# Patient Record
Sex: FEMALE | Race: WHITE | NOT HISPANIC OR LATINO | Employment: OTHER | URBAN - NONMETROPOLITAN AREA
[De-identification: names, ages, dates, MRNs, and addresses within clinical notes are randomized per-mention and may not be internally consistent; named-entity substitution may affect disease eponyms.]

---

## 2017-01-19 ENCOUNTER — HOSPITAL ENCOUNTER (OUTPATIENT)
Dept: RADIOLOGY | Facility: HOSPITAL | Age: 77
Discharge: HOME | End: 2017-01-19
Attending: INTERNAL MEDICINE
Payer: MEDICARE

## 2017-01-19 ENCOUNTER — TRANSCRIBE ORDERS (OUTPATIENT)
Dept: ADMINISTRATIVE | Facility: HOSPITAL | Age: 77
End: 2017-01-19

## 2017-01-19 DIAGNOSIS — M25.552 PAIN IN LEFT HIP: ICD-10-CM

## 2017-01-19 PROCEDURE — 72110 X-RAY EXAM L-2 SPINE 4/>VWS: CPT

## 2017-01-19 PROCEDURE — 73502 X-RAY EXAM HIP UNI 2-3 VIEWS: CPT

## 2017-01-24 ENCOUNTER — ALLSCRIPTS OFFICE VISIT (OUTPATIENT)
Dept: OTHER | Facility: OTHER | Age: 77
End: 2017-01-24

## 2017-05-11 ENCOUNTER — ALLSCRIPTS OFFICE VISIT (OUTPATIENT)
Dept: OTHER | Facility: OTHER | Age: 77
End: 2017-05-11

## 2017-05-11 DIAGNOSIS — I10 ESSENTIAL (PRIMARY) HYPERTENSION: ICD-10-CM

## 2017-05-11 DIAGNOSIS — R09.89 OTHER SPECIFIED SYMPTOMS AND SIGNS INVOLVING THE CIRCULATORY AND RESPIRATORY SYSTEMS: ICD-10-CM

## 2017-05-11 DIAGNOSIS — E03.9 HYPOTHYROIDISM: ICD-10-CM

## 2017-05-11 DIAGNOSIS — E78.5 HYPERLIPIDEMIA: ICD-10-CM

## 2017-05-11 DIAGNOSIS — R53.83 OTHER FATIGUE: ICD-10-CM

## 2017-05-27 ENCOUNTER — APPOINTMENT (OUTPATIENT)
Dept: LAB | Facility: HOSPITAL | Age: 77
End: 2017-05-27
Attending: INTERNAL MEDICINE
Payer: MEDICARE

## 2017-05-27 ENCOUNTER — TRANSCRIBE ORDERS (OUTPATIENT)
Dept: ADMINISTRATIVE | Facility: HOSPITAL | Age: 77
End: 2017-05-27

## 2017-05-27 DIAGNOSIS — R53.83 OTHER FATIGUE: ICD-10-CM

## 2017-05-27 DIAGNOSIS — E78.5 HYPERLIPIDEMIA: ICD-10-CM

## 2017-05-27 DIAGNOSIS — I10 ESSENTIAL (PRIMARY) HYPERTENSION: ICD-10-CM

## 2017-05-27 DIAGNOSIS — R09.89 OTHER SPECIFIED SYMPTOMS AND SIGNS INVOLVING THE CIRCULATORY AND RESPIRATORY SYSTEMS: ICD-10-CM

## 2017-05-27 LAB
ALBUMIN SERPL BCP-MCNC: 3.8 G/DL (ref 3.5–5)
ALP SERPL-CCNC: 76 U/L (ref 46–116)
ALT SERPL W P-5'-P-CCNC: 35 U/L (ref 12–78)
ANION GAP SERPL CALCULATED.3IONS-SCNC: 7 MMOL/L (ref 4–13)
AST SERPL W P-5'-P-CCNC: 31 U/L (ref 5–45)
BASOPHILS # BLD AUTO: 0.03 THOUSANDS/ΜL (ref 0–0.1)
BASOPHILS NFR BLD AUTO: 1 % (ref 0–1)
BILIRUB SERPL-MCNC: 0.4 MG/DL (ref 0.2–1)
BUN SERPL-MCNC: 25 MG/DL (ref 5–25)
CALCIUM SERPL-MCNC: 9 MG/DL (ref 8.3–10.1)
CHLORIDE SERPL-SCNC: 101 MMOL/L (ref 100–108)
CHOLEST SERPL-MCNC: 177 MG/DL (ref 50–200)
CO2 SERPL-SCNC: 29 MMOL/L (ref 21–32)
CREAT SERPL-MCNC: 0.86 MG/DL (ref 0.6–1.3)
EOSINOPHIL # BLD AUTO: 0.17 THOUSAND/ΜL (ref 0–0.61)
EOSINOPHIL NFR BLD AUTO: 4 % (ref 0–6)
ERYTHROCYTE [DISTWIDTH] IN BLOOD BY AUTOMATED COUNT: 13.2 % (ref 11.6–15.1)
GFR SERPL CREATININE-BSD FRML MDRD: >60 ML/MIN/1.73SQ M
GLUCOSE P FAST SERPL-MCNC: 108 MG/DL (ref 65–99)
HCT VFR BLD AUTO: 41.4 % (ref 34.8–46.1)
HDLC SERPL-MCNC: 41 MG/DL (ref 40–60)
HGB BLD-MCNC: 13.5 G/DL (ref 11.5–15.4)
LDLC SERPL CALC-MCNC: 79 MG/DL (ref 0–100)
LYMPHOCYTES # BLD AUTO: 1.58 THOUSANDS/ΜL (ref 0.6–4.47)
LYMPHOCYTES NFR BLD AUTO: 36 % (ref 14–44)
MCH RBC QN AUTO: 30.6 PG (ref 26.8–34.3)
MCHC RBC AUTO-ENTMCNC: 32.6 G/DL (ref 31.4–37.4)
MCV RBC AUTO: 94 FL (ref 82–98)
MONOCYTES # BLD AUTO: 0.63 THOUSAND/ΜL (ref 0.17–1.22)
MONOCYTES NFR BLD AUTO: 14 % (ref 4–12)
NEUTROPHILS # BLD AUTO: 2.01 THOUSANDS/ΜL (ref 1.85–7.62)
NEUTS SEG NFR BLD AUTO: 45 % (ref 43–75)
PLATELET # BLD AUTO: 225 THOUSANDS/UL (ref 149–390)
PMV BLD AUTO: 10 FL (ref 8.9–12.7)
POTASSIUM SERPL-SCNC: 4.5 MMOL/L (ref 3.5–5.3)
PROT SERPL-MCNC: 7.5 G/DL (ref 6.4–8.2)
RBC # BLD AUTO: 4.41 MILLION/UL (ref 3.81–5.12)
SODIUM SERPL-SCNC: 137 MMOL/L (ref 136–145)
TRIGL SERPL-MCNC: 285 MG/DL
TSH SERPL DL<=0.05 MIU/L-ACNC: 0.06 UIU/ML (ref 0.36–3.74)
WBC # BLD AUTO: 4.42 THOUSAND/UL (ref 4.31–10.16)

## 2017-05-27 PROCEDURE — 84443 ASSAY THYROID STIM HORMONE: CPT

## 2017-05-27 PROCEDURE — 80053 COMPREHEN METABOLIC PANEL: CPT

## 2017-05-27 PROCEDURE — 80061 LIPID PANEL: CPT

## 2017-05-27 PROCEDURE — 85025 COMPLETE CBC W/AUTO DIFF WBC: CPT

## 2017-05-27 PROCEDURE — 36415 COLL VENOUS BLD VENIPUNCTURE: CPT

## 2017-06-16 ENCOUNTER — ALLSCRIPTS OFFICE VISIT (OUTPATIENT)
Dept: OTHER | Facility: OTHER | Age: 77
End: 2017-06-16

## 2017-06-20 DIAGNOSIS — M89.9 DISORDER OF BONE: ICD-10-CM

## 2017-06-20 DIAGNOSIS — M54.9 DORSALGIA: ICD-10-CM

## 2017-07-05 ENCOUNTER — APPOINTMENT (OUTPATIENT)
Dept: PHYSICAL THERAPY | Facility: HOME HEALTHCARE | Age: 77
End: 2017-07-05
Payer: MEDICARE

## 2017-07-05 ENCOUNTER — GENERIC CONVERSION - ENCOUNTER (OUTPATIENT)
Dept: OTHER | Facility: OTHER | Age: 77
End: 2017-07-05

## 2017-07-05 DIAGNOSIS — M54.9 DORSALGIA: ICD-10-CM

## 2017-07-05 PROCEDURE — G8979 MOBILITY GOAL STATUS: HCPCS

## 2017-07-05 PROCEDURE — G8978 MOBILITY CURRENT STATUS: HCPCS

## 2017-07-05 PROCEDURE — 97535 SELF CARE MNGMENT TRAINING: CPT

## 2017-07-05 PROCEDURE — 97162 PT EVAL MOD COMPLEX 30 MIN: CPT

## 2017-07-10 ENCOUNTER — APPOINTMENT (OUTPATIENT)
Dept: PHYSICAL THERAPY | Facility: HOME HEALTHCARE | Age: 77
End: 2017-07-10
Payer: MEDICARE

## 2017-07-10 PROCEDURE — 97150 GROUP THERAPEUTIC PROCEDURES: CPT

## 2017-07-10 PROCEDURE — 97140 MANUAL THERAPY 1/> REGIONS: CPT

## 2017-07-12 ENCOUNTER — APPOINTMENT (OUTPATIENT)
Dept: PHYSICAL THERAPY | Facility: HOME HEALTHCARE | Age: 77
End: 2017-07-12
Payer: MEDICARE

## 2017-07-12 PROCEDURE — 97140 MANUAL THERAPY 1/> REGIONS: CPT

## 2017-07-12 PROCEDURE — 97150 GROUP THERAPEUTIC PROCEDURES: CPT

## 2017-07-14 ENCOUNTER — APPOINTMENT (OUTPATIENT)
Dept: PHYSICAL THERAPY | Facility: HOME HEALTHCARE | Age: 77
End: 2017-07-14
Payer: MEDICARE

## 2017-07-14 PROCEDURE — 97110 THERAPEUTIC EXERCISES: CPT

## 2017-07-14 PROCEDURE — 97014 ELECTRIC STIMULATION THERAPY: CPT

## 2017-07-19 ENCOUNTER — APPOINTMENT (OUTPATIENT)
Dept: PHYSICAL THERAPY | Facility: HOME HEALTHCARE | Age: 77
End: 2017-07-19
Payer: MEDICARE

## 2017-07-19 PROCEDURE — 97110 THERAPEUTIC EXERCISES: CPT

## 2017-07-19 PROCEDURE — 97014 ELECTRIC STIMULATION THERAPY: CPT

## 2017-07-19 PROCEDURE — 97150 GROUP THERAPEUTIC PROCEDURES: CPT

## 2017-07-21 ENCOUNTER — APPOINTMENT (OUTPATIENT)
Dept: PHYSICAL THERAPY | Facility: HOME HEALTHCARE | Age: 77
End: 2017-07-21
Payer: MEDICARE

## 2017-07-21 PROCEDURE — 97014 ELECTRIC STIMULATION THERAPY: CPT

## 2017-07-21 PROCEDURE — 97110 THERAPEUTIC EXERCISES: CPT

## 2017-07-24 ENCOUNTER — APPOINTMENT (OUTPATIENT)
Dept: PHYSICAL THERAPY | Facility: HOME HEALTHCARE | Age: 77
End: 2017-07-24
Payer: MEDICARE

## 2017-07-24 PROCEDURE — 97014 ELECTRIC STIMULATION THERAPY: CPT

## 2017-07-24 PROCEDURE — 97110 THERAPEUTIC EXERCISES: CPT

## 2017-07-28 ENCOUNTER — APPOINTMENT (OUTPATIENT)
Dept: PHYSICAL THERAPY | Facility: HOME HEALTHCARE | Age: 77
End: 2017-07-28
Payer: MEDICARE

## 2017-07-28 PROCEDURE — 97110 THERAPEUTIC EXERCISES: CPT

## 2017-07-28 PROCEDURE — 97014 ELECTRIC STIMULATION THERAPY: CPT

## 2017-07-31 ENCOUNTER — APPOINTMENT (OUTPATIENT)
Dept: PHYSICAL THERAPY | Facility: HOME HEALTHCARE | Age: 77
End: 2017-07-31
Payer: MEDICARE

## 2017-07-31 DIAGNOSIS — E03.9 HYPOTHYROIDISM: ICD-10-CM

## 2017-07-31 PROCEDURE — 97014 ELECTRIC STIMULATION THERAPY: CPT

## 2017-07-31 PROCEDURE — 97150 GROUP THERAPEUTIC PROCEDURES: CPT

## 2017-08-03 ENCOUNTER — APPOINTMENT (OUTPATIENT)
Dept: PHYSICAL THERAPY | Facility: HOME HEALTHCARE | Age: 77
End: 2017-08-03
Payer: MEDICARE

## 2017-08-04 ENCOUNTER — APPOINTMENT (OUTPATIENT)
Dept: PHYSICAL THERAPY | Facility: HOME HEALTHCARE | Age: 77
End: 2017-08-04
Payer: MEDICARE

## 2017-08-04 PROCEDURE — 97164 PT RE-EVAL EST PLAN CARE: CPT

## 2017-08-04 PROCEDURE — G8978 MOBILITY CURRENT STATUS: HCPCS

## 2017-08-04 PROCEDURE — G8979 MOBILITY GOAL STATUS: HCPCS

## 2017-08-04 PROCEDURE — 97110 THERAPEUTIC EXERCISES: CPT

## 2017-08-04 PROCEDURE — 97014 ELECTRIC STIMULATION THERAPY: CPT

## 2017-08-07 ENCOUNTER — GENERIC CONVERSION - ENCOUNTER (OUTPATIENT)
Dept: OTHER | Facility: OTHER | Age: 77
End: 2017-08-07

## 2017-08-08 ENCOUNTER — APPOINTMENT (OUTPATIENT)
Dept: PHYSICAL THERAPY | Facility: HOME HEALTHCARE | Age: 77
End: 2017-08-08
Payer: MEDICARE

## 2017-08-08 PROCEDURE — 97110 THERAPEUTIC EXERCISES: CPT

## 2017-08-08 PROCEDURE — 97014 ELECTRIC STIMULATION THERAPY: CPT

## 2017-08-11 ENCOUNTER — APPOINTMENT (OUTPATIENT)
Dept: PHYSICAL THERAPY | Facility: HOME HEALTHCARE | Age: 77
End: 2017-08-11
Payer: MEDICARE

## 2017-08-11 PROCEDURE — 97014 ELECTRIC STIMULATION THERAPY: CPT

## 2017-08-11 PROCEDURE — 97110 THERAPEUTIC EXERCISES: CPT

## 2017-08-14 ENCOUNTER — APPOINTMENT (OUTPATIENT)
Dept: PHYSICAL THERAPY | Facility: HOME HEALTHCARE | Age: 77
End: 2017-08-14
Payer: MEDICARE

## 2017-08-14 PROCEDURE — 97110 THERAPEUTIC EXERCISES: CPT

## 2017-08-14 PROCEDURE — 97014 ELECTRIC STIMULATION THERAPY: CPT

## 2017-08-17 ENCOUNTER — APPOINTMENT (OUTPATIENT)
Dept: PHYSICAL THERAPY | Facility: HOME HEALTHCARE | Age: 77
End: 2017-08-17
Payer: MEDICARE

## 2017-08-17 PROCEDURE — 97014 ELECTRIC STIMULATION THERAPY: CPT

## 2017-08-17 PROCEDURE — 97110 THERAPEUTIC EXERCISES: CPT

## 2017-08-21 ENCOUNTER — APPOINTMENT (OUTPATIENT)
Dept: PHYSICAL THERAPY | Facility: HOME HEALTHCARE | Age: 77
End: 2017-08-21
Payer: MEDICARE

## 2017-08-25 ENCOUNTER — APPOINTMENT (OUTPATIENT)
Dept: PHYSICAL THERAPY | Facility: HOME HEALTHCARE | Age: 77
End: 2017-08-25
Payer: MEDICARE

## 2017-12-29 ENCOUNTER — GENERIC CONVERSION - ENCOUNTER (OUTPATIENT)
Dept: INTERNAL MEDICINE CLINIC | Facility: CLINIC | Age: 77
End: 2017-12-29

## 2018-01-11 NOTE — CONSULTS
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[facility] [employer], presents for evaluation of hearing loss  Reports last audiogram was 5 years ago  States she is unable to hear out of left ear  [facility] Dr Jackie Kelsey has irrigated ears in passed for cerumen removal and referred today for further evaluation  Reports Q-tip and Debrox gtt use  Denies otalgia, otorrhea, ear trauma and surgery  Otherwise she is during well  She has mild decrease in her hearing bilaterally left far worse than right  Review of Systems  Complete ENT ROS [facility] [place]:   Eyes: No complaints of itching, excessive tearing or vision changes  Ears: blocked and hearing loss, but as noted in HPI  Nose: No nasal obstruction, no discharge or runniness, no bleeding, no dryness, no sneezing and no loss of smell  Mouth: No sores in mouth, no altered taste, no dental problems  Throat: No complaints of throat pain, no difficulty swallowing, no hoarseness  Neck: No neck soreness, no neck pain, no neck lumps or swelling  Genitourinary: No complaints of dysuria, flank pain or frequent urination  Cardiovascular: No complaints of chest pain or palpitations  Respiratory: No complaints of shortness of breath, cough or wheezing  Gastrointestinal: No complaints of heartburn, nausea/vomiting, or constipation  Neurological: No complaints of headache, convulsions or memory loss  ROS Reviewed:   ROS reviewed  Active Problems    1  Abdominal cramping (789 00) (R10 9)   2  Abdominal pain, unspecified abdominal location   3  Advance directive discussed with patient (V65 49) (Z71 89)   4  Alternating constipation and diarrhea (787 99) (R19 8)   5  Anxiety disorder (300 00) (F41 9)   6  Cerumen impaction (380 4) (H61 20)   7  Diarrhea (787 91) (R19 7)   8  Diverticulosis (562 10) (K57 90)   9   Dyspnea (786 09) (R06 00)   10  Encounter for dental examination (V72 2) (Z01 20)   11  Esophagitis, reflux (530 11) (K21 0)   12  Fatigue (780 79) (R53 83)   13  Generalized osteoarthritis of multiple sites (715 09) (M15 9)   14  Hip pain, left (719 45) (M25 552)   15  History of diverticulitis (V12 70) (Z87 19)   16  Hyperlipidemia (272 4) (E78 5)   17  Hypertension (401 9) (I10)   18  Hypothyroidism (244 9) (E03 9)   19  Irritable bowel syndrome with diarrhea (564 1) (K58 0)   20  Kidney stone (592 0) (N20 0)   21  Low back pain without sciatica, unspecified back pain laterality   22  Need for influenza vaccination (V04 81) (Z23)   23  Need for vaccination with 13-polyvalent pneumococcal conjugate vaccine (V03 82) (Z23)   24  Other muscle spasm (728 85) (M62 838)   25  Pulmonary emphysema (492 8) (J43 9)   26  Refused influenza vaccine (V64 06) (Z28 21)   27  Stool guaiac positive (792 1) (R19 5)   28  Symptomatic menopausal or female climacteric states (627 2) (N95 1)   29  Vitamin D deficiency (268 9) (E55 9)   30  Von Willebrand disease (286 4) (D68 0)    Past Medical History    1  History of Acute sinusitis (461 9) (J01 90)   2  History of Bronchitis (490) (J40)   3  History of Chest congestion (786 9) (R09 89)   4  History of Contact dermatitis (692 9) (L25 9)   5  History of Upper respiratory infection (465 9) (J06 9)   6  History of Urinary tract infection (599 0) (N39 0)  Past Medical History Reviewed: The past medical history was reviewed and updated today  Surgical History    1  History of Hysterectomy  Surgical History Reviewed: The surgical history was reviewed and updated today  Family History    1  Family history of Deficiency of clotting factor (286 9) (D68 4)    2  Family history of Aneurysm (442 9) (I72 9)  Family History Reviewed: The family history was reviewed and updated today         Social History    · Always uses seat belt   · Assistive Devices: Cane   · Caffeine Use   · Dental care, regularly   · Never a smoker   · No illicit drug use  Social History Reviewed: The social history was reviewed and updated today  The social history was reviewed and is unchanged  Current Meds   1  Align Oral Capsule; USE AS DIRECTED; Therapy: 45IKJ3927 to (Evaluate:07Gkq7435); Last Rx:25Nov2015 Ordered   2  ALPRAZolam 0 25 MG Oral Tablet; TAKE 1 TABLET BY MOUTH FOUR TIMES A DAY IF   NEEDED; Therapy: 79VHV4183 to (Evaluate:54Krh5811)  Requested for: 58RJX8533; Last   Rx:17Mar2017 Ordered   3  Carisoprodol 250 MG Oral Tablet; TAKE 1 TABLET Every 8 hours; Therapy: 31ZPY8906 to (Evaluate:35Xnj6339); Last Rx:02Jun2017 Ordered   4  Desmopressin Acetate Spray 0 01 % Nasal Solution; USE 1 SPRAY IN EACH NOSTRIL   ONCE DAILY; Therapy: 27Lvi4855 to (Last Rx:10Apr2017)  Requested for: 88Wks6151 Ordered   5  Dicyclomine HCl - 20 MG Oral Tablet; TAKE 1 TABLET EVERY 6 HOURS AS NEEDED; Therapy: 70IYG0396 to (Evaluate:05Bvh0067)  Requested for: 79NDB7888; Last   Rx:25Nov2015 Ordered   6  HydroCHLOROthiazide 50 MG Oral Tablet; Take 1 tablet daily; Therapy: 09Apr2012 to (Evaluate:25Nov2017)  Requested for: 62QHY8705; Last   Rx:30Nov2016 Ordered   7  Levothyroxine Sodium 125 MCG Oral Tablet; TAKE 1 TABLET DAILY; Therapy: 39EKP4968 to (GXJHHGIZ:70OEU8997)  Requested for: 05OIJ7718; Last   Rx:25Jan2017 Ordered   8  Lisinopril 10 MG Oral Tablet; take 1 tablet by mouth twice a day; Therapy: 08FMT0554 to (Evaluate:25Nov2017)  Requested for: 73JSA1185; Last   Rx:30Nov2016 Ordered   9  Metoprolol Tartrate 25 MG Oral Tablet; take 1 tablet by mouth twice a day; Therapy: 58KLZ4677 to (Silvia Purl)  Requested for: 84UZH7308; Last   Rx:09Mar2017 Ordered   10  Nadolol 20 MG Oral Tablet; TAKE 1 TABLET DAILY; Therapy: 54PMA7834 to (Evaluate:19Oct2017)  Requested for: 24Oct2016; Last    Rx:94Tyy7432 Ordered   11  Pantoprazole Sodium 40 MG Oral Tablet Delayed Release; Take 1 tablet daily;     Therapy: 52Fep2674 to (CLAIREOTDA:29LNP6269)  Requested for: 95IJL4855; Last    Rx:25Jan2017 Ordered   12  PARoxetine HCl - 30 MG Oral Tablet; TAKE 1 TABLET DAILY AS DIRECTED; Therapy: 07JZL2470 to (Evaluate:19Apr2018)  Requested for: 24Apr2017; Last    Rx:24Apr2017 Ordered   13  Simvastatin 40 MG Oral Tablet; TAKE 1 TABLET BY MOUTH AT BEDTIME  NO    GRAPEFRUIT OR GRAPEFRUIT JUICE; Therapy: 02Cqb1840 to (Evaluate:19Oct2017)  Requested for: 24Oct2016; Last    Rx:24Oct2016 Ordered   14  Symbicort 160-4 5 MCG/ACT Inhalation Aerosol; INHALE 2 PUFFS BY MOUTH  TWICE    DAILY OR AS        DIRECTED BY PHYSICIAN; Therapy: 07HJK5860 to (Evaluate:01Feb2018)  Requested for: 61SPQ7685; Last    Rx:98Sbb8006 Ordered   15  Vitamin D3 85237 UNIT Oral Capsule; TAKE 1 TABLET WEEKLY FOR 12 WEEKS THEN    OTC VITAMIN D3;    Therapy: 30AXX3127 to (Tanja Helms)  Requested for: 69Zdj8299; Last    Rx:51Klv2199 Ordered   16  Xifaxan 550 MG Oral Tablet; TAKE 1 TABLET 3 times daily; Therapy: 54YRN9131 to (Tanja Helms)  Requested for: 32KKS6169; Last    Rx:01Mar2017 Ordered   17  Xifaxan 550 MG Oral Tablet; TAKE 3 TABLET Daily SAMPLES LOT # [id number] EXP 10/19; Therapy: 66AAY3766 to (Evaluate:44Adl6164) Recorded    Allergies    1  Codeine Derivatives   2  NSAIDs   3  Penicillins    Vitals  Signs   Recorded: 16Jun2017 09:33AM   Heart Rate: 71  Systolic: 678, LUE, Sitting  Diastolic: 49, LUE, Sitting  Height: 5 ft 7 in  Weight: 178 lb 6 oz  BMI Calculated: 27 94  BSA Calculated: 1 93    Physical Exam    Constitutional:   General appearance: Well developed, well nourished  Ability to communicate: Voice normal  Speech normal    Head and Face:   Head and face: Head normocephalic, atraumatic with no lesions or palpable masses  Palpation of the face for sinus tenderness: No sinus tenderness  Submandibular glands and parotid glands: non tender, no masses     Ears:   Otoscopic examination: Abnormal  Left ear cerumen impaction, TM intact, normal landmarks  Hearing: Normal    Nose:   External auditory canals: No cerumen impaction noted, no drainage observed, no edema noted in EAC, no exostoses present, no osteoma present, no tenderness noted  External Inspection of Nose: No deformities observed, no deviation of bone structure, no skin lesion present, no swelling present  Nares are symmetric, no deviation of caudal portion of septum  Nasal Mucosa: No congestion observed, no mucosal lesion or masses present, no ulcerations observed  Cartilaginous Septum: midline, no bleeding noted, no crusting present, no perforation noted  Turbinates: No hypertrophy or inflammation noted  foreshortened septum from rhinoplasty in 1970s  Poor tip support  Mouth: Inspection of Lips, Teeth, Gums: Lips normal in color, moist, no cracks or lesions  No loose teeth, no missing teeth  Gingiva: no bleeding observed, no inflammation present  Hard Palate: no asymmetry observed, no torus present  Soft palate normal with no ulcers noted  Throat:   Examination of Oropharynx: Oral Mucosa: no masses, lesions, leukoplakia, or scarring  Normal Pratik's ducts, pink and moist, no discoloration noted  Floor of mouth: normal Warthin's ducts, no lesions, ulcerations, leukoplakia or torus mandibularis  Tonsils: no hypertrophy or ulcerations noted  Tongue: normal mobility, surfaces without fissures, leukoplakia, ulceration or masses, not enlarged, no pallor noted, no white patches present  Inspect Pharyngeal Walls/Pyriform Sinus: No edema of posterior pharyngeal walls observed  Neck:   Examination of Neck: No decreased range of motion, trachea midline  Examination of Thyroid: Normal size, non-tender, no palpable masses  Pulmonary:   Respiratory effort: Normal respiratory rate and rhythm, no increased work of breathing  Cardiovascular:   Inspection of Peripheral vascular system by observation: Normal    Neurological/Psychiatric:   Oriented to person, place, and time  Cooperative, in no acute distress  Additional Findings: Nose:    Mucosa: Edematous   Turbinates: Hypertrophic bilaterally   Septum: foreshortened and right deviated   Internal valves: bilateral nasal valve obstruction +Acadia maneuver    External valves:   Zone 1  Zone 2      Right:  0  0      Left:  0  0   Nasal tip: Poor tip support without droop  Wide nasal tip  External contour: No gross deformities  Normal doral aesthetic lines  Good tip refinement  Tip defining points present and appropriate  Gender appropriate rotation and size appropriate projection   Nasal bones: No obvious deformities of the nasal bones or dorsum  Procedure  Procedure: Cerumen debridement left    Indications: Cerumen impaction left    Procedure in detail: After informed verbal consent was obtained the ear was visualized using microscopy  Using cerumen loop, suction, and alligator forceps the cerumen was debrided and the findings below were seen  The patient tolerated the procedure well  FINDINGS:  Bilateral tympanic membranes are intact with intact normal landmarks  Assessment    1  Cerumen impaction (380 4) (H61 20)   2  Hypertension (401 9) (I10)   3  Von Willebrand disease (286 4) (D68 0)   4  History of Hysterectomy   5  Dental care, regularly   6  Hearing loss (389 9) (H91 90)   7  Never a smoker    Plan    1  Comprehensive Audiogram with Tympanometry; Status:Active; Requested   QGP:[id number];     Discussion/Summary  Discussion Summary:   We discussed ongoing management of cerumen  Educated to discontinue utilizing Q-tips and continue with Debrox or hydrogen peroxide gtts  Patient's hearing in left ear improved after cerumen debridement  Recommended comprehensive audiogram with tympanometry       Follow up with Dr Aurelio Joy or Fredrick Francis after audiogram       Signatures   Electronically signed by : CHRIS Sun ; Jun 16 2017 12:38PM EST                       (Author)

## 2018-01-12 VITALS
HEART RATE: 71 BPM | HEIGHT: 67 IN | WEIGHT: 178.38 LBS | DIASTOLIC BLOOD PRESSURE: 49 MMHG | BODY MASS INDEX: 28 KG/M2 | SYSTOLIC BLOOD PRESSURE: 110 MMHG

## 2018-01-14 VITALS
SYSTOLIC BLOOD PRESSURE: 124 MMHG | BODY MASS INDEX: 26.99 KG/M2 | OXYGEN SATURATION: 97 % | DIASTOLIC BLOOD PRESSURE: 72 MMHG | HEART RATE: 75 BPM | TEMPERATURE: 98.3 F | HEIGHT: 69 IN | WEIGHT: 182.25 LBS

## 2018-01-14 NOTE — MISCELLANEOUS
Message  Spoke with pt  regarding Xifaxin prescription  She reports that she is having symptoms consistent with her usual IBS-D and would like another prescription for Xifaxin  She notes that is expensive at her pharmacy but states she was approved to receive another two weeks of the medication for free, but is not sure through what company or means  She notes that Sienna sawyer [place] helped her with this in the past  I will ask Marilu Carlton for assistance and prescribe the Xifaxin once I get a response        Signatures   Electronically signed by : Mir Valentine, [facility]; Dec 15 2016  3:32PM EST                       (Author)

## 2018-01-15 VITALS
BODY MASS INDEX: 26.14 KG/M2 | OXYGEN SATURATION: 95 % | TEMPERATURE: 97.3 F | HEART RATE: 62 BPM | WEIGHT: 177 LBS | DIASTOLIC BLOOD PRESSURE: 61 MMHG | SYSTOLIC BLOOD PRESSURE: 120 MMHG

## 2018-01-16 NOTE — MISCELLANEOUS
Message  GI Reminder Recall Reji Howard:   Date: 11/21/2016   Dear Eloy Samples:     Review of our records shows you are due for the following: Follow-up visit  Our office attempted to reach you with no response  Please call the following office to schedule your appointment:   [address] [facility], [place], [facility], [address] [phone]  We look forward to hearing from you!      Sincerely,     [facility]      Signatures   Electronically signed by : Silviano Combs, ; Nov 21 2016 10:21AM EST                       (Author)

## 2018-01-24 NOTE — PROGRESS NOTES
Assessment   1  Irritable bowel syndrome with diarrhea (564 1) (K58 0)  2  Anxiety disorder (300 00) (F41 9)  3  Esophagitis, reflux (530 11) (K21 0)  4  Generalized osteoarthritis of multiple sites (715 09) (M15 9)    Plan  Anxiety disorder    · Renew: ALPRAZolam 0 25 MG Oral Tablet; TAKE 1 TABLET BY MOUTH FOUR TIMES A DAY IF  NEEDED  Anxiety disorder, Irritable bowel syndrome with diarrhea    · Follow-up visit in 4 Months Evaluation and Treatment  Follow-up  Status: Hold For - Scheduling   Requested for: 21Jan2016    Discussion/Summary  Discussion Summary:   Continue present rx  Pt deferred colonoscopy due to clotting disorder  Continue bowel rx if able to afford  Increase fluids  Rto 4 months/prn  Refill alprazolam1    Medication SE Review and Pt Understands Tx: Possible side effects of new medications were reviewed with the patient/guardian today1  The treatment plan was reviewed with the patient/guardian  The patient/guardian understands and agrees with the treatment plan1        1 Amended By: Srinivas Andres; Jan 21 2016 9:15 PM EST    Chief Complaint  Chief Complaint Free Text Note Form: pt is being seen today for a follow up visit  discussed medication with pt  printing rx  Chief Complaint Chronic Condition [facility]: Patient is here today for follow up of chronic conditions described in HPI  History of Present Illness  HPI: Pt saw GI, seems to be benefiting from [facility]  Still stressed and misses her   Tries to stay active and social  No cp or sob 1    Hospital Based Practices Required Assessment:   Pain Assessment1    The patient states they do not have pain  1   Abuse And Domestic Violence Screen 1    Yes, the patient is safe at home  1    The patient states no one is hurting them1   Depression And Suicide Screen1   No, the patient has not had thoughts of hurting themself  1     Yes, the patient has felt depressed in the past 7 days  1         1 Amended By: Srinivas Andres; Jan 21 2016 9:11 PM EST    Review of Systems  Complete-Female:   Constitutional:1  feeling tired1   Eyes:1  No complaints of eye pain, no red eyes, no eyesight problems, no discharge, no dry eyes, no itching of eyes1   ENT:1  no complaints of earache, no loss of hearing, no nose bleeds, no nasal discharge, no sore throat, no hoarseness1   Cardiovascular:1  No complaints of slow heart rate, no fast heart rate, no chest pain, no palpitations, no leg claudication, no lower extremity edema1   Respiratory: shortness of breath during exertion1   Gastrointestinal:1  diarrhea1   Genitourinary:1  No complaints of dysuria, no incontinence, no pelvic pain, no dysmenorrhea, no vaginal discharge or bleeding1   Musculoskeletal:1  arthralgias1   Integumentary:1  No complaints of skin rash or lesions, no itching, no skin wounds, no breast pain or lump1   Neurological:1  No complaints of headache, no confusion, no convulsions, no numbness, no dizziness or fainting, no tingling, no limb weakness, no difficulty walking1   Psychiatric: anxiety1   Endocrine:1  No complaints of proptosis, no hot flashes, no muscle weakness, no deepening of the voice, no feelings of weakness1   Hematologic/Lymphatic:1  No complaints of swollen glands, no swollen glands in the neck, does not bleed easily, does not bruise easily1   Preventive Quality 65 Older:   Preventive Quality 65 and Older:1  Falls Risk:1  The patient fell 0 times in the past 12 months  1   The patient is currently asymptomatic1  Symptoms Include: 1  Associated symptoms: 1  No associated symptoms are reported1   The patient currently has no urinary incontinence symptoms1         1 Amended By: Srinivas Andres; Jan 21 2016 9:12 PM EST    Active Problems   1  Abdominal cramping (789 00) (R10 9)  2  Abdominal pain, unspecified abdominal location (789 00) (R10 9)  3  Anxiety disorder (300 00) (F41 9)  4  Chest congestion (786 9) (R09 89)  5  Diarrhea (787 91) (R19 7)  6  Diverticulosis (562 10) (K57 90)  7  Dyspnea (786 09) (R06 00)  8  Esophagitis, reflux (530 11) (K21 0)  9  Generalized osteoarthritis of multiple sites (715 09) (M15 9)  10  History of diverticulitis (V12 70) (Z87 19)  11  Hyperlipidemia (272 4) (E78 5)  12  Hypertension (401 9) (I10)  13  Hypothyroidism (244 9) (E03 9)  14  Irritable bowel syndrome with diarrhea (564 1) (K58 0)  15  Kidney stone (592 0) (N20 0)  16  Low back pain without sciatica, unspecified back pain laterality (724 2) (M54 5)  17  Need for influenza vaccination (V04 81) (Z23)  18  Other muscle spasm (728 85) (M62 838)  19  Pulmonary emphysema (492 8) (J43 9)  20  Stool guaiac positive (792 1) (R19 5)  21  Vitamin D deficiency (268 9) (E55 9)  22  Von Willebrand disease (286 4) (D68 0)    Past Medical History   1  History of Acute sinusitis (461 9) (J01 90)  2  History of Bronchitis (490) (J40)  3  History of Contact dermatitis (692 9) (L25 9)  4  History of Upper respiratory infection (465 9) (J06 9)  5  History of Urinary tract infection (599 0) (N39 0)  Active Problems And Past Medical History Reviewed: The active problems and past medical history were reviewed and updated today  Surgical History   1  History of Hysterectomy  Surgical History Reviewed: The surgical history was reviewed and updated today  Family History   1  Family history of Deficiency of clotting factor (286 9) (D68 4)   2  Family history of Aneurysm (442 9) (I72 9)  Family History Reviewed: The family history was reviewed and updated today  Social History    · Caffeine Use   · Never A Smoker  Social History Reviewed: The social history was reviewed and updated today  The social history was reviewed and is unchanged  Current Meds  1  Align Oral Capsule; USE AS DIRECTED; Therapy: 82DSP6613 to (Evaluate:19Vew5203); Last Rx:25Nov2015 Ordered  2  ALPRAZolam 0 25 MG Oral Tablet; TAKE 1 TABLET BY MOUTH FOUR TIMES A DAY IF NEEDED;    Therapy: 17GGA7876 to (Evaluate:14Apr2016)  Requested for: 16VPN1215; Last Rx:56Lub9316   Ordered  3  Apriso 0 375 GM Oral Capsule Extended Release 24 Hour; TAKE 4 CAPSULES DAILY IN THE   MORNING; Therapy: 85VBY7224 to (Evaluate:33Sfz4981)  Requested for: 49SLC6970; Last Rx:91Apv1984   Ordered  4  Carisoprodol 250 MG Oral Tablet; TAKE 1 TABLET Bedtime; Therapy: 18DSX7607 to (Evaluate:06Mar2016); Last Rx:89Ytc0416 Ordered  5  Dicyclomine HCl - 20 MG Oral Tablet; TAKE 1 TABLET EVERY 6 HOURS AS NEEDED; Therapy: 85JFI8880 to (Evaluate:51Cce1602)  Requested for: 08URN5447; Last Rx:25Nov2015   Ordered  6  Diphenoxylate-Atropine 2 5-0 025 MG Oral Tablet; Take 1 Q 8 hours prn; Therapy: 83AIL5200 to (Evaluate:12Oct2015); Last Rx:63Tnz9297 Ordered  7  Hydrochlorothiazide 50 MG Oral Tablet; Take 1 tablet daily; Therapy: 23Ada7286 to (Evaluate:27Oct2016)  Requested for: 55FCH6000; Last Rx:02Nov2015   Ordered  8  Levothyroxine Sodium 112 MCG Oral Tablet; TAKE 1 TABLET IN THE     MORNING ON AN EMPTY        STOMACH 45 MINUTES BEFOREBREAKFAST  TAKE WITH WATE; Therapy: 33Bcx1993 to (Evaluate:27Oct2016)  Requested for: 46JSS9942; Last Rx:02Nov2015   Ordered  9  Lisinopril 10 MG Oral Tablet; take 1 tablet by mouth twice a day; Therapy: 11NHZ1899 to (Evaluate:27Oct2016)  Requested for: 61TQU9535; Last Rx:02Nov2015   Ordered  10  Meloxicam 7 5 MG Oral Tablet; TAKE 1 TABLET DAILY WITH FOOD; Therapy: 79XVV1288 to (Evaluate:07Jan2016); Last Rx:09Oct2015 Ordered  11  Methocarbamol 500 MG Oral Tablet; TAKE 1 Q 12 HOURS PRN; Therapy: 57QJI3412 to (Roxana Pry)  Requested for: 30Jun2014; Last Rx:30Jun2014    Ordered  12  MetroNIDAZOLE 500 MG Oral Tablet; Take 1 TID; Therapy: 57NTP4442 to (Myna Parcel)  Requested for: 25EVL0279; Last Rx:28Oct2015    Ordered  13  Nadolol 20 MG Oral Tablet; TAKE 1 TABLET DAILY; Therapy: 57REX9396 to (Evaluate:27Oct2016)  Requested for: 26FBX1483; Last Rx:02Nov2015    Ordered  14   Pantoprazole Sodium 40 MG Oral Tablet Delayed Release; Take 1 tablet daily; Therapy: 34Qvb9542 to (Evaluate:59Kfn5664)  Requested for: 08APN1522; Last Rx:03Nov2015    Ordered  15  PARoxetine HCl - 30 MG Oral Tablet; TAKE 1 TABLET DAILY AS DIRECTED; Therapy: 92ZSS0553 to [phone])  Requested for: 14OQB7769; Last Rx:02Nov2015    Ordered  16  Simvastatin 40 MG Oral Tablet; TAKE 1 TABLET DAILY AT   BEDTIME  NO                 GRAPEFRUIT/GRAPEFRUIT    JUICE; Therapy: 72Gsr1782 to (Evaluate:96Esy7165)  Requested for: 45TRN0150; Last Rx:02Nov2015    Ordered  17  Symbicort 160-4 5 MCG/ACT Inhalation Aerosol; INHALE 2 PUFFS BY MOUTH  TWICE DAILY OR AS           DIRECTED BY PHYSICIAN; Therapy: 85JYH0549 to [phone])  Requested for: 94KLM8319; Last Rx:02Nov2015    Ordered  18  Vitamin D3 55817 UNIT Oral Capsule; TAKE 1 TABLET WEEKLY FOR 12 WEEKS THEN OTC VITAMIN    D3;    Therapy: 57REF0769 to (Cherie Tucker)  Requested for: 40Rih5383; Last Rx:11Sep2015    Ordered  19  Xifaxan 550 MG Oral Tablet; TAKE 1 TABLET 3 times daily; Therapy: 10AFG7784 to (Evaluate:26Clk6260)  Requested for: 21ATK9927; Last Rx:11Jan2016    Ordered  20  Xifaxan 550 MG Oral Tablet; TAKE 1 TABLET 3 times daily; Therapy: 04LPE9344 to (Rea Wolfe)  Requested for: 56IWI4826; Last Rx:18Jan2016    Ordered  Medication List Reviewed: The medication list was reviewed and updated today  Allergies   1  Codeine Derivatives  2  NSAIDs  3  Penicillins    Vitals  Vital Signs [Data Includes: Current Encounter]    Recorded: 21Jan2016 11:25AM Recorded: 21Jan2016 11:09AM   Temperature  35 7 F   Systolic 555    Diastolic 72    Height  5 ft 9 in   Weight  170 lb 8 0 oz   BMI Calculated  25 18   BSA Calculated  1 93     Physical Exam    Constitutional1    General appearance: No acute distress, well appearing and well nourished  1    Eyes1    Conjunctiva and lids: No swelling, erythema or discharge  1    Pupils and irises: Equal, round and reactive to light  1    Ears, Nose, Mouth, and Throat1    External inspection of ears and nose: Normal 1    Otoscopic examination: Tympanic membranes translucent with normal light reflex  Canals patent without erythema  1    Nasal mucosa, septum, and turbinates: Normal without edema or erythema  1    Oropharynx: Normal with no erythema, edema, exudate or lesions  1    Pulmonary1    Respiratory effort: No increased work of breathing or signs of respiratory distress  1    Auscultation of lungs: Clear to auscultation  1    Cardiovascular1    Palpation of heart: Normal PMI, no thrills  1    Auscultation of heart: Normal rate and rhythm, normal S1 and S2, without murmurs  1    Examination of extremities for edema and/or varicosities: Normal 1    Carotid pulses: Normal 1    Abdomen1    Abdomen: Non-tender, no masses  1    Liver and spleen: No hepatomegaly or splenomegaly  1    Lymphatic1    Palpation of lymph nodes in neck: No lymphadenopathy  1    Musculoskeletal1    Gait and station: Normal 1    Digits and nails: Normal without clubbing or cyanosis  1    Inspection/palpation of joints, bones, and muscles: Abnormal  1  Djd1   Skin1    Skin and subcutaneous tissue: Normal without rashes or lesions  1    Neurologic1    Cranial nerves: Cranial nerves 2-12 intact  1    Reflexes: 2+ and symmetric  1    Sensation: No sensory loss  1    Psychiatric1    Orientation to person, place, and time: Normal 1    Mood and affect: Normal 1          1 Amended By: Giovanni Bright; Jan 21 2016 9:13 PM EST    Health Management  Health Maintenance   Medicare Annual Wellness Visit; every 1 year; Last 08Apr2015; Next Due: 89Ozn5575;  Active    Future Appointments    Date/Time Provider Specialty Site   03/09/2016 10:30 AM Keisha Modi DO Gastroenterology [facility] [address] [place] [address] [facility]     Signatures   Electronically signed by : Kalie Lao DO; Jan 21 2016 11:45AM EST                       (Author)    Electronically signed by : Jeffery Eller DO; Jan 21 2016  9:15PM EST                       (Author)

## 2018-01-26 DIAGNOSIS — E03.9 ACQUIRED HYPOTHYROIDISM: Primary | ICD-10-CM

## 2018-01-26 RX ORDER — LEVOTHYROXINE SODIUM 0.12 MG/1
TABLET ORAL
Qty: 90 TABLET | Refills: 3 | Status: SHIPPED | OUTPATIENT
Start: 2018-01-26 | End: 2018-02-01 | Stop reason: SDUPTHER

## 2018-02-01 DIAGNOSIS — E03.9 ACQUIRED HYPOTHYROIDISM: ICD-10-CM

## 2018-02-01 RX ORDER — LEVOTHYROXINE SODIUM 0.12 MG/1
125 TABLET ORAL DAILY
Qty: 90 TABLET | Refills: 3 | Status: SHIPPED | OUTPATIENT
Start: 2018-02-01 | End: 2018-02-01 | Stop reason: SDUPTHER

## 2018-02-02 RX ORDER — LEVOTHYROXINE SODIUM 0.12 MG/1
TABLET ORAL
Qty: 90 TABLET | Refills: 3 | Status: SHIPPED | OUTPATIENT
Start: 2018-02-02 | End: 2019-12-23 | Stop reason: ALTCHOICE

## 2018-02-09 ENCOUNTER — TELEPHONE (OUTPATIENT)
Dept: INTERNAL MEDICINE CLINIC | Facility: CLINIC | Age: 78
End: 2018-02-09

## 2018-02-09 DIAGNOSIS — I10 ESSENTIAL HYPERTENSION: ICD-10-CM

## 2018-02-09 DIAGNOSIS — R73.03 PREDIABETES: Primary | ICD-10-CM

## 2018-02-26 ENCOUNTER — APPOINTMENT (OUTPATIENT)
Dept: LAB | Facility: HOSPITAL | Age: 78
End: 2018-02-26
Attending: INTERNAL MEDICINE
Payer: MEDICARE

## 2018-02-26 DIAGNOSIS — R73.03 PREDIABETES: ICD-10-CM

## 2018-02-26 DIAGNOSIS — I10 ESSENTIAL HYPERTENSION: ICD-10-CM

## 2018-02-26 LAB
ALBUMIN SERPL BCP-MCNC: 3.6 G/DL (ref 3.5–5)
ALP SERPL-CCNC: 77 U/L (ref 46–116)
ALT SERPL W P-5'-P-CCNC: 32 U/L (ref 12–78)
ANION GAP SERPL CALCULATED.3IONS-SCNC: 9 MMOL/L (ref 4–13)
AST SERPL W P-5'-P-CCNC: 26 U/L (ref 5–45)
BILIRUB SERPL-MCNC: 0.2 MG/DL (ref 0.2–1)
BUN SERPL-MCNC: 25 MG/DL (ref 5–25)
CALCIUM SERPL-MCNC: 9 MG/DL (ref 8.3–10.1)
CHLORIDE SERPL-SCNC: 104 MMOL/L (ref 100–108)
CO2 SERPL-SCNC: 28 MMOL/L (ref 21–32)
CREAT SERPL-MCNC: 0.76 MG/DL (ref 0.6–1.3)
EST. AVERAGE GLUCOSE BLD GHB EST-MCNC: 126 MG/DL
GFR SERPL CREATININE-BSD FRML MDRD: 74 ML/MIN/1.73SQ M
GLUCOSE P FAST SERPL-MCNC: 107 MG/DL (ref 65–99)
HBA1C MFR BLD: 6 % (ref 4.2–6.3)
POTASSIUM SERPL-SCNC: 4.5 MMOL/L (ref 3.5–5.3)
PROT SERPL-MCNC: 7.3 G/DL (ref 6.4–8.2)
SODIUM SERPL-SCNC: 141 MMOL/L (ref 136–145)

## 2018-02-26 PROCEDURE — 83036 HEMOGLOBIN GLYCOSYLATED A1C: CPT

## 2018-02-26 PROCEDURE — 80053 COMPREHEN METABOLIC PANEL: CPT

## 2018-02-26 PROCEDURE — 36415 COLL VENOUS BLD VENIPUNCTURE: CPT

## 2018-02-28 ENCOUNTER — OFFICE VISIT (OUTPATIENT)
Dept: INTERNAL MEDICINE CLINIC | Facility: CLINIC | Age: 78
End: 2018-02-28
Payer: MEDICARE

## 2018-02-28 VITALS
SYSTOLIC BLOOD PRESSURE: 132 MMHG | BODY MASS INDEX: 28.31 KG/M2 | HEART RATE: 75 BPM | OXYGEN SATURATION: 93 % | HEIGHT: 67 IN | WEIGHT: 180.38 LBS | DIASTOLIC BLOOD PRESSURE: 70 MMHG | TEMPERATURE: 96.9 F

## 2018-02-28 DIAGNOSIS — K57.90 DIVERTICULOSIS OF INTESTINE WITHOUT BLEEDING, UNSPECIFIED INTESTINAL TRACT LOCATION: ICD-10-CM

## 2018-02-28 DIAGNOSIS — D68.0 VON WILLEBRAND DISEASE (HCC): ICD-10-CM

## 2018-02-28 DIAGNOSIS — J43.9 PULMONARY EMPHYSEMA, UNSPECIFIED EMPHYSEMA TYPE (HCC): Primary | ICD-10-CM

## 2018-02-28 DIAGNOSIS — I10 ESSENTIAL HYPERTENSION: ICD-10-CM

## 2018-02-28 DIAGNOSIS — M15.9 GENERALIZED OSTEOARTHRITIS OF MULTIPLE SITES: ICD-10-CM

## 2018-02-28 DIAGNOSIS — R06.00 DYSPNEA ON EXERTION: Primary | ICD-10-CM

## 2018-02-28 PROBLEM — M54.9 BACK PAIN, CHRONIC: Status: ACTIVE | Noted: 2017-06-26

## 2018-02-28 PROBLEM — G89.29 BACK PAIN, CHRONIC: Status: ACTIVE | Noted: 2017-06-26

## 2018-02-28 PROBLEM — M25.552 HIP PAIN, LEFT: Status: ACTIVE | Noted: 2017-01-19

## 2018-02-28 PROBLEM — R19.8 ALTERNATING CONSTIPATION AND DIARRHEA: Status: ACTIVE | Noted: 2017-01-29

## 2018-02-28 PROBLEM — H91.90 HEARING LOSS: Status: ACTIVE | Noted: 2017-06-16

## 2018-02-28 PROCEDURE — 99214 OFFICE O/P EST MOD 30 MIN: CPT | Performed by: INTERNAL MEDICINE

## 2018-02-28 NOTE — PROGRESS NOTES
Assessment/Plan:     Diagnoses and all orders for this visit:    Pulmonary emphysema, unspecified [facility] )  -     Complete pulmonary function test; Future  patient will schedule PFTs    Generalized osteoarthritis of multiple sites  Patient will walk for exercise as she cannot tolerate formal PT  She is aware of open order for Dexa scan and she will consider completing when she has other tests at [facility] disease ([address] )  Stable cbc    Essential hypertension  Low sodium diet  Labs reviewed with patient and no changes  Echo was ordered as patient requested and is due for repeat  She will schedule as well    Diverticulosis of intestine without bleeding, unspecified intestinal tract location    Labs reviewed with patient  Anxiety:  She is trying to move forward and has her house up for sale and already has a rental lined up once her house sells and it will be near a friend  Rto 4 months   Patient ID: Yuli Alonzo is a [de-identified] y o  female  HPI   Patient is Ok  Bowels are better overall with increased fruit in diet  Not able to do Pt  Planning to sell her house and move to half a double  Still struggles with stress regarding her children and their issues  No chest pain or sob  No uri sxs    The following portions of the patient's history were reviewed and updated as appropriate:   Past Medical History:   Diagnosis Date    Contact dermatitis     Last assessed - 8/8/12    COPD (chronic obstructive pulmonary disease) (HCC)     Disease of thyroid gland     GERD (gastroesophageal reflux disease)     Hyperlipidemia     Hypertension     Irritable bowel syndrome      Social History     Social History    Marital status:      Spouse name: N/A    Number of children: N/A    Years of education: N/A     Occupational History    Not on file       Social History Main Topics    Smoking status: Never Smoker    Smokeless tobacco: Never Used    Alcohol use No    Drug use: No    Sexual activity: Not on file     Other Topics Concern    Not on file     Social History Narrative    Always uses seat belt    Assistive devices - Cane     Caffeine use    Dental care, regularly         Allergies   Allergen Reactions    Niacin And Related Anaphylaxis    Codeine Nausea Only    Nsaids     Other      Iv contrast dye- lips and tongue thick body itchy    Penicillins Rash     Social History     Social History    Marital status:      Spouse name: N/A    Number of children: N/A    Years of education: N/A     Occupational History    Not on file  Social History Main Topics    Smoking status: Never Smoker    Smokeless tobacco: Never Used    Alcohol use No    Drug use: No    Sexual activity: Not on file     Other Topics Concern    Not on file     Social History Narrative    Always uses seat belt    Assistive devices - Cane     Caffeine use    Dental care, regularly         Review of Systems   Constitutional: Negative  HENT: Negative  Eyes: Negative  Respiratory: Negative  Cardiovascular: Negative  Gastrointestinal: Negative  Endocrine: Negative  Genitourinary: Negative  Musculoskeletal: Positive for arthralgias and back pain  Skin: Negative  Allergic/Immunologic: Negative  Neurological: Negative  Hematological: Negative  Psychiatric/Behavioral: The patient is nervous/anxious  /70   Pulse 75   Temp (!) 96 9 °F (36 1 °C) (Tympanic)   Ht 5' 7" (1 702 m)   Wt 81 8 kg (180 lb 6 oz)   SpO2 93%   BMI 28 25 kg/m²      Physical Exam   Constitutional: She is oriented to person, place, and time  She appears well-developed and well-nourished  No distress  HENT:   Head: Normocephalic and atraumatic  Right Ear: External ear normal    Left Ear: External ear normal    Nose: Nose normal    Mouth/Throat: No oropharyngeal exudate  Eyes: Conjunctivae and EOM are normal  Pupils are equal, round, and reactive to light  No scleral icterus     Neck: Normal range of motion  Neck supple  Cardiovascular: Normal rate, regular rhythm, normal heart sounds and intact distal pulses  Pulmonary/Chest: Effort normal and breath sounds normal    Abdominal: Soft  Bowel sounds are normal    Musculoskeletal: Normal range of motion  Neurological: She is alert and oriented to person, place, and time  She has normal reflexes  Skin: Skin is warm and dry  Psychiatric: She has a normal mood and affect  Her behavior is normal  Judgment and thought content normal    Nursing note and vitals reviewed

## 2018-03-07 NOTE — CONSULTS
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, retired Nursing  at [address], presents for evaluation of hearing loss  Reports last audiogram was 5 years ago  States she is unable to hear out of left ear  [facility] Dr Saumya Rae has irrigated ears in passed for cerumen removal and referred today for further evaluation  Reports Q-tip and Debrox gtt use  Denies otalgia, otorrhea, ear trauma and surgery  Otherwise she is during well  She has mild decrease in her hearing bilaterally left far worse than right  Review of Systems  Complete ENT ROS [facility]:   Eyes: No complaints of itching, excessive tearing or vision changes  Ears: blocked and hearing loss, but as noted in HPI  Nose: No nasal obstruction, no discharge or runniness, no bleeding, no dryness, no sneezing and no loss of smell  Mouth: No sores in mouth, no altered taste, no dental problems  Throat: No complaints of throat pain, no difficulty swallowing, no hoarseness  Neck: No neck soreness, no neck pain, no neck lumps or swelling  Genitourinary: No complaints of dysuria, flank pain or frequent urination  Cardiovascular: No complaints of chest pain or palpitations  Respiratory: No complaints of shortness of breath, cough or wheezing  Gastrointestinal: No complaints of heartburn, nausea/vomiting, or constipation  Neurological: No complaints of headache, convulsions or memory loss  ROS Reviewed:   ROS reviewed  Active Problems    1   Abdominal cramping (789 00) (R10 9)   2  Abdominal pain, unspecified abdominal location   3  Advance directive discussed with patient (V65 49) (Z71 89)   4  Alternating constipation and diarrhea (787 99) (R19 8)   5  Anxiety disorder (300 00) (F41 9)   6  Cerumen impaction (380 4) (H61 20)   7  Diarrhea (787 91) (R19 7)   8  Diverticulosis (562 10) (K57 90)   9  Dyspnea (786 09) (R06 00)   10  Encounter for dental examination (V72 2) (Z01 20)   11  Esophagitis, reflux (530 11) (K21 0)   12  Fatigue (780 79) (R53 83)   13  Generalized osteoarthritis of multiple sites (715 09) (M15 9)   14  Hip pain, left (719 45) (M25 552)   15  History of diverticulitis (V12 70) (Z87 19)   16  Hyperlipidemia (272 4) (E78 5)   17  Hypertension (401 9) (I10)   18  Hypothyroidism (244 9) (E03 9)   19  Irritable bowel syndrome with diarrhea (564 1) (K58 0)   20  Kidney stone (592 0) (N20 0)   21  Low back pain without sciatica, unspecified back pain laterality   22  Need for influenza vaccination (V04 81) (Z23)   23  Need for vaccination with 13-polyvalent pneumococcal conjugate vaccine (V03 82) (Z23)   24  Other muscle spasm (728 85) (M62 838)   25  Pulmonary emphysema (492 8) (J43 9)   26  Refused influenza vaccine (V64 06) (Z28 21)   27  Stool guaiac positive (792 1) (R19 5)   28  Symptomatic menopausal or female climacteric states (627 2) (N95 1)   29  Vitamin D deficiency (268 9) (E55 9)   30  Von Willebrand disease (286 4) (D68 0)    Past Medical History    1  History of Acute sinusitis (461 9) (J01 90)   2  History of Bronchitis (490) (J40)   3  History of Chest congestion (786 9) (R09 89)   4  History of Contact dermatitis (692 9) (L25 9)   5  History of Upper respiratory infection (465 9) (J06 9)   6  History of Urinary tract infection (599 0) (N39 0)  Past Medical History Reviewed: The past medical history was reviewed and updated today  Surgical History    1  History of Hysterectomy  Surgical History Reviewed:    The surgical history was reviewed and updated today  Family History  Mother    1  Family history of Deficiency of clotting factor (286 9) (D68 4)  Father    2  Family history of Aneurysm (442 9) (I72 9)  Family History Reviewed: The family history was reviewed and updated today  Social History    · Always uses seat belt   · Assistive Devices: Cane   · Caffeine Use   · Dental care, regularly   · Never a smoker   · No illicit drug use  Social History Reviewed: The social history was reviewed and updated today  The social history was reviewed and is unchanged  Current Meds   1  Align Oral Capsule; USE AS DIRECTED; Therapy: 36CGL6606 to (Evaluate:02Cut6306); Last Rx:25Nov2015 Ordered   2  ALPRAZolam 0 25 MG Oral Tablet; TAKE 1 TABLET BY MOUTH FOUR TIMES A DAY IF   NEEDED; Therapy: 31JRR8720 to (Evaluate:59Nzp5701)  Requested for: 87ZLH4801; Last   Rx:17Mar2017 Ordered   3  Carisoprodol 250 MG Oral Tablet; TAKE 1 TABLET Every 8 hours; Therapy: 14IZO5697 to (Evaluate:30Dgu0679); Last Rx:02Jun2017 Ordered   4  Desmopressin Acetate Spray 0 01 % Nasal Solution; USE 1 SPRAY IN EACH NOSTRIL   ONCE DAILY; Therapy: 42Xps9112 to (Last Rx:99Ovm0752)  Requested for: 31Jtc9046 Ordered   5  Dicyclomine HCl - 20 MG Oral Tablet; TAKE 1 TABLET EVERY 6 HOURS AS NEEDED; Therapy: 65HVJ9016 to (Evaluate:43Liv1379)  Requested for: 68QMA3806; Last   Rx:25Nov2015 Ordered   6  HydroCHLOROthiazide 50 MG Oral Tablet; Take 1 tablet daily; Therapy: 80Xvx9282 to (Evaluate:25Nov2017)  Requested for: 52HSZ1376; Last   Rx:30Nov2016 Ordered   7  Levothyroxine Sodium 125 MCG Oral Tablet; TAKE 1 TABLET DAILY; Therapy: 52IEB7338 to (LVHFZOKJ:36KCG7664)  Requested for: 34RCN1557; Last   Rx:25Jan2017 Ordered   8  Lisinopril 10 MG Oral Tablet; take 1 tablet by mouth twice a day; Therapy: 66KAO9809 to (Evaluate:25Nov2017)  Requested for: 17VKM0198; Last   Rx:30Nov2016 Ordered   9   Metoprolol Tartrate 25 MG Oral Tablet; take 1 tablet by mouth twice a day; Therapy: 37YWV5046 to (María Nagel)  Requested for: 19CJH7844; Last   Rx:09Mar2017 Ordered   10  Nadolol 20 MG Oral Tablet; TAKE 1 TABLET DAILY; Therapy: 36NSE8841 to (Evaluate:19Oct2017)  Requested for: 24Oct2016; Last    Rx:24Oct2016 Ordered   11  Pantoprazole Sodium 40 MG Oral Tablet Delayed Release; Take 1 tablet daily; Therapy: 43Wcg1355 to (Evaluate:20Jan2018)  Requested for: 25Jan2017; Last    Rx:25Jan2017 Ordered   12  PARoxetine HCl - 30 MG Oral Tablet; TAKE 1 TABLET DAILY AS DIRECTED; Therapy: 35YPL5399 to (Evaluate:19Apr2018)  Requested for: 24Apr2017; Last    Rx:24Apr2017 Ordered   13  Simvastatin 40 MG Oral Tablet; TAKE 1 TABLET BY MOUTH AT BEDTIME  NO    GRAPEFRUIT OR GRAPEFRUIT JUICE; Therapy: 12Rxb4995 to (Evaluate:19Oct2017)  Requested for: 24Oct2016; Last    Rx:24Oct2016 Ordered   14  Symbicort 160-4 5 MCG/ACT Inhalation Aerosol; INHALE 2 PUFFS BY MOUTH  TWICE    DAILY OR AS        DIRECTED BY PHYSICIAN; Therapy: 27TXN5641 to (Evaluate:01Feb2018)  Requested for: 95ZJD9567; Last    Rx:06Feb2017 Ordered   15  Vitamin D3 25362 UNIT Oral Capsule; TAKE 1 TABLET WEEKLY FOR 12 WEEKS THEN    OTC VITAMIN D3;    Therapy: 95VKT7666 to (Jb Carmona)  Requested for: 49Twi2122; Last    Rx:53Yfl7953 Ordered   16  Xifaxan 550 MG Oral Tablet; TAKE 1 TABLET 3 times daily; Therapy: 32BTY8463 to (Anika Bryson)  Requested for: 71ILG5145; Last    Rx:01Mar2017 Ordered   17  Xifaxan 550 MG Oral Tablet; TAKE 3 TABLET Daily SAMPLES LOT # [id number] EXP 10/19; Therapy: 64YMD5318 to (Evaluate:26Hlo4967) Recorded    Allergies    1  Codeine Derivatives   2  NSAIDs   3  Penicillins    Vitals  Signs   Recorded: 16Jun2017 09:33AM   Heart Rate: 71  Systolic: 165, LUE, Sitting  Diastolic: 49, LUE, Sitting  Height: 5 ft 7 in  Weight: 178 lb 6 oz  BMI Calculated: 27 94  BSA Calculated: 1 93    Physical Exam    Constitutional:   General appearance: Well developed, well nourished      Ability to communicate: Voice normal  Speech normal    Head and Face:   Head and face: Head normocephalic, atraumatic with no lesions or palpable masses  Palpation of the face for sinus tenderness: No sinus tenderness  Submandibular glands and parotid glands: non tender, no masses  Ears:   Otoscopic examination: Abnormal  Left ear cerumen impaction, TM intact, normal landmarks  Hearing: Normal    Nose:   External auditory canals: No cerumen impaction noted, no drainage observed, no edema noted in EAC, no exostoses present, no osteoma present, no tenderness noted  External Inspection of Nose: No deformities observed, no deviation of bone structure, no skin lesion present, no swelling present  Nares are symmetric, no deviation of caudal portion of septum  Nasal Mucosa: No congestion observed, no mucosal lesion or masses present, no ulcerations observed  Cartilaginous Septum: midline, no bleeding noted, no crusting present, no perforation noted  Turbinates: No hypertrophy or inflammation noted  foreshortened septum from rhinoplasty in [facility]  Poor tip support  Mouth: Inspection of Lips, Teeth, Gums: Lips normal in color, moist, no cracks or lesions  No loose teeth, no missing teeth  Gingiva: no bleeding observed, no inflammation present  Hard Palate: no asymmetry observed, no torus present  Soft palate normal with no ulcers noted  Throat:   Examination of Oropharynx: Oral Mucosa: no masses, lesions, leukoplakia, or scarring  Normal Pratik's ducts, pink and moist, no discoloration noted  Floor of mouth: normal Warthin's ducts, no lesions, ulcerations, leukoplakia or torus mandibularis  Tonsils: no hypertrophy or ulcerations noted  Tongue: normal mobility, surfaces without fissures, leukoplakia, ulceration or masses, not enlarged, no pallor noted, no white patches present  Inspect Pharyngeal Walls/Pyriform Sinus: No edema of posterior pharyngeal walls observed     Neck:   Examination of Neck: No decreased range of motion, trachea midline  Examination of Thyroid: Normal size, non-tender, no palpable masses  Pulmonary:   Respiratory effort: Normal respiratory rate and rhythm, no increased work of breathing  Cardiovascular:   Inspection of Peripheral vascular system by observation: Normal    Neurological/Psychiatric:   Oriented to person, place, and time  Cooperative, in no acute distress  Additional Findings: Nose:    Mucosa: Edematous   Turbinates: Hypertrophic bilaterally   Septum: foreshortened and right deviated   Internal valves: bilateral nasal valve obstruction +Luquillo maneuver    External valves:   Zone 1  Zone 2      Right:  0  0      Left:  0  0   Nasal tip: Poor tip support without droop  Wide nasal tip  External contour: No gross deformities  Normal doral aesthetic lines  Good tip refinement  Tip defining points present and appropriate  Gender appropriate rotation and size appropriate projection   Nasal bones: No obvious deformities of the nasal bones or dorsum  Procedure  Procedure: Cerumen debridement left    Indications: Cerumen impaction left    Procedure in detail: After informed verbal consent was obtained the ear was visualized using microscopy  Using cerumen loop, suction, and alligator forceps the cerumen was debrided and the findings below were seen  The patient tolerated the procedure well  FINDINGS:  Bilateral tympanic membranes are intact with intact normal landmarks  Discussion/Summary  Discussion Summary:   We discussed ongoing management of cerumen  Educated to discontinue utilizing Q-tips and continue with Debrox or hydrogen peroxide gtts  Patient's hearing in left ear improved after cerumen debridement  Recommended comprehensive audiogram with tympanometry       Follow up with Dr Star Palacios or Jack Santana after audiogram       Signatures   Electronically signed by : CHRIS Serrano ; Jun 16 2017 12:38PM EST                       (Author)

## 2018-03-13 ENCOUNTER — HOSPITAL ENCOUNTER (OUTPATIENT)
Dept: PULMONOLOGY | Facility: HOSPITAL | Age: 78
Discharge: HOME | End: 2018-03-13
Attending: INTERNAL MEDICINE

## 2018-03-13 RX ORDER — ALBUTEROL SULFATE 2.5 MG/3ML
2.5 SOLUTION RESPIRATORY (INHALATION) ONCE AS NEEDED
Status: DISCONTINUED | OUTPATIENT
Start: 2018-03-13 | End: 2018-03-17 | Stop reason: HOSPADM

## 2018-03-16 ENCOUNTER — HOSPITAL ENCOUNTER (OUTPATIENT)
Dept: NON INVASIVE DIAGNOSTICS | Facility: HOSPITAL | Age: 78
Discharge: HOME | End: 2018-03-16
Attending: INTERNAL MEDICINE
Payer: MEDICARE

## 2018-03-16 DIAGNOSIS — R06.00 DYSPNEA ON EXERTION: ICD-10-CM

## 2018-03-16 PROCEDURE — 93306 TTE W/DOPPLER COMPLETE: CPT

## 2018-03-16 PROCEDURE — 93306 TTE W/DOPPLER COMPLETE: CPT | Performed by: INTERNAL MEDICINE

## 2018-03-20 ENCOUNTER — TELEPHONE (OUTPATIENT)
Dept: INTERNAL MEDICINE CLINIC | Facility: CLINIC | Age: 78
End: 2018-03-20

## 2018-03-20 ENCOUNTER — HOSPITAL ENCOUNTER (EMERGENCY)
Facility: HOSPITAL | Age: 78
Discharge: HOME | End: 2018-03-20
Attending: EMERGENCY MEDICINE | Admitting: EMERGENCY MEDICINE
Payer: MEDICARE

## 2018-03-20 ENCOUNTER — HOSPITAL ENCOUNTER (OUTPATIENT)
Dept: RADIOLOGY | Facility: HOSPITAL | Age: 78
Discharge: HOME | End: 2018-03-20
Attending: INTERNAL MEDICINE
Payer: MEDICARE

## 2018-03-20 VITALS
TEMPERATURE: 99 F | HEART RATE: 65 BPM | RESPIRATION RATE: 16 BRPM | SYSTOLIC BLOOD PRESSURE: 122 MMHG | WEIGHT: 179.7 LBS | OXYGEN SATURATION: 93 % | HEIGHT: 67 IN | BODY MASS INDEX: 28.2 KG/M2 | DIASTOLIC BLOOD PRESSURE: 58 MMHG

## 2018-03-20 DIAGNOSIS — M79.601 RIGHT ARM PAIN: Primary | ICD-10-CM

## 2018-03-20 DIAGNOSIS — M24.811 INTERNAL DERANGEMENT OF RIGHT SHOULDER: Primary | ICD-10-CM

## 2018-03-20 DIAGNOSIS — M79.601 RIGHT ARM PAIN: ICD-10-CM

## 2018-03-20 PROCEDURE — 73030 X-RAY EXAM OF SHOULDER: CPT

## 2018-03-20 PROCEDURE — 99283 EMERGENCY DEPT VISIT LOW MDM: CPT

## 2018-03-20 PROCEDURE — 73060 X-RAY EXAM OF HUMERUS: CPT

## 2018-03-20 RX ORDER — ACETAMINOPHEN 325 MG/1
975 TABLET ORAL ONCE
Status: COMPLETED | OUTPATIENT
Start: 2018-03-20 | End: 2018-03-20

## 2018-03-20 RX ADMIN — ACETAMINOPHEN 975 MG: 325 TABLET, FILM COATED ORAL at 18:07

## 2018-03-20 NOTE — ED PROVIDER NOTES
History  Chief Complaint   Patient presents with    Shoulder Pain     Pt reports RT shoulder pain "on and off for a while then yesterday it goes worse" Pt denies any trauma/injury       History provided by:  Patient  Shoulder Pain   Location:  Shoulder  Shoulder location:  R shoulder  Injury: no (Patient states she has had intermittent recurrent right shoulder pain for some time particular worsening within the past 2 weeks without any preceding trauma/injury)    Pain details:     Quality:  Aching and cramping    Radiates to:  Does not radiate    Severity:  Moderate    Onset quality:  Gradual    Duration:  2 weeks    Timing:  Intermittent    Progression:  Waxing and waning  Handedness:  Right-handed  Prior injury to area:  No (No hx fracture/surgery to RUE)  Relieved by:  Being still (Pain relieved when arm in held internally rotated and flexed at elbow)  Worsened by:  Bearing weight and movement  Ineffective treatments: Has applied topical analgesic cream without improvement in symptoms  Associated symptoms: decreased range of motion and stiffness    Associated symptoms: no fatigue, no fever, no muscle weakness, no neck pain, no numbness, no swelling and no tingling    Risk factors comment:  Patient had outpatient x-rays performed prior to coming to emergency department for evaluation      Prior to Admission Medications   Prescriptions Last Dose Informant Patient Reported? Taking?    ALPRAZolam (XANAX) 0 25 mg tablet 3/20/2018 at Unknown time Self Yes Yes   Sig: Take 0 25 mg by mouth 4 (four) times a day as needed     PARoxetine (PAXIL) 30 mg tablet   Yes Yes   Sig: Take 30 mg by mouth every morning     benzonatate (TESSALON PERLES) 100 mg capsule   Yes Yes   Sig: Take 100 mg by mouth 2 (two) times a day as needed     budesonide-formoterol (SYMBICORT) 160-4 5 mcg/act inhaler   Yes Yes   Sig: Inhale 1 puff daily     dicyclomine (BENTYL) 20 mg tablet   Yes Yes   Sig: Take 20 mg by mouth every 6 (six) hours as needed     hydrochlorothiazide (HYDRODIURIL) 50 mg tablet 3/20/2018 at Unknown time  Yes Yes   Sig: Take 50 mg by mouth daily     levothyroxine 125 mcg tablet 3/20/2018 at Unknown time  No Yes   Sig: take 1 tablet by mouth once daily   lisinopril (ZESTRIL) 10 mg tablet   Yes Yes   Sig: Take 10 mg by mouth 2 (two) times a day     metoprolol tartrate (LOPRESSOR) 25 mg tablet 3/20/2018 at Unknown time  Yes Yes   Sig: Take 25 mg by mouth 2 (two) times a day     nadolol (CORGARD) 20 mg tablet 3/20/2018 at Unknown time  Yes Yes   Sig: Take 20 mg by mouth daily     pantoprazole (PROTONIX) 40 mg tablet 3/20/2018 at Unknown time  Yes Yes   Sig: Take 40 mg by mouth daily     simvastatin (ZOCOR) 40 mg tablet   Yes Yes   Sig: Take 40 mg by mouth daily        Facility-Administered Medications: None       Past Medical History:   Diagnosis Date    Contact dermatitis     Last assessed - 8/8/12    COPD [facility]chronic obstructive pulmonary disease) ([facility] [address] )     Disease of thyroid gland     GERD (gastroesophageal reflux disease)     Hyperlipidemia     Hypertension     Irritable bowel syndrome        Past Surgical History:   Procedure Laterality Date    APPENDECTOMY      HYSTERECTOMY      Date unk        Family History   Problem Relation Age of Onset    Clotting disorder Mother      Def of clotting factor     Aneurysm Father      I have reviewed and agree with the history as documented  Social History   Substance Use Topics    Smoking status: Never Smoker    Smokeless tobacco: Never Used    Alcohol use No        Review of Systems   Constitutional: Negative for diaphoresis, fatigue and fever  Respiratory: Negative for cough, chest tightness and shortness of breath  Cardiovascular: Negative for chest pain and palpitations  Musculoskeletal: Positive for arthralgias and stiffness  Negative for gait problem, joint swelling and neck pain  Skin: Negative for color change, pallor, rash and wound     Neurological: Negative for weakness and numbness  Hematological: Negative for adenopathy  Does not bruise/bleed easily  Physical Exam  ED Triage Vitals [03/20/18 1721]   Temperature Pulse Respirations Blood Pressure SpO2   99 °F (37 2 °C) 79 17 135/63 95 %      Temp Source Heart Rate Source Patient Position - Orthostatic VS BP Location FiO2 (%)   Temporal Monitor Sitting Left arm --      Pain Score       5           Orthostatic Vital Signs  Vitals:    03/20/18 1721 03/20/18 1800 03/20/18 1830   BP: 135/63 128/69 122/58   Pulse: 79 68 65   Patient Position - Orthostatic VS: Sitting Lying Lying       Physical Exam   Constitutional: She is oriented to person, place, and time  Vital signs are normal  She appears well-developed and well-nourished  She is active and cooperative  No distress  HENT:   Head: Normocephalic and atraumatic  Neck: Trachea normal and phonation normal    Cardiovascular: Normal rate, regular rhythm, intact distal pulses and normal pulses  Pulses:       Radial pulses are 2+ on the right side, and 2+ on the left side  Pulmonary/Chest: Effort normal  No respiratory distress  Musculoskeletal:        Right shoulder: She exhibits decreased range of motion (Approx 35 deg abduction; 25 deg external rotation; 90 deg internal rotation; 80 deg flexion; 25 deg extension ), tenderness (Moderate tenderness to palpation along the anterior and superior aspects of the glenoid), pain and spasm  She exhibits no bony tenderness, no swelling, no effusion, no deformity and no laceration  Left shoulder: Normal         Right elbow: Normal        Left elbow: Normal         Right wrist: Normal         Left wrist: Normal         Cervical back: Normal         Right upper arm: She exhibits tenderness  She exhibits no bony tenderness, no swelling, no edema, no deformity and no laceration          Left upper arm: Normal         Right forearm: Normal         Left forearm: Normal         Arms:       Right hand: Normal         Left hand: Normal    Neurological: She is alert and oriented to person, place, and time  She has normal strength  No sensory deficit  GCS eye subscore is 4  GCS verbal subscore is 5  GCS motor subscore is 6  Reflex Scores:       Tricep reflexes are 2+ on the right side and 2+ on the left side  Bicep reflexes are 2+ on the right side and 2+ on the left side  Brachioradialis reflexes are 2+ on the right side and 2+ on the left side  Sensation is intact to light touch in the bilateral upper extremity in the C3-C8 distribution  Strength 5/5 in upper extremity bilaterally at elbow/wrist/fingers in all planes of motion  Limited strength exam in right shoulder secondary to severe pain with any active range of motion  5/5 strength in left shoulder in all planes of motion  Skin: Skin is warm, dry and intact  Capillary refill takes less than 2 seconds  She is not diaphoretic  Nursing note and vitals reviewed  ED Medications  Medications   acetaminophen (TYLENOL) tablet 975 mg (975 mg Oral Given 3/20/18 1807)       Diagnostic Studies  Results Reviewed     None                 No orders to display              Procedures  Procedures       Phone Contacts  ED Phone Contact    ED Course  ED Course as of Mar 20 2007   Tue Mar 20, 2018   1841 RIGHT SHOULDER     INDICATION:   M79 601: Pain in right arm      COMPARISON:  Right shoulder radiograph 11/22/2013     VIEWS:  3     IMAGES:  3     FINDINGS:     There is no acute fracture or dislocation      Mild degenerative changes of glenohumeral and acromioclavicular joints  Punctate density adjacent to the inferior glenoid most compatible with sequela of old trauma or degenerative change      No lytic or blastic lesions are seen      Soft tissues are unremarkable      IMPRESSION:  1  No acute osseous abnormality    2  Punctate density adjacent to the inferior glenoid compatible with degenerative change or sequela of old trauma         Workstation performed: [id number]    1841 RIGHT HUMERUS     INDICATION:   M79 601: Pain in right arm      COMPARISON:  None     VIEWS:  2     IMAGES:  2     FINDINGS:     There is no acute fracture or dislocation      Mild degenerative changes of the glenohumeral and acromioclavicular joints      No lytic or blastic lesions are seen      Soft tissues are unremarkable      IMPRESSION:     No acute osseous abnormality            Workstation performed: [id number]        [facility]  Number of Diagnoses or Management Options  Internal derangement of right shoulder: new and requires workup     Amount and/or Complexity of Data Reviewed  Tests in the radiology section of CPT®: reviewed  Decide to obtain previous medical records or to obtain history from someone other than the patient: yes  Review and summarize past medical records: yes  Independent visualization of images, tracings, or specimens: yes    Risk of Complications, Morbidity, and/or Mortality  Presenting problems: moderate  Diagnostic procedures: moderate  Management options: moderate  General comments: I discussed results of the diagnostic workup with the patient  Reviewed relevant findings and likely diagnosis:  Right shoulder internal derangement with some suggestion of rotator cuff injury given the physical examination  No evidence of neurovascular impairment and no associated radiographic abnormalities account for symptoms  Reviewed treatment plan: Will use sling right upper extremity for pain control as needed as well as scheduled apap; advised patient to not exceed 3 g per day maximum dose  Reviewed follow-up plan:  Patient will require orthopedic surgery follow-up only given appropriate referral information for orthopedic surgery evaluation/management  Reviewed ED return precautions: return to ED for worsening/uncontrolled pain  All questions answered prior to discharge  Patient expressed understanding and agreed to plan        Patient Progress  Patient progress: stable    CritCare Time    Disposition  Final diagnoses:   Internal derangement of right shoulder     Time reflects when diagnosis was documented in both MDM as applicable and the Disposition within this note     Time User Action Codes Description Comment    3/20/2018  6:57 PM Gary Lazaro Add [R11 [id number]] Internal derangement of right shoulder       ED Disposition     ED Disposition Condition Comment    Discharge  Miladis Bello discharge to home/self care  Condition at discharge: Good        Follow-up Information     Follow up With Specialties Details Why Contact Info    [employer]  Call in 1 day This is the main [facility]'s information line  Please call in 1 day to obtain information on orthopedic surgeons who can evaluate your further   [phone]          Discharge Medication List as of 3/20/2018  6:59 PM      CONTINUE these medications which have NOT CHANGED    Details   ALPRAZolam (XANAX) 0 25 mg tablet Take 0 25 mg by mouth 4 (four) times a day as needed  , Starting Thu 1/26/2012, Historical Med      benzonatate (TESSALON PERLES) 100 mg capsule Take 100 mg by mouth 2 (two) times a day as needed  , Starting 8/1/2013, Until Discontinued, Historical Med      budesonide-formoterol (SYMBICORT) 160-4 5 mcg/act inhaler Inhale 1 puff daily  , Starting 1/4/2013, Until Discontinued, Historical Med      dicyclomine (BENTYL) 20 mg tablet Take 20 mg by mouth every 6 (six) hours as needed  , Starting 1/27/2015, Until Discontinued, Historical Med      hydrochlorothiazide (HYDRODIURIL) 50 mg tablet Take 50 mg by mouth daily  , Starting 4/9/2012, Until Discontinued, Historical Med      levothyroxine 125 mcg tablet take 1 tablet by mouth once daily, Normal      lisinopril (ZESTRIL) 10 mg tablet Take 10 mg by mouth 2 (two) times a day  , Starting 11/20/2014, Until Discontinued, Historical Med      metoprolol tartrate (LOPRESSOR) 25 mg tablet Take 25 mg by mouth 2 (two) times a day  , Starting 1/28/2016, Until Discontinued, Historical Med      nadolol (CORGARD) 20 mg tablet Take 20 mg by mouth daily  , Starting 2/14/2012, Until Discontinued, Historical Med      pantoprazole (PROTONIX) 40 mg tablet Take 40 mg by mouth daily  , Starting 8/24/2011, Until Discontinued, Historical Med      PARoxetine (PAXIL) 30 mg tablet Take 30 mg by mouth every morning  , Starting 4/4/2012, Until Discontinued, Historical Med      simvastatin (ZOCOR) 40 mg tablet Take 40 mg by mouth daily  , Starting 9/21/2016, Until Discontinued, Historical Med           No discharge procedures on file      ED Provider  Electronically Signed by           Bryon Bernal DO  03/20/18 2008       Bryon Bernal,   03/20/18 2010

## 2018-03-20 NOTE — DISCHARGE INSTRUCTIONS
Shoulder Pain   WHAT YOU NEED TO KNOW:   Shoulder pain is a common problem and can affect your daily activities  Pain can be caused by a problem within your shoulder  Shoulder pain may also be caused by pain that spreads to your shoulder from another part of your body  DISCHARGE INSTRUCTIONS:   Return to the emergency department if:   · You have severe pain  · You cannot move your arm or shoulder  · You have numbness or tingling in your shoulder or arm  Contact your healthcare provider if:   · Your pain gets worse or does not go away with treatment  · You have trouble moving your arm or shoulder  · You have questions or concerns about your condition or care  Medicines: You may need any of the following:  · Acetaminophen  decreases pain and fever  It is available without a doctor's order  Ask how much to take and how often to take it  Follow directions  Acetaminophen can cause liver damage if not taken correctly  · NSAIDs , such as ibuprofen, help decrease swelling, pain, and fever  This medicine is available with or without a doctor's order  NSAIDs can cause stomach bleeding or kidney problems in certain people  If you take blood thinner medicine, always ask your healthcare provider if NSAIDs are safe for you  Always read the medicine label and follow directions  · Take your medicine as directed  Contact your healthcare provider if you think your medicine is not helping or if you have side effects  Tell him of her if you are allergic to any medicine  Keep a list of the medicines, vitamins, and herbs you take  Include the amounts, and when and why you take them  Bring the list or the pill bottles to follow-up visits  Carry your medicine list with you in case of an emergency  Follow up with your healthcare provider or orthopedist as directed:  Write down your questions so you remember to ask them during your visits     Manage your symptoms:   · Apply ice  on your shoulder for 20 to 30 minutes every 2 hours or as directed  Use an ice pack, or put crushed ice in a plastic bag  Cover it with a towel  Ice helps prevent tissue damage and decreases swelling and pain  · Apply heat if ice does not help your symptoms  Apply heat on your shoulder for 20 to 30 minutes every 2 hours for as many days as directed  Heat helps decrease pain and muscle spasms  · Go to physical or occupational therapy as directed  A physical therapist teaches you exercises to help improve movement and strength, and to decrease pain  An occupational therapist teaches you skills to help with your daily activities  Prevent shoulder pain:   · Stretch and strengthen your shoulder  Use proper technique during exercises and sports  · Limit activities as directed  Try to avoid repeated overhead movements  © 2017 [facility] Information is for End User's use only and may not be sold, redistributed or otherwise used for commercial purposes  All illustrations and images included in CareNotes® are the copyrighted property of A D A M [employer] Inc  or [address]  The above information is an  only  It is not intended as medical advice for individual conditions or treatments  Talk to your doctor, nurse or pharmacist before following any medical regimen to see if it is safe and effective for you  Rotator Cuff Injury   WHAT YOU NEED TO KNOW:   A rotator cuff injury is damage to the muscles or tendons of your rotator cuff  The rotator cuff is made up of a group of muscles and tendons that hold the shoulder joint in place  The damage may include stretching of your muscle or tears in the tendons  It may also include inflammation of the bursa (small sack of fluid around the joint)  DISCHARGE INSTRUCTIONS:   · Acetaminophen: This medicine decreases pain  Acetaminophen is available without a doctor's order  Ask how much to take and how often to take it  Follow directions   Acetaminophen can cause liver damage if not taken correctly  · NSAIDs:  These medicines decrease swelling, pain, and fever  NSAIDs are available without a doctor's order  Ask your healthcare provider which medicine is right for you  Ask how much to take and when to take it  Take as directed  NSAIDs can cause stomach bleeding or kidney problems if not taken correctly  · Pain medicine: You may be given a prescription medicine to decrease pain  Do not wait until the pain is severe before you take this medicine  · Steroids: This medicine may be injected into the rotator cuff area to decrease inflammation and pain  · Take your medicine as directed  Contact your healthcare provider if you think your medicine is not helping or if you have side effects  Tell him of her if you are allergic to any medicine  Keep a list of the medicines, vitamins, and herbs you take  Include the amounts, and when and why you take them  Bring the list or the pill bottles to follow-up visits  Carry your medicine list with you in case of an emergency  Follow up with your healthcare provider or orthopedist as directed:  Write down your questions so you remember to ask them during your visits  Physical therapy:  A physical therapist can teach you exercises to help improve movement and strength, and to decrease pain  These exercises may help you go back to your usual activities or return to playing sports  Self-care:   · Rest:  Rest may help your shoulder heal  Overuse of your shoulder can make your injury worse  Avoid heavy lifting, using your arms over your head, or any other activity that makes the pain worse  · Put ice or heat on your shoulder:  Use ice on your shoulder every few hours for the first several days  This may help decrease pain and swelling  After the first several days, a heating pad may help relax the muscles in your shoulder    Contact your healthcare provider or orthopedist if:   · The pain in your shoulder or arm is not improving, or is worse than before you started treatment  · You have new pain in your neck  · You have a fever  · You have questions or concerns about your condition or care  Return to the emergency department if:  · You suddenly cannot move your arm  · You have severe stomach or back pain, are vomiting blood, or have black bowel movements  © 2017 [address] [employer]  Information is for End User's use only and may not be sold, redistributed or otherwise used for commercial purposes  All illustrations and images included in CareNotes® are the copyrighted property of [employer]  or [employer] Lee  The above information is an  only  It is not intended as medical advice for individual conditions or treatments  Talk to your doctor, nurse or pharmacist before following any medical regimen to see if it is safe and effective for you

## 2018-03-22 NOTE — PROGRESS NOTES
Asking what she can do about her discomfort? Wearing her sling and taking tylenol PRN  Told her to f/up with Ortho as rec  By the ER as well

## 2018-03-25 DIAGNOSIS — I10 ESSENTIAL HYPERTENSION: Primary | ICD-10-CM

## 2018-03-29 DIAGNOSIS — R05.9 COUGH: Primary | ICD-10-CM

## 2018-03-29 RX ORDER — BENZONATATE 100 MG/1
CAPSULE ORAL
Qty: 30 CAPSULE | Refills: 1 | Status: SHIPPED | OUTPATIENT
Start: 2018-03-29 | End: 2018-06-07 | Stop reason: SDUPTHER

## 2018-03-30 ENCOUNTER — OFFICE VISIT (OUTPATIENT)
Dept: OBGYN CLINIC | Facility: CLINIC | Age: 78
End: 2018-03-30
Payer: MEDICARE

## 2018-03-30 VITALS
DIASTOLIC BLOOD PRESSURE: 78 MMHG | WEIGHT: 179 LBS | HEART RATE: 71 BPM | HEIGHT: 67 IN | BODY MASS INDEX: 28.09 KG/M2 | SYSTOLIC BLOOD PRESSURE: 132 MMHG

## 2018-03-30 DIAGNOSIS — M75.41 IMPINGEMENT SYNDROME OF RIGHT SHOULDER: Primary | ICD-10-CM

## 2018-03-30 PROCEDURE — 99203 OFFICE O/P NEW LOW 30 MIN: CPT | Performed by: ORTHOPAEDIC SURGERY

## 2018-03-30 PROCEDURE — 20610 DRAIN/INJ JOINT/BURSA W/O US: CPT | Performed by: ORTHOPAEDIC SURGERY

## 2018-03-30 RX ORDER — BETAMETHASONE SODIUM PHOSPHATE AND BETAMETHASONE ACETATE 3; 3 MG/ML; MG/ML
6 INJECTION, SUSPENSION INTRA-ARTICULAR; INTRALESIONAL; INTRAMUSCULAR; SOFT TISSUE
Status: COMPLETED | OUTPATIENT
Start: 2018-03-30 | End: 2018-03-30

## 2018-03-30 RX ORDER — BUPIVACAINE HYDROCHLORIDE 2.5 MG/ML
4 INJECTION, SOLUTION INFILTRATION; PERINEURAL
Status: COMPLETED | OUTPATIENT
Start: 2018-03-30 | End: 2018-03-30

## 2018-03-30 RX ADMIN — BUPIVACAINE HYDROCHLORIDE 4 ML: 2.5 INJECTION, SOLUTION INFILTRATION; PERINEURAL at 15:31

## 2018-03-30 RX ADMIN — BETAMETHASONE SODIUM PHOSPHATE AND BETAMETHASONE ACETATE 6 MG: 3; 3 INJECTION, SUSPENSION INTRA-ARTICULAR; INTRALESIONAL; INTRAMUSCULAR; SOFT TISSUE at 15:31

## 2018-03-30 NOTE — PROGRESS NOTES
Chief Complaint  Right shoulder pain 10 days    History Of Presenting Illness  Lg [place] 1940 presents with right shoulder pain 10 days  No history of trauma  Patient is a retired nurse  Patient is right handed  Pain mainly located in the right shoulder region  Exacerbated by movements  Decreased with rest   Patient has 1 mg disease  Current Medications  Current Outpatient Prescriptions   Medication Sig Dispense Refill    ALPRAZolam (XANAX) 0 25 mg tablet Take 0 25 mg by mouth 4 (four) times a day as needed        benzonatate (TESSALON PERLES) 100 mg capsule take 1 capsule by mouth three times a day if needed 30 capsule 1    budesonide-formoterol (SYMBICORT) 160-4 5 mcg/act inhaler Inhale 1 puff daily        dicyclomine (BENTYL) 20 mg tablet Take 20 mg by mouth every 6 (six) hours as needed        hydrochlorothiazide (HYDRODIURIL) 50 mg tablet Take 50 mg by mouth daily        levothyroxine 125 mcg tablet take 1 tablet by mouth once daily 90 tablet 3    lisinopril (ZESTRIL) 10 mg tablet Take 10 mg by mouth 2 (two) times a day        metoprolol tartrate (LOPRESSOR) 25 mg tablet take 1 tablet by mouth twice a day 180 tablet 3    nadolol (CORGARD) 20 mg tablet Take 20 mg by mouth daily        pantoprazole (PROTONIX) 40 mg tablet Take 40 mg by mouth daily        PARoxetine (PAXIL) 30 mg tablet Take 30 mg by mouth every morning        simvastatin (ZOCOR) 40 mg tablet Take 40 mg by mouth daily         No current facility-administered medications for this visit          Current Problems    Active Problems:   Patient Active Problem List    Diagnosis Date Noted    Back pain, chronic 06/26/2017    Hearing loss 06/16/2017    Alternating constipation and diarrhea 01/29/2017    Hip pain, left 01/19/2017    Fatigue 06/14/2016    Stool guaiac positive 12/11/2015    Kidney stone 10/14/2015    Vitamin D deficiency 08/25/2015    Diverticulosis 05/17/2013    Von Willebrand disease ([facility], [place] [address] ) 05/03/2013    Hyperlipidemia 02/19/2013    Esophagitis, reflux 12/10/2012    Anxiety disorder 10/09/2012    Hypothyroidism 08/14/2012    Generalized osteoarthritis of multiple sites 05/23/2012    Hypertension 05/09/2012         Review of Systems:    General: negative for - chills, fatigue, fever,  weight gain or weight loss    Past Medical History:   Past Medical History:   Diagnosis Date    Contact dermatitis     Last assessed - 8/8/12    COPD (chronic obstructive pulmonary disease) (HCC)     Disease of thyroid gland     GERD (gastroesophageal reflux disease)     Hyperlipidemia     Hypertension     Irritable bowel syndrome        Past Surgical History:   Past Surgical History:   Procedure Laterality Date    APPENDECTOMY      HYSTERECTOMY      Date unk        Family History:  Family history reviewed and non-contributory  Family History   Problem Relation Age of Onset    Clotting disorder Mother      Def of clotting factor     Aneurysm Father        Social History:  Social History     Social History    Marital status:      Spouse name: N/A    Number of children: N/A    Years of education: N/A     Social History Main Topics    Smoking status: Never Smoker    Smokeless tobacco: Never Used    Alcohol use No    Drug use: No    Sexual activity: Not Asked     Other Topics Concern    None     Social History Narrative    Always uses seat belt    Assistive devices - Cane     Caffeine use    Dental care, regularly           Allergies: Allergies   Allergen Reactions    Niacin And Related Anaphylaxis    Codeine Nausea Only    Nsaids     Other      Iv contrast dye- lips and tongue thick body itchy    Penicillins Rash           Physical ExaminationBP 132/78   Pulse 71   Ht 5' 7" (1 702 m)   Wt 81 2 kg (179 lb)   BMI 28 04 kg/m²   Gen: Alert and oriented to person, place, time  HEENT: EOMI, eyes clear, moist mucus membranes, hearing intact  Respiratory: Bilateral chest rise   No audible wheezing found  Cardiovascular: Regular Rate and Rhythm  Abdomen: soft nontender/nondistended    Orthopedic Exam  Right shoulder no obvious  Swelling or deformity cuff strength is 4/5  Signs of impingement positive          Impression    Right shoulder rotator cuff tendinitis with impingement            Plan  Right subacromial bursa injected with steroid  Patient will start a physical therapy program  Follow-up in 8 weeks  If the patient is no better we will obtain an MRI  Large joint arthrocentesis  Date/Time: 3/30/2018 3:31 PM  Consent given by: patient  Site marked: site marked  Timeout: Immediately prior to procedure a time out was called to verify the correct patient, procedure, equipment, support staff and site/side marked as required   Supporting Documentation  Indications: pain and diagnostic evaluation   Procedure Details  Location: shoulder - R subacromial bursa  Needle size: 22 G  Ultrasound guidance: no  Approach: posterolateral  Medications administered: 4 mL bupivacaine 0 25 %; 6 mg betamethasone acetate-betamethasone sodium phosphate 6 (3-3) mg/mL    Patient tolerance: patient tolerated the procedure well with no immediate complications  Dressing:  Sterile dressing applied        Francisco Leahy MD        Portions of the record may have been created with voice recognition software   Occasional wrong word or "sound a like" substitutions may have occurred due to the inherent limitations of voice recognition software   Read the chart carefully and recognize, using context, where substitutions have occurred

## 2018-03-30 NOTE — PATIENT INSTRUCTIONS
Exercises for Shoulder Abduction and Adduction   AMBULATORY CARE:   Shoulder abduction and adduction exercises  work the muscles at the back of your shoulder and your upper back  Before you exercise:  Warm up and stretch before you exercise  Walk or ride a stationary bike for 5 to 10 minutes to help you warm up  Stretching helps increase range of motion  It may also decrease muscle soreness and help prevent another injury  Your healthcare provider will tell you which of the following stretches to do:  · Crossover arm stretch:  Relax your shoulders  Hold your upper arm with the opposite hand  Pull your arm across your chest until you feel a stretch  Hold the stretch for 30 seconds  Return to the starting position  · Shoulder flexion stretch:  Stand facing a wall  Slowly walk your fingers up the wall until you feel a stretch  Hold the stretch for 30 seconds  Return to the starting position  · Sleeper stretch:  Lie on your injured side on a firm, flat surface  Bend the elbow of your injured arm 90° with your hand facing up  Use your arm that is not injured to slowly push your injured arm down  Stop when you feel a stretch at the back of your injured shoulder  Hold the stretch for 30 seconds  Slowly return to the starting position  Contact your healthcare provider if:   · You have sharp or worsening pain during exercise or at rest     · You have questions or concerns about your shoulder exercises  How to exercise with a weight:  Your healthcare provider will tell you how much weight to use  · Shoulder abduction:  Stand and hold a weight in your hand with your palm facing your body  Slowly raise your arm to the side with your thumb pointing up  Then raise your arm over your head as far as you can without pain  Hold this position for as long as directed  Do not raise your arm over your head unless your healthcare provider says it is okay  · Shoulder adduction:  Lie on your back on a firm surface   Extend your arm out to a "T " Bend your elbow so your forearm in the air  Hold a weight in your hand  Slowly raise your arm toward the ceiling and straighten your elbow  Hold this position for as long as directed  Slowly return to the starting position  How to exercise with an exercise band:   · Shoulder abduction:  Wrap the exercise band around a heavy, stable object near your foot  Grab the band with the hand of your injured shoulder  Keep your arm straight  Slowly raise your arm to the side with your thumb pointing up  Then, slowly pull the band over your head as far as you can without pain  Do not raise your arm over your head unless your healthcare provider says it is okay  Do not let your shoulder shrug  Hold this position for as long as directed  Slowly return to the starting position  · Shoulder adduction:  Wrap the exercise band around a heavy, stable object  Stand and face away from where the band is anchored  Hold each end of the band in both hands with your elbows bent  Your elbows should not be behind your body  Keep your arms parallel to the floor and slowly straighten your elbows  Hold this position for as long as directed  Slowly return to the starting position  Follow up with your physical therapist as directed:  Write down your questions so you remember to ask them at your visits  © 2017 [address] [employer] Information is for End User's use only and may not be sold, redistributed or otherwise used for commercial purposes  All illustrations and images included in CareNotes® are the copyrighted property of [employer]  or Lorenzo Lee  The above information is an  only  It is not intended as medical advice for individual conditions or treatments  Talk to your doctor, nurse or pharmacist before following any medical regimen to see if it is safe and effective for you

## 2018-04-04 DIAGNOSIS — F41.9 ANXIETY: Primary | ICD-10-CM

## 2018-04-04 RX ORDER — ALPRAZOLAM 0.25 MG/1
0.25 TABLET ORAL 4 TIMES DAILY PRN
Qty: 120 TABLET | Refills: 0 | Status: SHIPPED | OUTPATIENT
Start: 2018-04-04 | End: 2018-05-02 | Stop reason: SDUPTHER

## 2018-04-18 DIAGNOSIS — F32.9 REACTIVE DEPRESSION: Primary | ICD-10-CM

## 2018-04-18 DIAGNOSIS — F32.A DEPRESSION, UNSPECIFIED DEPRESSION TYPE: Primary | ICD-10-CM

## 2018-04-18 RX ORDER — PAROXETINE 30 MG/1
30 TABLET, FILM COATED ORAL EVERY MORNING
Qty: 90 TABLET | Refills: 3 | Status: SHIPPED | OUTPATIENT
Start: 2018-04-18 | End: 2018-04-18 | Stop reason: SDUPTHER

## 2018-04-18 RX ORDER — PAROXETINE 30 MG/1
TABLET, FILM COATED ORAL
Qty: 90 TABLET | Refills: 3 | Status: SHIPPED | OUTPATIENT
Start: 2018-04-18 | End: 2019-04-11 | Stop reason: SDUPTHER

## 2018-05-02 DIAGNOSIS — F41.9 ANXIETY: ICD-10-CM

## 2018-05-02 DIAGNOSIS — K21.9 GASTROESOPHAGEAL REFLUX DISEASE WITHOUT ESOPHAGITIS: Primary | ICD-10-CM

## 2018-05-02 RX ORDER — ALPRAZOLAM 0.25 MG/1
0.25 TABLET ORAL 4 TIMES DAILY PRN
Qty: 120 TABLET | Refills: 0 | Status: SHIPPED | OUTPATIENT
Start: 2018-05-02 | End: 2018-05-31 | Stop reason: SDUPTHER

## 2018-05-02 RX ORDER — PANTOPRAZOLE SODIUM 40 MG/1
40 TABLET, DELAYED RELEASE ORAL DAILY
Qty: 30 TABLET | Refills: 5 | Status: SHIPPED | OUTPATIENT
Start: 2018-05-02 | End: 2018-11-24 | Stop reason: SDUPTHER

## 2018-05-09 ENCOUNTER — OFFICE VISIT (OUTPATIENT)
Dept: OBGYN CLINIC | Facility: CLINIC | Age: 78
End: 2018-05-09
Payer: MEDICARE

## 2018-05-09 VITALS
HEART RATE: 75 BPM | WEIGHT: 181 LBS | DIASTOLIC BLOOD PRESSURE: 55 MMHG | BODY MASS INDEX: 28.41 KG/M2 | SYSTOLIC BLOOD PRESSURE: 89 MMHG | HEIGHT: 67 IN

## 2018-05-09 DIAGNOSIS — M75.41 IMPINGEMENT SYNDROME OF RIGHT SHOULDER: Primary | ICD-10-CM

## 2018-05-09 PROCEDURE — 99213 OFFICE O/P EST LOW 20 MIN: CPT | Performed by: ORTHOPAEDIC SURGERY

## 2018-05-09 PROCEDURE — 20610 DRAIN/INJ JOINT/BURSA W/O US: CPT | Performed by: ORTHOPAEDIC SURGERY

## 2018-05-09 RX ORDER — BUPIVACAINE HYDROCHLORIDE 2.5 MG/ML
4 INJECTION, SOLUTION INFILTRATION; PERINEURAL
Status: COMPLETED | OUTPATIENT
Start: 2018-05-09 | End: 2018-05-09

## 2018-05-09 RX ORDER — BETAMETHASONE SODIUM PHOSPHATE AND BETAMETHASONE ACETATE 3; 3 MG/ML; MG/ML
12 INJECTION, SUSPENSION INTRA-ARTICULAR; INTRALESIONAL; INTRAMUSCULAR; SOFT TISSUE
Status: COMPLETED | OUTPATIENT
Start: 2018-05-09 | End: 2018-05-09

## 2018-05-09 RX ADMIN — BUPIVACAINE HYDROCHLORIDE 4 ML: 2.5 INJECTION, SOLUTION INFILTRATION; PERINEURAL at 13:36

## 2018-05-09 RX ADMIN — BETAMETHASONE SODIUM PHOSPHATE AND BETAMETHASONE ACETATE 12 MG: 3; 3 INJECTION, SUSPENSION INTRA-ARTICULAR; INTRALESIONAL; INTRAMUSCULAR; SOFT TISSUE at 13:36

## 2018-05-09 NOTE — PATIENT INSTRUCTIONS
Exercises for Shoulder Abduction and Adduction   AMBULATORY CARE:   Shoulder abduction and adduction exercises  work the muscles at the back of your shoulder and your upper back  Before you exercise:  Warm up and stretch before you exercise  Walk or ride a stationary bike for 5 to 10 minutes to help you warm up  Stretching helps increase range of motion  It may also decrease muscle soreness and help prevent another injury  Your healthcare provider will tell you which of the following stretches to do:  · Crossover arm stretch:  Relax your shoulders  Hold your upper arm with the opposite hand  Pull your arm across your chest until you feel a stretch  Hold the stretch for 30 seconds  Return to the starting position  · Shoulder flexion stretch:  Stand facing a wall  Slowly walk your fingers up the wall until you feel a stretch  Hold the stretch for 30 seconds  Return to the starting position  · Sleeper stretch:  Lie on your injured side on a firm, flat surface  Bend the elbow of your injured arm 90° with your hand facing up  Use your arm that is not injured to slowly push your injured arm down  Stop when you feel a stretch at the back of your injured shoulder  Hold the stretch for 30 seconds  Slowly return to the starting position  Contact your healthcare provider if:   · You have sharp or worsening pain during exercise or at rest     · You have questions or concerns about your shoulder exercises  How to exercise with a weight:  Your healthcare provider will tell you how much weight to use  · Shoulder abduction:  Stand and hold a weight in your hand with your palm facing your body  Slowly raise your arm to the side with your thumb pointing up  Then raise your arm over your head as far as you can without pain  Hold this position for as long as directed  Do not raise your arm over your head unless your healthcare provider says it is okay  · Shoulder adduction:  Lie on your back on a firm surface   Extend your arm out to a "T " Bend your elbow so your forearm in the air  Hold a weight in your hand  Slowly raise your arm toward the ceiling and straighten your elbow  Hold this position for as long as directed  Slowly return to the starting position  How to exercise with an exercise band:   · Shoulder abduction:  Wrap the exercise band around a heavy, stable object near your foot  Grab the band with the hand of your injured shoulder  Keep your arm straight  Slowly raise your arm to the side with your thumb pointing up  Then, slowly pull the band over your head as far as you can without pain  Do not raise your arm over your head unless your healthcare provider says it is okay  Do not let your shoulder shrug  Hold this position for as long as directed  Slowly return to the starting position  · Shoulder adduction:  Wrap the exercise band around a heavy, stable object  Stand and face away from where the band is anchored  Hold each end of the band in both hands with your elbows bent  Your elbows should not be behind your body  Keep your arms parallel to the floor and slowly straighten your elbows  Hold this position for as long as directed  Slowly return to the starting position  Follow up with your physical therapist as directed:  Write down your questions so you remember to ask them at your visits  © 2017 [address] [employer] Information is for End User's use only and may not be sold, redistributed or otherwise used for commercial purposes  All illustrations and images included in CareNotes® are the copyrighted property of [employer]  or Lorenzo Lee  The above information is an  only  It is not intended as medical advice for individual conditions or treatments  Talk to your doctor, nurse or pharmacist before following any medical regimen to see if it is safe and effective for you

## 2018-05-09 NOTE — PROGRESS NOTES
Chief Complaint  Right shoulder pain    History Of Presenting Illness  Tasia Leonard 1940 presents with right shoulder pain 8 weeks  Patient had a steroid injection end of March 2018  Patient was diagnosed with subacromial impingement  Patient had good relief of symptoms till around 3 4 days ago  Symptoms are returning  The pain in the right shoulder aggravated by overhead activities  Decreased with rest       Current Medications  Current Outpatient Prescriptions   Medication Sig Dispense Refill    ALPRAZolam (XANAX) 0 25 mg tablet Take 1 tablet (0 25 mg total) by mouth 4 (four) times a day as needed for anxiety 120 tablet 0    benzonatate (TESSALON PERLES) 100 mg capsule take 1 capsule by mouth three times a day if needed 30 capsule 1    budesonide-formoterol (SYMBICORT) 160-4 5 mcg/act inhaler Inhale 1 puff daily        dicyclomine (BENTYL) 20 mg tablet Take 20 mg by mouth every 6 (six) hours as needed        hydrochlorothiazide (HYDRODIURIL) 50 mg tablet Take 50 mg by mouth daily        levothyroxine 125 mcg tablet take 1 tablet by mouth once daily 90 tablet 3    lisinopril (ZESTRIL) 10 mg tablet Take 10 mg by mouth 2 (two) times a day        metoprolol tartrate (LOPRESSOR) 25 mg tablet take 1 tablet by mouth twice a day 180 tablet 3    nadolol (CORGARD) 20 mg tablet Take 20 mg by mouth daily        pantoprazole (PROTONIX) 40 mg tablet Take 1 tablet (40 mg total) by mouth daily 30 tablet 5    PARoxetine (PAXIL) 30 mg tablet take 1 tablet by mouth once daily 90 tablet 3    simvastatin (ZOCOR) 40 mg tablet Take 40 mg by mouth daily         No current facility-administered medications for this visit          Current Problems    Active Problems:   Patient Active Problem List    Diagnosis Date Noted    Back pain, chronic 06/26/2017    Hearing loss 06/16/2017    Alternating constipation and diarrhea 01/29/2017    Hip pain, left 01/19/2017    Fatigue 06/14/2016    Stool guaiac positive 12/11/2015    Kidney stone 10/14/2015    Vitamin D deficiency 08/25/2015    Diverticulosis 05/17/2013    Von Willebrand disease ([facility] [address] ) 05/03/2013    Hyperlipidemia 02/19/2013    Esophagitis, reflux 12/10/2012    Anxiety disorder 10/09/2012    Hypothyroidism 08/14/2012    Generalized osteoarthritis of multiple sites 05/23/2012    Hypertension 05/09/2012         Review of Systems:    General: negative for - chills, fatigue, fever,  weight gain or weight loss    Rest of review of systems unchanged from before  Past Medical History:   Past Medical History:   Diagnosis Date    Contact dermatitis     Last assessed - 8/8/12    COPD (chronic obstructive pulmonary disease) (HCC)     Disease of thyroid gland     GERD (gastroesophageal reflux disease)     Hyperlipidemia     Hypertension     Irritable bowel syndrome        Past Surgical History:   Past Surgical History:   Procedure Laterality Date    APPENDECTOMY      HYSTERECTOMY      Date unk        Family History:  Family history reviewed and non-contributory  Family History   Problem Relation Age of Onset    Clotting disorder Mother      Def of clotting factor     Aneurysm Father        Social History:  Social History     Social History    Marital status:      Spouse name: N/A    Number of children: N/A    Years of education: N/A     Social History Main Topics    Smoking status: Never Smoker    Smokeless tobacco: Never Used    Alcohol use No    Drug use: No    Sexual activity: Not Asked     Other Topics Concern    None     Social History Narrative    Always uses seat belt    Assistive devices - Cane     Caffeine use    Dental care, regularly           Allergies:    Allergies   Allergen Reactions    Niacin And Related Anaphylaxis    Codeine Nausea Only    Nsaids     Other      Iv contrast dye- lips and tongue thick body itchy    Penicillins Rash           Physical ExaminationBP (!) 89/55   Pulse 75   Ht 5' 7" (1 702 m)   Wt 82 1 kg (181 lb)   BMI 28 35 kg/m²   Gen: Alert and oriented to person, place, time  HEENT: EOMI, eyes clear, moist mucus membranes, hearing intact  Respiratory: Bilateral chest rise  No audible wheezing found  Cardiovascular: Regular Rate and Rhythm  Abdomen: soft nontender/nondistended    Orthopedic Exam  Right shoulder no obvious swelling or deformity  Excellent range of movements  Cuff strength 4+ out of 5  Signs of impingement positive          Impression    Right shoulder pain due to impingement        No results found  Plan    Discussed treatment with the patient  She does not want an MRI  Right subacromial space injected with steroid and local anesthetic  Patient will continue physical therapy and home exercise program  Follow-up in 3 months  Large joint arthrocentesis  Date/Time: 5/9/2018 1:36 PM  Consent given by: patient  Site marked: site marked  Timeout: Immediately prior to procedure a time out was called to verify the correct patient, procedure, equipment, support staff and site/side marked as required   Supporting Documentation  Indications: pain and diagnostic evaluation   Procedure Details  Location: shoulder - R subacromial bursa  Needle size: 22 G  Ultrasound guidance: no  Approach: posterolateral  Medications administered: 4 mL bupivacaine 0 25 %; 12 mg betamethasone acetate-betamethasone sodium phosphate 6 (3-3) mg/mL    Patient tolerance: patient tolerated the procedure well with no immediate complications  Dressing:  Sterile dressing applied        Nick Torres MD        Portions of the record may have been created with voice recognition software   Occasional wrong word or "sound a like" substitutions may have occurred due to the inherent limitations of voice recognition software   Read the chart carefully and recognize, using context, where substitutions have occurred

## 2018-05-31 DIAGNOSIS — F41.9 ANXIETY: ICD-10-CM

## 2018-05-31 RX ORDER — ALPRAZOLAM 0.25 MG/1
TABLET ORAL
Qty: 120 TABLET | Refills: 0 | Status: SHIPPED | OUTPATIENT
Start: 2018-05-31 | End: 2018-07-02 | Stop reason: SDUPTHER

## 2018-06-07 DIAGNOSIS — R05.9 COUGH: ICD-10-CM

## 2018-06-07 RX ORDER — BENZONATATE 100 MG/1
CAPSULE ORAL
Qty: 30 CAPSULE | Refills: 1 | Status: SHIPPED | OUTPATIENT
Start: 2018-06-07 | End: 2018-08-06 | Stop reason: SDUPTHER

## 2018-07-02 DIAGNOSIS — F41.9 ANXIETY: ICD-10-CM

## 2018-07-02 RX ORDER — ALPRAZOLAM 0.25 MG/1
TABLET ORAL
Qty: 120 TABLET | Refills: 0 | Status: SHIPPED | OUTPATIENT
Start: 2018-07-02 | End: 2018-07-02 | Stop reason: SDUPTHER

## 2018-07-02 RX ORDER — ALPRAZOLAM 0.25 MG/1
0.25 TABLET ORAL 4 TIMES DAILY
Qty: 120 TABLET | Refills: 0 | Status: SHIPPED | OUTPATIENT
Start: 2018-07-02 | End: 2018-07-30 | Stop reason: SDUPTHER

## 2018-07-30 DIAGNOSIS — F41.9 ANXIETY: ICD-10-CM

## 2018-07-30 RX ORDER — ALPRAZOLAM 0.25 MG/1
0.25 TABLET ORAL 4 TIMES DAILY
Qty: 360 TABLET | Refills: 0 | Status: SHIPPED | OUTPATIENT
Start: 2018-07-30 | End: 2018-11-04 | Stop reason: SDUPTHER

## 2018-08-06 DIAGNOSIS — R05.9 COUGH: ICD-10-CM

## 2018-08-06 RX ORDER — BENZONATATE 100 MG/1
CAPSULE ORAL
Qty: 30 CAPSULE | Refills: 1 | Status: SHIPPED | OUTPATIENT
Start: 2018-08-06 | End: 2018-10-08 | Stop reason: SDUPTHER

## 2018-09-10 DIAGNOSIS — J43.9 PULMONARY EMPHYSEMA, UNSPECIFIED EMPHYSEMA TYPE (HCC): Primary | ICD-10-CM

## 2018-09-10 DIAGNOSIS — J43.9 PULMONARY EMPHYSEMA, UNSPECIFIED EMPHYSEMA TYPE (HCC): ICD-10-CM

## 2018-09-10 RX ORDER — BUDESONIDE AND FORMOTEROL FUMARATE DIHYDRATE 160; 4.5 UG/1; UG/1
AEROSOL RESPIRATORY (INHALATION)
Qty: 30.6 G | Refills: 3 | Status: SHIPPED | OUTPATIENT
Start: 2018-09-10 | End: 2019-04-11 | Stop reason: SDUPTHER

## 2018-09-10 RX ORDER — BUDESONIDE AND FORMOTEROL FUMARATE DIHYDRATE 160; 4.5 UG/1; UG/1
2 AEROSOL RESPIRATORY (INHALATION) 2 TIMES DAILY
Qty: 10.2 G | Refills: 5 | Status: SHIPPED | OUTPATIENT
Start: 2018-09-10 | End: 2018-09-10 | Stop reason: SDUPTHER

## 2018-09-13 ENCOUNTER — OFFICE VISIT (OUTPATIENT)
Dept: INTERNAL MEDICINE CLINIC | Facility: CLINIC | Age: 78
End: 2018-09-13
Payer: MEDICARE

## 2018-09-13 ENCOUNTER — TELEPHONE (OUTPATIENT)
Dept: INTERNAL MEDICINE CLINIC | Facility: CLINIC | Age: 78
End: 2018-09-13

## 2018-09-13 VITALS — HEART RATE: 75 BPM | OXYGEN SATURATION: 97 % | TEMPERATURE: 97.8 F

## 2018-09-13 DIAGNOSIS — R10.84 GENERALIZED ABDOMINAL PAIN: Primary | ICD-10-CM

## 2018-09-13 PROCEDURE — 99214 OFFICE O/P EST MOD 30 MIN: CPT | Performed by: INTERNAL MEDICINE

## 2018-09-13 RX ORDER — DICYCLOMINE HYDROCHLORIDE 10 MG/1
10 CAPSULE ORAL 3 TIMES DAILY PRN
Qty: 30 CAPSULE | Refills: 5 | Status: SHIPPED | OUTPATIENT
Start: 2018-09-13 | End: 2019-07-10 | Stop reason: DRUGHIGH

## 2018-09-13 NOTE — PROGRESS NOTES
Assessment/Plan:      Diagnoses and all orders for this visit:    Generalized abdominal pain  Sxs seem to be improving and seem to be related to higher fiber diet  Tums prn Increase water intake  Should have fiber but balance the intake  Bentyl prn  She does not want GI follow up/would not            Patient ID: Delvin Dumont is a [de-identified] y o  female  HPI   Patient has had abdominal pain and distension  She had been drinking smoothies with fruit and boost  She had bloating and some discomfort  Bentyl has helped and today sxs seem better  Taking tums    Review of Systems   Constitutional: Negative  HENT: Negative  Eyes: Negative  Respiratory: Negative  Cardiovascular: Negative  Gastrointestinal: Positive for abdominal distention and abdominal pain  Genitourinary: Negative  Negative for dysuria  Musculoskeletal: Positive for arthralgias  Skin: Negative  Allergic/Immunologic: Negative  Hematological: Negative  Psychiatric/Behavioral: Negative  Past Medical History:   Diagnosis Date    Contact dermatitis     Last assessed - 8/8/12    COPD (chronic obstructive pulmonary disease) (HCC)     Disease of thyroid gland     GERD (gastroesophageal reflux disease)     Hyperlipidemia     Hypertension     Irritable bowel syndrome      Past Surgical History:   Procedure Laterality Date    APPENDECTOMY      HYSTERECTOMY      Date unk      Allergies   Allergen Reactions    Niacin And Related Anaphylaxis    Codeine Nausea Only    Nsaids     Other      Iv contrast dye- lips and tongue thick body itchy    Penicillins Rash     Social History     Social History    Marital status:      Spouse name: N/A    Number of children: N/A    Years of education: N/A     Occupational History    Not on file       Social History Main Topics    Smoking status: Never Smoker    Smokeless tobacco: Never Used    Alcohol use No    Drug use: No    Sexual activity: Not on file     Other Topics Concern    Not on file     Social History Narrative    Always uses seat belt    Assistive devices - Cane     Caffeine use    Dental care, regularly         Vitals:    09/13/18 1427   Pulse: 75   Temp: 97 8 °F (36 6 °C)   SpO2: 97%   /70       Physical Exam   Constitutional: She is oriented to person, place, and time  She appears well-developed and well-nourished  No distress  Pulmonary/Chest: Effort normal and breath sounds normal    Abdominal: Soft  Bowel sounds are normal  She exhibits no distension  There is no tenderness  There is no rebound and no guarding  Musculoskeletal: Normal range of motion  She exhibits no edema, tenderness or deformity  Neurological: She is alert and oriented to person, place, and time  She has normal reflexes  She displays normal reflexes  No cranial nerve deficit  She exhibits normal muscle tone  Coordination normal    Skin: Skin is warm and dry  She is not diaphoretic  Psychiatric: She has a normal mood and affect  Her behavior is normal  Judgment and thought content normal    Nursing note and vitals reviewed

## 2018-09-13 NOTE — TELEPHONE ENCOUNTER
I WOULD NOT - IF SHE HAD EXPOSURE TO IT IN THE PAST IT CAN REMAIN POSITIVE SO iT MAY NOT BE ACCURATE - BIOPSY IS MUCH MORE ACCURATE

## 2018-10-08 DIAGNOSIS — R05.9 COUGH: ICD-10-CM

## 2018-10-08 RX ORDER — BENZONATATE 100 MG/1
CAPSULE ORAL
Qty: 30 CAPSULE | Refills: 1 | Status: SHIPPED | OUTPATIENT
Start: 2018-10-08 | End: 2018-11-07 | Stop reason: SDUPTHER

## 2018-11-04 DIAGNOSIS — F41.9 ANXIETY: ICD-10-CM

## 2018-11-04 RX ORDER — ALPRAZOLAM 0.25 MG/1
TABLET ORAL
Qty: 120 TABLET | Refills: 5 | Status: SHIPPED | OUTPATIENT
Start: 2018-11-04 | End: 2019-05-13 | Stop reason: SDUPTHER

## 2018-11-05 ENCOUNTER — TELEPHONE (OUTPATIENT)
Dept: INTERNAL MEDICINE CLINIC | Facility: CLINIC | Age: 78
End: 2018-11-05

## 2018-11-07 DIAGNOSIS — R05.9 COUGH: ICD-10-CM

## 2018-11-07 RX ORDER — BENZONATATE 100 MG/1
CAPSULE ORAL
Qty: 30 CAPSULE | Refills: 1 | Status: SHIPPED | OUTPATIENT
Start: 2018-11-07 | End: 2019-01-09 | Stop reason: SDUPTHER

## 2018-11-15 DIAGNOSIS — I10 ESSENTIAL HYPERTENSION: Primary | ICD-10-CM

## 2018-11-15 RX ORDER — HYDROCHLOROTHIAZIDE 50 MG/1
TABLET ORAL
Qty: 90 TABLET | Refills: 3 | Status: SHIPPED | OUTPATIENT
Start: 2018-11-15 | End: 2019-11-13 | Stop reason: SDUPTHER

## 2018-11-15 RX ORDER — LISINOPRIL 10 MG/1
TABLET ORAL
Qty: 180 TABLET | Refills: 3 | Status: SHIPPED | OUTPATIENT
Start: 2018-11-15 | End: 2019-11-13 | Stop reason: SDUPTHER

## 2018-11-24 DIAGNOSIS — K21.9 GASTROESOPHAGEAL REFLUX DISEASE WITHOUT ESOPHAGITIS: ICD-10-CM

## 2018-11-24 RX ORDER — PANTOPRAZOLE SODIUM 40 MG/1
TABLET, DELAYED RELEASE ORAL
Qty: 30 TABLET | Refills: 5 | Status: SHIPPED | OUTPATIENT
Start: 2018-11-24 | End: 2019-05-30 | Stop reason: SDUPTHER

## 2018-12-18 DIAGNOSIS — E78.2 MIXED HYPERLIPIDEMIA: Primary | ICD-10-CM

## 2018-12-18 RX ORDER — SIMVASTATIN 40 MG
TABLET ORAL
Qty: 90 TABLET | Refills: 3 | Status: SHIPPED | OUTPATIENT
Start: 2018-12-18 | End: 2019-12-08 | Stop reason: SDUPTHER

## 2019-01-09 DIAGNOSIS — R05.9 COUGH: ICD-10-CM

## 2019-01-09 RX ORDER — BENZONATATE 100 MG/1
CAPSULE ORAL
Qty: 30 CAPSULE | Refills: 1 | Status: SHIPPED | OUTPATIENT
Start: 2019-01-09 | End: 2019-02-03 | Stop reason: SDUPTHER

## 2019-02-03 DIAGNOSIS — R05.9 COUGH: ICD-10-CM

## 2019-02-03 DIAGNOSIS — E03.9 ACQUIRED HYPOTHYROIDISM: ICD-10-CM

## 2019-02-03 RX ORDER — LEVOTHYROXINE SODIUM 0.12 MG/1
TABLET ORAL
Qty: 90 TABLET | Refills: 3 | Status: SHIPPED | OUTPATIENT
Start: 2019-02-03 | End: 2019-07-10 | Stop reason: SDUPTHER

## 2019-02-03 RX ORDER — BENZONATATE 100 MG/1
CAPSULE ORAL
Qty: 30 CAPSULE | Refills: 1 | Status: SHIPPED | OUTPATIENT
Start: 2019-02-03 | End: 2019-04-17 | Stop reason: SDUPTHER

## 2019-02-26 ENCOUNTER — TELEPHONE (OUTPATIENT)
Dept: INTERNAL MEDICINE CLINIC | Facility: CLINIC | Age: 79
End: 2019-02-26

## 2019-03-14 DIAGNOSIS — G89.29 CHRONIC MIDLINE LOW BACK PAIN WITHOUT SCIATICA: Primary | ICD-10-CM

## 2019-03-14 DIAGNOSIS — M54.50 CHRONIC MIDLINE LOW BACK PAIN WITHOUT SCIATICA: Primary | ICD-10-CM

## 2019-03-14 RX ORDER — BACLOFEN 10 MG/1
10 TABLET ORAL 3 TIMES DAILY
Qty: 90 TABLET | Refills: 0 | Status: SHIPPED | OUTPATIENT
Start: 2019-03-14 | End: 2019-12-23 | Stop reason: ALTCHOICE

## 2019-03-14 NOTE — TELEPHONE ENCOUNTER
Pt is requesting a refill on her Soma 250mg  Pt hasn't had this medication filled in about 3 years, pt states that she has tried to control her back pain by taking tylenol but last night she couldn't sleep  Pt is on Alprazolam 0 25mg four times a day  All- nsaids, penicillins, codeine, niacin  Pls advise

## 2019-03-14 NOTE — TELEPHONE ENCOUNTER
Cleo Li is on a non preferred rx list  We can give her an alternative jose manuel since she is already on a controlled rx in alprazolam  Baclofen 10 mg up to tid would be the alternate

## 2019-03-20 ENCOUNTER — TELEPHONE (OUTPATIENT)
Dept: INTERNAL MEDICINE CLINIC | Facility: CLINIC | Age: 79
End: 2019-03-20

## 2019-03-20 DIAGNOSIS — R06.00 DYSPNEA ON EXERTION: ICD-10-CM

## 2019-03-20 DIAGNOSIS — I10 ESSENTIAL HYPERTENSION: ICD-10-CM

## 2019-03-20 DIAGNOSIS — E55.9 VITAMIN D DEFICIENCY: Primary | ICD-10-CM

## 2019-03-20 DIAGNOSIS — E03.9 ACQUIRED HYPOTHYROIDISM: ICD-10-CM

## 2019-03-20 DIAGNOSIS — M15.9 GENERALIZED OSTEOARTHRITIS OF MULTIPLE SITES: ICD-10-CM

## 2019-03-20 DIAGNOSIS — E78.2 MIXED HYPERLIPIDEMIA: ICD-10-CM

## 2019-03-20 DIAGNOSIS — D68.0 VON WILLEBRAND DISEASE (HCC): ICD-10-CM

## 2019-03-20 NOTE — TELEPHONE ENCOUNTER
Patient called for routine lab testing and asked when she is due for a repeat Echo?     Last echo was 3/2018 and before that was 2016

## 2019-03-20 NOTE — TELEPHONE ENCOUNTER
Patient aware of lab and Echo orders  Please call to schedule Echo for patient  She prefers late morning appt    Thank you

## 2019-03-24 DIAGNOSIS — I10 ESSENTIAL HYPERTENSION: ICD-10-CM

## 2019-03-28 ENCOUNTER — HOSPITAL ENCOUNTER (OUTPATIENT)
Dept: NON INVASIVE DIAGNOSTICS | Facility: HOSPITAL | Age: 79
Discharge: HOME | End: 2019-03-28
Attending: INTERNAL MEDICINE
Payer: MEDICARE

## 2019-03-28 ENCOUNTER — TELEPHONE (OUTPATIENT)
Dept: INTERNAL MEDICINE CLINIC | Facility: CLINIC | Age: 79
End: 2019-03-28

## 2019-03-28 ENCOUNTER — APPOINTMENT (OUTPATIENT)
Dept: LAB | Facility: HOSPITAL | Age: 79
End: 2019-03-28
Attending: INTERNAL MEDICINE
Payer: MEDICARE

## 2019-03-28 DIAGNOSIS — D68.0 VON WILLEBRAND DISEASE (HCC): ICD-10-CM

## 2019-03-28 DIAGNOSIS — R06.00 DYSPNEA ON EXERTION: ICD-10-CM

## 2019-03-28 DIAGNOSIS — E78.2 MIXED HYPERLIPIDEMIA: ICD-10-CM

## 2019-03-28 DIAGNOSIS — M15.9 GENERALIZED OSTEOARTHRITIS OF MULTIPLE SITES: ICD-10-CM

## 2019-03-28 DIAGNOSIS — E03.9 ACQUIRED HYPOTHYROIDISM: ICD-10-CM

## 2019-03-28 DIAGNOSIS — E55.9 VITAMIN D DEFICIENCY: ICD-10-CM

## 2019-03-28 DIAGNOSIS — I10 ESSENTIAL HYPERTENSION: ICD-10-CM

## 2019-03-28 LAB
25(OH)D3 SERPL-MCNC: 21.9 NG/ML (ref 30–100)
ALBUMIN SERPL BCP-MCNC: 3.7 G/DL (ref 3.5–5)
ALP SERPL-CCNC: 84 U/L (ref 46–116)
ALT SERPL W P-5'-P-CCNC: 30 U/L (ref 12–78)
ANION GAP SERPL CALCULATED.3IONS-SCNC: 10 MMOL/L (ref 4–13)
AST SERPL W P-5'-P-CCNC: 23 U/L (ref 5–45)
BILIRUB SERPL-MCNC: 0.3 MG/DL (ref 0.2–1)
BUN SERPL-MCNC: 27 MG/DL (ref 5–25)
CALCIUM SERPL-MCNC: 9.1 MG/DL (ref 8.3–10.1)
CHLORIDE SERPL-SCNC: 104 MMOL/L (ref 100–108)
CHOLEST SERPL-MCNC: 184 MG/DL (ref 50–200)
CO2 SERPL-SCNC: 27 MMOL/L (ref 21–32)
CREAT SERPL-MCNC: 0.97 MG/DL (ref 0.6–1.3)
ERYTHROCYTE [DISTWIDTH] IN BLOOD BY AUTOMATED COUNT: 13.3 % (ref 11.6–15.1)
GFR SERPL CREATININE-BSD FRML MDRD: 55 ML/MIN/1.73SQ M
GLUCOSE P FAST SERPL-MCNC: 117 MG/DL (ref 65–99)
HCT VFR BLD AUTO: 41.2 % (ref 34.8–46.1)
HDLC SERPL-MCNC: 37 MG/DL (ref 40–60)
HGB BLD-MCNC: 12.9 G/DL (ref 11.5–15.4)
LDLC SERPL CALC-MCNC: 96 MG/DL (ref 0–100)
MCH RBC QN AUTO: 30.4 PG (ref 26.8–34.3)
MCHC RBC AUTO-ENTMCNC: 31.3 G/DL (ref 31.4–37.4)
MCV RBC AUTO: 97 FL (ref 82–98)
NONHDLC SERPL-MCNC: 147 MG/DL
PLATELET # BLD AUTO: 238 THOUSANDS/UL (ref 149–390)
PMV BLD AUTO: 9.7 FL (ref 8.9–12.7)
POTASSIUM SERPL-SCNC: 4.4 MMOL/L (ref 3.5–5.3)
PROT SERPL-MCNC: 7.4 G/DL (ref 6.4–8.2)
RBC # BLD AUTO: 4.25 MILLION/UL (ref 3.81–5.12)
SODIUM SERPL-SCNC: 141 MMOL/L (ref 136–145)
TRIGL SERPL-MCNC: 253 MG/DL
TSH SERPL DL<=0.05 MIU/L-ACNC: 0.04 UIU/ML (ref 0.36–3.74)
WBC # BLD AUTO: 3.8 THOUSAND/UL (ref 4.31–10.16)

## 2019-03-28 PROCEDURE — 80061 LIPID PANEL: CPT

## 2019-03-28 PROCEDURE — 93306 TTE W/DOPPLER COMPLETE: CPT

## 2019-03-28 PROCEDURE — 93306 TTE W/DOPPLER COMPLETE: CPT | Performed by: INTERNAL MEDICINE

## 2019-03-28 PROCEDURE — 85027 COMPLETE CBC AUTOMATED: CPT

## 2019-03-28 PROCEDURE — 84443 ASSAY THYROID STIM HORMONE: CPT

## 2019-03-28 PROCEDURE — 36415 COLL VENOUS BLD VENIPUNCTURE: CPT

## 2019-03-28 PROCEDURE — 80053 COMPREHEN METABOLIC PANEL: CPT

## 2019-03-28 PROCEDURE — 82306 VITAMIN D 25 HYDROXY: CPT

## 2019-03-28 NOTE — TELEPHONE ENCOUNTER
Labs overall look about the same - her thyroid level is lower but it has been in that range - if she feels ok would not change  Sugar went up about 10 points to 117 so should watch carb in take and try to do some exercise

## 2019-04-11 DIAGNOSIS — F32.9 REACTIVE DEPRESSION: ICD-10-CM

## 2019-04-11 DIAGNOSIS — J43.9 PULMONARY EMPHYSEMA, UNSPECIFIED EMPHYSEMA TYPE (HCC): ICD-10-CM

## 2019-04-11 RX ORDER — BUDESONIDE AND FORMOTEROL FUMARATE DIHYDRATE 160; 4.5 UG/1; UG/1
2 AEROSOL RESPIRATORY (INHALATION) 2 TIMES DAILY
Qty: 1 INHALER | Refills: 5 | Status: SHIPPED | OUTPATIENT
Start: 2019-04-11 | End: 2019-07-10 | Stop reason: SDUPTHER

## 2019-04-11 RX ORDER — PAROXETINE 30 MG/1
30 TABLET, FILM COATED ORAL DAILY
Qty: 90 TABLET | Refills: 3 | Status: SHIPPED | OUTPATIENT
Start: 2019-04-11 | End: 2019-07-10 | Stop reason: SDUPTHER

## 2019-04-17 DIAGNOSIS — R05.9 COUGH: ICD-10-CM

## 2019-04-17 RX ORDER — BENZONATATE 100 MG/1
CAPSULE ORAL
Qty: 30 CAPSULE | Refills: 1 | Status: SHIPPED | OUTPATIENT
Start: 2019-04-17 | End: 2019-07-08 | Stop reason: SDUPTHER

## 2019-05-13 DIAGNOSIS — F41.9 ANXIETY: ICD-10-CM

## 2019-05-13 RX ORDER — ALPRAZOLAM 0.25 MG/1
TABLET ORAL
Qty: 120 TABLET | Refills: 5 | Status: SHIPPED | OUTPATIENT
Start: 2019-05-13 | End: 2019-12-09 | Stop reason: SDUPTHER

## 2019-05-30 DIAGNOSIS — K21.9 GASTROESOPHAGEAL REFLUX DISEASE WITHOUT ESOPHAGITIS: ICD-10-CM

## 2019-05-30 RX ORDER — PANTOPRAZOLE SODIUM 40 MG/1
TABLET, DELAYED RELEASE ORAL
Qty: 30 TABLET | Refills: 5 | Status: SHIPPED | OUTPATIENT
Start: 2019-05-30 | End: 2019-12-03 | Stop reason: SDUPTHER

## 2019-06-24 ENCOUNTER — TELEPHONE (OUTPATIENT)
Dept: GASTROENTEROLOGY | Facility: AMBULARY SURGERY CENTER | Age: 79
End: 2019-06-24

## 2019-07-08 DIAGNOSIS — R05.9 COUGH: ICD-10-CM

## 2019-07-08 RX ORDER — BENZONATATE 100 MG/1
CAPSULE ORAL
Qty: 30 CAPSULE | Refills: 1 | Status: SHIPPED | OUTPATIENT
Start: 2019-07-08 | End: 2019-08-28 | Stop reason: SDUPTHER

## 2019-07-10 ENCOUNTER — OFFICE VISIT (OUTPATIENT)
Dept: INTERNAL MEDICINE CLINIC | Facility: CLINIC | Age: 79
End: 2019-07-10
Payer: MEDICARE

## 2019-07-10 VITALS
HEART RATE: 72 BPM | DIASTOLIC BLOOD PRESSURE: 70 MMHG | SYSTOLIC BLOOD PRESSURE: 126 MMHG | WEIGHT: 184.38 LBS | BODY MASS INDEX: 28.94 KG/M2 | HEIGHT: 67 IN | OXYGEN SATURATION: 93 % | TEMPERATURE: 98.7 F

## 2019-07-10 DIAGNOSIS — K58.2 IRRITABLE BOWEL SYNDROME WITH BOTH CONSTIPATION AND DIARRHEA: Primary | ICD-10-CM

## 2019-07-10 DIAGNOSIS — M54.6 CHRONIC MIDLINE THORACIC BACK PAIN: ICD-10-CM

## 2019-07-10 DIAGNOSIS — G89.29 CHRONIC MIDLINE THORACIC BACK PAIN: ICD-10-CM

## 2019-07-10 DIAGNOSIS — F32.9 REACTIVE DEPRESSION: ICD-10-CM

## 2019-07-10 DIAGNOSIS — J43.9 PULMONARY EMPHYSEMA, UNSPECIFIED EMPHYSEMA TYPE (HCC): ICD-10-CM

## 2019-07-10 PROBLEM — M25.552 HIP PAIN, LEFT: Status: RESOLVED | Noted: 2017-01-19 | Resolved: 2019-07-10

## 2019-07-10 PROCEDURE — 99214 OFFICE O/P EST MOD 30 MIN: CPT | Performed by: INTERNAL MEDICINE

## 2019-07-10 RX ORDER — PAROXETINE 30 MG/1
30 TABLET, FILM COATED ORAL DAILY
Qty: 90 TABLET | Refills: 3 | Status: SHIPPED | OUTPATIENT
Start: 2019-07-10 | End: 2019-12-23 | Stop reason: SDUPTHER

## 2019-07-10 RX ORDER — DICYCLOMINE HCL 20 MG
20 TABLET ORAL EVERY 8 HOURS PRN
Qty: 90 TABLET | Refills: 5 | Status: SHIPPED | OUTPATIENT
Start: 2019-07-10 | End: 2019-12-23 | Stop reason: DRUGHIGH

## 2019-07-10 RX ORDER — BUDESONIDE AND FORMOTEROL FUMARATE DIHYDRATE 160; 4.5 UG/1; UG/1
2 AEROSOL RESPIRATORY (INHALATION) 2 TIMES DAILY
Qty: 1 INHALER | Refills: 5 | Status: SHIPPED | OUTPATIENT
Start: 2019-07-10 | End: 2019-11-18 | Stop reason: SDUPTHER

## 2019-07-10 NOTE — PROGRESS NOTES
BMI Counseling: Body mass index is 28 88 kg/m²  Discussed the patient's BMI with her  The BMI is above average  BMI counseling and education was provided to the patient  Nutrition recommendations include 3-5 servings of fruits/vegetables daily

## 2019-07-10 NOTE — PROGRESS NOTES
Assessment/Plan:         Diagnoses and all orders for this visit:    Irritable bowel syndrome with both constipation and diarrhea  -     dicyclomine (BENTYL) 20 mg tablet; Take 1 tablet (20 mg total) by mouth every 8 (eight) hours as needed (abdominal pain)  Pt encouraged to take regularly since sxs have been increased   Increase water intake Rest, fiber    Reactive depression  -     PARoxetine (PAXIL) 30 mg tablet; Take 1 tablet (30 mg total) by mouth daily  Pt continues on present rx and seems to be managing better overall in terms of family stressors  Her upcoming move I feel will be a help as well in settling her further    Pulmonary emphysema, unspecified emphysema type (HCC)  -     budesonide-formoterol (SYMBICORT) 160-4 5 mcg/act inhaler; Inhale 2 puffs 2 (two) times a day Rinse mouth after use  BMI 28 0-28 9,adult  Low fat diet Increase water intake    Chronic midline thoracic back pain  She has known arthritis and recent bowel flare likely increased back pressure Caution with upcoming move and lifting boxes  Rto 3 months recent labs reviewed       Patient ID: Gentry Wesley is a 78 y o  female  HPI   Pt had increased abdominal bloating and loose stools about 2 weeks ago She strated taking 20mg of the bentyl three times per day more often and sxs have been improving  She is moving into an apartment soon and is in the process of selling her home to her niece which she is pleased about  She has lost contact with one of her sons who was causing great stress to her previously but she felt she needed to step back until he could be in a more respectful relationship Overall she seems to be managing the family stressors better and is very excited about her upcoming move and downsizing  Her diet has not been perfect Her bowels now are more formed The Bentyl does help to some degree    She had increased mid back pain while her bowels were acting up No acute trauma to her back but she has started packing this week - she does have help coming to do most of the work though  She actually has a pretty good plan to remove items from her home as well as the move ahead  She takes xanax at hs to help with sleep  No chest pain or sob  Review of Systems   Constitutional: Negative for chills and fever  HENT: Negative  Respiratory: Negative  Cardiovascular: Negative  Gastrointestinal: Positive for abdominal distention and abdominal pain  Genitourinary: Negative  Musculoskeletal: Positive for back pain  Skin: Negative  Neurological: Negative  Hematological: Negative  Psychiatric/Behavioral: Negative  Past Medical History:   Diagnosis Date    Contact dermatitis     Last assessed - 8/8/12    COPD (chronic obstructive pulmonary disease) (HCC)     Disease of thyroid gland     GERD (gastroesophageal reflux disease)     Hyperlipidemia     Hypertension     Irritable bowel syndrome      Past Surgical History:   Procedure Laterality Date    APPENDECTOMY      HYSTERECTOMY      Date unk      Social History     Socioeconomic History    Marital status:       Spouse name: Not on file    Number of children: Not on file    Years of education: Not on file    Highest education level: Not on file   Occupational History    Not on file   Social Needs    Financial resource strain: Not on file    Food insecurity:     Worry: Not on file     Inability: Not on file    Transportation needs:     Medical: Not on file     Non-medical: Not on file   Tobacco Use    Smoking status: Never Smoker    Smokeless tobacco: Never Used   Substance and Sexual Activity    Alcohol use: No    Drug use: No    Sexual activity: Not on file   Lifestyle    Physical activity:     Days per week: Not on file     Minutes per session: Not on file    Stress: Not on file   Relationships    Social connections:     Talks on phone: Not on file     Gets together: Not on file     Attends [employer] service: Not on file Active member of club or organization: Not on file     Attends meetings of clubs or organizations: Not on file     Relationship status: Not on file    Intimate partner violence:     Fear of current or ex partner: Not on file     Emotionally abused: Not on file     Physically abused: Not on file     Forced sexual activity: Not on file   Other Topics Concern    Not on file   Social History Narrative    Always uses seat belt    Assistive devices - Cane     Caffeine use    Dental care, regularly     Allergies   Allergen Reactions    Niacin And Related Anaphylaxis    Codeine Nausea Only    Nsaids     Other      Iv contrast dye- lips and tongue thick body itchy    Penicillins Rash               /70   Pulse 72   Temp 98 7 °F (37 1 °C) (Tympanic)   Ht 5' 7" (1 702 m)   Wt 83 6 kg (184 lb 6 oz)   SpO2 93%   BMI 28 88 kg/m²          Physical Exam   Constitutional: She is oriented to person, place, and time  She appears well-developed and well-nourished  Non-toxic appearance  She does not appear ill  No distress  HENT:   Head: Normocephalic and atraumatic  Mouth/Throat: No oropharyngeal exudate  Eyes: Pupils are equal, round, and reactive to light  EOM are normal  No scleral icterus  Cardiovascular: Normal rate, regular rhythm and normal heart sounds  No murmur heard  Pulmonary/Chest: Effort normal and breath sounds normal  No respiratory distress  She has no wheezes  Abdominal: Soft  Normal appearance and bowel sounds are normal  She exhibits no ascites  Neurological: She is alert and oriented to person, place, and time  Skin: Skin is warm and dry  Capillary refill takes less than 2 seconds  She is not diaphoretic  Psychiatric: She has a normal mood and affect  Her behavior is normal    Nursing note and vitals reviewed

## 2019-07-10 NOTE — PATIENT INSTRUCTIONS

## 2019-08-28 DIAGNOSIS — R05.9 COUGH: ICD-10-CM

## 2019-08-28 RX ORDER — BENZONATATE 100 MG/1
CAPSULE ORAL
Qty: 30 CAPSULE | Refills: 1 | Status: SHIPPED | OUTPATIENT
Start: 2019-08-28 | End: 2019-10-10 | Stop reason: SDUPTHER

## 2019-08-30 ENCOUNTER — HOSPITAL ENCOUNTER (EMERGENCY)
Facility: HOSPITAL | Age: 79
Discharge: HOME | End: 2019-08-30
Attending: EMERGENCY MEDICINE
Payer: MEDICARE

## 2019-08-30 VITALS
SYSTOLIC BLOOD PRESSURE: 142 MMHG | TEMPERATURE: 97.8 F | BODY MASS INDEX: 29.17 KG/M2 | DIASTOLIC BLOOD PRESSURE: 65 MMHG | HEIGHT: 67 IN | OXYGEN SATURATION: 94 % | RESPIRATION RATE: 18 BRPM | HEART RATE: 72 BPM | WEIGHT: 185.85 LBS

## 2019-08-30 DIAGNOSIS — S05.02XA ABRASION OF LEFT CORNEA, INITIAL ENCOUNTER: ICD-10-CM

## 2019-08-30 DIAGNOSIS — H11.32 SUBCONJUNCTIVAL HEMORRHAGE OF LEFT EYE: Primary | ICD-10-CM

## 2019-08-30 PROCEDURE — 99284 EMERGENCY DEPT VISIT MOD MDM: CPT | Performed by: EMERGENCY MEDICINE

## 2019-08-30 PROCEDURE — 99282 EMERGENCY DEPT VISIT SF MDM: CPT

## 2019-08-30 RX ORDER — CARBOXYMETHYLCELLULOSE SODIUM 5 MG/ML
1 SOLUTION/ DROPS OPHTHALMIC 3 TIMES DAILY PRN
Qty: 10 ML | Refills: 0 | Status: SHIPPED | OUTPATIENT
Start: 2019-08-30 | End: 2020-05-21 | Stop reason: SDUPTHER

## 2019-08-30 RX ORDER — BACITRACIN 500 [USP'U]/G
OINTMENT OPHTHALMIC EVERY 4 HOURS
Qty: 3.5 G | Refills: 0 | Status: SHIPPED | OUTPATIENT
Start: 2019-08-30 | End: 2019-09-06

## 2019-08-30 RX ORDER — TETRACAINE HYDROCHLORIDE 5 MG/ML
1 SOLUTION OPHTHALMIC ONCE
Status: COMPLETED | OUTPATIENT
Start: 2019-08-30 | End: 2019-08-30

## 2019-08-30 RX ADMIN — FLUORESCEIN SODIUM 1 STRIP: 1 STRIP OPHTHALMIC at 09:14

## 2019-08-30 RX ADMIN — TETRACAINE HYDROCHLORIDE 1 DROP: 5 SOLUTION OPHTHALMIC at 09:14

## 2019-08-30 NOTE — ED PROVIDER NOTES
History  Chief Complaint   Patient presents with    Eye Redness     left eye redness past 3 days, worse in last day  Itchy, has been rubbing  initially felt like humberto was in her eye  Eye Problem   Location:  Left eye  Quality:  Burning  Severity:  Mild  Onset quality:  Gradual  Duration:  2 days  Timing:  Constant  Progression:  Unchanged  Chronicity:  New  Relieved by:  Nothing  Worsened by:  Nothing  Ineffective treatments:  None tried  Associated symptoms: itching and redness    Associated symptoms: no discharge, no headaches, no nausea, no photophobia, no vomiting and no weakness    Risk factors: conjunctival hemorrhage         Pt presents from home c/o left eye "redness, like there's blood underneath," and a "scratchy feeling in both my eyes, and I've been itching them like crazy "  Pt is taking "blood thinners," although she can't remember the name  Pt o/w denies any symptoms  No vision change  No other symptoms  Pt denies ha, fevers, cough, cp, sob, n/v/d/c, abd pain, dysuria, focal def or syncope  Prior to Admission Medications   Prescriptions Last Dose Informant Patient Reported? Taking? ALPRAZolam (XANAX) 0 25 mg tablet   No No   Sig: take 1 tablet by mouth four times a day if needed   PARoxetine (PAXIL) 30 mg tablet   No No   Sig: Take 1 tablet (30 mg total) by mouth daily   baclofen 10 mg tablet   No No   Sig: Take 1 tablet (10 mg total) by mouth 3 (three) times a day for 30 days   benzonatate (TESSALON PERLES) 100 mg capsule   No No   Sig: TAKE 1 CAPSULE BY MOUTH 3 TIMES A DAY   budesonide-formoterol (SYMBICORT) 160-4 5 mcg/act inhaler   No No   Sig: Inhale 2 puffs 2 (two) times a day Rinse mouth after use     dicyclomine (BENTYL) 20 mg tablet   Yes No   Sig: Take 20 mg by mouth every 6 (six) hours as needed     dicyclomine (BENTYL) 20 mg tablet   No No   Sig: Take 1 tablet (20 mg total) by mouth every 8 (eight) hours as needed (abdominal pain)   hydrochlorothiazide (HYDRODIURIL) 50 mg tablet   No No   Sig: take 1 tablet by mouth once daily   levothyroxine 125 mcg tablet   No No   Sig: take 1 tablet by mouth once daily   lisinopril (ZESTRIL) 10 mg tablet   No No   Sig: TAKE 1 TABLET TWICE DAILY   metoprolol tartrate (LOPRESSOR) 25 mg tablet   No No   Sig: take 1 tablet by mouth twice a day   nadolol (CORGARD) 20 mg tablet   Yes No   Sig: Take 20 mg by mouth daily     pantoprazole (PROTONIX) 40 mg tablet   No No   Sig: take 1 tablet by mouth once daily   simvastatin (ZOCOR) 40 mg tablet   No No   Sig: take 1 tablet by mouth at bedtime (NO GRAPEFRUIT JUICE)      Facility-Administered Medications: None       Past Medical History:   Diagnosis Date    Contact dermatitis     Last assessed - 8/8/12    COPD (chronic obstructive pulmonary disease) (HCC)     Disease of thyroid gland     GERD (gastroesophageal reflux disease)     Hyperlipidemia     Hypertension     Irritable bowel syndrome        Past Surgical History:   Procedure Laterality Date    APPENDECTOMY      HYSTERECTOMY      Date unk        Family History   Problem Relation Age of Onset    Clotting disorder Mother         Def of clotting factor     Aneurysm Father      I have reviewed and agree with the history as documented  Social History     Tobacco Use    Smoking status: Never Smoker    Smokeless tobacco: Never Used   Substance Use Topics    Alcohol use: No    Drug use: No        Review of Systems   Constitutional: Negative for activity change, appetite change, diaphoresis, fatigue and fever  HENT: Negative for congestion, facial swelling, mouth sores and trouble swallowing  Eyes: Positive for redness and itching  Negative for photophobia, discharge and visual disturbance  Respiratory: Negative for apnea, cough, shortness of breath and wheezing  Cardiovascular: Negative for chest pain and leg swelling  Gastrointestinal: Negative for abdominal pain, constipation, diarrhea, nausea and vomiting     Endocrine: Negative for heat intolerance and polydipsia  Genitourinary: Negative for dysuria, flank pain, frequency and hematuria  Musculoskeletal: Negative for back pain, gait problem, myalgias and neck pain  Skin: Negative for rash and wound  Allergic/Immunologic: Negative for immunocompromised state  Neurological: Negative for dizziness, syncope, weakness, light-headedness and headaches  Hematological: Negative for adenopathy  Psychiatric/Behavioral: Negative for agitation, confusion and self-injury  The patient is not nervous/anxious  Physical Exam  Physical Exam   Constitutional: She is oriented to person, place, and time  She appears well-developed and well-nourished  No distress  HENT:   Head: Normocephalic and atraumatic  Right Ear: External ear normal    Left Ear: External ear normal    Nose: Nose normal    Mouth/Throat: Oropharynx is clear and moist  No oropharyngeal exudate  Eyes: Pupils are equal, round, and reactive to light  Conjunctivae and EOM are normal  Right eye exhibits no discharge  Left eye exhibits no discharge  No scleral icterus  Fluorescein staining of the left eye shows uptake over the pupil approx 2-3 mm at the 3 o'clock position   Neck: Normal range of motion  Neck supple  No JVD present  No tracheal deviation present  No thyromegaly present  Cardiovascular: Normal rate, regular rhythm and normal heart sounds  No murmur heard  Pulmonary/Chest: Effort normal and breath sounds normal  No stridor  No respiratory distress  She has no wheezes  She has no rales  She exhibits no tenderness  Abdominal: Soft  Bowel sounds are normal  She exhibits no distension and no mass  There is no tenderness  There is no rebound and no guarding  Musculoskeletal: Normal range of motion  She exhibits no edema, tenderness or deformity  Lymphadenopathy:     She has no cervical adenopathy  Neurological: She is alert and oriented to person, place, and time   She has normal reflexes  She displays normal reflexes  No cranial nerve deficit  She exhibits normal muscle tone  Coordination normal    Skin: Skin is warm and dry  No rash noted  She is not diaphoretic  No erythema  No pallor  Psychiatric: She has a normal mood and affect  Her behavior is normal  Judgment and thought content normal    Nursing note and vitals reviewed  Vital Signs  ED Triage Vitals [08/30/19 0832]   Temperature Pulse Respirations Blood Pressure SpO2   97 8 °F (36 6 °C) 72 18 142/65 94 %      Temp Source Heart Rate Source Patient Position - Orthostatic VS BP Location FiO2 (%)   Temporal Monitor Sitting Left arm --      Pain Score       No Pain           Vitals:    08/30/19 0832   BP: 142/65   Pulse: 72   Patient Position - Orthostatic VS: Sitting         Visual Acuity      ED Medications  Medications   fluorescein sodium sterile ophthalmic strip 1 strip (1 strip Right Eye Given by Other 8/30/19 8668)   tetracaine 0 5 % ophthalmic solution 1 drop (1 drop Right Eye Given by Other 8/30/19 9533)       Diagnostic Studies  Results Reviewed     None                 No orders to display              Procedures  Procedures       ED Course                               MDM  Number of Diagnoses or Management Options  Abrasion of left cornea, initial encounter:   Subconjunctival hemorrhage of left eye:   Diagnosis management comments: IMP: subconjunctival hemorrhage likely due to trauma from rubbing the eyes and being on blood thinners  Plan: will give artificial tears and antibiotic eye drop  Pt will f/up with ophthalmology         Amount and/or Complexity of Data Reviewed  Decide to obtain previous medical records or to obtain history from someone other than the patient: yes  Review and summarize past medical records: yes  Independent visualization of images, tracings, or specimens: yes    Risk of Complications, Morbidity, and/or Mortality  Presenting problems: high  Diagnostic procedures: minimal  Management options: low    Patient Progress  Patient progress: improved      Disposition  Final diagnoses:   Subconjunctival hemorrhage of left eye   Abrasion of left cornea, initial encounter     Time reflects when diagnosis was documented in both MDM as applicable and the Disposition within this note     Time User Action Codes Description Comment    8/30/2019  9:59 AM Sarah Aguilar Add [H11 32] Subconjunctival hemorrhage of left eye     8/30/2019  9:59 AM Sarah Aguilar Add [S05  02XA] Abrasion of left cornea, initial encounter       ED Disposition     ED Disposition Condition Date/Time Comment    Discharge Stable Fri Aug 30, 2019  9:59 AM Justa Quitaque discharge to home/self care              Follow-up Information     Follow up With Specialties Details Why [facility], DO Internal Medicine Schedule an appointment as soon as possible for a visit in 2 days As needed [facility]  [phone]            Discharge Medication List as of 8/30/2019 10:03 AM      START taking these medications    Details   bacitracin ophthalmic ointment Administer into the left eye every 4 (four) hours for 7 days, Starting Fri 8/30/2019, Until Fri 9/6/2019, Print      carboxymethylcellulose 0 5 % SOLN Administer 1 drop to both eyes 3 (three) times a day as needed for dry eyes for up to 30 days, Starting Fri 8/30/2019, Until Sun 9/29/2019, Print         CONTINUE these medications which have NOT CHANGED    Details   ALPRAZolam (XANAX) 0 25 mg tablet take 1 tablet by mouth four times a day if needed, Normal      baclofen 10 mg tablet Take 1 tablet (10 mg total) by mouth 3 (three) times a day for 30 days, Starting Thu 3/14/2019, Until Sat 4/13/2019, Normal      benzonatate (TESSALON PERLES) 100 mg capsule TAKE 1 CAPSULE BY MOUTH 3 TIMES A DAY, Normal      budesonide-formoterol (SYMBICORT) 160-4 5 mcg/act inhaler Inhale 2 puffs 2 (two) times a day Rinse mouth after use , Starting Wed 7/10/2019, Normal      !! dicyclomine (BENTYL) 20 mg tablet Take 20 mg by mouth every 6 (six) hours as needed  , Starting 1/27/2015, Until Discontinued, Historical Med      !! dicyclomine (BENTYL) 20 mg tablet Take 1 tablet (20 mg total) by mouth every 8 (eight) hours as needed (abdominal pain), Starting Wed 7/10/2019, Normal      hydrochlorothiazide (HYDRODIURIL) 50 mg tablet take 1 tablet by mouth once daily, Normal      levothyroxine 125 mcg tablet take 1 tablet by mouth once daily, Normal      lisinopril (ZESTRIL) 10 mg tablet TAKE 1 TABLET TWICE DAILY, Normal      metoprolol tartrate (LOPRESSOR) 25 mg tablet take 1 tablet by mouth twice a day, Normal      nadolol (CORGARD) 20 mg tablet Take 20 mg by mouth daily  , Starting 2/14/2012, Until Discontinued, Historical Med      pantoprazole (PROTONIX) 40 mg tablet take 1 tablet by mouth once daily, Normal      PARoxetine (PAXIL) 30 mg tablet Take 1 tablet (30 mg total) by mouth daily, Starting Wed 7/10/2019, Normal      simvastatin (ZOCOR) 40 mg tablet take 1 tablet by mouth at bedtime (NO GRAPEFRUIT JUICE), Normal       !! - Potential duplicate medications found  Please discuss with provider  No discharge procedures on file      ED Provider  Electronically Signed by           Catrina Saavedra DO  09/01/19 6310

## 2019-09-14 DIAGNOSIS — R10.84 GENERALIZED ABDOMINAL PAIN: ICD-10-CM

## 2019-09-15 RX ORDER — DICYCLOMINE HYDROCHLORIDE 10 MG/1
CAPSULE ORAL
Qty: 30 CAPSULE | Refills: 5 | Status: SHIPPED | OUTPATIENT
Start: 2019-09-15 | End: 2019-12-23 | Stop reason: SDUPTHER

## 2019-10-10 ENCOUNTER — IMMUNIZATIONS (OUTPATIENT)
Dept: INTERNAL MEDICINE CLINIC | Facility: CLINIC | Age: 79
End: 2019-10-10
Payer: MEDICARE

## 2019-10-10 DIAGNOSIS — Z23 ENCOUNTER FOR IMMUNIZATION: ICD-10-CM

## 2019-10-10 DIAGNOSIS — R05.9 COUGH: ICD-10-CM

## 2019-10-10 DIAGNOSIS — I10 ESSENTIAL HYPERTENSION: ICD-10-CM

## 2019-10-10 DIAGNOSIS — K57.90 DIVERTICULOSIS: ICD-10-CM

## 2019-10-10 DIAGNOSIS — E55.9 VITAMIN D DEFICIENCY: Primary | ICD-10-CM

## 2019-10-10 DIAGNOSIS — E78.2 MIXED HYPERLIPIDEMIA: ICD-10-CM

## 2019-10-10 PROCEDURE — 90732 PPSV23 VACC 2 YRS+ SUBQ/IM: CPT | Performed by: INTERNAL MEDICINE

## 2019-10-10 PROCEDURE — G0009 ADMIN PNEUMOCOCCAL VACCINE: HCPCS | Performed by: INTERNAL MEDICINE

## 2019-10-10 PROCEDURE — 90662 IIV NO PRSV INCREASED AG IM: CPT | Performed by: INTERNAL MEDICINE

## 2019-10-10 PROCEDURE — G0008 ADMIN INFLUENZA VIRUS VAC: HCPCS | Performed by: INTERNAL MEDICINE

## 2019-10-10 RX ORDER — BENZONATATE 100 MG/1
100 CAPSULE ORAL 3 TIMES DAILY
Qty: 30 CAPSULE | Refills: 1 | Status: SHIPPED | OUTPATIENT
Start: 2019-10-10 | End: 2019-10-24 | Stop reason: SDUPTHER

## 2019-10-18 ENCOUNTER — APPOINTMENT (OUTPATIENT)
Dept: LAB | Facility: HOSPITAL | Age: 79
End: 2019-10-18
Attending: INTERNAL MEDICINE
Payer: MEDICARE

## 2019-10-18 ENCOUNTER — TELEPHONE (OUTPATIENT)
Dept: INTERNAL MEDICINE CLINIC | Facility: CLINIC | Age: 79
End: 2019-10-18

## 2019-10-18 DIAGNOSIS — E03.9 HYPOTHYROIDISM, UNSPECIFIED TYPE: Primary | ICD-10-CM

## 2019-10-18 DIAGNOSIS — I10 ESSENTIAL HYPERTENSION: ICD-10-CM

## 2019-10-18 DIAGNOSIS — E55.9 VITAMIN D DEFICIENCY: ICD-10-CM

## 2019-10-18 DIAGNOSIS — E78.2 MIXED HYPERLIPIDEMIA: ICD-10-CM

## 2019-10-18 DIAGNOSIS — E03.9 HYPOTHYROIDISM, UNSPECIFIED TYPE: ICD-10-CM

## 2019-10-18 LAB
25(OH)D3 SERPL-MCNC: 19 NG/ML (ref 30–100)
ALBUMIN SERPL BCP-MCNC: 4 G/DL (ref 3.5–5)
ALP SERPL-CCNC: 78 U/L (ref 46–116)
ALT SERPL W P-5'-P-CCNC: 29 U/L (ref 12–78)
ANION GAP SERPL CALCULATED.3IONS-SCNC: 8 MMOL/L (ref 4–13)
AST SERPL W P-5'-P-CCNC: 23 U/L (ref 5–45)
BILIRUB SERPL-MCNC: 0.3 MG/DL (ref 0.2–1)
BUN SERPL-MCNC: 27 MG/DL (ref 5–25)
CALCIUM SERPL-MCNC: 9.6 MG/DL (ref 8.3–10.1)
CHLORIDE SERPL-SCNC: 102 MMOL/L (ref 100–108)
CO2 SERPL-SCNC: 28 MMOL/L (ref 21–32)
CREAT SERPL-MCNC: 1.06 MG/DL (ref 0.6–1.3)
ERYTHROCYTE [DISTWIDTH] IN BLOOD BY AUTOMATED COUNT: 13.3 % (ref 11.6–15.1)
GFR SERPL CREATININE-BSD FRML MDRD: 49 ML/MIN/1.73SQ M
GLUCOSE P FAST SERPL-MCNC: 114 MG/DL (ref 65–99)
HCT VFR BLD AUTO: 44.7 % (ref 34.8–46.1)
HGB BLD-MCNC: 14.3 G/DL (ref 11.5–15.4)
LDLC SERPL DIRECT ASSAY-MCNC: 84 MG/DL (ref 0–100)
MCH RBC QN AUTO: 31.2 PG (ref 26.8–34.3)
MCHC RBC AUTO-ENTMCNC: 32 G/DL (ref 31.4–37.4)
MCV RBC AUTO: 98 FL (ref 82–98)
PLATELET # BLD AUTO: 255 THOUSANDS/UL (ref 149–390)
PMV BLD AUTO: 9.7 FL (ref 8.9–12.7)
POTASSIUM SERPL-SCNC: 4.6 MMOL/L (ref 3.5–5.3)
PROT SERPL-MCNC: 8.5 G/DL (ref 6.4–8.2)
RBC # BLD AUTO: 4.58 MILLION/UL (ref 3.81–5.12)
SODIUM SERPL-SCNC: 138 MMOL/L (ref 136–145)
TRIGL SERPL-MCNC: 266 MG/DL
TSH SERPL DL<=0.05 MIU/L-ACNC: 0.19 UIU/ML (ref 0.36–3.74)
WBC # BLD AUTO: 3.98 THOUSAND/UL (ref 4.31–10.16)

## 2019-10-18 PROCEDURE — 83721 ASSAY OF BLOOD LIPOPROTEIN: CPT

## 2019-10-18 PROCEDURE — 84443 ASSAY THYROID STIM HORMONE: CPT

## 2019-10-18 PROCEDURE — 85027 COMPLETE CBC AUTOMATED: CPT

## 2019-10-18 PROCEDURE — 80053 COMPREHEN METABOLIC PANEL: CPT

## 2019-10-18 PROCEDURE — 84478 ASSAY OF TRIGLYCERIDES: CPT

## 2019-10-18 PROCEDURE — 82306 VITAMIN D 25 HYDROXY: CPT

## 2019-10-18 PROCEDURE — 36415 COLL VENOUS BLD VENIPUNCTURE: CPT

## 2019-10-18 NOTE — TELEPHONE ENCOUNTER
Labs were bascially stable from prior - TSH was actually on low end so would be more towards overactive not underactive  Would have to be on cancel list to come in sooner

## 2019-10-24 ENCOUNTER — OFFICE VISIT (OUTPATIENT)
Dept: INTERNAL MEDICINE CLINIC | Facility: CLINIC | Age: 79
End: 2019-10-24
Payer: MEDICARE

## 2019-10-24 VITALS
WEIGHT: 184.38 LBS | HEIGHT: 67 IN | SYSTOLIC BLOOD PRESSURE: 126 MMHG | HEART RATE: 75 BPM | TEMPERATURE: 97.6 F | BODY MASS INDEX: 28.94 KG/M2 | DIASTOLIC BLOOD PRESSURE: 70 MMHG | OXYGEN SATURATION: 92 %

## 2019-10-24 DIAGNOSIS — F41.1 GENERALIZED ANXIETY DISORDER: ICD-10-CM

## 2019-10-24 DIAGNOSIS — R79.89 ABNORMAL TSH: Primary | ICD-10-CM

## 2019-10-24 DIAGNOSIS — E55.9 VITAMIN D DEFICIENCY: ICD-10-CM

## 2019-10-24 DIAGNOSIS — R05.9 COUGH: ICD-10-CM

## 2019-10-24 PROCEDURE — 99214 OFFICE O/P EST MOD 30 MIN: CPT | Performed by: INTERNAL MEDICINE

## 2019-10-24 RX ORDER — BENZONATATE 100 MG/1
100 CAPSULE ORAL 3 TIMES DAILY
Qty: 30 CAPSULE | Refills: 1 | Status: SHIPPED | OUTPATIENT
Start: 2019-10-24 | End: 2020-03-09 | Stop reason: SDUPTHER

## 2019-10-24 RX ORDER — LEVOTHYROXINE SODIUM 0.1 MG/1
100 TABLET ORAL
Qty: 30 TABLET | Refills: 5 | Status: SHIPPED | OUTPATIENT
Start: 2019-10-24 | End: 2019-12-23 | Stop reason: SDUPTHER

## 2019-10-24 NOTE — PROGRESS NOTES
Assessment/Plan:         Diagnoses and all orders for this visit:    Abnormal TSH  -     levothyroxine 100 mcg tablet; Take 1 tablet (100 mcg total) by mouth daily in the early morning  Decrease to 100mcg daily     Cough  -     benzonatate (TESSALON PERLES) 100 mg capsule; Take 1 capsule (100 mg total) by mouth 3 (three) times a day  Refill sent to pharmacy    Generalized anxiety disorder  -     levothyroxine 100 mcg tablet; Take 1 tablet (100 mcg total) by mouth daily in the early morning  Hopeful that decreasing synthroid dose and recheck in 2 months     Vitamin D deficiency  Vit d supplement 2000 iu daily     Rto 2 months      Patient ID: Love Baltazar is a 78 y o  female  HPI  Pt has been more tired recently She did move and is happy but very tired She has good neighbors and goes to [employer] group She feels down and is sad about her  this time of year No chest pain Occasional palpitations No n/v Appetite good She sleeps more than she would like to She feels she gets good sleep No falls She tries to get out but does not always feel like going out  No harmful thoughts She lacks motivation at times and her son does upset her often    Depression Screening Follow-up Plan: Patient's depression screening was positive with a PHQ-2 score of   Their PHQ-9 score was   Patient assessed for underlying major depression  They have no active suicidal ideations  Brief counseling provided and recommend additional follow-up/re-evaluation next office visit  Review of Systems   Constitutional: Positive for fatigue  Negative for chills and fever  HENT: Negative  Respiratory: Negative  Cardiovascular: Negative  Gastrointestinal: Negative  Genitourinary: Negative  Musculoskeletal: Positive for arthralgias  Skin: Negative  Neurological: Negative for dizziness and headaches  Hematological: Negative  Psychiatric/Behavioral: The patient is nervous/anxious          Past Medical History: Diagnosis Date    Contact dermatitis     Last assessed - 8/8/12    COPD (chronic obstructive pulmonary disease) (HCC)     Disease of thyroid gland     GERD (gastroesophageal reflux disease)     Hyperlipidemia     Hypertension     Irritable bowel syndrome      Past Surgical History:   Procedure Laterality Date    APPENDECTOMY      HYSTERECTOMY      Date unk      Social History     Socioeconomic History    Marital status:       Spouse name: Not on file    Number of children: Not on file    Years of education: Not on file    Highest education level: Not on file   Occupational History    Not on file   Social Needs    Financial resource strain: Not on file    Food insecurity:     Worry: Not on file     Inability: Not on file    Transportation needs:     Medical: Not on file     Non-medical: Not on file   Tobacco Use    Smoking status: Never Smoker    Smokeless tobacco: Never Used   Substance and Sexual Activity    Alcohol use: No    Drug use: No    Sexual activity: Not on file   Lifestyle    Physical activity:     Days per week: Not on file     Minutes per session: Not on file    Stress: Not on file   Relationships    Social connections:     Talks on phone: Not on file     Gets together: Not on file     Attends Zoroastrianism service: Not on file     Active member of club or organization: Not on file     Attends meetings of clubs or organizations: Not on file     Relationship status: Not on file    Intimate partner violence:     Fear of current or ex partner: Not on file     Emotionally abused: Not on file     Physically abused: Not on file     Forced sexual activity: Not on file   Other Topics Concern    Not on file   Social History Narrative    Always uses seat belt    Assistive devices - Cane     Caffeine use    Dental care, regularly     Allergies   Allergen Reactions    Niacin And Related Anaphylaxis    Codeine Nausea Only    Nsaids     Other      Iv contrast dye- lips and tongue thick body itchy    Penicillins Rash             /70   Pulse 75   Temp 97 6 °F (36 4 °C) (Tympanic)   Ht 5' 7" (1 702 m)   Wt 83 6 kg (184 lb 6 oz)   SpO2 92%   BMI 28 88 kg/m²          Physical Exam   Constitutional: She is oriented to person, place, and time  She appears well-developed and well-nourished  No distress  HENT:   Head: Normocephalic and atraumatic  Mouth/Throat: Oropharynx is clear and moist  No oropharyngeal exudate  Eyes: Pupils are equal, round, and reactive to light  Conjunctivae and EOM are normal  No scleral icterus  Neck: Normal range of motion  Neck supple  No JVD present  Cardiovascular: Normal rate and regular rhythm  No murmur heard  Pulmonary/Chest: Effort normal and breath sounds normal  No respiratory distress  She has no wheezes  Abdominal: Soft  Bowel sounds are normal  She exhibits no distension  There is no tenderness  Musculoskeletal: Normal range of motion  She exhibits no edema  Lymphadenopathy:     She has no cervical adenopathy  Neurological: She is alert and oriented to person, place, and time  No cranial nerve deficit  She exhibits abnormal muscle tone  Coordination normal    Skin: Skin is warm and dry  Capillary refill takes less than 2 seconds  She is not diaphoretic  No erythema  Psychiatric: She has a normal mood and affect  Her behavior is normal  Judgment and thought content normal    Nursing note and vitals reviewed

## 2019-11-13 DIAGNOSIS — I10 ESSENTIAL HYPERTENSION: ICD-10-CM

## 2019-11-13 RX ORDER — HYDROCHLOROTHIAZIDE 50 MG/1
TABLET ORAL
Qty: 90 TABLET | Refills: 3 | Status: SHIPPED | OUTPATIENT
Start: 2019-11-13 | End: 2019-12-23 | Stop reason: SDUPTHER

## 2019-11-13 RX ORDER — LISINOPRIL 10 MG/1
TABLET ORAL
Qty: 180 TABLET | Refills: 3 | Status: SHIPPED | OUTPATIENT
Start: 2019-11-13 | End: 2019-12-23 | Stop reason: SDUPTHER

## 2019-11-18 DIAGNOSIS — J43.9 PULMONARY EMPHYSEMA, UNSPECIFIED EMPHYSEMA TYPE (HCC): ICD-10-CM

## 2019-11-18 RX ORDER — BUDESONIDE AND FORMOTEROL FUMARATE DIHYDRATE 160; 4.5 UG/1; UG/1
2 AEROSOL RESPIRATORY (INHALATION) 2 TIMES DAILY
Qty: 1 INHALER | Refills: 5 | Status: SHIPPED | OUTPATIENT
Start: 2019-11-18 | End: 2019-12-23 | Stop reason: SDUPTHER

## 2019-12-03 DIAGNOSIS — K21.9 GASTROESOPHAGEAL REFLUX DISEASE WITHOUT ESOPHAGITIS: ICD-10-CM

## 2019-12-03 RX ORDER — PANTOPRAZOLE SODIUM 40 MG/1
TABLET, DELAYED RELEASE ORAL
Qty: 30 TABLET | Refills: 5 | Status: SHIPPED | OUTPATIENT
Start: 2019-12-03 | End: 2019-12-23 | Stop reason: SDUPTHER

## 2019-12-08 DIAGNOSIS — E78.2 MIXED HYPERLIPIDEMIA: ICD-10-CM

## 2019-12-08 RX ORDER — SIMVASTATIN 40 MG
TABLET ORAL
Qty: 90 TABLET | Refills: 0 | Status: SHIPPED | OUTPATIENT
Start: 2019-12-08 | End: 2019-12-23 | Stop reason: SDUPTHER

## 2019-12-09 DIAGNOSIS — F41.9 ANXIETY: ICD-10-CM

## 2019-12-09 RX ORDER — ALPRAZOLAM 0.25 MG/1
TABLET ORAL
Qty: 120 TABLET | Refills: 0 | Status: SHIPPED | OUTPATIENT
Start: 2019-12-09 | End: 2020-02-20 | Stop reason: SDUPTHER

## 2019-12-23 ENCOUNTER — OFFICE VISIT (OUTPATIENT)
Dept: INTERNAL MEDICINE CLINIC | Facility: CLINIC | Age: 79
End: 2019-12-23
Payer: MEDICARE

## 2019-12-23 VITALS
BODY MASS INDEX: 28.94 KG/M2 | HEART RATE: 64 BPM | SYSTOLIC BLOOD PRESSURE: 132 MMHG | OXYGEN SATURATION: 95 % | DIASTOLIC BLOOD PRESSURE: 78 MMHG | TEMPERATURE: 99.1 F | HEIGHT: 67 IN | WEIGHT: 184.4 LBS

## 2019-12-23 DIAGNOSIS — E78.2 MIXED HYPERLIPIDEMIA: ICD-10-CM

## 2019-12-23 DIAGNOSIS — I10 ESSENTIAL HYPERTENSION: ICD-10-CM

## 2019-12-23 DIAGNOSIS — R10.84 GENERALIZED ABDOMINAL PAIN: ICD-10-CM

## 2019-12-23 DIAGNOSIS — R79.89 ABNORMAL TSH: ICD-10-CM

## 2019-12-23 DIAGNOSIS — Z11.59 ENCOUNTER FOR HEPATITIS C SCREENING TEST FOR LOW RISK PATIENT: ICD-10-CM

## 2019-12-23 DIAGNOSIS — F41.1 GENERALIZED ANXIETY DISORDER: ICD-10-CM

## 2019-12-23 DIAGNOSIS — Z00.00 MEDICARE ANNUAL WELLNESS VISIT, SUBSEQUENT: Primary | ICD-10-CM

## 2019-12-23 DIAGNOSIS — F32.9 REACTIVE DEPRESSION: ICD-10-CM

## 2019-12-23 DIAGNOSIS — J43.9 PULMONARY EMPHYSEMA, UNSPECIFIED EMPHYSEMA TYPE (HCC): ICD-10-CM

## 2019-12-23 DIAGNOSIS — K21.9 GASTROESOPHAGEAL REFLUX DISEASE WITHOUT ESOPHAGITIS: ICD-10-CM

## 2019-12-23 PROCEDURE — G0439 PPPS, SUBSEQ VISIT: HCPCS | Performed by: INTERNAL MEDICINE

## 2019-12-23 RX ORDER — HYDROCHLOROTHIAZIDE 50 MG/1
50 TABLET ORAL DAILY
Qty: 90 TABLET | Refills: 3 | Status: SHIPPED | OUTPATIENT
Start: 2019-12-23 | End: 2020-02-20 | Stop reason: SDUPTHER

## 2019-12-23 RX ORDER — PAROXETINE 30 MG/1
30 TABLET, FILM COATED ORAL DAILY
Qty: 90 TABLET | Refills: 3 | Status: SHIPPED | OUTPATIENT
Start: 2019-12-23 | End: 2020-05-21 | Stop reason: SDUPTHER

## 2019-12-23 RX ORDER — DICYCLOMINE HYDROCHLORIDE 10 MG/1
10 CAPSULE ORAL 3 TIMES DAILY PRN
Qty: 90 CAPSULE | Refills: 3 | Status: SHIPPED | OUTPATIENT
Start: 2019-12-23 | End: 2020-05-15 | Stop reason: SDUPTHER

## 2019-12-23 RX ORDER — LISINOPRIL 10 MG/1
10 TABLET ORAL 2 TIMES DAILY
Qty: 180 TABLET | Refills: 3 | Status: SHIPPED | OUTPATIENT
Start: 2019-12-23 | End: 2020-04-23 | Stop reason: SDUPTHER

## 2019-12-23 RX ORDER — LEVOTHYROXINE SODIUM 0.1 MG/1
100 TABLET ORAL
Qty: 90 TABLET | Refills: 3 | Status: SHIPPED | OUTPATIENT
Start: 2019-12-23 | End: 2020-05-21 | Stop reason: SDUPTHER

## 2019-12-23 RX ORDER — BUDESONIDE AND FORMOTEROL FUMARATE DIHYDRATE 160; 4.5 UG/1; UG/1
2 AEROSOL RESPIRATORY (INHALATION) 2 TIMES DAILY
Qty: 3 INHALER | Refills: 3 | Status: SHIPPED | OUTPATIENT
Start: 2019-12-23 | End: 2020-05-21 | Stop reason: SDUPTHER

## 2019-12-23 RX ORDER — PANTOPRAZOLE SODIUM 40 MG/1
40 TABLET, DELAYED RELEASE ORAL DAILY
Qty: 90 TABLET | Refills: 3 | Status: SHIPPED | OUTPATIENT
Start: 2019-12-23 | End: 2020-05-21 | Stop reason: SDUPTHER

## 2019-12-23 RX ORDER — SIMVASTATIN 40 MG
40 TABLET ORAL
Qty: 90 TABLET | Refills: 3 | Status: SHIPPED | OUTPATIENT
Start: 2019-12-23 | End: 2020-05-21 | Stop reason: SDUPTHER

## 2019-12-23 NOTE — PROGRESS NOTES
Assessment and Plan:     Problem List Items Addressed This Visit        Other    Medicare annual wellness visit, subsequent - Primary      Other Visit Diagnoses     Encounter for hepatitis C screening test for low risk patient        Relevant Orders    Hepatitis C antibody      Rx for repeat TSH Hep C screening  Immunizations utd  Pt has AD and POA  Exercise at home encouraged  Rto 4 months      Preventive health issues were discussed with patient, and age appropriate screening tests were ordered as noted in patient's After Visit Summary  Personalized health advice and appropriate referrals for health education or preventive services given if needed, as noted in patient's After Visit Summary       History of Present Illness:     Patient presents for Medicare Annual Wellness visit    Patient Care Team:  Doug Canas DO as PCP - General  Doug Canas DO     Problem List:     Patient Active Problem List   Diagnosis    Alternating constipation and diarrhea    Anxiety disorder    Back pain, chronic    Diverticulosis    Esophagitis, reflux    Fatigue    Generalized osteoarthritis of multiple sites    Hearing loss    Hyperlipidemia    Hypertension    Hypothyroidism    Kidney stone    Stool guaiac positive    Vitamin D deficiency    [facility]    Generalized abdominal pain    Irritable bowel syndrome with both constipation and diarrhea    BMI 28 0-28 9,adult    Subconjunctival hemorrhage of left eye    Left corneal abrasion    Abnormal TSH    Medicare annual wellness visit, subsequent      Past Medical and Surgical History:     Past Medical History:   Diagnosis Date    Contact dermatitis     Last assessed - 8/8/12    COPD [facility]chronic obstructive [facility] disease) ([facility]     Disease of thyroid gland     GERD (gastroesophageal reflux disease)     Hyperlipidemia     Hypertension     Irritable bowel syndrome      Past Surgical History:   Procedure Laterality Date    APPENDECTOMY      HYSTERECTOMY      Date unk       Family History:     Family History   Problem Relation Age of Onset    Clotting disorder Mother         Def of clotting factor     Aneurysm Father       Social History:     Social History     Socioeconomic History    Marital status:      Spouse name: None    Number of children: None    Years of education: None    Highest education level: None   Occupational History    None   Social Needs    Financial resource strain: None    Food insecurity:     Worry: None     Inability: None    Transportation needs:     Medical: None     Non-medical: None   Tobacco Use    Smoking status: Never Smoker    Smokeless tobacco: Never Used   Substance and Sexual Activity    Alcohol use: No    Drug use: No    Sexual activity: None   Lifestyle    Physical activity:     Days per week: None     Minutes per session: None    Stress: None   Relationships    Social connections:     Talks on phone: None     Gets together: None     Attends Rastafarian service: None     Active member of club or organization: None     Attends meetings of clubs or organizations: None     Relationship status: None    Intimate partner violence:     Fear of current or ex partner: None     Emotionally abused: None     Physically abused: None     Forced sexual activity: None   Other Topics Concern    None   Social History Narrative    Always uses seat belt    Assistive devices - Cane     Caffeine use    Dental care, regularly       Medications and Allergies:     Current Outpatient Medications   Medication Sig Dispense Refill    ALPRAZolam (XANAX) 0 25 mg tablet take 1 tablet by mouth four times a day if needed 120 tablet 0    benzonatate (TESSALON PERLES) 100 mg capsule Take 1 capsule (100 mg total) by mouth 3 (three) times a day 30 capsule 1    budesonide-formoterol (SYMBICORT) 160-4 5 mcg/act inhaler Inhale 2 puffs 2 (two) times a day Rinse mouth after use   1 Inhaler 5    carboxymethylcellulose 0 5 % SOLN Administer 1 drop to both eyes 3 (three) times a day as needed for dry eyes for up to 30 days 10 mL 0    dicyclomine (BENTYL) 10 mg capsule TAKE 1 CAPSULE BY MOUTH 3 TIMES A DAY AS NEEDED FOR ABDOMINAL PAIN 30 capsule 5    dicyclomine (BENTYL) 20 mg tablet Take 1 tablet (20 mg total) by mouth every 8 (eight) hours as needed (abdominal pain) 90 tablet 5    hydrochlorothiazide (HYDRODIURIL) 50 mg tablet take 1 tablet by mouth once daily 90 tablet 3    levothyroxine 100 mcg tablet Take 1 tablet (100 mcg total) by mouth daily in the early morning 30 tablet 5    lisinopril (ZESTRIL) 10 mg tablet take 1 tablet by mouth twice a day 180 tablet 3    metoprolol tartrate (LOPRESSOR) 25 mg tablet take 1 tablet by mouth twice a day 180 tablet 3    pantoprazole (PROTONIX) 40 mg tablet take 1 tablet by mouth once daily 30 tablet 5    PARoxetine (PAXIL) 30 mg tablet Take 1 tablet (30 mg total) by mouth daily 90 tablet 3    simvastatin (ZOCOR) 40 mg tablet  TAKE 1 TABLET BY MOUTH, AT BEDTIME, NO GRAPEFRUIT JUICE  90 tablet 0     No current facility-administered medications for this visit  Allergies   Allergen Reactions    Niacin And Related Anaphylaxis    Codeine Nausea Only    Nsaids     Other      Iv contrast dye- lips and tongue thick body itchy    Penicillins Rash      Immunizations:     Immunization History   Administered Date(s) Administered    INFLUENZA 09/30/2014    Influenza Quadrivalent Preservative Free 3 years and older IM 09/10/2015    Influenza Split High Dose Preservative Free IM 09/19/2011, 10/05/2017    Influenza TIV (IM) 09/04/2012    Influenza, high dose seasonal 0 5 mL 10/10/2019    Pneumococcal Conjugate 13-Valent 07/18/2016    Pneumococcal Polysaccharide PPV23 10/10/2019    Zoster 12/20/2010      Health Maintenance: There are no preventive care reminders to display for this patient        Topic Date Due    DTaP,Tdap,and Td Vaccines (1 - Tdap) 02/25/1951      Medicare Health Risk Assessment:     /78   Pulse 64   Temp 99 1 °F (37 3 °C) (Tympanic)   Ht 5' 7" (1 702 m)   Wt 83 6 kg (184 lb 6 4 oz)   SpO2 95%   BMI 28 88 kg/m²      Rachael Rogel is here for her Subsequent Wellness visit  Last Medicare Wellness visit information reviewed, patient interviewed and updates made to the record today  Health Risk Assessment:   Patient rates overall health as fair  Patient feels that their physical health rating is slightly worse  Eyesight was rated as same  Hearing was rated as same  Patient feels that their emotional and mental health rating is slightly worse  Pain experienced in the last 7 days has been some  Patient's pain rating has been 4/10  Patient states that she has experienced no weight loss or gain in last 6 months  Depression Screening:   PHQ-2 Score: 6  PHQ-9 Score: 13      Fall Risk Screening: In the past year, patient has experienced: no history of falling in past year      Urinary Incontinence Screening:   Patient has leaked urine accidently in the last six months  Pt states that she has to wear pads every day  Home Safety:  Patient has trouble with stairs inside or outside of their home  Patient has working smoke alarms and has working carbon monoxide detector  Home safety hazards include: none  Nutrition:   Current diet is Regular  Medications:   Patient is currently taking over-the-counter supplements  OTC medications include: see medication list  Patient is able to manage medications  Activities of Daily Living (ADLs)/Instrumental Activities of Daily Living (IADLs):   Walk and transfer into and out of bed and chair?: Yes  Dress and groom yourself?: Yes    Bathe or shower yourself?: Yes    Feed yourself?  Yes  Do your laundry/housekeeping?: Yes  Manage your money, pay your bills and track your expenses?: Yes  Make your own meals?: Yes    Do your own shopping?: Yes    Previous Hospitalizations:   Any hospitalizations or ED visits within the last 12 months?: No      Advance Care Planning:   Living will: Yes    Durable POA for healthcare:  Yes    Advanced directive: No    End of Life Decisions reviewed with patient: Yes    Provider agrees with end of life decisions: Yes      PREVENTIVE SCREENINGS      Cardiovascular Screening:    General: Screening Not Indicated, History Lipid Disorder and Screening Current      Diabetes Screening:     General: Screening Current      Colorectal Cancer Screening:     General: Screening Not Indicated      Breast Cancer Screening:     General: Patient Declines      Cervical Cancer Screening:    General: Screening Not Indicated      Osteoporosis Screening:    General: Patient Declines      Abdominal Aortic Aneurysm (AAA) Screening:        General: Screening Current      Lung Cancer Screening:     General: Screening Not Indicated      Hepatitis C Screening:    General: Risks and Benefits Discussed    Hep C Screening Accepted: Yes        Doug Canas DO

## 2019-12-23 NOTE — TELEPHONE ENCOUNTER
Pt was requesting all of her meds be sent to [facility] in [address] [facility] due to ins change   Rx sent for approval

## 2019-12-23 NOTE — PATIENT INSTRUCTIONS

## 2020-01-03 ENCOUNTER — APPOINTMENT (OUTPATIENT)
Dept: LAB | Facility: HOSPITAL | Age: 80
End: 2020-01-03
Attending: INTERNAL MEDICINE
Payer: MEDICARE

## 2020-01-03 DIAGNOSIS — R79.89 ABNORMAL TSH: ICD-10-CM

## 2020-01-03 DIAGNOSIS — Z11.59 ENCOUNTER FOR HEPATITIS C SCREENING TEST FOR LOW RISK PATIENT: ICD-10-CM

## 2020-01-03 LAB
HCV AB SER QL: NORMAL
TSH SERPL DL<=0.05 MIU/L-ACNC: 0.76 UIU/ML (ref 0.36–3.74)

## 2020-01-03 PROCEDURE — 84443 ASSAY THYROID STIM HORMONE: CPT

## 2020-01-03 PROCEDURE — 36415 COLL VENOUS BLD VENIPUNCTURE: CPT

## 2020-01-03 PROCEDURE — 86803 HEPATITIS C AB TEST: CPT

## 2020-02-20 DIAGNOSIS — F41.9 ANXIETY: ICD-10-CM

## 2020-02-20 DIAGNOSIS — I10 ESSENTIAL HYPERTENSION: ICD-10-CM

## 2020-02-20 RX ORDER — ALPRAZOLAM 0.25 MG/1
0.25 TABLET ORAL 4 TIMES DAILY PRN
Qty: 120 TABLET | Refills: 0 | Status: SHIPPED | OUTPATIENT
Start: 2020-02-20 | End: 2020-04-23 | Stop reason: SDUPTHER

## 2020-02-20 RX ORDER — HYDROCHLOROTHIAZIDE 50 MG/1
50 TABLET ORAL DAILY
Qty: 90 TABLET | Refills: 3 | Status: SHIPPED | OUTPATIENT
Start: 2020-02-20 | End: 2020-05-21 | Stop reason: SDUPTHER

## 2020-03-03 ENCOUNTER — APPOINTMENT (EMERGENCY)
Dept: CT IMAGING | Facility: HOSPITAL | Age: 80
End: 2020-03-03
Payer: MEDICARE

## 2020-03-03 ENCOUNTER — HOSPITAL ENCOUNTER (EMERGENCY)
Facility: HOSPITAL | Age: 80
Discharge: HOME | End: 2020-03-03
Attending: EMERGENCY MEDICINE | Admitting: EMERGENCY MEDICINE
Payer: MEDICARE

## 2020-03-03 ENCOUNTER — TELEPHONE (OUTPATIENT)
Dept: INTERNAL MEDICINE CLINIC | Facility: CLINIC | Age: 80
End: 2020-03-03

## 2020-03-03 VITALS
HEART RATE: 70 BPM | RESPIRATION RATE: 14 BRPM | WEIGHT: 184.97 LBS | SYSTOLIC BLOOD PRESSURE: 106 MMHG | DIASTOLIC BLOOD PRESSURE: 58 MMHG | HEIGHT: 67 IN | OXYGEN SATURATION: 94 % | TEMPERATURE: 98.3 F | BODY MASS INDEX: 29.03 KG/M2

## 2020-03-03 DIAGNOSIS — N39.0 UTI (URINARY TRACT INFECTION): Primary | ICD-10-CM

## 2020-03-03 DIAGNOSIS — R11.2 NAUSEA VOMITING AND DIARRHEA: ICD-10-CM

## 2020-03-03 DIAGNOSIS — R79.89 ELEVATED LACTIC ACID LEVEL: ICD-10-CM

## 2020-03-03 DIAGNOSIS — R19.7 NAUSEA VOMITING AND DIARRHEA: ICD-10-CM

## 2020-03-03 LAB
ALBUMIN SERPL BCP-MCNC: 3.7 G/DL (ref 3.5–5)
ALP SERPL-CCNC: 64 U/L (ref 46–116)
ALT SERPL W P-5'-P-CCNC: 28 U/L (ref 12–78)
ANION GAP SERPL CALCULATED.3IONS-SCNC: 11 MMOL/L (ref 4–13)
AST SERPL W P-5'-P-CCNC: 20 U/L (ref 5–45)
BACTERIA UR QL AUTO: ABNORMAL /HPF
BASOPHILS # BLD AUTO: 0.05 THOUSANDS/ΜL (ref 0–0.1)
BASOPHILS NFR BLD AUTO: 1 % (ref 0–1)
BILIRUB SERPL-MCNC: 0.3 MG/DL (ref 0.2–1)
BILIRUB UR QL STRIP: NEGATIVE
BUN SERPL-MCNC: 27 MG/DL (ref 5–25)
CALCIUM SERPL-MCNC: 9.2 MG/DL (ref 8.3–10.1)
CHLORIDE SERPL-SCNC: 103 MMOL/L (ref 100–108)
CLARITY UR: ABNORMAL
CO2 SERPL-SCNC: 26 MMOL/L (ref 21–32)
COLOR UR: YELLOW
CREAT SERPL-MCNC: 1.11 MG/DL (ref 0.6–1.3)
EOSINOPHIL # BLD AUTO: 0.1 THOUSAND/ΜL (ref 0–0.61)
EOSINOPHIL NFR BLD AUTO: 2 % (ref 0–6)
ERYTHROCYTE [DISTWIDTH] IN BLOOD BY AUTOMATED COUNT: 13.2 % (ref 11.6–15.1)
GFR SERPL CREATININE-BSD FRML MDRD: 46 ML/MIN/1.73SQ M
GLUCOSE SERPL-MCNC: 151 MG/DL (ref 65–140)
GLUCOSE UR STRIP-MCNC: NEGATIVE MG/DL
HCT VFR BLD AUTO: 41 % (ref 34.8–46.1)
HGB BLD-MCNC: 13.2 G/DL (ref 11.5–15.4)
HGB UR QL STRIP.AUTO: NEGATIVE
IMM GRANULOCYTES # BLD AUTO: 0.02 THOUSAND/UL (ref 0–0.2)
IMM GRANULOCYTES NFR BLD AUTO: 0 % (ref 0–2)
KETONES UR STRIP-MCNC: NEGATIVE MG/DL
LACTATE SERPL-SCNC: 2.1 MMOL/L (ref 0.5–2)
LACTATE SERPL-SCNC: 2.5 MMOL/L (ref 0.5–2)
LEUKOCYTE ESTERASE UR QL STRIP: ABNORMAL
LIPASE SERPL-CCNC: 118 U/L (ref 73–393)
LYMPHOCYTES # BLD AUTO: 1.39 THOUSANDS/ΜL (ref 0.6–4.47)
LYMPHOCYTES NFR BLD AUTO: 22 % (ref 14–44)
MCH RBC QN AUTO: 31.4 PG (ref 26.8–34.3)
MCHC RBC AUTO-ENTMCNC: 32.2 G/DL (ref 31.4–37.4)
MCV RBC AUTO: 98 FL (ref 82–98)
MONOCYTES # BLD AUTO: 0.42 THOUSAND/ΜL (ref 0.17–1.22)
MONOCYTES NFR BLD AUTO: 7 % (ref 4–12)
NEUTROPHILS # BLD AUTO: 4.44 THOUSANDS/ΜL (ref 1.85–7.62)
NEUTS SEG NFR BLD AUTO: 68 % (ref 43–75)
NITRITE UR QL STRIP: POSITIVE
NON-SQ EPI CELLS URNS QL MICRO: ABNORMAL /HPF
NRBC BLD AUTO-RTO: 0 /100 WBCS
PH UR STRIP.AUTO: 6 [PH]
PLATELET # BLD AUTO: 216 THOUSANDS/UL (ref 149–390)
PMV BLD AUTO: 10.3 FL (ref 8.9–12.7)
POTASSIUM SERPL-SCNC: 3.4 MMOL/L (ref 3.5–5.3)
PROT SERPL-MCNC: 7.8 G/DL (ref 6.4–8.2)
PROT UR STRIP-MCNC: NEGATIVE MG/DL
RBC # BLD AUTO: 4.2 MILLION/UL (ref 3.81–5.12)
RBC #/AREA URNS AUTO: ABNORMAL /HPF
SODIUM SERPL-SCNC: 140 MMOL/L (ref 136–145)
SP GR UR STRIP.AUTO: 1.01 (ref 1–1.03)
TROPONIN I SERPL-MCNC: <0.02 NG/ML
UROBILINOGEN UR QL STRIP.AUTO: 0.2 E.U./DL
WBC # BLD AUTO: 6.42 THOUSAND/UL (ref 4.31–10.16)
WBC #/AREA URNS AUTO: ABNORMAL /HPF

## 2020-03-03 PROCEDURE — 99284 EMERGENCY DEPT VISIT MOD MDM: CPT | Performed by: PHYSICIAN ASSISTANT

## 2020-03-03 PROCEDURE — 83605 ASSAY OF LACTIC ACID: CPT | Performed by: EMERGENCY MEDICINE

## 2020-03-03 PROCEDURE — 84484 ASSAY OF TROPONIN QUANT: CPT | Performed by: PHYSICIAN ASSISTANT

## 2020-03-03 PROCEDURE — 96375 TX/PRO/DX INJ NEW DRUG ADDON: CPT

## 2020-03-03 PROCEDURE — 96365 THER/PROPH/DIAG IV INF INIT: CPT

## 2020-03-03 PROCEDURE — 96361 HYDRATE IV INFUSION ADD-ON: CPT

## 2020-03-03 PROCEDURE — 81001 URINALYSIS AUTO W/SCOPE: CPT | Performed by: EMERGENCY MEDICINE

## 2020-03-03 PROCEDURE — 36415 COLL VENOUS BLD VENIPUNCTURE: CPT | Performed by: EMERGENCY MEDICINE

## 2020-03-03 PROCEDURE — 93005 ELECTROCARDIOGRAM TRACING: CPT

## 2020-03-03 PROCEDURE — 80053 COMPREHEN METABOLIC PANEL: CPT | Performed by: EMERGENCY MEDICINE

## 2020-03-03 PROCEDURE — 74176 CT ABD & PELVIS W/O CONTRAST: CPT

## 2020-03-03 PROCEDURE — 83690 ASSAY OF LIPASE: CPT | Performed by: EMERGENCY MEDICINE

## 2020-03-03 PROCEDURE — 99284 EMERGENCY DEPT VISIT MOD MDM: CPT

## 2020-03-03 PROCEDURE — 85025 COMPLETE CBC W/AUTO DIFF WBC: CPT | Performed by: EMERGENCY MEDICINE

## 2020-03-03 RX ORDER — ONDANSETRON 4 MG/1
4 TABLET, ORALLY DISINTEGRATING ORAL EVERY 6 HOURS PRN
Qty: 12 TABLET | Refills: 0 | Status: SHIPPED | OUTPATIENT
Start: 2020-03-03 | End: 2020-05-22 | Stop reason: ALTCHOICE

## 2020-03-03 RX ORDER — CEPHALEXIN 500 MG/1
500 CAPSULE ORAL EVERY 12 HOURS SCHEDULED
Qty: 14 CAPSULE | Refills: 0 | Status: SHIPPED | OUTPATIENT
Start: 2020-03-03 | End: 2020-03-10

## 2020-03-03 RX ORDER — ONDANSETRON 2 MG/ML
4 INJECTION INTRAMUSCULAR; INTRAVENOUS ONCE
Status: COMPLETED | OUTPATIENT
Start: 2020-03-03 | End: 2020-03-03

## 2020-03-03 RX ORDER — CEFTRIAXONE 1 G/50ML
1000 INJECTION, SOLUTION INTRAVENOUS ONCE
Status: COMPLETED | OUTPATIENT
Start: 2020-03-03 | End: 2020-03-03

## 2020-03-03 RX ORDER — ONDANSETRON 2 MG/ML
INJECTION INTRAMUSCULAR; INTRAVENOUS
Status: COMPLETED
Start: 2020-03-03 | End: 2020-03-03

## 2020-03-03 RX ORDER — ACETAMINOPHEN 325 MG/1
650 TABLET ORAL ONCE
Status: COMPLETED | OUTPATIENT
Start: 2020-03-03 | End: 2020-03-03

## 2020-03-03 RX ADMIN — CEFTRIAXONE 1000 MG: 1 INJECTION, SOLUTION INTRAVENOUS at 19:31

## 2020-03-03 RX ADMIN — SODIUM CHLORIDE 500 ML: 0.9 INJECTION, SOLUTION INTRAVENOUS at 17:26

## 2020-03-03 RX ADMIN — ONDANSETRON 4 MG: 2 INJECTION INTRAMUSCULAR; INTRAVENOUS at 19:30

## 2020-03-03 RX ADMIN — ONDANSETRON 4 MG: 2 INJECTION INTRAMUSCULAR; INTRAVENOUS at 16:29

## 2020-03-03 RX ADMIN — ACETAMINOPHEN 650 MG: 325 TABLET, FILM COATED ORAL at 19:28

## 2020-03-03 NOTE — ED PROVIDER NOTES
History  Chief Complaint   Patient presents with    Vomiting     Nausea, vomiting, diarrhea starting today     78year old female with history of HTN, HLD, GERD, IBS, COPD presents via EMS from home for evaluation of nausea, vomiting and diarrhea  She notes symptoms started acutely today when she woke up  Reports 4-5 episodes of nonbloody nonbilious vomiting  Reports abdominal pain and diarrhea  She notes mild headache earlier which improved with tylenol this morning  Denies head injury or trauma  Denies visual changes  Reports fatigue, decreased appetite and feeling dizzy/lightheaded  Denies chest pain or SOB  Denies fever, chills  Denies cough, congestion or recent illness  Prior abdominal surgeries include appendectomy and hysterectomy  No specific treatments tried  She has not been able to tolerate any liquids this afternoon  Denies sick contacts  Denies recent travel  History provided by:  Patient   used: No    Vomiting   Associated symptoms: abdominal pain and diarrhea    Associated symptoms: no chills, no cough, no fever, no headaches, no myalgias and no sore throat        Prior to Admission Medications   Prescriptions Last Dose Informant Patient Reported? Taking?    ALPRAZolam (XANAX) 0 25 mg tablet   No Yes   Sig: Take 1 tablet (0 25 mg total) by mouth 4 (four) times a day as needed (As needed)   PARoxetine (PAXIL) 30 mg tablet   No Yes   Sig: Take 1 tablet (30 mg total) by mouth daily   benzonatate (TESSALON PERLES) 100 mg capsule   No Yes   Sig: Take 1 capsule (100 mg total) by mouth 3 (three) times a day   budesonide-formoterol (SYMBICORT) 160-4 5 mcg/act inhaler   No Yes   Sig: Inhale 2 puffs 2 (two) times a day Rinse mouth after use    carboxymethylcellulose 0 5 % SOLN   No No   Sig: Administer 1 drop to both eyes 3 (three) times a day as needed for dry eyes for up to 30 days   dicyclomine (BENTYL) 10 mg capsule   No Yes   Sig: Take 1 capsule (10 mg total) by mouth 3 (three) times a day as needed (abdominal pain)   hydrochlorothiazide (HYDRODIURIL) 50 mg tablet   No Yes   Sig: Take 1 tablet (50 mg total) by mouth daily   levothyroxine 100 mcg tablet   No Yes   Sig: Take 1 tablet (100 mcg total) by mouth daily in the early morning   lisinopril (ZESTRIL) 10 mg tablet   No Yes   Sig: Take 1 tablet (10 mg total) by mouth 2 (two) times a day   metoprolol tartrate (LOPRESSOR) 25 mg tablet   No Yes   Sig: Take 1 tablet (25 mg total) by mouth 2 (two) times a day   pantoprazole (PROTONIX) 40 mg tablet   No Yes   Sig: Take 1 tablet (40 mg total) by mouth daily   simvastatin (ZOCOR) 40 mg tablet   No Yes   Sig: Take 1 tablet (40 mg total) by mouth daily at bedtime      Facility-Administered Medications: None       Past Medical History:   Diagnosis Date    Contact dermatitis     Last assessed - 8/8/12    COPD (chronic obstructive pulmonary disease) (HCC)     Disease of thyroid gland     GERD (gastroesophageal reflux disease)     Hyperlipidemia     Hypertension     Irritable bowel syndrome     Von Willebrand disease ([facility] [address] )        Past Surgical History:   Procedure Laterality Date    APPENDECTOMY      HYSTERECTOMY      Date unk        Family History   Problem Relation Age of Onset    Clotting disorder Mother         Def of clotting factor     Aneurysm Father      I have reviewed and agree with the history as documented  E-Cigarette/Vaping     E-Cigarette/Vaping Substances     Social History     Tobacco Use    Smoking status: Never Smoker    Smokeless tobacco: Never Used   Substance Use Topics    Alcohol use: No    Drug use: No       Review of Systems   Constitutional: Positive for appetite change and fatigue  Negative for chills and fever  HENT: Negative  Negative for congestion, rhinorrhea and sore throat  Eyes: Negative  Negative for visual disturbance  Respiratory: Negative  Negative for cough, shortness of breath and wheezing      Cardiovascular: Negative  Negative for chest pain, palpitations and leg swelling  Gastrointestinal: Positive for abdominal pain, diarrhea, nausea and vomiting  Negative for constipation  Genitourinary: Negative  Negative for dysuria, flank pain, frequency and hematuria  Musculoskeletal: Negative  Negative for back pain, myalgias and neck pain  Skin: Negative  Negative for rash  Neurological: Positive for dizziness and light-headedness  Negative for syncope and headaches  Psychiatric/Behavioral: Negative  Negative for confusion  All other systems reviewed and are negative  Physical Exam  Physical Exam   Constitutional: She is oriented to person, place, and time  She appears well-developed and well-nourished  Non-toxic appearance  No distress  Elderly female   HENT:   Head: Normocephalic and atraumatic  Right Ear: Hearing, tympanic membrane, external ear and ear canal normal    Left Ear: Hearing, tympanic membrane, external ear and ear canal normal    Nose: Nose normal    Mouth/Throat: Uvula is midline, oropharynx is clear and moist and mucous membranes are normal  No oropharyngeal exudate  Eyes: Pupils are equal, round, and reactive to light  Conjunctivae, EOM and lids are normal  No scleral icterus    +glasses   Neck: Trachea normal, normal range of motion and phonation normal  Neck supple  No tracheal deviation present  Cardiovascular: Normal rate, regular rhythm, normal heart sounds, intact distal pulses and normal pulses  No murmur heard  Pulmonary/Chest: Effort normal and breath sounds normal  No respiratory distress  She has no wheezes  She has no rhonchi  She has no rales  Abdominal: Soft  Bowel sounds are normal  She exhibits no distension  There is no hepatosplenomegaly  There is no tenderness  There is no rigidity, no rebound, no guarding and no CVA tenderness  No hernia  obese   Musculoskeletal: Normal range of motion  She exhibits no edema or tenderness     Neurological: She is alert and oriented to person, place, and time  She has normal strength and normal reflexes  No cranial nerve deficit or sensory deficit  She exhibits normal muscle tone  Coordination and gait normal  GCS eye subscore is 4  GCS verbal subscore is 5  GCS motor subscore is 6  No focal neuro deficits  No pronator drift  Skin: Skin is warm and dry  Capillary refill takes less than 2 seconds  No rash noted  Psychiatric: She has a normal mood and affect  Her speech is normal and behavior is normal    Nursing note and vitals reviewed  Vital Signs  ED Triage Vitals   Temperature Pulse Respirations Blood Pressure SpO2   03/03/20 1612 03/03/20 1612 03/03/20 1612 03/03/20 1612 03/03/20 1612   98 3 °F (36 8 °C) 76 18 145/68 93 %      Temp Source Heart Rate Source Patient Position - Orthostatic VS BP Location FiO2 (%)   03/03/20 1612 03/03/20 1732 03/03/20 1612 03/03/20 1612 --   Temporal Monitor Lying Right arm       Pain Score       03/03/20 1612       No Pain           Vitals:    03/03/20 1612 03/03/20 1732   BP: 145/68 106/58   Pulse: 76 70   Patient Position - Orthostatic VS: Lying Lying         Visual Acuity      ED Medications  Medications   ondansetron (ZOFRAN) injection 4 mg (4 mg Intravenous Given 3/3/20 1629)   sodium chloride 0 9 % bolus 500 mL (0 mL Intravenous Stopped 3/3/20 1831)   acetaminophen (TYLENOL) tablet 650 mg (650 mg Oral Given 3/3/20 1928)   cefTRIAXone (ROCEPHIN) IVPB (premix) 1,000 mg (0 mg Intravenous Stopped 3/3/20 2007)   ondansetron (ZOFRAN) 4 mg/2 mL injection **ADS Override Pull** (4 mg  Given 3/3/20 1930)       Diagnostic Studies  Results Reviewed     Procedure Component Value Units Date/Time    Lactic acid x2 Q2H [[id number]]  (Abnormal) Collected:  03/03/20 1840    Lab Status:  Final result Specimen:  Blood from Arm, Right Updated:  03/03/20 1907     LACTIC ACID 2 1 mmol/L     Narrative:       Result may be elevated if tourniquet was used during collection      Urine Microscopic [[id number]]  (Abnormal) Collected:  03/03/20 1834    Lab Status:  Final result Specimen:  Urine, Clean Catch Updated:  03/03/20 1901     RBC, UA 0-1 /hpf      WBC, UA 4-10 /hpf      Epithelial Cells Moderate /hpf      Bacteria, UA Innumerable /hpf     UA w Reflex to Microscopic w Reflex to Culture [[id number]]  (Abnormal) Collected:  03/03/20 1834    Lab Status:  Final result Specimen:  Urine, Clean Catch Updated:  03/03/20 1847     Color, UA Yellow     Clarity, UA Slightly Cloudy     Specific Gravity, UA 1 015     pH, UA 6 0     Leukocytes, UA Trace     Nitrite, UA Positive     Protein, UA Negative mg/dl      Glucose, UA Negative mg/dl      Ketones, UA Negative mg/dl      Urobilinogen, UA 0 2 E U /dl      Bilirubin, UA Negative     Blood, UA Negative    Lactic acid x2 Q2H [[id number]]  (Abnormal) Collected:  03/03/20 1627    Lab Status:  Final result Specimen:  Blood from Arm, Right Updated:  03/03/20 1709     LACTIC ACID 2 5 mmol/L     Narrative:       Result may be elevated if tourniquet was used during collection      Troponin I [[id number]]  (Normal) Collected:  03/03/20 1627    Lab Status:  Final result Specimen:  Blood from Arm, Right Updated:  03/03/20 1658     Troponin I <0 02 ng/mL     Comprehensive metabolic panel [[id number]]  (Abnormal) Collected:  03/03/20 1627    Lab Status:  Final result Specimen:  Blood from Arm, Right Updated:  03/03/20 1654     Sodium 140 mmol/L      Potassium 3 4 mmol/L      Chloride 103 mmol/L      CO2 26 mmol/L      ANION GAP 11 mmol/L      BUN 27 mg/dL      Creatinine 1 11 mg/dL      Glucose 151 mg/dL      Calcium 9 2 mg/dL      AST 20 U/L      ALT 28 U/L      Alkaline Phosphatase 64 U/L      Total Protein 7 8 g/dL      Albumin 3 7 g/dL      Total Bilirubin 0 30 mg/dL      eGFR 46 ml/min/1 73sq m     Narrative:       Padmini guidelines for Chronic Kidney Disease (CKD):     Stage 1 with normal or high GFR (GFR > 90 mL/min/1 73 square meters)    Stage 2 Mild CKD (GFR = 60-89 mL/min/1 73 square meters)    Stage 3A Moderate CKD (GFR = 45-59 mL/min/1 73 square meters)    Stage 3B Moderate CKD (GFR = 30-44 mL/min/1 73 square meters)    Stage 4 Severe CKD (GFR = 15-29 mL/min/1 73 square meters)    Stage 5 End Stage CKD (GFR <15 mL/min/1 73 square meters)  Note: GFR calculation is accurate only with a steady state creatinine    Lipase [[id number]]  (Normal) Collected:  03/03/20 1627    Lab Status:  Final result Specimen:  Blood from Arm, Right Updated:  03/03/20 1650     Lipase 118 u/L     CBC and differential [[id number]] Collected:  03/03/20 1627    Lab Status:  Final result Specimen:  Blood from Arm, Right Updated:  03/03/20 1640     WBC 6 42 Thousand/uL      RBC 4 20 Million/uL      Hemoglobin 13 2 g/dL      Hematocrit 41 0 %      MCV 98 fL      MCH 31 4 pg      MCHC 32 2 g/dL      RDW 13 2 %      MPV 10 3 fL      Platelets 285 Thousands/uL      nRBC 0 /100 WBCs      Neutrophils Relative 68 %      Immat GRANS % 0 %      Lymphocytes Relative 22 %      Monocytes Relative 7 %      Eosinophils Relative 2 %      Basophils Relative 1 %      Neutrophils Absolute 4 44 Thousands/µL      Immature Grans Absolute 0 02 Thousand/uL      Lymphocytes Absolute 1 39 Thousands/µL      Monocytes Absolute 0 42 Thousand/µL      Eosinophils Absolute 0 10 Thousand/µL      Basophils Absolute 0 05 Thousands/µL                  CT abdomen pelvis wo contrast   Final Result by Kamryn Tobias MD (03/03 1720)      No acute findings  No bowel obstruction                 Workstation performed: [id number]                    Procedures  ECG 12 Lead Documentation Only  Date/Time: 3/3/2020 5:05 PM  Performed by: Slime Herman PA-C  Authorized by: Slime Herman PA-C     Indications / Diagnosis:  Nausea/vomiting  ECG reviewed by me, the ED Provider: yes    Patient location:  ED  Previous ECG:     Previous ECG:  Unavailable    Comparison to cardiac monitor: Yes    Interpretation: Interpretation: normal    Rate:     ECG rate:  73    ECG rate assessment: normal    Rhythm:     Rhythm: sinus rhythm    Ectopy:     Ectopy: none    QRS:     QRS axis:  Normal    QRS intervals:  Normal  Conduction:     Conduction: normal    ST segments:     ST segments:  Normal  T waves:     T waves: normal    Comments:      , QRS 72, QT/QTc 418/460; no acute ischemic changes; prior EKG unavailable  ED Course  ED Course as of Mar 03 2048   Tue Mar 03, 2020   1641 WBC: 6 42   1641 Hemoglobin: 13 2   1641 Platelet Count: 416   1651 Lipase: 118   1651 CT performed and pending interpretation  This was done without contrast secondary to allergy  1704 Glucose, Random(!): 151   1704 Stable from 1 06 four months ago   Creatinine: 1 11   1704 BUN(!): 27   1704 Sodium: 140   1704 Potassium(!): 3 4   1704 Chloride: 103   1704 CO2: 26   1704 Anion Gap: 11   1704 Calcium: 9 2   1704 AST: 20   1704 ALT: 28   1704 Alkaline Phosphatase: 64   1704 Total Protein: 7 8   1704 Albumin: 3 7   1704 TOTAL BILIRUBIN: 0 30   1704 eGFR: 46   1704 Troponin I: <0 02   1717 LACTIC ACID(!!): 2 5   1728 IMPRESSION:     No acute findings  No bowel obstruction  CT abdomen pelvis wo contrast   1740 Pt updated on results  Pending urine  1830 Pt again reassessed  She is feeling improved  No further vomiting while here  She is tolerating sips of water at this time  She will try to give urine  Will repeat lactic acid        1849 Color, UA: Yellow   1849 Clarity, UA: Slightly Cloudy   1849 SL AMB SPECIFIC GRAVITY_URINE: 1 015   1849 pH, UA: 6 0   1849 Leukocytes, UA(!): Trace   1849 Nitrite, UA(!): Positive   1849 POCT URINE PROTEIN: Negative   1849 Glucose, UA: Negative   1849 Ketones, UA: Negative   1849 SL AMB POCT UROBILINOGEN: 0 2   1849 Bilirubin, UA: Negative   1849 Blood, UA: Negative   1906 RBC, UA(!): 0-1   1906 WBC, UA(!): 4-10   1906 Epithelial Cells(!): Moderate   1907 Bacteria, UA(!): Innumerable   1914 Improved from 2 5 earlier  LACTIC ACID(!!): 2 1   1920 Pt updated on results  No further vomiting  She is tolerating water  She is feeling improved except for a mild headache in which she requested tylenol  IV rocephin ordered for UTI  At this time, pt requesting discharge home  Will reassess once Abx completed  As long as she continues to feel improved and is tolerating PO, will discharge home with strict return precautions  2011 Pt again reassessed  Antibiotic completed  She continues to feel improved and requesting discharge home  UA c/w UTI  Lactic acid improved from 2 5 to 2 1  Although still elevated, has trended down with gentle fluids  Pt has normal white count and afebrile  Abdomen is soft and nontender  She is tolerating PO  Pt afebrile and hemodynamically stable  She's had no vomiting or diarrhea while here  She wants to go home as she feels improved  Rx keflex as directed for full duration pending urine culture result  Zofran as needed for nausea/vomiting  Pt advised to stay well hydrated  Strict return precautions outlined - 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  Advised outpatient follow up with PCP in 2-3 days for recheck or return to ER for change in condition as outlined  Pt verbalized understanding and had no further questions  Pt left in stable, improved condition  HEART Risk Score      Most Recent Value   Heart Score Risk Calculator   History  0 Filed at: 03/03/2020 1834   ECG  0 Filed at: 03/03/2020 1834   Age  2 Filed at: 03/03/2020 1834   Risk Factors  1 Filed at: 03/03/2020 1834   Troponin  0 Filed at: 03/03/2020 1834   HEART Score  3 Filed at: 03/03/2020 1834                Initial Sepsis Screening     Row Name 03/03/20 2017                Is the patient's history suggestive of a new or worsening infection?   (!) Yes (Proceed)  -KO        Suspected source of infection  acute abdominal infection  -KO        Are two or more of the following signs & symptoms of infection both present and new to the patient? No  -KO        Indicate SIRS criteria          If the answer is yes to both questions, suspicion of sepsis is present          If severe sepsis is present AND tissue hypoperfusion perists in the hour after fluid resuscitation or lactate > 4, the patient meets criteria for SEPTIC SHOCK          Are any of the following organ dysfunction criteria present within 6 hours of suspected infection and SIRS criteria that are NOT considered to be chronic conditions?         Organ dysfunction          Date of presentation of severe sepsis          Time of presentation of severe sepsis          Tissue hypoperfusion persists in the hour after crystalloid fluid administration, evidenced, by either:          Was hypotension present within one hour of the conclusion of crystalloid fluid administration?           Date of presentation of septic shock          Time of presentation of septic shock            User Key  (r) = Recorded By, (t) = Taken By, (c) = Cosigned By    Initials Name Provider Type    TAMEKA Knox PA-C Physician Assistant                  MDM  Number of Diagnoses or Management Options  Elevated lactic acid level: new and requires workup  Nausea vomiting and diarrhea: new and requires workup  UTI (urinary tract infection): new and requires workup     Amount and/or Complexity of Data Reviewed  Clinical lab tests: ordered and reviewed  Tests in the radiology section of CPT®: ordered and reviewed  Decide to obtain previous medical records or to obtain history from someone other than the patient: yes  Obtain history from someone other than the patient: yes  Review and summarize past medical records: yes  Discuss the patient with other providers: yes (attending)  Independent visualization of images, tracings, or specimens: yes    Patient Progress  Patient progress: improved        Disposition  Final diagnoses:   UTI (urinary tract infection)   Nausea vomiting and diarrhea   Elevated lactic acid level     Time reflects when diagnosis was documented in both MDM as applicable and the Disposition within this note     Time User Action Codes Description Comment    3/3/2020  7:32 PM Vania Deutscher Add [N39 0] UTI (urinary tract infection)     3/3/2020  7:32 PM Vania Deutscher Add [R11 2,  R19 7] Nausea vomiting and diarrhea     3/3/2020  7:33 PM Vania Deutscher Add [R79 89] Elevated lactic acid level       ED Disposition     ED Disposition Condition Date/Time Comment    Discharge Stable Tue Mar 3, 2020  8:15 PM Love Giovanny discharge to home/self care              Follow-up Information     Follow up With Specialties Details Why Contact Info Additional DO [facility] Schedule an appointment as soon as possible for a visit   [address] [place] [address] Emergency Department Emergency Medicine  As needed [facility] [address]  [phone] [place] ED, [address], [address]          Discharge Medication List as of 3/3/2020  8:19 PM      START taking these medications    Details   cephalexin (KEFLEX) 500 mg capsule Take 1 capsule (500 mg total) by mouth every 12 (twelve) hours for 7 days, Starting Tue 3/3/2020, Until Tue 3/10/2020, Normal      ondansetron (ZOFRAN-ODT) 4 mg disintegrating tablet Take 1 tablet (4 mg total) by mouth every 6 (six) hours as needed for nausea or vomiting, Starting Tue 3/3/2020, Normal         CONTINUE these medications which have NOT CHANGED    Details   ALPRAZolam (XANAX) 0 25 mg tablet Take 1 tablet (0 25 mg total) by mouth 4 (four) times a day as needed (As needed), Starting Thu 2/20/2020, Normal      benzonatate (TESSALON PERLES) 100 mg capsule Take 1 capsule (100 mg total) by mouth 3 (three) times a day, Starting Thu 10/24/2019, Normal budesonide-formoterol (SYMBICORT) 160-4 5 mcg/act inhaler Inhale 2 puffs 2 (two) times a day Rinse mouth after use , Starting Mon 12/23/2019, Normal      dicyclomine (BENTYL) 10 mg capsule Take 1 capsule (10 mg total) by mouth 3 (three) times a day as needed (abdominal pain), Starting Mon 12/23/2019, Normal      hydrochlorothiazide (HYDRODIURIL) 50 mg tablet Take 1 tablet (50 mg total) by mouth daily, Starting Thu 2/20/2020, Normal      levothyroxine 100 mcg tablet Take 1 tablet (100 mcg total) by mouth daily in the early morning, Starting Mon 12/23/2019, Normal      lisinopril (ZESTRIL) 10 mg tablet Take 1 tablet (10 mg total) by mouth 2 (two) times a day, Starting Mon 12/23/2019, Normal      metoprolol tartrate (LOPRESSOR) 25 mg tablet Take 1 tablet (25 mg total) by mouth 2 (two) times a day, Starting Mon 12/23/2019, Normal      pantoprazole (PROTONIX) 40 mg tablet Take 1 tablet (40 mg total) by mouth daily, Starting Mon 12/23/2019, Normal      PARoxetine (PAXIL) 30 mg tablet Take 1 tablet (30 mg total) by mouth daily, Starting Mon 12/23/2019, Normal      simvastatin (ZOCOR) 40 mg tablet Take 1 tablet (40 mg total) by mouth daily at bedtime, Starting Mon 12/23/2019, Normal      carboxymethylcellulose 0 5 % SOLN Administer 1 drop to both eyes 3 (three) times a day as needed for dry eyes for up to 30 days, Starting Fri 8/30/2019, Until Mon 12/23/2019, Print           No discharge procedures on file      PDMP Review       Value Time User    PDMP Reviewed  Yes 2/20/2020 12:37 PM Padmini Montez DO ED Provider  Electronically Signed by           Dima Will PA-C  03/03/20 2048

## 2020-03-03 NOTE — TELEPHONE ENCOUNTER
Pt c/o soft stool, nausea, vomiting, and when she moves the room spins  Pt states it started late last night  Pls advise          Allergies   Niacin And Related Anaphylaxis High  10/9/2016    Codeine Nausea Only Not Specified  10/9/2015    Nsaids  Not Specified  10/9/2015    Other  Not Specified  10/9/2016    Iv contrast dye- lips and tongue thick body itchy      Penicillins Rash Low  10/9/2016

## 2020-03-04 LAB
ATRIAL RATE: 73 BPM
P AXIS: 55 DEGREES
PR INTERVAL: 182 MS
QRS AXIS: -8 DEGREES
QRSD INTERVAL: 72 MS
QT INTERVAL: 418 MS
QTC INTERVAL: 460 MS
T WAVE AXIS: 52 DEGREES
VENTRICULAR RATE: 73 BPM

## 2020-03-04 PROCEDURE — 93010 ELECTROCARDIOGRAM REPORT: CPT | Performed by: INTERNAL MEDICINE

## 2020-03-04 NOTE — DISCHARGE INSTRUCTIONS
Rest, plenty of fluids  Take antibiotic as directed for the full duration, pending urine culture result  Continue to alternate OTC tylenol and ibuprofen as needed for discomfort  Zofran as needed for nausea/vomiting  Follow up with PCP in 2-3 days for recheck or return to ER as needed

## 2020-03-09 DIAGNOSIS — R05.9 COUGH: ICD-10-CM

## 2020-03-09 RX ORDER — BENZONATATE 100 MG/1
100 CAPSULE ORAL 3 TIMES DAILY
Qty: 90 CAPSULE | Refills: 1 | Status: SHIPPED | OUTPATIENT
Start: 2020-03-09 | End: 2020-06-17 | Stop reason: SDUPTHER

## 2020-03-25 ENCOUNTER — PATIENT OUTREACH (OUTPATIENT)
Dept: CASE MANAGEMENT | Facility: OTHER | Age: 80
End: 2020-03-25

## 2020-04-20 ENCOUNTER — TELEPHONE (OUTPATIENT)
Dept: INTERNAL MEDICINE CLINIC | Facility: CLINIC | Age: 80
End: 2020-04-20

## 2020-04-20 DIAGNOSIS — F41.9 ANXIETY: ICD-10-CM

## 2020-04-23 ENCOUNTER — TELEMEDICINE (OUTPATIENT)
Dept: INTERNAL MEDICINE CLINIC | Facility: CLINIC | Age: 80
End: 2020-04-23
Payer: MEDICARE

## 2020-04-23 DIAGNOSIS — I10 ESSENTIAL HYPERTENSION: ICD-10-CM

## 2020-04-23 DIAGNOSIS — F41.9 ANXIETY: ICD-10-CM

## 2020-04-23 PROCEDURE — 99441 PR PHYS/QHP TELEPHONE EVALUATION 5-10 MIN: CPT | Performed by: INTERNAL MEDICINE

## 2020-04-23 RX ORDER — LISINOPRIL 10 MG/1
10 TABLET ORAL 2 TIMES DAILY
Qty: 180 TABLET | Refills: 3 | Status: SHIPPED | OUTPATIENT
Start: 2020-04-23 | End: 2020-05-21 | Stop reason: SDUPTHER

## 2020-04-23 RX ORDER — ALPRAZOLAM 0.25 MG/1
0.25 TABLET ORAL 3 TIMES DAILY PRN
Qty: 90 TABLET | Refills: 0 | Status: SHIPPED | OUTPATIENT
Start: 2020-04-23 | End: 2020-05-20 | Stop reason: SDUPTHER

## 2020-05-13 ENCOUNTER — TELEPHONE (OUTPATIENT)
Dept: INTERNAL MEDICINE CLINIC | Facility: CLINIC | Age: 80
End: 2020-05-13

## 2020-05-15 ENCOUNTER — APPOINTMENT (EMERGENCY)
Dept: CT IMAGING | Facility: HOSPITAL | Age: 80
End: 2020-05-15
Payer: MEDICARE

## 2020-05-15 ENCOUNTER — TELEPHONE (OUTPATIENT)
Dept: OTHER | Facility: OTHER | Age: 80
End: 2020-05-15

## 2020-05-15 ENCOUNTER — HOSPITAL ENCOUNTER (EMERGENCY)
Facility: HOSPITAL | Age: 80
Discharge: HOME | End: 2020-05-15
Attending: EMERGENCY MEDICINE | Admitting: EMERGENCY MEDICINE
Payer: MEDICARE

## 2020-05-15 ENCOUNTER — NURSE TRIAGE (OUTPATIENT)
Dept: OTHER | Facility: OTHER | Age: 80
End: 2020-05-15

## 2020-05-15 VITALS
DIASTOLIC BLOOD PRESSURE: 54 MMHG | SYSTOLIC BLOOD PRESSURE: 106 MMHG | OXYGEN SATURATION: 91 % | HEART RATE: 74 BPM | RESPIRATION RATE: 18 BRPM | TEMPERATURE: 98.6 F

## 2020-05-15 DIAGNOSIS — R19.7 DIARRHEA, UNSPECIFIED TYPE: ICD-10-CM

## 2020-05-15 DIAGNOSIS — R10.84 GENERALIZED ABDOMINAL PAIN: ICD-10-CM

## 2020-05-15 DIAGNOSIS — K58.9 IBS (IRRITABLE BOWEL SYNDROME): Primary | ICD-10-CM

## 2020-05-15 LAB
ALBUMIN SERPL BCP-MCNC: 3.5 G/DL (ref 3.5–5)
ALP SERPL-CCNC: 72 U/L (ref 46–116)
ALT SERPL W P-5'-P-CCNC: 27 U/L (ref 12–78)
ANION GAP SERPL CALCULATED.3IONS-SCNC: 8 MMOL/L (ref 4–13)
AST SERPL W P-5'-P-CCNC: 19 U/L (ref 5–45)
BACTERIA UR QL AUTO: ABNORMAL /HPF
BASOPHILS # BLD AUTO: 0.05 THOUSANDS/ΜL (ref 0–0.1)
BASOPHILS NFR BLD AUTO: 1 % (ref 0–1)
BILIRUB SERPL-MCNC: 0.2 MG/DL (ref 0.2–1)
BILIRUB UR QL STRIP: NEGATIVE
BUN SERPL-MCNC: 39 MG/DL (ref 5–25)
CALCIUM SERPL-MCNC: 8.9 MG/DL (ref 8.3–10.1)
CHLORIDE SERPL-SCNC: 104 MMOL/L (ref 100–108)
CLARITY UR: CLEAR
CO2 SERPL-SCNC: 25 MMOL/L (ref 21–32)
COLOR UR: YELLOW
CREAT SERPL-MCNC: 1.17 MG/DL (ref 0.6–1.3)
EOSINOPHIL # BLD AUTO: 0.18 THOUSAND/ΜL (ref 0–0.61)
EOSINOPHIL NFR BLD AUTO: 3 % (ref 0–6)
ERYTHROCYTE [DISTWIDTH] IN BLOOD BY AUTOMATED COUNT: 12.9 % (ref 11.6–15.1)
GFR SERPL CREATININE-BSD FRML MDRD: 43 ML/MIN/1.73SQ M
GLUCOSE SERPL-MCNC: 122 MG/DL (ref 65–140)
GLUCOSE UR STRIP-MCNC: NEGATIVE MG/DL
HCT VFR BLD AUTO: 37.9 % (ref 34.8–46.1)
HGB BLD-MCNC: 12.2 G/DL (ref 11.5–15.4)
HGB UR QL STRIP.AUTO: NEGATIVE
IMM GRANULOCYTES # BLD AUTO: 0.02 THOUSAND/UL (ref 0–0.2)
IMM GRANULOCYTES NFR BLD AUTO: 0 % (ref 0–2)
KETONES UR STRIP-MCNC: NEGATIVE MG/DL
LEUKOCYTE ESTERASE UR QL STRIP: ABNORMAL
LIPASE SERPL-CCNC: 130 U/L (ref 73–393)
LYMPHOCYTES # BLD AUTO: 1.47 THOUSANDS/ΜL (ref 0.6–4.47)
LYMPHOCYTES NFR BLD AUTO: 26 % (ref 14–44)
MAGNESIUM SERPL-MCNC: 1.8 MG/DL (ref 1.6–2.6)
MCH RBC QN AUTO: 31.4 PG (ref 26.8–34.3)
MCHC RBC AUTO-ENTMCNC: 32.2 G/DL (ref 31.4–37.4)
MCV RBC AUTO: 98 FL (ref 82–98)
MONOCYTES # BLD AUTO: 0.68 THOUSAND/ΜL (ref 0.17–1.22)
MONOCYTES NFR BLD AUTO: 12 % (ref 4–12)
NEUTROPHILS # BLD AUTO: 3.31 THOUSANDS/ΜL (ref 1.85–7.62)
NEUTS SEG NFR BLD AUTO: 58 % (ref 43–75)
NITRITE UR QL STRIP: NEGATIVE
NON-SQ EPI CELLS URNS QL MICRO: ABNORMAL /HPF
NRBC BLD AUTO-RTO: 0 /100 WBCS
PH UR STRIP.AUTO: 5.5 [PH]
PLATELET # BLD AUTO: 225 THOUSANDS/UL (ref 149–390)
PMV BLD AUTO: 9.7 FL (ref 8.9–12.7)
POTASSIUM SERPL-SCNC: 4 MMOL/L (ref 3.5–5.3)
PROT SERPL-MCNC: 7.3 G/DL (ref 6.4–8.2)
PROT UR STRIP-MCNC: NEGATIVE MG/DL
RBC # BLD AUTO: 3.88 MILLION/UL (ref 3.81–5.12)
RBC #/AREA URNS AUTO: ABNORMAL /HPF
SODIUM SERPL-SCNC: 137 MMOL/L (ref 136–145)
SP GR UR STRIP.AUTO: 1.02 (ref 1–1.03)
TROPONIN I SERPL-MCNC: 0.02 NG/ML
UROBILINOGEN UR QL STRIP.AUTO: 0.2 E.U./DL
WBC # BLD AUTO: 5.71 THOUSAND/UL (ref 4.31–10.16)
WBC #/AREA URNS AUTO: ABNORMAL /HPF

## 2020-05-15 PROCEDURE — 83735 ASSAY OF MAGNESIUM: CPT | Performed by: EMERGENCY MEDICINE

## 2020-05-15 PROCEDURE — 96374 THER/PROPH/DIAG INJ IV PUSH: CPT

## 2020-05-15 PROCEDURE — 84484 ASSAY OF TROPONIN QUANT: CPT | Performed by: EMERGENCY MEDICINE

## 2020-05-15 PROCEDURE — 93005 ELECTROCARDIOGRAM TRACING: CPT

## 2020-05-15 PROCEDURE — 74176 CT ABD & PELVIS W/O CONTRAST: CPT

## 2020-05-15 PROCEDURE — 83690 ASSAY OF LIPASE: CPT | Performed by: EMERGENCY MEDICINE

## 2020-05-15 PROCEDURE — 81001 URINALYSIS AUTO W/SCOPE: CPT | Performed by: EMERGENCY MEDICINE

## 2020-05-15 PROCEDURE — 99284 EMERGENCY DEPT VISIT MOD MDM: CPT

## 2020-05-15 PROCEDURE — 85025 COMPLETE CBC W/AUTO DIFF WBC: CPT | Performed by: EMERGENCY MEDICINE

## 2020-05-15 PROCEDURE — 99284 EMERGENCY DEPT VISIT MOD MDM: CPT | Performed by: EMERGENCY MEDICINE

## 2020-05-15 PROCEDURE — 36415 COLL VENOUS BLD VENIPUNCTURE: CPT | Performed by: EMERGENCY MEDICINE

## 2020-05-15 PROCEDURE — 80053 COMPREHEN METABOLIC PANEL: CPT | Performed by: EMERGENCY MEDICINE

## 2020-05-15 RX ORDER — ONDANSETRON 2 MG/ML
4 INJECTION INTRAMUSCULAR; INTRAVENOUS ONCE
Status: COMPLETED | OUTPATIENT
Start: 2020-05-15 | End: 2020-05-15

## 2020-05-15 RX ORDER — SIMETHICONE 80 MG
80 TABLET,CHEWABLE ORAL ONCE
Status: COMPLETED | OUTPATIENT
Start: 2020-05-15 | End: 2020-05-15

## 2020-05-15 RX ORDER — MAGNESIUM HYDROXIDE/ALUMINUM HYDROXICE/SIMETHICONE 120; 1200; 1200 MG/30ML; MG/30ML; MG/30ML
30 SUSPENSION ORAL ONCE
Status: COMPLETED | OUTPATIENT
Start: 2020-05-15 | End: 2020-05-15

## 2020-05-15 RX ORDER — MORPHINE SULFATE 4 MG/ML
4 INJECTION, SOLUTION INTRAMUSCULAR; INTRAVENOUS ONCE
Status: DISCONTINUED | OUTPATIENT
Start: 2020-05-15 | End: 2020-05-15 | Stop reason: HOSPADM

## 2020-05-15 RX ORDER — ACETAMINOPHEN 325 MG/1
650 TABLET ORAL EVERY 6 HOURS PRN
Qty: 1 BOTTLE | Refills: 0 | Status: SHIPPED | OUTPATIENT
Start: 2020-05-15

## 2020-05-15 RX ORDER — SIMETHICONE 125 MG
125 TABLET,CHEWABLE ORAL EVERY 6 HOURS PRN
Qty: 30 TABLET | Refills: 0 | Status: SHIPPED | OUTPATIENT
Start: 2020-05-15

## 2020-05-15 RX ORDER — DICYCLOMINE HYDROCHLORIDE 10 MG/1
10 CAPSULE ORAL 3 TIMES DAILY PRN
Qty: 90 CAPSULE | Refills: 0 | Status: SHIPPED | OUTPATIENT
Start: 2020-05-15 | End: 2020-05-21 | Stop reason: SDUPTHER

## 2020-05-15 RX ORDER — SODIUM CHLORIDE 9 MG/ML
125 INJECTION, SOLUTION INTRAVENOUS CONTINUOUS
Status: DISCONTINUED | OUTPATIENT
Start: 2020-05-15 | End: 2020-05-15

## 2020-05-15 RX ADMIN — SODIUM CHLORIDE 125 ML/HR: 0.9 INJECTION, SOLUTION INTRAVENOUS at 17:59

## 2020-05-15 RX ADMIN — ONDANSETRON 4 MG: 2 INJECTION INTRAMUSCULAR; INTRAVENOUS at 18:30

## 2020-05-15 RX ADMIN — SIMETHICONE CHEW TAB 80 MG 80 MG: 80 TABLET ORAL at 20:01

## 2020-05-15 RX ADMIN — ALUMINUM HYDROXIDE, MAGNESIUM HYDROXIDE, AND SIMETHICONE 30 ML: 200; 200; 20 SUSPENSION ORAL at 20:01

## 2020-05-16 LAB
ATRIAL RATE: 79 BPM
P AXIS: 61 DEGREES
PR INTERVAL: 178 MS
QRS AXIS: -4 DEGREES
QRSD INTERVAL: 70 MS
QT INTERVAL: 370 MS
QTC INTERVAL: 424 MS
T WAVE AXIS: 50 DEGREES
VENTRICULAR RATE: 79 BPM

## 2020-05-16 PROCEDURE — 93010 ELECTROCARDIOGRAM REPORT: CPT | Performed by: INTERNAL MEDICINE

## 2020-05-16 RX ORDER — ALPRAZOLAM 0.25 MG/1
TABLET ORAL
Qty: 120 TABLET | Refills: 0 | OUTPATIENT
Start: 2020-05-16

## 2020-05-20 ENCOUNTER — TELEMEDICINE (OUTPATIENT)
Dept: INTERNAL MEDICINE CLINIC | Facility: CLINIC | Age: 80
End: 2020-05-20
Payer: MEDICARE

## 2020-05-20 DIAGNOSIS — K58.2 IRRITABLE BOWEL SYNDROME WITH BOTH CONSTIPATION AND DIARRHEA: Primary | ICD-10-CM

## 2020-05-20 DIAGNOSIS — F41.9 ANXIETY: ICD-10-CM

## 2020-05-20 DIAGNOSIS — R14.0 ABDOMINAL BLOATING: ICD-10-CM

## 2020-05-20 PROCEDURE — 99442 PR PHYS/QHP TELEPHONE EVALUATION 11-20 MIN: CPT | Performed by: INTERNAL MEDICINE

## 2020-05-20 RX ORDER — ALPRAZOLAM 0.25 MG/1
0.25 TABLET ORAL 3 TIMES DAILY PRN
Qty: 90 TABLET | Refills: 0 | Status: SHIPPED | OUTPATIENT
Start: 2020-05-20 | End: 2020-06-22 | Stop reason: SDUPTHER

## 2020-05-21 DIAGNOSIS — K21.9 GASTROESOPHAGEAL REFLUX DISEASE WITHOUT ESOPHAGITIS: ICD-10-CM

## 2020-05-21 DIAGNOSIS — J43.9 PULMONARY EMPHYSEMA, UNSPECIFIED EMPHYSEMA TYPE (HCC): ICD-10-CM

## 2020-05-21 DIAGNOSIS — F32.9 REACTIVE DEPRESSION: ICD-10-CM

## 2020-05-21 DIAGNOSIS — F41.1 GENERALIZED ANXIETY DISORDER: ICD-10-CM

## 2020-05-21 DIAGNOSIS — R10.84 GENERALIZED ABDOMINAL PAIN: ICD-10-CM

## 2020-05-21 DIAGNOSIS — H11.32 SUBCONJUNCTIVAL HEMORRHAGE OF LEFT EYE: ICD-10-CM

## 2020-05-21 DIAGNOSIS — E78.2 MIXED HYPERLIPIDEMIA: ICD-10-CM

## 2020-05-21 DIAGNOSIS — I10 ESSENTIAL HYPERTENSION: ICD-10-CM

## 2020-05-21 DIAGNOSIS — R79.89 ABNORMAL TSH: ICD-10-CM

## 2020-05-21 RX ORDER — PAROXETINE 30 MG/1
30 TABLET, FILM COATED ORAL DAILY
Qty: 90 TABLET | Refills: 3 | Status: SHIPPED | OUTPATIENT
Start: 2020-05-21 | End: 2021-05-07

## 2020-05-21 RX ORDER — SIMVASTATIN 40 MG
40 TABLET ORAL
Qty: 90 TABLET | Refills: 3 | Status: SHIPPED | OUTPATIENT
Start: 2020-05-21 | End: 2021-06-13

## 2020-05-21 RX ORDER — CARBOXYMETHYLCELLULOSE SODIUM 5 MG/ML
1 SOLUTION/ DROPS OPHTHALMIC 3 TIMES DAILY PRN
Qty: 10 ML | Refills: 0 | Status: SHIPPED | OUTPATIENT
Start: 2020-05-21 | End: 2020-05-22 | Stop reason: ALTCHOICE

## 2020-05-21 RX ORDER — DICYCLOMINE HYDROCHLORIDE 10 MG/1
10 CAPSULE ORAL 3 TIMES DAILY PRN
Qty: 90 CAPSULE | Refills: 0 | Status: SHIPPED | OUTPATIENT
Start: 2020-05-21 | End: 2020-05-22

## 2020-05-21 RX ORDER — HYDROCHLOROTHIAZIDE 50 MG/1
50 TABLET ORAL DAILY
Qty: 90 TABLET | Refills: 3 | Status: SHIPPED | OUTPATIENT
Start: 2020-05-21 | End: 2021-08-02

## 2020-05-21 RX ORDER — BUDESONIDE AND FORMOTEROL FUMARATE DIHYDRATE 160; 4.5 UG/1; UG/1
2 AEROSOL RESPIRATORY (INHALATION) 2 TIMES DAILY
Qty: 3 INHALER | Refills: 3 | Status: SHIPPED | OUTPATIENT
Start: 2020-05-21 | End: 2021-10-10

## 2020-05-21 RX ORDER — PANTOPRAZOLE SODIUM 40 MG/1
40 TABLET, DELAYED RELEASE ORAL DAILY
Qty: 90 TABLET | Refills: 3 | Status: SHIPPED | OUTPATIENT
Start: 2020-05-21 | End: 2020-05-22

## 2020-05-21 RX ORDER — LEVOTHYROXINE SODIUM 0.1 MG/1
100 TABLET ORAL
Qty: 90 TABLET | Refills: 3 | Status: SHIPPED | OUTPATIENT
Start: 2020-05-21 | End: 2020-05-22 | Stop reason: ALTCHOICE

## 2020-05-21 RX ORDER — LISINOPRIL 10 MG/1
10 TABLET ORAL 2 TIMES DAILY
Qty: 180 TABLET | Refills: 3 | Status: SHIPPED | OUTPATIENT
Start: 2020-05-21 | End: 2021-08-02

## 2020-05-22 ENCOUNTER — TELEPHONE (OUTPATIENT)
Dept: GASTROENTEROLOGY | Facility: CLINIC | Age: 80
End: 2020-05-22

## 2020-05-22 ENCOUNTER — OFFICE VISIT (OUTPATIENT)
Dept: GASTROENTEROLOGY | Facility: CLINIC | Age: 80
End: 2020-05-22
Payer: MEDICARE

## 2020-05-22 ENCOUNTER — TELEPHONE (OUTPATIENT)
Dept: GASTROENTEROLOGY | Facility: AMBULARY SURGERY CENTER | Age: 80
End: 2020-05-22

## 2020-05-22 ENCOUNTER — TELEPHONE (OUTPATIENT)
Dept: INTERNAL MEDICINE CLINIC | Facility: CLINIC | Age: 80
End: 2020-05-22

## 2020-05-22 VITALS
DIASTOLIC BLOOD PRESSURE: 71 MMHG | HEART RATE: 90 BPM | BODY MASS INDEX: 28.25 KG/M2 | TEMPERATURE: 100 F | WEIGHT: 180 LBS | HEIGHT: 67 IN | SYSTOLIC BLOOD PRESSURE: 127 MMHG | RESPIRATION RATE: 20 BRPM

## 2020-05-22 DIAGNOSIS — K21.00 ESOPHAGITIS, REFLUX: ICD-10-CM

## 2020-05-22 DIAGNOSIS — R10.30 LOWER ABDOMINAL PAIN: ICD-10-CM

## 2020-05-22 DIAGNOSIS — K58.2 IRRITABLE BOWEL SYNDROME WITH BOTH CONSTIPATION AND DIARRHEA: ICD-10-CM

## 2020-05-22 DIAGNOSIS — R19.5 POSITIVE COLORECTAL CANCER SCREENING USING COLOGUARD TEST: ICD-10-CM

## 2020-05-22 DIAGNOSIS — K58.2 IRRITABLE BOWEL SYNDROME WITH BOTH CONSTIPATION AND DIARRHEA: Primary | ICD-10-CM

## 2020-05-22 PROCEDURE — 99204 OFFICE O/P NEW MOD 45 MIN: CPT | Performed by: INTERNAL MEDICINE

## 2020-05-22 RX ORDER — HYOSCYAMINE SULFATE 0.125 MG
0.12 TABLET ORAL EVERY 6 HOURS PRN
Qty: 60 TABLET | Refills: 0 | Status: SHIPPED | OUTPATIENT
Start: 2020-05-22

## 2020-05-23 ENCOUNTER — APPOINTMENT (OUTPATIENT)
Dept: LAB | Facility: HOSPITAL | Age: 80
End: 2020-05-23
Attending: INTERNAL MEDICINE
Payer: MEDICARE

## 2020-05-23 DIAGNOSIS — K58.2 IRRITABLE BOWEL SYNDROME WITH BOTH CONSTIPATION AND DIARRHEA: ICD-10-CM

## 2020-05-23 DIAGNOSIS — R10.30 LOWER ABDOMINAL PAIN: ICD-10-CM

## 2020-05-23 LAB
C DIFF TOX GENS STL QL NAA+PROBE: NEGATIVE
SARS-COV-2 RNA RESP QL NAA+PROBE: NEGATIVE

## 2020-05-23 PROCEDURE — U0003 INFECTIOUS AGENT DETECTION BY NUCLEIC ACID (DNA OR RNA); SEVERE ACUTE RESPIRATORY SYNDROME CORONAVIRUS 2 (SARS-COV-2) (CORONAVIRUS DISEASE [COVID-19]), AMPLIFIED PROBE TECHNIQUE, MAKING USE OF HIGH THROUGHPUT TECHNOLOGIES AS DESCRIBED BY CMS-2020-01-R: HCPCS

## 2020-05-23 PROCEDURE — 87505 NFCT AGENT DETECTION GI: CPT

## 2020-05-23 PROCEDURE — 87209 SMEAR COMPLEX STAIN: CPT

## 2020-05-23 PROCEDURE — 83993 ASSAY FOR CALPROTECTIN FECAL: CPT

## 2020-05-23 PROCEDURE — 87177 OVA AND PARASITES SMEARS: CPT

## 2020-05-24 LAB
CAMPYLOBACTER DNA SPEC NAA+PROBE: NORMAL
SALMONELLA DNA SPEC QL NAA+PROBE: NORMAL
SHIGA TOXIN STX GENE SPEC NAA+PROBE: NORMAL
SHIGELLA DNA SPEC QL NAA+PROBE: NORMAL

## 2020-05-25 ENCOUNTER — TELEPHONE (OUTPATIENT)
Dept: GASTROENTEROLOGY | Facility: AMBULARY SURGERY CENTER | Age: 80
End: 2020-05-25

## 2020-05-25 ENCOUNTER — TELEPHONE (OUTPATIENT)
Dept: OTHER | Facility: OTHER | Age: 80
End: 2020-05-25

## 2020-05-25 DIAGNOSIS — K58.2 IRRITABLE BOWEL SYNDROME WITH BOTH CONSTIPATION AND DIARRHEA: Primary | ICD-10-CM

## 2020-05-25 DIAGNOSIS — K58.0 IRRITABLE BOWEL SYNDROME WITH DIARRHEA: Primary | ICD-10-CM

## 2020-05-25 DIAGNOSIS — K58.9 IBS (IRRITABLE BOWEL SYNDROME): ICD-10-CM

## 2020-05-26 ENCOUNTER — TELEPHONE (OUTPATIENT)
Dept: GASTROENTEROLOGY | Facility: CLINIC | Age: 80
End: 2020-05-26

## 2020-05-26 ENCOUNTER — APPOINTMENT (OUTPATIENT)
Dept: LAB | Facility: HOSPITAL | Age: 80
End: 2020-05-26
Payer: MEDICARE

## 2020-05-26 DIAGNOSIS — K58.2 IRRITABLE BOWEL SYNDROME WITH BOTH CONSTIPATION AND DIARRHEA: ICD-10-CM

## 2020-05-26 LAB
ANION GAP SERPL CALCULATED.3IONS-SCNC: 8 MMOL/L (ref 4–13)
BUN SERPL-MCNC: 22 MG/DL (ref 5–25)
CALCIUM SERPL-MCNC: 9.3 MG/DL (ref 8.3–10.1)
CHLORIDE SERPL-SCNC: 104 MMOL/L (ref 100–108)
CO2 SERPL-SCNC: 27 MMOL/L (ref 21–32)
CREAT SERPL-MCNC: 1.04 MG/DL (ref 0.6–1.3)
GFR SERPL CREATININE-BSD FRML MDRD: 50 ML/MIN/1.73SQ M
GLUCOSE P FAST SERPL-MCNC: 123 MG/DL (ref 65–99)
POTASSIUM SERPL-SCNC: 4.4 MMOL/L (ref 3.5–5.3)
SODIUM SERPL-SCNC: 139 MMOL/L (ref 136–145)

## 2020-05-26 PROCEDURE — 86677 HELICOBACTER PYLORI ANTIBODY: CPT

## 2020-05-26 PROCEDURE — 36415 COLL VENOUS BLD VENIPUNCTURE: CPT

## 2020-05-26 PROCEDURE — 80048 BASIC METABOLIC PNL TOTAL CA: CPT

## 2020-05-27 LAB
H PYLORI IGG SER IA-ACNC: 0.21 INDEX VALUE (ref 0–0.79)
H PYLORI IGM SER-ACNC: <9 UNITS (ref 0–8.9)
O+P STL CONC: NORMAL

## 2020-05-28 LAB — CALPROTECTIN STL-MCNT: 123 UG/G (ref 0–120)

## 2020-06-02 ENCOUNTER — TELEMEDICINE (OUTPATIENT)
Dept: GASTROENTEROLOGY | Facility: CLINIC | Age: 80
End: 2020-06-02
Payer: MEDICARE

## 2020-06-02 DIAGNOSIS — K58.2 IRRITABLE BOWEL SYNDROME WITH BOTH CONSTIPATION AND DIARRHEA: Primary | ICD-10-CM

## 2020-06-02 DIAGNOSIS — R19.5 POSITIVE COLORECTAL CANCER SCREENING USING COLOGUARD TEST: ICD-10-CM

## 2020-06-02 PROCEDURE — 99441 PR PHYS/QHP TELEPHONE EVALUATION 5-10 MIN: CPT | Performed by: PHYSICIAN ASSISTANT

## 2020-06-03 ENCOUNTER — TELEPHONE (OUTPATIENT)
Dept: GASTROENTEROLOGY | Facility: CLINIC | Age: 80
End: 2020-06-03

## 2020-06-03 RX ORDER — CARBOXYMETHYLCELLULOSE SODIUM 5 MG/ML
SOLUTION/ DROPS OPHTHALMIC
COMMUNITY
Start: 2020-05-21

## 2020-06-05 ENCOUNTER — TELEPHONE (OUTPATIENT)
Dept: GASTROENTEROLOGY | Facility: CLINIC | Age: 80
End: 2020-06-05

## 2020-06-09 ENCOUNTER — OFFICE VISIT (OUTPATIENT)
Dept: GASTROENTEROLOGY | Facility: CLINIC | Age: 80
End: 2020-06-09
Payer: MEDICARE

## 2020-06-09 VITALS
HEART RATE: 85 BPM | WEIGHT: 181 LBS | TEMPERATURE: 98.4 F | HEIGHT: 67 IN | DIASTOLIC BLOOD PRESSURE: 80 MMHG | SYSTOLIC BLOOD PRESSURE: 116 MMHG | BODY MASS INDEX: 28.41 KG/M2

## 2020-06-09 DIAGNOSIS — K21.00 ESOPHAGITIS, REFLUX: ICD-10-CM

## 2020-06-09 DIAGNOSIS — R19.5 POSITIVE COLORECTAL CANCER SCREENING USING COLOGUARD TEST: ICD-10-CM

## 2020-06-09 DIAGNOSIS — K58.2 IRRITABLE BOWEL SYNDROME WITH BOTH CONSTIPATION AND DIARRHEA: Primary | ICD-10-CM

## 2020-06-09 PROBLEM — R19.8 ALTERNATING CONSTIPATION AND DIARRHEA: Status: RESOLVED | Noted: 2017-01-29 | Resolved: 2020-06-09

## 2020-06-09 PROCEDURE — 3079F DIAST BP 80-89 MM HG: CPT | Performed by: INTERNAL MEDICINE

## 2020-06-09 PROCEDURE — 1036F TOBACCO NON-USER: CPT | Performed by: INTERNAL MEDICINE

## 2020-06-09 PROCEDURE — 3074F SYST BP LT 130 MM HG: CPT | Performed by: INTERNAL MEDICINE

## 2020-06-09 PROCEDURE — 1160F RVW MEDS BY RX/DR IN RCRD: CPT | Performed by: INTERNAL MEDICINE

## 2020-06-09 PROCEDURE — 4040F PNEUMOC VAC/ADMIN/RCVD: CPT | Performed by: INTERNAL MEDICINE

## 2020-06-09 PROCEDURE — 99214 OFFICE O/P EST MOD 30 MIN: CPT | Performed by: INTERNAL MEDICINE

## 2020-06-09 PROCEDURE — 3008F BODY MASS INDEX DOCD: CPT | Performed by: INTERNAL MEDICINE

## 2020-06-17 DIAGNOSIS — R05.9 COUGH: ICD-10-CM

## 2020-06-17 RX ORDER — BENZONATATE 100 MG/1
100 CAPSULE ORAL 3 TIMES DAILY
Qty: 90 CAPSULE | Refills: 1 | Status: SHIPPED | OUTPATIENT
Start: 2020-06-17 | End: 2020-09-17 | Stop reason: SDUPTHER

## 2020-06-22 ENCOUNTER — TELEMEDICINE (OUTPATIENT)
Dept: INTERNAL MEDICINE CLINIC | Facility: CLINIC | Age: 80
End: 2020-06-22
Payer: MEDICARE

## 2020-06-22 DIAGNOSIS — K58.2 IRRITABLE BOWEL SYNDROME WITH BOTH CONSTIPATION AND DIARRHEA: Primary | ICD-10-CM

## 2020-06-22 DIAGNOSIS — F41.9 ANXIETY: ICD-10-CM

## 2020-06-22 PROCEDURE — 99214 OFFICE O/P EST MOD 30 MIN: CPT | Performed by: INTERNAL MEDICINE

## 2020-06-22 RX ORDER — ALPRAZOLAM 0.25 MG/1
0.25 TABLET ORAL 3 TIMES DAILY PRN
Qty: 90 TABLET | Refills: 0 | Status: SHIPPED | OUTPATIENT
Start: 2020-06-22 | End: 2020-08-27 | Stop reason: SDUPTHER

## 2020-06-23 ENCOUNTER — TELEPHONE (OUTPATIENT)
Dept: GASTROENTEROLOGY | Facility: CLINIC | Age: 80
End: 2020-06-23

## 2020-07-17 ENCOUNTER — TELEPHONE (OUTPATIENT)
Dept: INTERNAL MEDICINE CLINIC | Facility: CLINIC | Age: 80
End: 2020-07-17

## 2020-07-20 DIAGNOSIS — K57.90 DIVERTICULOSIS: ICD-10-CM

## 2020-07-20 DIAGNOSIS — D68.0 VON WILLEBRAND DISEASE (HCC): Primary | ICD-10-CM

## 2020-07-20 DIAGNOSIS — K58.2 IRRITABLE BOWEL SYNDROME WITH BOTH CONSTIPATION AND DIARRHEA: ICD-10-CM

## 2020-07-20 RX ORDER — DICYCLOMINE HYDROCHLORIDE 10 MG/1
10 CAPSULE ORAL 3 TIMES DAILY PRN
Qty: 90 CAPSULE | Refills: 3 | Status: SHIPPED | OUTPATIENT
Start: 2020-07-20 | End: 2021-04-20 | Stop reason: SDUPTHER

## 2020-07-20 RX ORDER — PANTOPRAZOLE SODIUM 40 MG/1
40 TABLET, DELAYED RELEASE ORAL
Qty: 30 TABLET | Refills: 5 | Status: SHIPPED | OUTPATIENT
Start: 2020-07-20 | End: 2021-01-18

## 2020-07-20 NOTE — TELEPHONE ENCOUNTER
Patient asking for refills on Pantoprazole and Dicyclomine    She also is due for Echo       OK to order these

## 2020-08-05 ENCOUNTER — HOSPITAL ENCOUNTER (OUTPATIENT)
Dept: NON INVASIVE DIAGNOSTICS | Facility: HOSPITAL | Age: 80
Discharge: HOME | End: 2020-08-05
Attending: INTERNAL MEDICINE
Payer: MEDICARE

## 2020-08-05 DIAGNOSIS — D68.0 VON WILLEBRAND DISEASE (HCC): ICD-10-CM

## 2020-08-05 PROCEDURE — 93306 TTE W/DOPPLER COMPLETE: CPT

## 2020-08-05 PROCEDURE — 93306 TTE W/DOPPLER COMPLETE: CPT | Performed by: INTERNAL MEDICINE

## 2020-08-27 DIAGNOSIS — F41.9 ANXIETY: ICD-10-CM

## 2020-08-27 RX ORDER — ALPRAZOLAM 0.25 MG/1
0.25 TABLET ORAL 3 TIMES DAILY PRN
Qty: 90 TABLET | Refills: 0 | Status: SHIPPED | OUTPATIENT
Start: 2020-08-27 | End: 2020-10-19 | Stop reason: SDUPTHER

## 2020-08-27 NOTE — TELEPHONE ENCOUNTER
30 day is the policy due to restrictions with controlled meds and recently issues with trying to fill 90 days

## 2020-09-17 DIAGNOSIS — R05.9 COUGH: ICD-10-CM

## 2020-09-17 RX ORDER — BENZONATATE 100 MG/1
100 CAPSULE ORAL 3 TIMES DAILY
Qty: 90 CAPSULE | Refills: 1 | Status: SHIPPED | OUTPATIENT
Start: 2020-09-17 | End: 2020-12-29 | Stop reason: SDUPTHER

## 2020-09-24 ENCOUNTER — IMMUNIZATIONS (OUTPATIENT)
Dept: INTERNAL MEDICINE CLINIC | Facility: CLINIC | Age: 80
End: 2020-09-24
Payer: MEDICARE

## 2020-09-24 DIAGNOSIS — Z23 ENCOUNTER FOR IMMUNIZATION: ICD-10-CM

## 2020-09-24 PROCEDURE — 90662 IIV NO PRSV INCREASED AG IM: CPT

## 2020-09-24 PROCEDURE — G0008 ADMIN INFLUENZA VIRUS VAC: HCPCS

## 2020-10-19 DIAGNOSIS — F41.9 ANXIETY: ICD-10-CM

## 2020-10-19 RX ORDER — ALPRAZOLAM 0.25 MG/1
0.25 TABLET ORAL 3 TIMES DAILY PRN
Qty: 90 TABLET | Refills: 0 | Status: SHIPPED | OUTPATIENT
Start: 2020-10-19 | End: 2020-12-02 | Stop reason: SDUPTHER

## 2020-11-04 ENCOUNTER — OFFICE VISIT (OUTPATIENT)
Dept: GASTROENTEROLOGY | Facility: CLINIC | Age: 80
End: 2020-11-04
Payer: MEDICARE

## 2020-11-04 VITALS
BODY MASS INDEX: 28.6 KG/M2 | TEMPERATURE: 99.2 F | DIASTOLIC BLOOD PRESSURE: 66 MMHG | WEIGHT: 182.2 LBS | HEIGHT: 67 IN | SYSTOLIC BLOOD PRESSURE: 106 MMHG | HEART RATE: 69 BPM

## 2020-11-04 DIAGNOSIS — K21.00 GASTROESOPHAGEAL REFLUX DISEASE WITH ESOPHAGITIS, UNSPECIFIED WHETHER HEMORRHAGE: ICD-10-CM

## 2020-11-04 DIAGNOSIS — K58.2 IRRITABLE BOWEL SYNDROME WITH BOTH CONSTIPATION AND DIARRHEA: Primary | ICD-10-CM

## 2020-11-04 PROCEDURE — 99214 OFFICE O/P EST MOD 30 MIN: CPT | Performed by: INTERNAL MEDICINE

## 2020-11-13 ENCOUNTER — TELEPHONE (OUTPATIENT)
Dept: GASTROENTEROLOGY | Facility: CLINIC | Age: 80
End: 2020-11-13

## 2020-11-20 ENCOUNTER — TELEPHONE (OUTPATIENT)
Dept: GASTROENTEROLOGY | Facility: CLINIC | Age: 80
End: 2020-11-20

## 2020-12-02 ENCOUNTER — TELEPHONE (OUTPATIENT)
Dept: INTERNAL MEDICINE CLINIC | Facility: CLINIC | Age: 80
End: 2020-12-02

## 2020-12-02 DIAGNOSIS — M54.9 BACK PAIN, UNSPECIFIED BACK LOCATION, UNSPECIFIED BACK PAIN LATERALITY, UNSPECIFIED CHRONICITY: Primary | ICD-10-CM

## 2020-12-02 DIAGNOSIS — F41.9 ANXIETY: ICD-10-CM

## 2020-12-02 RX ORDER — ALPRAZOLAM 0.25 MG/1
0.25 TABLET ORAL 3 TIMES DAILY PRN
Qty: 90 TABLET | Refills: 0 | Status: SHIPPED | OUTPATIENT
Start: 2020-12-02 | End: 2020-12-29 | Stop reason: SDUPTHER

## 2020-12-07 ENCOUNTER — TELEPHONE (OUTPATIENT)
Dept: INTERNAL MEDICINE CLINIC | Facility: CLINIC | Age: 80
End: 2020-12-07

## 2020-12-07 DIAGNOSIS — I10 ESSENTIAL HYPERTENSION: Primary | ICD-10-CM

## 2020-12-20 ENCOUNTER — LAB (OUTPATIENT)
Dept: LAB | Facility: HOSPITAL | Age: 80
End: 2020-12-20
Attending: INTERNAL MEDICINE
Payer: MEDICARE

## 2020-12-20 DIAGNOSIS — I10 ESSENTIAL HYPERTENSION: ICD-10-CM

## 2020-12-20 LAB
ALBUMIN SERPL BCP-MCNC: 3.8 G/DL (ref 3.5–5)
ALP SERPL-CCNC: 71 U/L (ref 46–116)
ALT SERPL W P-5'-P-CCNC: 30 U/L (ref 12–78)
ANION GAP SERPL CALCULATED.3IONS-SCNC: 8 MMOL/L (ref 4–13)
AST SERPL W P-5'-P-CCNC: 22 U/L (ref 5–45)
BASOPHILS # BLD AUTO: 0.04 THOUSANDS/ΜL (ref 0–0.1)
BASOPHILS NFR BLD AUTO: 1 % (ref 0–1)
BILIRUB SERPL-MCNC: 0.3 MG/DL (ref 0.2–1)
BUN SERPL-MCNC: 26 MG/DL (ref 5–25)
CALCIUM SERPL-MCNC: 9.3 MG/DL (ref 8.3–10.1)
CHLORIDE SERPL-SCNC: 105 MMOL/L (ref 100–108)
CO2 SERPL-SCNC: 27 MMOL/L (ref 21–32)
CREAT SERPL-MCNC: 0.96 MG/DL (ref 0.6–1.3)
CREAT UR-MCNC: 152 MG/DL
EOSINOPHIL # BLD AUTO: 0.2 THOUSAND/ΜL (ref 0–0.61)
EOSINOPHIL NFR BLD AUTO: 5 % (ref 0–6)
ERYTHROCYTE [DISTWIDTH] IN BLOOD BY AUTOMATED COUNT: 13.2 % (ref 11.6–15.1)
GFR SERPL CREATININE-BSD FRML MDRD: 55 ML/MIN/1.73SQ M
GLUCOSE P FAST SERPL-MCNC: 113 MG/DL (ref 65–99)
HCT VFR BLD AUTO: 39.8 % (ref 34.8–46.1)
HGB BLD-MCNC: 12.5 G/DL (ref 11.5–15.4)
IMM GRANULOCYTES # BLD AUTO: 0.01 THOUSAND/UL (ref 0–0.2)
IMM GRANULOCYTES NFR BLD AUTO: 0 % (ref 0–2)
LYMPHOCYTES # BLD AUTO: 1.55 THOUSANDS/ΜL (ref 0.6–4.47)
LYMPHOCYTES NFR BLD AUTO: 40 % (ref 14–44)
MCH RBC QN AUTO: 31.2 PG (ref 26.8–34.3)
MCHC RBC AUTO-ENTMCNC: 31.4 G/DL (ref 31.4–37.4)
MCV RBC AUTO: 99 FL (ref 82–98)
MICROALBUMIN UR-MCNC: 12.3 MG/L (ref 0–20)
MICROALBUMIN/CREAT 24H UR: 8 MG/G CREATININE (ref 0–30)
MONOCYTES # BLD AUTO: 0.42 THOUSAND/ΜL (ref 0.17–1.22)
MONOCYTES NFR BLD AUTO: 11 % (ref 4–12)
NEUTROPHILS # BLD AUTO: 1.69 THOUSANDS/ΜL (ref 1.85–7.62)
NEUTS SEG NFR BLD AUTO: 43 % (ref 43–75)
NRBC BLD AUTO-RTO: 0 /100 WBCS
PLATELET # BLD AUTO: 205 THOUSANDS/UL (ref 149–390)
PMV BLD AUTO: 10.1 FL (ref 8.9–12.7)
POTASSIUM SERPL-SCNC: 4.1 MMOL/L (ref 3.5–5.3)
PROT SERPL-MCNC: 7.6 G/DL (ref 6.4–8.2)
RBC # BLD AUTO: 4.01 MILLION/UL (ref 3.81–5.12)
SODIUM SERPL-SCNC: 140 MMOL/L (ref 136–145)
WBC # BLD AUTO: 3.91 THOUSAND/UL (ref 4.31–10.16)

## 2020-12-20 PROCEDURE — 82043 UR ALBUMIN QUANTITATIVE: CPT

## 2020-12-20 PROCEDURE — 36415 COLL VENOUS BLD VENIPUNCTURE: CPT

## 2020-12-20 PROCEDURE — 82570 ASSAY OF URINE CREATININE: CPT

## 2020-12-20 PROCEDURE — 80053 COMPREHEN METABOLIC PANEL: CPT

## 2020-12-20 PROCEDURE — 85025 COMPLETE CBC W/AUTO DIFF WBC: CPT

## 2020-12-21 ENCOUNTER — TELEPHONE (OUTPATIENT)
Dept: INTERNAL MEDICINE CLINIC | Facility: CLINIC | Age: 80
End: 2020-12-21

## 2020-12-29 ENCOUNTER — OFFICE VISIT (OUTPATIENT)
Dept: INTERNAL MEDICINE CLINIC | Facility: CLINIC | Age: 80
End: 2020-12-29
Payer: MEDICARE

## 2020-12-29 VITALS
SYSTOLIC BLOOD PRESSURE: 118 MMHG | DIASTOLIC BLOOD PRESSURE: 70 MMHG | TEMPERATURE: 96 F | BODY MASS INDEX: 28.09 KG/M2 | HEART RATE: 66 BPM | HEIGHT: 67 IN | OXYGEN SATURATION: 92 % | WEIGHT: 179 LBS

## 2020-12-29 DIAGNOSIS — M54.50 CHRONIC BILATERAL LOW BACK PAIN WITHOUT SCIATICA: ICD-10-CM

## 2020-12-29 DIAGNOSIS — F33.9 DEPRESSION, RECURRENT (HCC): ICD-10-CM

## 2020-12-29 DIAGNOSIS — R05.9 COUGH: ICD-10-CM

## 2020-12-29 DIAGNOSIS — Z00.00 MEDICARE ANNUAL WELLNESS VISIT, SUBSEQUENT: Primary | ICD-10-CM

## 2020-12-29 DIAGNOSIS — G89.29 CHRONIC BILATERAL LOW BACK PAIN WITHOUT SCIATICA: ICD-10-CM

## 2020-12-29 DIAGNOSIS — F41.9 ANXIETY: ICD-10-CM

## 2020-12-29 PROCEDURE — G0438 PPPS, INITIAL VISIT: HCPCS | Performed by: INTERNAL MEDICINE

## 2020-12-29 PROCEDURE — 1123F ACP DISCUSS/DSCN MKR DOCD: CPT | Performed by: INTERNAL MEDICINE

## 2020-12-29 RX ORDER — ALPRAZOLAM 0.25 MG/1
0.25 TABLET ORAL DAILY
Qty: 60 TABLET | Refills: 0 | Status: SHIPPED | OUTPATIENT
Start: 2020-12-29 | End: 2021-03-04 | Stop reason: SDUPTHER

## 2020-12-29 RX ORDER — LEVOTHYROXINE SODIUM 0.1 MG/1
TABLET ORAL
COMMUNITY
Start: 2020-10-27 | End: 2021-01-25 | Stop reason: SDUPTHER

## 2020-12-29 RX ORDER — BENZONATATE 100 MG/1
100 CAPSULE ORAL 3 TIMES DAILY
Qty: 90 CAPSULE | Refills: 1 | Status: SHIPPED | OUTPATIENT
Start: 2020-12-29 | End: 2021-05-17 | Stop reason: SDUPTHER

## 2021-01-18 DIAGNOSIS — K57.90 DIVERTICULOSIS: ICD-10-CM

## 2021-01-18 RX ORDER — PANTOPRAZOLE SODIUM 40 MG/1
TABLET, DELAYED RELEASE ORAL
Qty: 30 TABLET | Refills: 0 | Status: SHIPPED | OUTPATIENT
Start: 2021-01-18 | End: 2021-01-18 | Stop reason: SDUPTHER

## 2021-01-18 RX ORDER — PANTOPRAZOLE SODIUM 40 MG/1
40 TABLET, DELAYED RELEASE ORAL DAILY
Qty: 90 TABLET | Refills: 3 | Status: SHIPPED | OUTPATIENT
Start: 2021-01-18 | End: 2021-02-17 | Stop reason: SDUPTHER

## 2021-01-21 ENCOUNTER — TELEPHONE (OUTPATIENT)
Dept: INTERNAL MEDICINE CLINIC | Facility: CLINIC | Age: 81
End: 2021-01-21

## 2021-01-25 DIAGNOSIS — E03.9 HYPOTHYROIDISM, UNSPECIFIED TYPE: Primary | ICD-10-CM

## 2021-01-25 RX ORDER — LEVOTHYROXINE SODIUM 0.1 MG/1
100 TABLET ORAL DAILY
Qty: 30 TABLET | Refills: 6 | Status: SHIPPED | OUTPATIENT
Start: 2021-01-25 | End: 2021-04-20 | Stop reason: SDUPTHER

## 2021-01-28 ENCOUNTER — IMMUNIZATIONS (OUTPATIENT)
Dept: FAMILY MEDICINE CLINIC | Facility: HOSPITAL | Age: 81
End: 2021-01-28

## 2021-01-28 DIAGNOSIS — Z23 ENCOUNTER FOR IMMUNIZATION: Primary | ICD-10-CM

## 2021-01-28 PROCEDURE — 91301 SARS-COV-2 / COVID-19 MRNA VACCINE (MODERNA) 100 MCG: CPT

## 2021-01-28 PROCEDURE — 0011A SARS-COV-2 / COVID-19 MRNA VACCINE (MODERNA) 100 MCG: CPT

## 2021-02-17 DIAGNOSIS — K57.90 DIVERTICULOSIS: ICD-10-CM

## 2021-02-17 RX ORDER — PANTOPRAZOLE SODIUM 40 MG/1
40 TABLET, DELAYED RELEASE ORAL DAILY
Qty: 90 TABLET | Refills: 3 | Status: SHIPPED | OUTPATIENT
Start: 2021-02-17 | End: 2022-01-18 | Stop reason: SDUPTHER

## 2021-02-25 ENCOUNTER — IMMUNIZATIONS (OUTPATIENT)
Dept: FAMILY MEDICINE CLINIC | Facility: HOSPITAL | Age: 81
End: 2021-02-25

## 2021-02-25 DIAGNOSIS — Z23 ENCOUNTER FOR IMMUNIZATION: Primary | ICD-10-CM

## 2021-02-25 PROCEDURE — 0012A SARS-COV-2 / COVID-19 MRNA VACCINE (MODERNA) 100 MCG: CPT

## 2021-02-25 PROCEDURE — 91301 SARS-COV-2 / COVID-19 MRNA VACCINE (MODERNA) 100 MCG: CPT

## 2021-02-26 ENCOUNTER — TELEPHONE (OUTPATIENT)
Dept: GASTROENTEROLOGY | Facility: CLINIC | Age: 81
End: 2021-02-26

## 2021-03-04 DIAGNOSIS — F41.9 ANXIETY: ICD-10-CM

## 2021-03-04 RX ORDER — ALPRAZOLAM 0.25 MG/1
0.25 TABLET ORAL DAILY
Qty: 60 TABLET | Refills: 0 | Status: SHIPPED | OUTPATIENT
Start: 2021-03-04 | End: 2021-03-23 | Stop reason: SDUPTHER

## 2021-03-10 ENCOUNTER — OFFICE VISIT (OUTPATIENT)
Dept: GASTROENTEROLOGY | Facility: CLINIC | Age: 81
End: 2021-03-10
Payer: MEDICARE

## 2021-03-10 ENCOUNTER — TELEPHONE (OUTPATIENT)
Dept: GASTROENTEROLOGY | Facility: CLINIC | Age: 81
End: 2021-03-10

## 2021-03-10 VITALS
BODY MASS INDEX: 28.25 KG/M2 | TEMPERATURE: 98.8 F | OXYGEN SATURATION: 95 % | WEIGHT: 180 LBS | HEART RATE: 71 BPM | SYSTOLIC BLOOD PRESSURE: 105 MMHG | DIASTOLIC BLOOD PRESSURE: 55 MMHG | HEIGHT: 67 IN

## 2021-03-10 DIAGNOSIS — K21.00 GASTROESOPHAGEAL REFLUX DISEASE WITH ESOPHAGITIS, UNSPECIFIED WHETHER HEMORRHAGE: ICD-10-CM

## 2021-03-10 DIAGNOSIS — K58.2 IRRITABLE BOWEL SYNDROME WITH BOTH CONSTIPATION AND DIARRHEA: Primary | ICD-10-CM

## 2021-03-10 PROCEDURE — 99214 OFFICE O/P EST MOD 30 MIN: CPT | Performed by: INTERNAL MEDICINE

## 2021-03-10 NOTE — PROGRESS NOTES
[facility] - Outpatient Follow-up Note  Yulissa Sen 78 y o  female MRN: [id number]  Encounter: [id number]          ASSESSMENT AND PLAN:      1  Irritable bowel syndrome with both constipation and diarrhea  I am going to give her another course of the rifaximin 3 times a day for two weeks  I will also give her two refills  I encouraged her to restart MiraLax at a low dose and take it on a consistent basis so she does not get backed up  She may end up taking the MiraLax 1/2 dose per day or 1/2 dose every other day  - rifaximin (XIFAXAN) 550 mg tablet; Take 1 tablet (550 mg total) by mouth every 8 (eight) hours for 14 days  Dispense: 42 tablet; Refill: 2    2  Gastroesophageal reflux disease with esophagitis, unspecified whether hemorrhage  I encouraged her to continue the Protonix daily for her reflux     ______________________________________________________________________    SUBJECTIVE:  She presents for follow-up of her irritable bowel syndrome in her reflux  Her reflux has been well controlled on Protonix daily  She has not had any difficulty swallowing or vomiting symptoms  She feels the bloating has worsened from her irritable bowel syndrome  She has been a little more on the constipated side than the diarrhea side  She has not had any bleeding or weight loss  She felt rifaximin helped in the past and would like to try another course of this  She takes dicyclomine at bedtime and feels it is helping  She tried MiraLax in the past but felt to cause diarrhea so she stopped taking it  REVIEW OF SYSTEMS IS OTHERWISE NEGATIVE        Historical Information   Past Medical History:   Diagnosis Date    Contact dermatitis     Last assessed - 8/8/12    COPD (chronic obstructive pulmonary disease) (HCC)     Disease of thyroid gland     GERD (gastroesophageal reflux disease)     Hyperlipidemia     Hypertension     Irritable bowel syndrome     Von Willebrand disease [facility])      Past Surgical History:   Procedure Laterality Date    APPENDECTOMY      HYSTERECTOMY      Date unk      Social History   Social History     Substance and Sexual Activity   Alcohol Use No     Social History     Substance and Sexual Activity   Drug Use No     Social History     Tobacco Use   Smoking Status Never Smoker   Smokeless Tobacco Never Used     Family History   Problem Relation Age of Onset    Clotting disorder Mother         Def of clotting factor     Aneurysm Father        Meds/Allergies       Current Outpatient Medications:     acetaminophen (TYLENOL) 325 mg tablet    ALPRAZolam (XANAX) 0 25 mg tablet    benzonatate (TESSALON PERLES) 100 mg capsule    budesonide-formoterol (Symbicort) 160-4 5 mcg/act inhaler    dicyclomine (BENTYL) 10 mg capsule    hydrochlorothiazide (HYDRODIURIL) 50 mg tablet    levothyroxine 100 mcg tablet    lisinopril (ZESTRIL) 10 mg tablet    metoprolol tartrate (LOPRESSOR) 25 mg tablet    pantoprazole (PROTONIX) 40 mg tablet    PARoxetine (PAXIL) 30 mg tablet    REFRESH TEARS 0 5 % SOLN    simethicone (MYLICON) 858 MG chewable tablet    simvastatin (ZOCOR) 40 mg tablet    hyoscyamine (ANASPAZ,LEVSIN) 0 125 MG tablet    rifaximin (XIFAXAN) 550 mg tablet    Allergies   Allergen Reactions    Contrast [Iodinated Diagnostic Agents] Itching, Tongue Swelling and Lip Swelling    Niacin And Related Anaphylaxis    Other      Iv contrast dye- lips and tongue thick body itchy    Codeine Nausea Only    Nsaids     Penicillins Rash           Objective     Blood pressure 105/55, pulse 71, temperature 98 8 °F (37 1 °C), temperature source Tympanic, height 5' 7" (1 702 m), weight 81 6 kg (180 lb), SpO2 95 %  Body mass index is 28 19 kg/m²        PHYSICAL EXAM:      General Appearance:   Alert, cooperative, no distress, uses a cane   HEENT:   Normocephalic, atraumatic, anicteric      Neck:  Supple, symmetrical, trachea midline   Lungs:   Clear to auscultation bilaterally; no rales, rhonchi or wheezing; respirations unlabored    Heart[de-identified]   Regular rate and rhythm; no murmur, rub, or gallop  Abdomen:   Soft, non-tender, non-distended; normal bowel sounds; no masses, no organomegaly    Genitalia:   Deferred    Rectal:   Deferred    Extremities:  No cyanosis, clubbing or edema    Pulses:  2+ and symmetric    Skin:  No jaundice, rashes, or lesions    Lymph nodes:  No palpable cervical lymphadenopathy        Lab Results:   No visits with results within 1 Day(s) from this visit  Latest known visit with results is:   Lab on 12/20/2020   Component Date Value    Sodium 12/20/2020 140     Potassium 12/20/2020 4 1     Chloride 12/20/2020 105     CO2 12/20/2020 27     ANION GAP 12/20/2020 8     BUN 12/20/2020 26*    Creatinine 12/20/2020 0 96     Glucose, Fasting 12/20/2020 113*    Calcium 12/20/2020 9 3     AST 12/20/2020 22     ALT 12/20/2020 30     Alkaline Phosphatase 12/20/2020 71     Total Protein 12/20/2020 7 6     Albumin 12/20/2020 3 8     Total Bilirubin 12/20/2020 0 30     eGFR 12/20/2020 55     WBC 12/20/2020 3 91*    RBC 12/20/2020 4 01     Hemoglobin 12/20/2020 12 5     Hematocrit 12/20/2020 39 8     MCV 12/20/2020 99*    MCH 12/20/2020 31 2     MCHC 12/20/2020 31 4     RDW 12/20/2020 13 2     MPV 12/20/2020 10 1     Platelets 32/19/3843 205     nRBC 12/20/2020 0     Neutrophils Relative 12/20/2020 43     Immat GRANS % 12/20/2020 0     Lymphocytes Relative 12/20/2020 40     Monocytes Relative 12/20/2020 11     Eosinophils Relative 12/20/2020 5     Basophils Relative 12/20/2020 1     Neutrophils Absolute 12/20/2020 1 69*    Immature Grans Absolute 12/20/2020 0 01     Lymphocytes Absolute 12/20/2020 1 55     Monocytes Absolute 12/20/2020 0 42     Eosinophils Absolute 12/20/2020 0 20     Basophils Absolute 12/20/2020 0 04          Radiology Results:   No results found

## 2021-03-10 NOTE — TELEPHONE ENCOUNTER
Called patient to let her know I will start the prior-authorization for her medication  Patient expressed understanding

## 2021-03-11 NOTE — TELEPHONE ENCOUNTER
Called patient to let her know that I received the paper work from Gypsy Hdez everything over to [place] @[phone]  Patient expressed understanding

## 2021-03-15 NOTE — TELEPHONE ENCOUNTER
I called Irenamelyssa Vigil @ [phone]  Talked to [facility] the medication Rifaximin (Xifaxan) 550 mg tablet is approved from  3/11/2021 to 5/11/2021  Talk to the patient she said she picked up the medication on Saturday, she was very grateful for me doing the work I do for her  Patient expressed understanding

## 2021-03-23 DIAGNOSIS — F41.9 ANXIETY: ICD-10-CM

## 2021-03-23 RX ORDER — ALPRAZOLAM 0.25 MG/1
0.25 TABLET ORAL 3 TIMES DAILY PRN
Qty: 42 TABLET | Refills: 0 | Status: SHIPPED | OUTPATIENT
Start: 2021-03-23 | End: 2021-04-07 | Stop reason: SDUPTHER

## 2021-03-23 RX ORDER — ALPRAZOLAM 0.25 MG/1
TABLET ORAL
Qty: 60 TABLET | Refills: 0 | OUTPATIENT
Start: 2021-03-23

## 2021-03-23 NOTE — TELEPHONE ENCOUNTER
Patient called back to state she would still like the script to be for TID so she has the medication for the days she needs it  States you are aware of her family issues, IBS and need for this medication  States she has been on this sig for a long time and would appreciate you changing back to TID  She is also requesting an appt

## 2021-03-23 NOTE — TELEPHONE ENCOUNTER
Pt called asking about her Xanax script as to why it was changed the last few times to once daily with a qty of 60  Pt states it calms her irritable bowel syndrome  I explained you had changed it on her december appt to one daily at bedtime PRN  She should have anough as she said she doesn't take it 3 times daily as she used to get it as she got a qty of 61 which is more than once daily  She asked to make an appt and was transferred to the front to schedule

## 2021-03-24 ENCOUNTER — TELEPHONE (OUTPATIENT)
Dept: INTERNAL MEDICINE CLINIC | Facility: CLINIC | Age: 81
End: 2021-03-24

## 2021-03-24 NOTE — TELEPHONE ENCOUNTER
Refill was sent to the pharmacy but patient states she is still waiting for a c/b to schedule an appt? Can you please call patient    Thank you

## 2021-03-24 NOTE — TELEPHONE ENCOUNTER
The reason for the visit is she wats to increase her anxiety medication - we gave her an additional rx yesterday since her script was not due to be filled until early April  Can add prior to that Rx running out but she was also told not sure if insurance will cover the additional medication  Not sure if anything Friday (she usually takes more than 15 minutes if not look at next week as she had script sent in yesterday

## 2021-03-24 NOTE — TELEPHONE ENCOUNTER
Pt refused appt offered 4/8/2021 - "too far out", scheduled for 3/30/21 early appt     Wasent happy with that but iit was all we had

## 2021-03-30 ENCOUNTER — OFFICE VISIT (OUTPATIENT)
Dept: INTERNAL MEDICINE CLINIC | Facility: CLINIC | Age: 81
End: 2021-03-30
Payer: MEDICARE

## 2021-03-30 ENCOUNTER — APPOINTMENT (OUTPATIENT)
Dept: RADIOLOGY | Facility: MEDICAL CENTER | Age: 81
End: 2021-03-30
Payer: MEDICARE

## 2021-03-30 VITALS
HEIGHT: 67 IN | TEMPERATURE: 97.5 F | SYSTOLIC BLOOD PRESSURE: 126 MMHG | WEIGHT: 181.5 LBS | BODY MASS INDEX: 28.49 KG/M2 | DIASTOLIC BLOOD PRESSURE: 78 MMHG

## 2021-03-30 DIAGNOSIS — M54.50 CHRONIC BILATERAL LOW BACK PAIN WITHOUT SCIATICA: ICD-10-CM

## 2021-03-30 DIAGNOSIS — M54.50 ACUTE BILATERAL LOW BACK PAIN WITHOUT SCIATICA: Primary | ICD-10-CM

## 2021-03-30 DIAGNOSIS — R19.7 DIARRHEA, UNSPECIFIED TYPE: ICD-10-CM

## 2021-03-30 DIAGNOSIS — G89.29 CHRONIC BILATERAL LOW BACK PAIN WITHOUT SCIATICA: ICD-10-CM

## 2021-03-30 PROCEDURE — 72100 X-RAY EXAM L-S SPINE 2/3 VWS: CPT

## 2021-03-30 PROCEDURE — 99214 OFFICE O/P EST MOD 30 MIN: CPT | Performed by: INTERNAL MEDICINE

## 2021-03-30 NOTE — PROGRESS NOTES
BMI Counseling: Body mass index is 28 43 kg/m²  The BMI is above normal  Nutrition recommendations include consuming healthier snacks and moderation in carbohydrate intake  Exercise recommendations include exercising 3-5 times per week  No pharmacotherapy was ordered  Assessment/Plan:         Diagnoses and all orders for this visit:    Acute bilateral low back pain without sciatica  Pt will go for xray today and did discuss possible in home therapy     BMI 28 0-28 9,adult  Pt is working on [employer]     Diarrhea, unspecified type  Pt had sxs after taking Francella Sober so I encouraged her to call Gi who prescribed the medication       Anxiety - pt requesting next Rx for anxiety rx be back to bid as in past I told her that it is a controlled med/system and we can send as she takes prn up to bid but if it is denied we will have to stay with daily rx She refuses alternate rx as she states she has had reactions in the past to many     Rto 1 months as scheduled     Patient ID: Deacon Gunn is a 78 y o  female  HPI  Pt has back pain and is aware xray was ordered last visit but she did not get done yet She started Francella Sober per GI and said she has had loose stools since She did not call GI but stopped the med Her son is creating a lot of added stress and anxiety but he as in past does not feel he needs help She is overwhelmed at times and feels it makes her sick She has been advised in the past that it is he who needs to agree to getting help to fix the problem       Review of Systems   Constitutional: Positive for activity change and fatigue  Negative for chills and fever  HENT: Negative  Respiratory: Negative for cough and shortness of breath  Cardiovascular: Negative  Gastrointestinal: Positive for diarrhea  Genitourinary: Negative  Musculoskeletal: Positive for arthralgias and back pain  Neurological: Negative for dizziness, light-headedness and headaches     Psychiatric/Behavioral: Positive for sleep disturbance  The patient is nervous/anxious  Past Medical History:   Diagnosis Date    Contact dermatitis     Last assessed - 8/8/12    [facility]ch[facility] obstructive [facility]) [facility])     Disease of thyroid gland     GERD (gastroesophageal reflux disease)     Hyperlipidemia     Hypertension     Irritable bowel syndrome     Von Willebrand disease ([facility] [address] )      Past Surgical History:   Procedure Laterality Date    APPENDECTOMY      HYSTERECTOMY      Date unk      Social History     Socioeconomic History    Marital status:       Spouse name: Not on file    Number of children: Not on file    Years of education: Not on file    Highest education level: Not on file   Occupational History    Not on file   Social Needs    Financial resource strain: Not on file    Food insecurity     Worry: Not on file     Inability: Not on file    Transportation needs     Medical: Not on file     Non-medical: Not on file   Tobacco Use    Smoking status: Never Smoker    Smokeless tobacco: Never Used   Substance and Sexual Activity    Alcohol use: No    Drug use: No    Sexual activity: Not on file   Lifestyle    Physical activity     Days per week: Not on file     Minutes per session: Not on file    Stress: Not on file   Relationships    Social connections     Talks on phone: Not on file     Gets together: Not on file     Attends Caodaism service: Not on file     Active member of club or organization: Not on file     Attends meetings of clubs or organizations: Not on file     Relationship status: Not on file    Intimate partner violence     Fear of current or ex partner: Not on file     Emotionally abused: Not on file     Physically abused: Not on file     Forced sexual activity: Not on file   Other Topics Concern    Not on file   Social History Narrative    Always uses seat belt    Assistive devices - Cane     Caffeine use    Dental care, regularly     Allergies   Allergen Reactions    Contrast [Iodinated Diagnostic Agents] Itching, Tongue Swelling and Lip Swelling    Niacin And Related Anaphylaxis    Other      Iv contrast dye- lips and tongue thick body itchy    Codeine Nausea Only    Nsaids     Penicillins Rash           Temp 97 5 °F (36 4 °C) (Temporal)   Ht 5' 7" (1 702 m)   Wt 82 3 kg (181 lb 8 oz)   BMI 28 43 kg/m²          Physical Exam  Constitutional:       General: She is not in acute distress  Appearance: Normal appearance  She is not ill-appearing, toxic-appearing or diaphoretic  HENT:      Head: Normocephalic and atraumatic  Neurological:      Mental Status: She is alert

## 2021-04-07 DIAGNOSIS — F41.9 ANXIETY: ICD-10-CM

## 2021-04-07 RX ORDER — ALPRAZOLAM 0.25 MG/1
0.25 TABLET ORAL 2 TIMES DAILY PRN
Qty: 60 TABLET | Refills: 0 | Status: SHIPPED | OUTPATIENT
Start: 2021-04-07 | End: 2021-05-17 | Stop reason: SDUPTHER

## 2021-04-07 NOTE — TELEPHONE ENCOUNTER
I told her I prefer it to be taken BID - she had requested an increase but at her visit I encouraged to try and maintain bid dose or can add Buspar as an option if she had ongoing anxiety beyond that dose

## 2021-04-07 NOTE — TELEPHONE ENCOUNTER
Pt called for a refill on Alprazolam and said it should be BID  Just veryfying we are lowering that and are we dispensing 60? Please advise

## 2021-04-20 DIAGNOSIS — K58.2 IRRITABLE BOWEL SYNDROME WITH BOTH CONSTIPATION AND DIARRHEA: ICD-10-CM

## 2021-04-20 DIAGNOSIS — E03.9 HYPOTHYROIDISM, UNSPECIFIED TYPE: ICD-10-CM

## 2021-04-20 DIAGNOSIS — K57.90 DIVERTICULOSIS: ICD-10-CM

## 2021-04-20 RX ORDER — LEVOTHYROXINE SODIUM 0.1 MG/1
100 TABLET ORAL DAILY
Qty: 30 TABLET | Refills: 6 | Status: SHIPPED | OUTPATIENT
Start: 2021-04-20 | End: 2021-05-17 | Stop reason: SDUPTHER

## 2021-04-20 RX ORDER — DICYCLOMINE HYDROCHLORIDE 10 MG/1
10 CAPSULE ORAL 3 TIMES DAILY PRN
Qty: 90 CAPSULE | Refills: 3 | Status: SHIPPED | OUTPATIENT
Start: 2021-04-20 | End: 2022-03-15 | Stop reason: SDUPTHER

## 2021-04-21 ENCOUNTER — TELEPHONE (OUTPATIENT)
Dept: INTERNAL MEDICINE CLINIC | Facility: CLINIC | Age: 81
End: 2021-04-21

## 2021-04-21 NOTE — TELEPHONE ENCOUNTER
Pt called, wanting to cancel her appt for tomorrow with CARLOS MANUEL, she is having a bout of IBS  - I left message  For JOSE to reconnect with pt for rescheduling/cancel of tomorrows appt

## 2021-04-29 ENCOUNTER — OFFICE VISIT (OUTPATIENT)
Dept: INTERNAL MEDICINE CLINIC | Facility: CLINIC | Age: 81
End: 2021-04-29
Payer: MEDICARE

## 2021-04-29 VITALS
HEIGHT: 67 IN | BODY MASS INDEX: 28.47 KG/M2 | TEMPERATURE: 97.1 F | WEIGHT: 181.38 LBS | DIASTOLIC BLOOD PRESSURE: 72 MMHG | SYSTOLIC BLOOD PRESSURE: 112 MMHG

## 2021-04-29 DIAGNOSIS — J43.9 PULMONARY EMPHYSEMA, UNSPECIFIED EMPHYSEMA TYPE (HCC): ICD-10-CM

## 2021-04-29 DIAGNOSIS — R79.89 ABNORMAL TSH: ICD-10-CM

## 2021-04-29 DIAGNOSIS — G89.29 CHRONIC MIDLINE THORACIC BACK PAIN: Primary | ICD-10-CM

## 2021-04-29 DIAGNOSIS — M54.6 CHRONIC MIDLINE THORACIC BACK PAIN: Primary | ICD-10-CM

## 2021-04-29 DIAGNOSIS — R19.7 DIARRHEA, UNSPECIFIED TYPE: ICD-10-CM

## 2021-04-29 DIAGNOSIS — F33.9 DEPRESSION, RECURRENT (HCC): ICD-10-CM

## 2021-04-29 DIAGNOSIS — E78.1 HYPERTRIGLYCERIDEMIA: ICD-10-CM

## 2021-04-29 DIAGNOSIS — E03.8 OTHER SPECIFIED HYPOTHYROIDISM: ICD-10-CM

## 2021-04-29 DIAGNOSIS — D68.0 VON WILLEBRAND DISEASE (HCC): ICD-10-CM

## 2021-04-29 PROBLEM — H11.32 SUBCONJUNCTIVAL HEMORRHAGE OF LEFT EYE: Status: RESOLVED | Noted: 2019-08-30 | Resolved: 2021-04-29

## 2021-04-29 PROBLEM — R14.0 ABDOMINAL BLOATING: Status: RESOLVED | Noted: 2020-05-20 | Resolved: 2021-04-29

## 2021-04-29 PROCEDURE — 99214 OFFICE O/P EST MOD 30 MIN: CPT | Performed by: INTERNAL MEDICINE

## 2021-04-29 NOTE — PROGRESS NOTES
Assessment/Plan:      Diagnoses and all orders for this visit:    Chronic midline thoracic back pain  Pt cx in home PT due to GI issues but encouraged her to reschedule     Diarrhea, unspecified type  -     Comprehensive metabolic panel; Future  GI appt moved to June and pt will have labs prior     Von Willebrand disease ([facility] [address] )  -     CBC and Platelet; Future  Pt hesitant to get vaccine due to her hx     Pulmonary emphysema, unspecified emphysema type ([facility] [address] )  Stable Pt without complaint today     Depression, recurrent ([facility] )  Discussed boundaries for family stressors and encouraged continued delia community as resource She has deferred therapy in past and also medication other than present rx     Abnormal TSH  -     TSH, 3rd generation; Future  -     Triglycerides; Future  Pt will get labs upcoming     Other specified hypothyroidism   -     TSH, 3rd generation; Future    Hypertriglyceridemia  -     Triglycerides; Future  Pt requested with upcoming labs           Rto 3 months      Patient ID: Natalia Monroe is a 78 y o  female  HPI   Pt did not start PT as she had flare of GI sxs and cx Pt She has r/s Gi appt in June She continues to have family stressors and struggles with resolution She continues to rely on her delia and prays a lot but has not yet returned to [employer] her back pain is ongoing and limits activity at home at times No falls No chest pain or sob No abdominal pain Loose stools off and on     Review of Systems   Constitutional: Positive for activity change  Cx PT due to GI issues   Respiratory: Negative for cough and shortness of breath  Gastrointestinal: Positive for abdominal distention and diarrhea  Musculoskeletal: Positive for arthralgias and back pain  Neurological: Negative for dizziness, light-headedness and headaches  Psychiatric/Behavioral: Positive for sleep disturbance  The patient is nervous/anxious          Past Medical History:   Diagnosis Date    Contact dermatitis Last assessed - 8/8/12    [facility]ch[facility] obstructive pulmonary [facility]) ([facility])     Disease of thyroid gland     GERD (gastroesophageal reflux disease)     Hyperlipidemia     Hypertension     Irritable bowel syndrome     Von Willebrand disease ([address] )      Past Surgical History:   Procedure Laterality Date    APPENDECTOMY      HYSTERECTOMY      Date unk      Social History     Socioeconomic History    Marital status:       Spouse name: Not on file    Number of children: Not on file    Years of education: Not on file    Highest education level: Not on file   Occupational History    Not on file   Social Needs    Financial resource strain: Not on file    Food insecurity     Worry: Not on file     Inability: Not on file    Transportation needs     Medical: Not on file     Non-medical: Not on file   Tobacco Use    Smoking status: Never Smoker    Smokeless tobacco: Never Used   Substance and Sexual Activity    Alcohol use: No    Drug use: No    Sexual activity: Not on file   Lifestyle    Physical activity     Days per week: Not on file     Minutes per session: Not on file    Stress: Not on file   Relationships    Social connections     Talks on phone: Not on file     Gets together: Not on file     Attends Hindu service: Not on file     Active member of club or organization: Not on file     Attends meetings of clubs or organizations: Not on file     Relationship status: Not on file    Intimate partner violence     Fear of current or ex partner: Not on file     Emotionally abused: Not on file     Physically abused: Not on file     Forced sexual activity: Not on file   Other Topics Concern    Not on file   Social History Narrative    Always uses seat belt    Assistive devices - Cane     Caffeine use    Dental care, regularly     Allergies   Allergen Reactions    Contrast [Iodinated Diagnostic Agents] Itching, Tongue Swelling and Lip Swelling    Niacin And Related Anaphylaxis    Other Iv contrast dye- lips and tongue thick body itchy    Codeine Nausea Only    Nsaids     Penicillins Rash         Visit Vitals  /72   Temp (!) 97 1 °F (36 2 °C) (Temporal)   Ht 5' 7" (1 702 m)   Wt 82 3 kg (181 lb 6 oz)   BMI 28 41 kg/m²   OB Status Hysterectomy   Smoking Status Never Smoker   BSA 1 94 m²          Physical Exam  Vitals signs reviewed  Constitutional:       General: She is not in acute distress  Appearance: Normal appearance  She is not ill-appearing, toxic-appearing or diaphoretic  HENT:      Head: Normocephalic and atraumatic  Nose: Nose normal       Mouth/Throat:      Mouth: Mucous membranes are dry  Eyes:      General: No scleral icterus  Extraocular Movements: Extraocular movements intact  Conjunctiva/sclera: Conjunctivae normal       Pupils: Pupils are equal, round, and reactive to light  Neck:      Musculoskeletal: Normal range of motion and neck supple  Cardiovascular:      Rate and Rhythm: Normal rate  Pulses: Normal pulses  Heart sounds: Normal heart sounds  Abdominal:      General: Bowel sounds are normal  There is distension  Palpations: Abdomen is soft  Tenderness: There is no abdominal tenderness  Musculoskeletal:      Right lower leg: No edema  Left lower leg: No edema  Skin:     General: Skin is dry  Neurological:      General: No focal deficit present  Mental Status: She is alert and oriented to person, place, and time  Mental status is at baseline  Cranial Nerves: No cranial nerve deficit  Sensory: No sensory deficit  Psychiatric:         Mood and Affect: Mood normal          Behavior: Behavior normal          Thought Content:  Thought content normal          Judgment: Judgment normal

## 2021-05-07 DIAGNOSIS — F32.9 REACTIVE DEPRESSION: ICD-10-CM

## 2021-05-07 RX ORDER — PAROXETINE 30 MG/1
30 TABLET, FILM COATED ORAL DAILY
Qty: 90 TABLET | Refills: 3 | Status: SHIPPED | OUTPATIENT
Start: 2021-05-07 | End: 2022-01-18 | Stop reason: SDUPTHER

## 2021-05-07 RX ORDER — PAROXETINE 30 MG/1
TABLET, FILM COATED ORAL
Qty: 90 TABLET | Refills: 0 | Status: SHIPPED | OUTPATIENT
Start: 2021-05-07 | End: 2021-05-07 | Stop reason: SDUPTHER

## 2021-05-17 DIAGNOSIS — E03.9 HYPOTHYROIDISM, UNSPECIFIED TYPE: ICD-10-CM

## 2021-05-17 DIAGNOSIS — R05.9 COUGH: ICD-10-CM

## 2021-05-17 DIAGNOSIS — F41.9 ANXIETY: ICD-10-CM

## 2021-05-17 RX ORDER — BENZONATATE 100 MG/1
100 CAPSULE ORAL 3 TIMES DAILY
Qty: 90 CAPSULE | Refills: 1 | Status: SHIPPED | OUTPATIENT
Start: 2021-05-17 | End: 2021-11-11

## 2021-05-17 RX ORDER — LEVOTHYROXINE SODIUM 0.1 MG/1
100 TABLET ORAL DAILY
Qty: 30 TABLET | Refills: 6 | Status: SHIPPED | OUTPATIENT
Start: 2021-05-17 | End: 2021-12-17

## 2021-05-17 RX ORDER — ALPRAZOLAM 0.25 MG/1
0.25 TABLET ORAL 2 TIMES DAILY PRN
Qty: 60 TABLET | Refills: 0 | Status: SHIPPED | OUTPATIENT
Start: 2021-05-17 | End: 2021-06-17 | Stop reason: SDUPTHER

## 2021-05-18 ENCOUNTER — APPOINTMENT (OUTPATIENT)
Dept: LAB | Facility: HOSPITAL | Age: 81
End: 2021-05-18
Attending: INTERNAL MEDICINE
Payer: MEDICARE

## 2021-05-18 DIAGNOSIS — E03.8 OTHER SPECIFIED HYPOTHYROIDISM: ICD-10-CM

## 2021-05-18 DIAGNOSIS — E78.1 HYPERTRIGLYCERIDEMIA: ICD-10-CM

## 2021-05-18 DIAGNOSIS — R79.89 ABNORMAL TSH: ICD-10-CM

## 2021-05-18 DIAGNOSIS — D68.0 VON WILLEBRAND DISEASE (HCC): ICD-10-CM

## 2021-05-18 DIAGNOSIS — R19.7 DIARRHEA, UNSPECIFIED TYPE: ICD-10-CM

## 2021-05-18 LAB
ALBUMIN SERPL BCP-MCNC: 3.8 G/DL (ref 3.5–5)
ALP SERPL-CCNC: 78 U/L (ref 46–116)
ALT SERPL W P-5'-P-CCNC: 28 U/L (ref 12–78)
ANION GAP SERPL CALCULATED.3IONS-SCNC: 9 MMOL/L (ref 4–13)
AST SERPL W P-5'-P-CCNC: 21 U/L (ref 5–45)
BILIRUB SERPL-MCNC: 0.31 MG/DL (ref 0.2–1)
BUN SERPL-MCNC: 32 MG/DL (ref 5–25)
CALCIUM SERPL-MCNC: 9.3 MG/DL (ref 8.3–10.1)
CHLORIDE SERPL-SCNC: 105 MMOL/L (ref 100–108)
CO2 SERPL-SCNC: 26 MMOL/L (ref 21–32)
CREAT SERPL-MCNC: 1.01 MG/DL (ref 0.6–1.3)
ERYTHROCYTE [DISTWIDTH] IN BLOOD BY AUTOMATED COUNT: 13.5 % (ref 11.6–15.1)
GFR SERPL CREATININE-BSD FRML MDRD: 52 ML/MIN/1.73SQ M
GLUCOSE P FAST SERPL-MCNC: 113 MG/DL (ref 65–99)
HCT VFR BLD AUTO: 38.9 % (ref 34.8–46.1)
HGB BLD-MCNC: 12.2 G/DL (ref 11.5–15.4)
MCH RBC QN AUTO: 31.5 PG (ref 26.8–34.3)
MCHC RBC AUTO-ENTMCNC: 31.4 G/DL (ref 31.4–37.4)
MCV RBC AUTO: 101 FL (ref 82–98)
PLATELET # BLD AUTO: 212 THOUSANDS/UL (ref 149–390)
PMV BLD AUTO: 10.1 FL (ref 8.9–12.7)
POTASSIUM SERPL-SCNC: 4.2 MMOL/L (ref 3.5–5.3)
PROT SERPL-MCNC: 7.9 G/DL (ref 6.4–8.2)
RBC # BLD AUTO: 3.87 MILLION/UL (ref 3.81–5.12)
SODIUM SERPL-SCNC: 140 MMOL/L (ref 136–145)
TRIGL SERPL-MCNC: 293 MG/DL
TSH SERPL DL<=0.05 MIU/L-ACNC: 0.49 UIU/ML (ref 0.36–3.74)
WBC # BLD AUTO: 4.85 THOUSAND/UL (ref 4.31–10.16)

## 2021-05-18 PROCEDURE — 80053 COMPREHEN METABOLIC PANEL: CPT

## 2021-05-18 PROCEDURE — 36415 COLL VENOUS BLD VENIPUNCTURE: CPT

## 2021-05-18 PROCEDURE — 84478 ASSAY OF TRIGLYCERIDES: CPT

## 2021-05-18 PROCEDURE — 85027 COMPLETE CBC AUTOMATED: CPT

## 2021-05-18 PROCEDURE — 84443 ASSAY THYROID STIM HORMONE: CPT

## 2021-06-13 DIAGNOSIS — E78.2 MIXED HYPERLIPIDEMIA: ICD-10-CM

## 2021-06-13 RX ORDER — SIMVASTATIN 40 MG
TABLET ORAL
Qty: 90 TABLET | Refills: 0 | Status: SHIPPED | OUTPATIENT
Start: 2021-06-13 | End: 2021-09-08

## 2021-06-17 DIAGNOSIS — F41.9 ANXIETY: ICD-10-CM

## 2021-06-17 RX ORDER — ALPRAZOLAM 0.25 MG/1
0.25 TABLET ORAL 2 TIMES DAILY PRN
Qty: 60 TABLET | Refills: 0 | Status: SHIPPED | OUTPATIENT
Start: 2021-06-17 | End: 2021-07-14

## 2021-07-14 DIAGNOSIS — F41.9 ANXIETY: ICD-10-CM

## 2021-07-14 RX ORDER — ALPRAZOLAM 0.25 MG/1
TABLET ORAL
Qty: 60 TABLET | Refills: 0 | Status: SHIPPED | OUTPATIENT
Start: 2021-07-14 | End: 2021-08-02

## 2021-08-01 DIAGNOSIS — F41.9 ANXIETY: ICD-10-CM

## 2021-08-01 DIAGNOSIS — I10 ESSENTIAL HYPERTENSION: ICD-10-CM

## 2021-08-02 RX ORDER — ALPRAZOLAM 0.25 MG/1
TABLET ORAL
Qty: 60 TABLET | Refills: 0 | Status: SHIPPED | OUTPATIENT
Start: 2021-08-13 | End: 2021-09-17

## 2021-08-02 RX ORDER — LISINOPRIL 10 MG/1
TABLET ORAL
Qty: 180 TABLET | Refills: 3 | Status: SHIPPED | OUTPATIENT
Start: 2021-08-02 | End: 2022-01-18 | Stop reason: SDUPTHER

## 2021-08-02 RX ORDER — HYDROCHLOROTHIAZIDE 50 MG/1
TABLET ORAL
Qty: 90 TABLET | Refills: 3 | Status: SHIPPED | OUTPATIENT
Start: 2021-08-02 | End: 2022-01-18 | Stop reason: SDUPTHER

## 2021-09-17 DIAGNOSIS — F41.9 ANXIETY: ICD-10-CM

## 2021-09-17 RX ORDER — ALPRAZOLAM 0.25 MG/1
TABLET ORAL
Qty: 60 TABLET | Refills: 0 | Status: SHIPPED | OUTPATIENT
Start: 2021-09-17 | End: 2021-10-18

## 2021-10-14 ENCOUNTER — TELEPHONE (OUTPATIENT)
Dept: GASTROENTEROLOGY | Facility: CLINIC | Age: 81
End: 2021-10-14

## 2021-10-14 DIAGNOSIS — K58.0 IRRITABLE BOWEL SYNDROME WITH DIARRHEA: Primary | ICD-10-CM

## 2021-10-18 DIAGNOSIS — F41.9 ANXIETY: ICD-10-CM

## 2021-10-18 RX ORDER — ALPRAZOLAM 0.25 MG/1
TABLET ORAL
Qty: 60 TABLET | Refills: 0 | Status: SHIPPED | OUTPATIENT
Start: 2021-10-18 | End: 2021-11-18

## 2021-10-22 ENCOUNTER — TELEPHONE (OUTPATIENT)
Dept: GASTROENTEROLOGY | Facility: CLINIC | Age: 81
End: 2021-10-22

## 2021-11-10 DIAGNOSIS — R05.9 COUGH: ICD-10-CM

## 2021-11-11 RX ORDER — BENZONATATE 100 MG/1
CAPSULE ORAL
Qty: 90 CAPSULE | Refills: 0 | Status: SHIPPED | OUTPATIENT
Start: 2021-11-11 | End: 2022-01-18 | Stop reason: SDUPTHER

## 2021-11-18 DIAGNOSIS — F41.9 ANXIETY: ICD-10-CM

## 2021-11-18 RX ORDER — ALPRAZOLAM 0.25 MG/1
TABLET ORAL
Qty: 60 TABLET | Refills: 0 | Status: SHIPPED | OUTPATIENT
Start: 2021-11-18 | End: 2021-12-17

## 2021-12-01 ENCOUNTER — TELEPHONE (OUTPATIENT)
Dept: FAMILY MEDICINE CLINIC | Facility: CLINIC | Age: 81
End: 2021-12-01

## 2021-12-01 DIAGNOSIS — R42 VERTIGO: Primary | ICD-10-CM

## 2021-12-01 RX ORDER — MECLIZINE HCL 12.5 MG/1
12.5 TABLET ORAL EVERY 8 HOURS PRN
Qty: 30 TABLET | Refills: 0 | Status: SHIPPED | OUTPATIENT
Start: 2021-12-01 | End: 2022-01-18 | Stop reason: SDUPTHER

## 2021-12-17 DIAGNOSIS — F41.9 ANXIETY: ICD-10-CM

## 2021-12-17 DIAGNOSIS — E03.9 HYPOTHYROIDISM, UNSPECIFIED TYPE: ICD-10-CM

## 2021-12-17 RX ORDER — LEVOTHYROXINE SODIUM 100 UG/1
TABLET ORAL
Qty: 30 TABLET | Refills: 0 | Status: SHIPPED | OUTPATIENT
Start: 2021-12-17 | End: 2022-01-18 | Stop reason: SDUPTHER

## 2021-12-17 RX ORDER — ALPRAZOLAM 0.25 MG/1
TABLET ORAL
Qty: 60 TABLET | Refills: 0 | Status: SHIPPED | OUTPATIENT
Start: 2021-12-17 | End: 2022-01-18 | Stop reason: SDUPTHER

## 2021-12-21 ENCOUNTER — IMMUNIZATIONS (OUTPATIENT)
Dept: FAMILY MEDICINE CLINIC | Facility: HOSPITAL | Age: 81
End: 2021-12-21

## 2021-12-21 DIAGNOSIS — Z23 ENCOUNTER FOR IMMUNIZATION: Primary | ICD-10-CM

## 2021-12-21 PROCEDURE — 0064A COVID-19 MODERNA VACC 0.25 ML BOOSTER: CPT

## 2021-12-21 PROCEDURE — 91306 COVID-19 MODERNA VACC 0.25 ML BOOSTER: CPT

## 2021-12-30 ENCOUNTER — OFFICE VISIT (OUTPATIENT)
Dept: FAMILY MEDICINE CLINIC | Facility: CLINIC | Age: 81
End: 2021-12-30
Payer: MEDICARE

## 2021-12-30 VITALS
HEART RATE: 78 BPM | RESPIRATION RATE: 18 BRPM | SYSTOLIC BLOOD PRESSURE: 126 MMHG | BODY MASS INDEX: 28.41 KG/M2 | DIASTOLIC BLOOD PRESSURE: 70 MMHG | HEIGHT: 67 IN | WEIGHT: 181 LBS | TEMPERATURE: 97.8 F

## 2021-12-30 DIAGNOSIS — Z00.00 MEDICARE ANNUAL WELLNESS VISIT, SUBSEQUENT: Primary | ICD-10-CM

## 2021-12-30 PROCEDURE — G0439 PPPS, SUBSEQ VISIT: HCPCS | Performed by: INTERNAL MEDICINE

## 2022-01-18 DIAGNOSIS — E03.9 HYPOTHYROIDISM, UNSPECIFIED TYPE: ICD-10-CM

## 2022-01-18 DIAGNOSIS — F32.9 REACTIVE DEPRESSION: ICD-10-CM

## 2022-01-18 DIAGNOSIS — F41.9 ANXIETY: ICD-10-CM

## 2022-01-18 DIAGNOSIS — R42 VERTIGO: ICD-10-CM

## 2022-01-18 DIAGNOSIS — R05.9 COUGH: ICD-10-CM

## 2022-01-18 DIAGNOSIS — J43.9 PULMONARY EMPHYSEMA, UNSPECIFIED EMPHYSEMA TYPE (HCC): ICD-10-CM

## 2022-01-18 DIAGNOSIS — E78.2 MIXED HYPERLIPIDEMIA: ICD-10-CM

## 2022-01-18 DIAGNOSIS — K57.90 DIVERTICULOSIS: ICD-10-CM

## 2022-01-18 DIAGNOSIS — I10 ESSENTIAL HYPERTENSION: ICD-10-CM

## 2022-01-18 RX ORDER — PAROXETINE 30 MG/1
30 TABLET, FILM COATED ORAL DAILY
Qty: 90 TABLET | Refills: 3 | Status: SHIPPED | OUTPATIENT
Start: 2022-01-18

## 2022-01-18 RX ORDER — HYDROCHLOROTHIAZIDE 50 MG/1
50 TABLET ORAL DAILY
Qty: 90 TABLET | Refills: 3 | Status: SHIPPED | OUTPATIENT
Start: 2022-01-18

## 2022-01-18 RX ORDER — MECLIZINE HCL 12.5 MG/1
12.5 TABLET ORAL EVERY 8 HOURS PRN
Qty: 30 TABLET | Refills: 0 | Status: SHIPPED | OUTPATIENT
Start: 2022-01-18

## 2022-01-18 RX ORDER — SIMVASTATIN 40 MG
40 TABLET ORAL
Qty: 90 TABLET | Refills: 3 | Status: SHIPPED | OUTPATIENT
Start: 2022-01-18

## 2022-01-18 RX ORDER — PANTOPRAZOLE SODIUM 40 MG/1
40 TABLET, DELAYED RELEASE ORAL DAILY
Qty: 90 TABLET | Refills: 3 | Status: SHIPPED | OUTPATIENT
Start: 2022-01-18

## 2022-01-18 RX ORDER — LEVOTHYROXINE SODIUM 0.1 MG/1
100 TABLET ORAL DAILY
Qty: 30 TABLET | Refills: 3 | Status: SHIPPED | OUTPATIENT
Start: 2022-01-18 | End: 2022-03-15

## 2022-01-18 RX ORDER — BUDESONIDE AND FORMOTEROL FUMARATE DIHYDRATE 160; 4.5 UG/1; UG/1
2 AEROSOL RESPIRATORY (INHALATION) 2 TIMES DAILY
Qty: 33 G | Refills: 5 | Status: SHIPPED | OUTPATIENT
Start: 2022-01-18

## 2022-01-18 RX ORDER — ALPRAZOLAM 0.25 MG/1
0.25 TABLET ORAL 2 TIMES DAILY PRN
Qty: 60 TABLET | Refills: 0 | Status: SHIPPED | OUTPATIENT
Start: 2022-01-18 | End: 2022-02-23 | Stop reason: SDUPTHER

## 2022-01-18 RX ORDER — BENZONATATE 100 MG/1
100 CAPSULE ORAL 3 TIMES DAILY
Qty: 90 CAPSULE | Refills: 0 | Status: SHIPPED | OUTPATIENT
Start: 2022-01-18 | End: 2022-02-09 | Stop reason: SDUPTHER

## 2022-01-18 RX ORDER — LISINOPRIL 10 MG/1
10 TABLET ORAL 2 TIMES DAILY
Qty: 180 TABLET | Refills: 3 | Status: SHIPPED | OUTPATIENT
Start: 2022-01-18

## 2022-02-09 DIAGNOSIS — R05.9 COUGH: ICD-10-CM

## 2022-02-09 RX ORDER — BENZONATATE 100 MG/1
100 CAPSULE ORAL 3 TIMES DAILY
Qty: 90 CAPSULE | Refills: 0 | Status: SHIPPED | OUTPATIENT
Start: 2022-02-09 | End: 2022-03-15

## 2022-02-23 DIAGNOSIS — F41.9 ANXIETY: ICD-10-CM

## 2022-02-23 RX ORDER — ALPRAZOLAM 0.25 MG/1
0.25 TABLET ORAL 2 TIMES DAILY PRN
Qty: 60 TABLET | Refills: 0 | Status: SHIPPED | OUTPATIENT
Start: 2022-02-23 | End: 2022-03-21 | Stop reason: SDUPTHER

## 2022-03-15 DIAGNOSIS — K57.90 DIVERTICULOSIS: ICD-10-CM

## 2022-03-15 DIAGNOSIS — E03.9 HYPOTHYROIDISM, UNSPECIFIED TYPE: ICD-10-CM

## 2022-03-15 DIAGNOSIS — K58.2 IRRITABLE BOWEL SYNDROME WITH BOTH CONSTIPATION AND DIARRHEA: ICD-10-CM

## 2022-03-15 DIAGNOSIS — R05.9 COUGH: ICD-10-CM

## 2022-03-15 RX ORDER — LEVOTHYROXINE SODIUM 0.1 MG/1
TABLET ORAL
Qty: 90 TABLET | Refills: 1 | Status: SHIPPED | OUTPATIENT
Start: 2022-03-15 | End: 2022-08-07

## 2022-03-15 RX ORDER — DICYCLOMINE HYDROCHLORIDE 10 MG/1
10 CAPSULE ORAL 3 TIMES DAILY PRN
Qty: 90 CAPSULE | Refills: 3 | Status: SHIPPED | OUTPATIENT
Start: 2022-03-15 | End: 2022-06-05

## 2022-03-15 RX ORDER — BENZONATATE 100 MG/1
CAPSULE ORAL
Qty: 90 CAPSULE | Refills: 0 | Status: SHIPPED | OUTPATIENT
Start: 2022-03-15 | End: 2022-04-19

## 2022-03-21 ENCOUNTER — TELEMEDICINE (OUTPATIENT)
Dept: FAMILY MEDICINE CLINIC | Facility: CLINIC | Age: 82
End: 2022-03-21
Payer: MEDICARE

## 2022-03-21 ENCOUNTER — TELEPHONE (OUTPATIENT)
Dept: FAMILY MEDICINE CLINIC | Facility: CLINIC | Age: 82
End: 2022-03-21

## 2022-03-21 DIAGNOSIS — F41.9 ANXIETY: ICD-10-CM

## 2022-03-21 DIAGNOSIS — J06.9 UPPER RESPIRATORY TRACT INFECTION, UNSPECIFIED TYPE: Primary | ICD-10-CM

## 2022-03-21 PROCEDURE — 99441 PR PHYS/QHP TELEPHONE EVALUATION 5-10 MIN: CPT | Performed by: INTERNAL MEDICINE

## 2022-03-21 RX ORDER — AZITHROMYCIN 250 MG/1
TABLET, FILM COATED ORAL
Qty: 6 TABLET | Refills: 0 | Status: SHIPPED | OUTPATIENT
Start: 2022-03-21 | End: 2022-03-25

## 2022-03-21 RX ORDER — ALPRAZOLAM 0.25 MG/1
0.25 TABLET ORAL 2 TIMES DAILY PRN
Qty: 60 TABLET | Refills: 0 | Status: SHIPPED | OUTPATIENT
Start: 2022-03-21 | End: 2022-04-21

## 2022-03-21 NOTE — TELEPHONE ENCOUNTER
PT CALLED REQUESTING APPT FOR TODAY IF AVAILABLE FOR CONGESTION, SORE THROAT AND CONSTANT SNEEZING    PLEASE ADVISE

## 2022-03-21 NOTE — PROGRESS NOTES
Virtual Brief Visit    Patient is located in the following state in which I hold an active license [place]      Assessment/Plan:    Problem List Items Addressed This Visit        Other    Anxiety    Relevant Medications    ALPRAZolam (XANAX) 0 25 mg tablet      Other Visit Diagnoses     Upper respiratory tract infection, unspecified type    -  Primary    Relevant Medications    azithromycin (ZITHROMAX) 250 mg tablet      Increase fluids Pt does not go out of the home and is vaccinated She already is isolated for 3 days and on day 4 today   Zpack ,rest Increase fluids  Add claritin daily   Refill alprazolam sent to pharmacy     Recent Visits  No visits were found meeting these conditions  Showing recent visits within past 7 days and meeting all other requirements  Today's Visits  Date Type Provider Dept   03/21/22 [facility], DO  REAG[facility]   03/21/22 Telephone [facility]   Showing today's visits and meeting all other requirements  Future Appointments  No visits were found meeting these conditions    Showing future appointments within next 150 days and meeting all other requirements   Pt vaccinated and boosted     She developed onset of congestion, sore throat past 3 days Today is day4   No cough Has nasal congestion and thinks may be based on allergy sxs   No chest pain or fever She does not go out of the home other than pharmacy and [place] and has been very limited due to her back pain ongoing    She has been taking Tylenol otc since the weekend She can eat/drink ok    Pt sounds congestion on the phone No respiratory distress audible She is awake and alert and staying in    Her cousin hopefully will  script for pt today   If sxs persist, pt will call She is in new apartment and has many trees surrounding that are starting to bloom       I spent 10 minutes directly with the patient during this visit

## 2022-03-21 NOTE — TELEPHONE ENCOUNTER
Can offer virtual appt at 11:30 since ill sxs (she likely will be phone call as balaji think has smart phone)

## 2022-04-08 ENCOUNTER — APPOINTMENT (OUTPATIENT)
Dept: LAB | Facility: HOSPITAL | Age: 82
End: 2022-04-08
Attending: INTERNAL MEDICINE
Payer: MEDICARE

## 2022-04-08 DIAGNOSIS — Z00.00 MEDICARE ANNUAL WELLNESS VISIT, SUBSEQUENT: ICD-10-CM

## 2022-04-08 LAB
ALBUMIN SERPL BCP-MCNC: 3.7 G/DL (ref 3.5–5)
ALP SERPL-CCNC: 68 U/L (ref 46–116)
ALT SERPL W P-5'-P-CCNC: 26 U/L (ref 12–78)
ANION GAP SERPL CALCULATED.3IONS-SCNC: 12 MMOL/L (ref 4–13)
AST SERPL W P-5'-P-CCNC: 19 U/L (ref 5–45)
BILIRUB SERPL-MCNC: 0.25 MG/DL (ref 0.2–1)
BUN SERPL-MCNC: 33 MG/DL (ref 5–25)
CALCIUM SERPL-MCNC: 9.1 MG/DL (ref 8.3–10.1)
CHLORIDE SERPL-SCNC: 104 MMOL/L (ref 100–108)
CHOLEST SERPL-MCNC: 190 MG/DL
CO2 SERPL-SCNC: 22 MMOL/L (ref 21–32)
CREAT SERPL-MCNC: 1.09 MG/DL (ref 0.6–1.3)
GFR SERPL CREATININE-BSD FRML MDRD: 46 ML/MIN/1.73SQ M
GLUCOSE P FAST SERPL-MCNC: 106 MG/DL (ref 65–99)
HDLC SERPL-MCNC: 38 MG/DL
LDLC SERPL CALC-MCNC: 77 MG/DL (ref 0–100)
NONHDLC SERPL-MCNC: 152 MG/DL
POTASSIUM SERPL-SCNC: 4 MMOL/L (ref 3.5–5.3)
PROT SERPL-MCNC: 7.9 G/DL (ref 6.4–8.2)
SODIUM SERPL-SCNC: 138 MMOL/L (ref 136–145)
TRIGL SERPL-MCNC: 374 MG/DL

## 2022-04-08 PROCEDURE — 36415 COLL VENOUS BLD VENIPUNCTURE: CPT

## 2022-04-08 PROCEDURE — 80061 LIPID PANEL: CPT

## 2022-04-08 PROCEDURE — 80053 COMPREHEN METABOLIC PANEL: CPT

## 2022-04-21 DIAGNOSIS — F41.9 ANXIETY: ICD-10-CM

## 2022-04-21 RX ORDER — ALPRAZOLAM 0.25 MG/1
TABLET ORAL
Qty: 60 TABLET | Refills: 0 | Status: SHIPPED | OUTPATIENT
Start: 2022-04-21 | End: 2022-05-25 | Stop reason: SDUPTHER

## 2022-05-25 DIAGNOSIS — F41.9 ANXIETY: ICD-10-CM

## 2022-05-25 RX ORDER — ALPRAZOLAM 0.25 MG/1
0.25 TABLET ORAL 2 TIMES DAILY PRN
Qty: 60 TABLET | Refills: 0 | Status: SHIPPED | OUTPATIENT
Start: 2022-05-25 | End: 2022-06-25

## 2022-06-01 ENCOUNTER — TELEPHONE (OUTPATIENT)
Dept: FAMILY MEDICINE CLINIC | Facility: CLINIC | Age: 82
End: 2022-06-01

## 2022-06-01 DIAGNOSIS — J06.9 UPPER RESPIRATORY TRACT INFECTION, UNSPECIFIED TYPE: Primary | ICD-10-CM

## 2022-06-01 RX ORDER — AZITHROMYCIN 250 MG/1
TABLET, FILM COATED ORAL
Qty: 6 TABLET | Refills: 0 | Status: SHIPPED | OUTPATIENT
Start: 2022-06-01 | End: 2022-06-01 | Stop reason: CLARIF

## 2022-06-01 RX ORDER — AZITHROMYCIN 250 MG/1
TABLET, FILM COATED ORAL
Qty: 6 TABLET | Refills: 0 | Status: SHIPPED | OUTPATIENT
Start: 2022-06-01 | End: 2022-06-05

## 2022-06-01 NOTE — TELEPHONE ENCOUNTER
Pt asking for rx to be sent to [facility] [place], rx cannot be transferred, said they would need a new script   Pt also made aware to get tested, will get home test

## 2022-06-01 NOTE — TELEPHONE ENCOUNTER
Rx sent to listed pharmacy but she should check home covid test or setup for swab here tomorrow at 2 since sxs are similar with this variant

## 2022-06-01 NOTE — TELEPHONE ENCOUNTER
Pt asking if you prescribe a [facility]pack to [facility] [place], experiencing a sore throat, cough, congestion for a few days now

## 2022-06-02 NOTE — TELEPHONE ENCOUNTER
Sent but please verify pharmacy when pt calls There was none in message so sent to pharmacy on chart

## 2022-06-05 DIAGNOSIS — K57.90 DIVERTICULOSIS: ICD-10-CM

## 2022-06-05 DIAGNOSIS — K58.2 IRRITABLE BOWEL SYNDROME WITH BOTH CONSTIPATION AND DIARRHEA: ICD-10-CM

## 2022-06-05 RX ORDER — DICYCLOMINE HYDROCHLORIDE 10 MG/1
10 CAPSULE ORAL 3 TIMES DAILY PRN
Qty: 270 CAPSULE | Refills: 1 | Status: SHIPPED | OUTPATIENT
Start: 2022-06-05 | End: 2022-07-05

## 2022-06-24 DIAGNOSIS — F41.9 ANXIETY: ICD-10-CM

## 2022-06-25 RX ORDER — ALPRAZOLAM 0.25 MG/1
TABLET ORAL
Qty: 60 TABLET | Refills: 0 | Status: SHIPPED | OUTPATIENT
Start: 2022-06-25 | End: 2022-07-22

## 2022-06-27 ENCOUNTER — RA CDI HCC (OUTPATIENT)
Dept: OTHER | Facility: HOSPITAL | Age: 82
End: 2022-06-27

## 2022-06-27 NOTE — PROGRESS NOTES
[address]  coding opportunities       Chart reviewed, no opportunity found: CHART REVIEWED, NO OPPORTUNITY FOUND        Patients Insurance     Medicare Insurance: Medicare

## 2022-07-11 ENCOUNTER — TELEPHONE (OUTPATIENT)
Dept: GASTROENTEROLOGY | Facility: CLINIC | Age: 82
End: 2022-07-11

## 2022-07-11 ENCOUNTER — TELEPHONE (OUTPATIENT)
Dept: OTHER | Facility: OTHER | Age: 82
End: 2022-07-11

## 2022-07-11 NOTE — TELEPHONE ENCOUNTER
Patient came into the office wanting a appointment with  you but  have no appointments available, she only wants you and is not willing to travel       She states that when she goes out she gets cramping and then her bowels move like water, Can you order her anything or can you call her  Let me know if you have a date in mind for her or if you want us to over book      Thank you

## 2022-07-22 DIAGNOSIS — F41.9 ANXIETY: ICD-10-CM

## 2022-07-22 RX ORDER — ALPRAZOLAM 0.25 MG/1
TABLET ORAL
Qty: 60 TABLET | Refills: 0 | Status: SHIPPED | OUTPATIENT
Start: 2022-07-22 | End: 2022-08-21

## 2022-07-26 ENCOUNTER — OFFICE VISIT (OUTPATIENT)
Dept: GASTROENTEROLOGY | Facility: CLINIC | Age: 82
End: 2022-07-26
Payer: MEDICARE

## 2022-07-26 VITALS
SYSTOLIC BLOOD PRESSURE: 104 MMHG | OXYGEN SATURATION: 96 % | RESPIRATION RATE: 16 BRPM | HEART RATE: 71 BPM | WEIGHT: 179.6 LBS | BODY MASS INDEX: 28.19 KG/M2 | DIASTOLIC BLOOD PRESSURE: 61 MMHG | HEIGHT: 67 IN | TEMPERATURE: 97.7 F

## 2022-07-26 DIAGNOSIS — K21.00 GASTROESOPHAGEAL REFLUX DISEASE WITH ESOPHAGITIS, UNSPECIFIED WHETHER HEMORRHAGE: ICD-10-CM

## 2022-07-26 DIAGNOSIS — R14.0 BLOATING: ICD-10-CM

## 2022-07-26 DIAGNOSIS — K58.2 IRRITABLE BOWEL SYNDROME WITH BOTH CONSTIPATION AND DIARRHEA: Primary | ICD-10-CM

## 2022-07-26 PROCEDURE — 99214 OFFICE O/P EST MOD 30 MIN: CPT | Performed by: INTERNAL MEDICINE

## 2022-07-26 NOTE — PROGRESS NOTES
[facility] - Outpatient Follow-up Note  Ana Elder 78 y o  female MRN: [id number]  Encounter: [id number]          ASSESSMENT AND PLAN:      1  Irritable bowel syndrome with both constipation and diarrhea  2  Bloating  Her symptoms are most likely due to irritable bowel syndrome of the mixed type with alternating diarrhea and constipation  I suspect she is primarily constipated and she backs up and then when she starts to have bowel movements again she has multiple loose bowel movements as all the stool that was backed up comes out  I encouraged her to eat a low FODMAP diet and said she could take a stool softener as needed if she gets constipated and Imodium as needed if she gets diarrhea  If her symptoms persist we could consider low-dose Linzess at 72 micro g daily  She can also continue to take dicyclomine as needed and are however follow-up in three months to reassess her symptoms  3  Gastroesophageal reflux disease with esophagitis, unspecified whether hemorrhage  Her reflux is well controlled with Protonix daily  She can continue this for now  If her symptoms are well controlled for a few months in a row she can try stopping it and then restart if needed  ______________________________________________________________________    SUBJECTIVE:  She presents for follow-up of her reflux and irritable bowel syndrome with alternating constipation and diarrhea and bloating symptoms  At the time of her last visit approximately one year ago she has been taking Protonix daily and a half dose of MiraLax daily and was given a course of rifaximin  Unfortunately the rifaximin did not help  She since stop taking the MiraLax because she was having more episodes of diarrhea  Recently she has been having alternating constipation and diarrhea and the diarrhea has been severe at times limiting her lifestyle  She has not had any bleeding or weight loss    Her reflux has been well controlled with Protonix daily  REVIEW OF SYSTEMS IS OTHERWISE NEGATIVE        Historical Information   Past Medical History:   Diagnosis Date    Contact dermatitis     Last assessed - 8/8/12    [facility]     Disease of thyroid gland     GERD (gastroesophageal reflux disease)     Hyperlipidemia     Hypertension     Irritable bowel syndrome     Von Willebrand disease ([address] )      Past Surgical History:   Procedure Laterality Date    APPENDECTOMY      HYSTERECTOMY      Date unk      Social History   Social History     Substance and Sexual Activity   Alcohol Use No     Social History     Substance and Sexual Activity   Drug Use No     Social History     Tobacco Use   Smoking Status Never Smoker   Smokeless Tobacco Never Used     Family History   Problem Relation Age of Onset    Clotting disorder Mother         Def of clotting factor     Aneurysm Father        Meds/Allergies       Current Outpatient Medications:     acetaminophen (TYLENOL) 325 mg tablet    ALPRAZolam (XANAX) 0 25 mg tablet    benzonatate (TESSALON PERLES) 100 mg capsule    budesonide-formoterol (SYMBICORT) 160-4 5 mcg/act inhaler    hydrochlorothiazide (HYDRODIURIL) 50 mg tablet    hyoscyamine (ANASPAZ,LEVSIN) 0 125 MG tablet    levothyroxine 100 mcg tablet    lisinopril (ZESTRIL) 10 mg tablet    meclizine (ANTIVERT) 12 5 MG tablet    metoprolol tartrate (LOPRESSOR) 25 mg tablet    pantoprazole (PROTONIX) 40 mg tablet    PARoxetine (PAXIL) 30 mg tablet    REFRESH TEARS 0 5 % SOLN    simethicone (MYLICON) 649 MG chewable tablet    simvastatin (ZOCOR) 40 mg tablet    dicyclomine (BENTYL) 10 mg capsule    Allergies   Allergen Reactions    Contrast [Iodinated Diagnostic Agents] Itching, Tongue Swelling and Lip Swelling    Niacin And Related Anaphylaxis    Other      Iv contrast dye- lips and tongue thick body itchy    Codeine Nausea Only    Nsaids     Penicillins Rash Objective     Blood pressure 104/61, pulse 71, temperature 97 7 °F (36 5 °C), temperature source Tympanic, resp  rate 16, height 5' 7" (1 702 m), weight 81 5 kg (179 lb 9 6 oz), SpO2 96 %  Body mass index is 28 13 kg/m²  PHYSICAL EXAM:      General Appearance:   Alert, cooperative, no distress, walks with a cane   HEENT:   Normocephalic, atraumatic, anicteric      Neck:  Supple, symmetrical, trachea midline   Lungs:   Clear to auscultation bilaterally; no rales, rhonchi or wheezing; respirations unlabored    Heart[de-identified]   Regular rate and rhythm; no murmur, rub, or gallop  Abdomen:   Soft, non-tender, non-distended; normal bowel sounds; no masses, no organomegaly    Genitalia:   Deferred    Rectal:   Deferred    Extremities:  No cyanosis, clubbing or edema    Pulses:  2+ and symmetric    Skin:  No jaundice, rashes, or lesions    Lymph nodes:  No palpable cervical lymphadenopathy        Lab Results:   No visits with results within 1 Day(s) from this visit  Latest known visit with results is:   Appointment on 04/08/2022   Component Date Value    Cholesterol 04/08/2022 190     Triglycerides 04/08/2022 374 (A)    HDL, Direct 04/08/2022 38 (A)    LDL Calculated 04/08/2022 77     Non-HDL-Chol (CHOL-HDL) 04/08/2022 152     Sodium 04/08/2022 138     Potassium 04/08/2022 4 0     Chloride 04/08/2022 104     CO2 04/08/2022 22     ANION GAP 04/08/2022 12     BUN 04/08/2022 33 (A)    Creatinine 04/08/2022 1 09     Glucose, Fasting 04/08/2022 106 (A)    Calcium 04/08/2022 9 1     AST 04/08/2022 19     ALT 04/08/2022 26     Alkaline Phosphatase 04/08/2022 68     Total Protein 04/08/2022 7 9     Albumin 04/08/2022 3 7     Total Bilirubin 04/08/2022 0 25     eGFR 04/08/2022 46          Radiology Results:   No results found

## 2022-08-20 DIAGNOSIS — F41.9 ANXIETY: ICD-10-CM

## 2022-08-21 RX ORDER — ALPRAZOLAM 0.25 MG/1
TABLET ORAL
Qty: 60 TABLET | Refills: 0 | Status: SHIPPED | OUTPATIENT
Start: 2022-08-21 | End: 2022-09-26

## 2022-09-26 DIAGNOSIS — F41.9 ANXIETY: ICD-10-CM

## 2022-09-26 RX ORDER — ALPRAZOLAM 0.25 MG/1
TABLET ORAL
Qty: 60 TABLET | Refills: 0 | Status: SHIPPED | OUTPATIENT
Start: 2022-09-26 | End: 2022-10-20

## 2022-09-26 RX ORDER — ALPRAZOLAM 0.25 MG/1
0.25 TABLET ORAL 2 TIMES DAILY PRN
Qty: 60 TABLET | Refills: 0 | OUTPATIENT
Start: 2022-09-26

## 2022-10-05 DIAGNOSIS — J43.9 PULMONARY EMPHYSEMA, UNSPECIFIED EMPHYSEMA TYPE (HCC): ICD-10-CM

## 2022-10-05 RX ORDER — BUDESONIDE AND FORMOTEROL FUMARATE DIHYDRATE 160; 4.5 UG/1; UG/1
AEROSOL RESPIRATORY (INHALATION)
Qty: 30.6 G | Refills: 6 | Status: SHIPPED | OUTPATIENT
Start: 2022-10-05

## 2022-10-07 ENCOUNTER — NURSE TRIAGE (OUTPATIENT)
Dept: OTHER | Facility: OTHER | Age: 82
End: 2022-10-07

## 2022-10-08 NOTE — TELEPHONE ENCOUNTER
Regarding: covid positive concerns   ----- Message from Inez Monk sent at 10/7/2022  5:08 PM EDT -----  " I just tested positive for covid, my temp is 100   What do I do now ?"

## 2022-10-08 NOTE — TELEPHONE ENCOUNTER
Started yesterday with fatigue, body aches, chills, temp of 100, sneezing, and overall "feeling awful"  Home covid test today was positive  Would like to discuss Paxlovid as an option  Not sure she wants to take it       On call provider notified

## 2022-10-08 NOTE — TELEPHONE ENCOUNTER
Reason for Disposition   HIGH RISK for severe COVID complications (e g , weak immune system, age > 59 years, obesity with BMI > 25, pregnant, chronic lung disease or other chronic medical condition)  (Exception: Already seen by PCP and no new or worsening symptoms )    Answer Assessment - Initial Assessment Questions  Were you within 6 feet or less, for up to 15 minutes or more with a person that has a confirmed COVID-19 test? unsure  What was the date of your exposure? unsure  Are you experiencing any symptoms attributed to the virus?  (Assess for SOB, cough, fever, difficulty breathing) fatigue, bodyaches, chills, sneezing  HIGH RISK: Do you have any history heart or lung conditions, weakened immune system, diabetes, Asthma, CHF, HIV, COPD, Chemo, renal failure, sickle cell, etc? denies  VACCINE: "Have you gotten the COVID-19 vaccine?" If Yes ask: "Which one, how many shots, when did you get it?" yes    Protocols used: CORONAVIRUS (COVID-19) [facility]AGNOSED [facility] SUSPECTED[facility]

## 2022-10-11 ENCOUNTER — TELEPHONE (OUTPATIENT)
Dept: OTHER | Facility: OTHER | Age: 82
End: 2022-10-11

## 2022-10-11 NOTE — TELEPHONE ENCOUNTER
Patient is calling regarding cancelling an appointment  Date/Time: 10/12/22 @ 0317 [id number] killian/ Martinez Garcia     Patient was rescheduled: YES [] NO [x]    Patient requesting call back to reschedule: YES [] NO [x] has covid, will call back when she is feeling better

## 2022-10-20 DIAGNOSIS — F41.9 ANXIETY: ICD-10-CM

## 2022-10-20 RX ORDER — ALPRAZOLAM 0.25 MG/1
TABLET ORAL
Qty: 60 TABLET | Refills: 0 | Status: SHIPPED | OUTPATIENT
Start: 2022-10-20

## 2022-10-25 DIAGNOSIS — F32.9 REACTIVE DEPRESSION: ICD-10-CM

## 2022-10-25 DIAGNOSIS — I10 ESSENTIAL HYPERTENSION: ICD-10-CM

## 2022-10-25 RX ORDER — LISINOPRIL 10 MG/1
TABLET ORAL
Qty: 180 TABLET | Refills: 3 | Status: SHIPPED | OUTPATIENT
Start: 2022-10-25

## 2022-10-25 RX ORDER — HYDROCHLOROTHIAZIDE 50 MG/1
TABLET ORAL
Qty: 90 TABLET | Refills: 3 | Status: SHIPPED | OUTPATIENT
Start: 2022-10-25

## 2022-10-25 RX ORDER — PAROXETINE 30 MG/1
TABLET, FILM COATED ORAL
Qty: 90 TABLET | Refills: 3 | Status: SHIPPED | OUTPATIENT
Start: 2022-10-25

## 2022-11-23 DIAGNOSIS — F41.9 ANXIETY: ICD-10-CM

## 2022-11-23 DIAGNOSIS — K58.9 IBS (IRRITABLE BOWEL SYNDROME): ICD-10-CM

## 2022-11-23 RX ORDER — ALPRAZOLAM 0.25 MG/1
TABLET ORAL
Qty: 60 TABLET | Refills: 0 | Status: SHIPPED | OUTPATIENT
Start: 2022-11-23

## 2022-11-25 DIAGNOSIS — K57.90 DIVERTICULOSIS: ICD-10-CM

## 2022-11-25 RX ORDER — PANTOPRAZOLE SODIUM 40 MG/1
TABLET, DELAYED RELEASE ORAL
Qty: 90 TABLET | Refills: 3 | Status: SHIPPED | OUTPATIENT
Start: 2022-11-25

## 2022-12-16 ENCOUNTER — RA CDI HCC (OUTPATIENT)
Dept: OTHER | Facility: HOSPITAL | Age: 82
End: 2022-12-16

## 2022-12-16 DIAGNOSIS — J43.9 PULMONARY EMPHYSEMA, UNSPECIFIED EMPHYSEMA TYPE (HCC): ICD-10-CM

## 2022-12-16 RX ORDER — BUDESONIDE AND FORMOTEROL FUMARATE DIHYDRATE 160; 4.5 UG/1; UG/1
2 AEROSOL RESPIRATORY (INHALATION) 2 TIMES DAILY
Qty: 30.6 G | Refills: 5 | Status: SHIPPED | OUTPATIENT
Start: 2022-12-16

## 2022-12-17 DIAGNOSIS — E78.2 MIXED HYPERLIPIDEMIA: ICD-10-CM

## 2022-12-17 RX ORDER — SIMVASTATIN 40 MG
TABLET ORAL
Qty: 90 TABLET | Refills: 3 | Status: SHIPPED | OUTPATIENT
Start: 2022-12-17

## 2022-12-26 DIAGNOSIS — F41.9 ANXIETY: ICD-10-CM

## 2022-12-26 RX ORDER — ALPRAZOLAM 0.25 MG/1
TABLET ORAL
Qty: 60 TABLET | Refills: 0 | Status: SHIPPED | OUTPATIENT
Start: 2022-12-26

## 2023-01-21 ENCOUNTER — NURSE TRIAGE (OUTPATIENT)
Dept: OTHER | Facility: OTHER | Age: 83
End: 2023-01-21

## 2023-01-21 NOTE — TELEPHONE ENCOUNTER
Regarding: blister in ear/ dizzy  ----- Message from Delaney Calle sent at 1/21/2023  3:08 PM EST -----  " I have a blister in my ear, water filled   I've been getting dizzy from it "

## 2023-01-21 NOTE — TELEPHONE ENCOUNTER
Reason for Disposition  • Walking is very unsteady or feels very dizzy    Answer Assessment - Initial Assessment Questions  1  DESCRIPTION: "Describe your dizziness "      "I feel like the room is spinning for a couple days and I've noticed a water blister in my right ear this morning"    2  LIGHTHEADED: "Do you feel lightheaded?" (e g , somewhat faint, woozy, weak upon standing)      Occasionally feels woozy    3  VERTIGO: "Do you feel like either you or the room is spinning or tilting?" (i e  vertigo)      Room spinning    4  SEVERITY: "How bad is it?"  "Do you feel like you are going to faint?" "Can you stand and walk?"    - MILD: Feels slightly dizzy, but walking normally  - MODERATE: Feels very unsteady when walking, but not falling; interferes with normal activities (e g , school, work)   - SEVERE: Unable to walk without falling, or requires assistance to walk without falling; feels like passing out now  Moderate     5  ONSET:  "When did the dizziness begin?"      A few days ago    6  AGGRAVATING FACTORS: "Does anything make it worse?" (e g , standing, change in head position)      Change in position    7  CAUSE: "What do you think is causing the dizziness?"      Water filled blister inside of right ear    8  RECURRENT SYMPTOM: "Have you had dizziness before?" If Yes, ask: "When was the last time?" "What happened that time?"       Yes, vertigo    9  OTHER SYMPTOMS: "Do you have any other symptoms?" (e g , fever, chest pain, vomiting, diarrhea, bleeding)       No fevers  Has been having episodes of stool "blowouts" for the past few days, not diarrhea but formed/soft stools that come on very suddenly  Informed patient of protocol disposition but she is declining urgent care center eval at this time and is requesting a prescription for an antibiotic be sent in for her by the on-call provider      Protocols used: EARACHE-ADULT-AH, DIZZINESS - LIGHTHEADEDNESS-ADULT-AH

## 2023-01-24 DIAGNOSIS — F41.9 ANXIETY: ICD-10-CM

## 2023-01-24 RX ORDER — ALPRAZOLAM 0.25 MG/1
TABLET ORAL
Qty: 60 TABLET | Refills: 0 | Status: SHIPPED | OUTPATIENT
Start: 2023-01-24

## 2023-02-02 ENCOUNTER — TELEPHONE (OUTPATIENT)
Dept: FAMILY MEDICINE CLINIC | Facility: CLINIC | Age: 83
End: 2023-02-02

## 2023-02-02 DIAGNOSIS — M54.6 CHRONIC THORACIC BACK PAIN, UNSPECIFIED BACK PAIN LATERALITY: Primary | ICD-10-CM

## 2023-02-02 DIAGNOSIS — G89.29 CHRONIC THORACIC BACK PAIN, UNSPECIFIED BACK PAIN LATERALITY: Primary | ICD-10-CM

## 2023-02-02 NOTE — TELEPHONE ENCOUNTER
Pt coming in Monday, 2/6, for her back pain  Said it's more her mid back and it's not constant but hurts when she moves  Asking if you wanted an xray done before she comes in on Monday  Taking Tylenol PRN for the pain

## 2023-02-03 ENCOUNTER — HOSPITAL ENCOUNTER (OUTPATIENT)
Dept: RADIOLOGY | Facility: HOSPITAL | Age: 83
Discharge: HOME | End: 2023-02-03
Attending: INTERNAL MEDICINE

## 2023-02-03 DIAGNOSIS — G89.29 CHRONIC THORACIC BACK PAIN, UNSPECIFIED BACK PAIN LATERALITY: ICD-10-CM

## 2023-02-03 DIAGNOSIS — M54.6 CHRONIC THORACIC BACK PAIN, UNSPECIFIED BACK PAIN LATERALITY: ICD-10-CM

## 2023-02-06 ENCOUNTER — OFFICE VISIT (OUTPATIENT)
Dept: FAMILY MEDICINE CLINIC | Facility: CLINIC | Age: 83
End: 2023-02-06

## 2023-02-06 VITALS
SYSTOLIC BLOOD PRESSURE: 124 MMHG | WEIGHT: 175 LBS | TEMPERATURE: 97.2 F | HEART RATE: 76 BPM | RESPIRATION RATE: 18 BRPM | HEIGHT: 67 IN | DIASTOLIC BLOOD PRESSURE: 78 MMHG | BODY MASS INDEX: 27.47 KG/M2

## 2023-02-06 DIAGNOSIS — M54.50 ACUTE BILATERAL LOW BACK PAIN WITHOUT SCIATICA: ICD-10-CM

## 2023-02-06 DIAGNOSIS — D68.00 VON WILLEBRAND DISEASE: ICD-10-CM

## 2023-02-06 DIAGNOSIS — E03.2 HYPOTHYROIDISM DUE TO MEDICATION: ICD-10-CM

## 2023-02-06 DIAGNOSIS — F33.9 DEPRESSION, RECURRENT (HCC): ICD-10-CM

## 2023-02-06 DIAGNOSIS — I35.0 MILD AORTIC STENOSIS: ICD-10-CM

## 2023-02-06 DIAGNOSIS — Z00.00 MEDICARE ANNUAL WELLNESS VISIT, SUBSEQUENT: Primary | ICD-10-CM

## 2023-02-06 DIAGNOSIS — J43.9 PULMONARY EMPHYSEMA, UNSPECIFIED EMPHYSEMA TYPE (HCC): ICD-10-CM

## 2023-02-06 DIAGNOSIS — R79.89 ABNORMAL TSH: ICD-10-CM

## 2023-02-06 DIAGNOSIS — E78.2 MIXED HYPERLIPIDEMIA: ICD-10-CM

## 2023-02-06 DIAGNOSIS — N18.31 STAGE 3A CHRONIC KIDNEY DISEASE (HCC): ICD-10-CM

## 2023-02-06 NOTE — PROGRESS NOTES
Assessment and Plan:     Problem List Items Addressed This Visit        Endocrine    Hypothyroidism       Respiratory    Pulmonary emphysema ([facility] [address] )       Cardiovascular and Mediastinum    Mild aortic stenosis    Relevant Orders    CBC and Platelet    Comprehensive metabolic panel    Echo complete w/ contrast if indicated       Hematopoietic and Hemostatic    Von Willebrand disease       Genitourinary    Stage 3a chronic kidney disease ([facility] [address] )       Other    Hyperlipidemia    Relevant Orders    Comprehensive metabolic panel    Lipid panel    Medicare annual wellness visit, subsequent - Primary    Depression, recurrent ([facility] [address] )    Acute bilateral low back pain without sciatica    RESOLVED: Abnormal TSH   Pt has ACP documents completed and plan to live with family if unable to stay in her home as she gets older  Offered referral for therapist and pt declined  Encouraged pt to return to in person [employer] when weather improves as she always benefitted from that   [place] 6 months  FBW and scheduled echo at [facility]    BMI Counseling: Body mass index is 27 41 kg/m²  The BMI is above normal  Nutrition recommendations include consuming healthier snacks and increasing intake of lean protein  Exercise recommendations include exercising 3-5 times per week  Rationale for BMI follow-up plan is due to patient being overweight or obese  Preventive health issues were discussed with patient, and age appropriate screening tests were ordered as noted in patient's After Visit Summary  Personalized health advice and appropriate referrals for health education or preventive services given if needed, as noted in patient's After Visit Summary       History of Present Illness:     Patient presents for a Medicare Wellness Visit    HPI   Pt back sxs are better today Moving her bowel's did help sxs She still has a lot of family stress and is trying to manage overall She is not speaking to some of her family and involved in a lawsuit over an estate Patient Care Team:  Alicia Kim DO as PCP - General  Alicia Kim DO     Review of Systems:     Review of Systems   Constitutional: Negative for chills and fever  HENT: Positive for congestion  Eyes: Negative for visual disturbance  Respiratory: Negative for cough and shortness of breath  Cardiovascular: Negative for chest pain, palpitations and leg swelling  Gastrointestinal: Negative for abdominal distention and abdominal pain  Genitourinary: Negative  Musculoskeletal: Positive for arthralgias and back pain  Back sxs have improved this week   Neurological: Negative for dizziness and headaches  Psychiatric/Behavioral: Negative for sleep disturbance  The patient is not nervous/anxious           Problem List:     Patient Active Problem List   Diagnosis   • Diarrhea   • Anxiety   • Back pain, chronic   • Diverticulosis   • Esophagitis, reflux   • Generalized osteoarthritis of multiple sites   • Hearing loss   • Hyperlipidemia   • Hypertension   • Hypothyroidism   • Stool guaiac positive   • Vitamin D deficiency   • Von Willebrand disease   • Generalized abdominal pain   • Irritable bowel syndrome with both constipation and diarrhea   • BMI 28 0-28 9,adult   • Left corneal abrasion   • Medicare annual wellness visit, subsequent   • Positive colorectal cancer screening using Cologuard test   • Depression, recurrent ([facility] [address] )   • Acute bilateral low back pain without sciatica   • Pulmonary emphysema (HCC)   • Mild aortic stenosis   • Stage 3a chronic kidney disease (HCC)      Past Medical and Surgical History:     Past Medical History:   Diagnosis Date   • Contact dermatitis     Last assessed - 8/8/12   • COPD (chronic obstructive pulmonary disease) (HCC)    • Disease of thyroid gland    • GERD (gastroesophageal reflux disease)    • Hyperlipidemia    • Hypertension    • Irritable bowel syndrome    • Von Willebrand disease      Past Surgical History:   Procedure Laterality Date   • APPENDECTOMY     • HYSTERECTOMY      Date unk       Family History:     Family History   Problem Relation Age of Onset   • Clotting disorder Mother         Def of clotting factor    • Aneurysm Father       Social History:     Social History     Socioeconomic History   • Marital status:      Spouse name: None   • Number of children: None   • Years of education: None   • Highest education level: None   Occupational History   • None   Tobacco Use   • Smoking status: Never   • Smokeless tobacco: Never   Vaping Use   • Vaping Use: Never used   Substance and Sexual Activity   • Alcohol use: No   • Drug use: No   • Sexual activity: None   Other Topics Concern   • None   Social History Narrative    Always uses seat belt    Assistive devices - Cane     Caffeine use    Dental care, regularly     Social Determinants of Health     Financial Resource Strain: Low Risk    • Difficulty of Paying Living Expenses: Not very hard   Food Insecurity: Not on file   Transportation Needs: No Transportation Needs   • Lack of Transportation (Medical): No   • Lack of Transportation (Non-Medical): No   Physical Activity: Not on file   Stress: Not on file   Social Connections: Not on file   Intimate Partner Violence: Not on file   Housing Stability: Not on file      Medications and Allergies:     Current Outpatient Medications   Medication Sig Dispense Refill   • acetaminophen (TYLENOL) 325 mg tablet Take 2 tablets (650 mg total) by mouth every 6 (six) hours as needed (pain, fever) 1 Bottle 0   • ALPRAZolam (XANAX) 0 25 mg tablet TAKE 1 TABLET BY MOUTH TWICE A DAY AS NEEDED FOR ANXIETY 60 tablet 0   • benzonatate (TESSALON PERLES) 100 mg capsule TAKE 1 CAPSULE BY MOUTH THREE TIMES A DAY 90 capsule 3   • budesonide-formoterol (SYMBICORT) 160-4 5 mcg/act inhaler Inhale 2 puffs 2 (two) times a day Rinse mouth after use   30 6 g 5   • dicyclomine (BENTYL) 10 mg capsule TAKE 1 CAPSULE (10 MG TOTAL) BY MOUTH 3 (THREE) TIMES A DAY AS NEEDED (USE ONLY AS NEEDED) 270 capsule 1   • hydrochlorothiazide (HYDRODIURIL) 50 mg tablet TAKE 1 TABLET BY MOUTH EVERY DAY 90 tablet 3   • hyoscyamine (ANASPAZ,LEVSIN) 0 125 MG tablet Take 1 tablet (0 125 mg total) by mouth every 6 (six) hours as needed for cramping 60 tablet 0   • levothyroxine 100 mcg tablet TAKE 1 TABLET BY MOUTH EVERY DAY 90 tablet 3   • lisinopril (ZESTRIL) 10 mg tablet TAKE 1 TABLET BY MOUTH TWICE A  tablet 3   • meclizine (ANTIVERT) 12 5 MG tablet Take 1 tablet (12 5 mg total) by mouth every 8 (eight) hours as needed for dizziness 30 tablet 0   • metoprolol tartrate (LOPRESSOR) 25 mg tablet TAKE 1 TABLET BY MOUTH TWICE A  tablet 3   • pantoprazole (PROTONIX) 40 mg tablet TAKE 1 TABLET BY MOUTH EVERY DAY 90 tablet 3   • PARoxetine (PAXIL) 30 mg tablet TAKE 1 TABLET BY MOUTH EVERY DAY 90 tablet 3   • REFRESH TEARS 0 5 % SOLN INSTILL 1 DROP INTO EACH EYE THREE TIMES DAILY AS NEEDED FOR DRY EYES     • simethicone (MYLICON) 231 MG chewable tablet Chew 1 tablet (125 mg total) every 6 (six) hours as needed (bloating, flatulence) 30 tablet 0   • simvastatin (ZOCOR) 40 mg tablet TAKE 1 TABLET BY MOUTH DAILY AT BEDTIME 90 tablet 3     No current facility-administered medications for this visit       Allergies   Allergen Reactions   • Contrast [Iodinated Contrast Media] Itching, Tongue Swelling and Lip Swelling   • Niacin And Related Anaphylaxis   • Other      Iv contrast dye- lips and tongue thick body itchy   • Codeine Nausea Only   • Nsaids    • Penicillins Rash      Immunizations:     Immunization History   Administered Date(s) Administered   • COVID-19 MODERNA VACC 0 25 ML IM BOOSTER 12/21/2021   • COVID-19 MODERNA VACC 0 5 ML IM 01/28/2021, 02/25/2021   • INFLUENZA 09/30/2014   • Influenza Quadrivalent Preservative Free 3 years and older IM 09/10/2015   • Influenza Split High Dose Preservative Free IM 09/19/2011, 10/05/2017   • Influenza, high dose seasonal 0 7 mL 10/10/2019, 09/24/2020   • Influenza, seasonal, injectable 09/04/2012   • Pneumococcal Conjugate 13-Valent 07/18/2016   • Pneumococcal Polysaccharide PPV23 10/10/2019   • Zoster 12/20/2010      Health Maintenance:         Topic Date Due   • Hepatitis C Screening  Completed         Topic Date Due   • COVID-19 Vaccine (4 - Booster for Moderna series) 02/15/2022   • Influenza Vaccine (1) 09/01/2022      Medicare Screening Tests and Risk Assessments:     Carlos Manuel Villafana is here for her Subsequent Wellness visit  Last Medicare Wellness visit information reviewed, patient interviewed, no change since last AWV  Health Risk Assessment:   Patient rates overall health as good  Patient feels that their physical health rating is slightly better  Patient is satisfied with their life  Eyesight was rated as same  Hearing was rated as same  Patient feels that their emotional and mental health rating is same  Patients states they are never, rarely angry  Patient states they are sometimes unusually tired/fatigued  Pain experienced in the last 7 days has been some  Patient's pain rating has been 3/10  Patient states that she has experienced no weight loss or gain in last 6 months  Fall Risk Screening: In the past year, patient has experienced: no history of falling in past year      Urinary Incontinence Screening:   Patient has leaked urine accidently in the last six months  Home Safety:  Patient has trouble with stairs inside or outside of their home  Patient has working smoke alarms and has working carbon monoxide detector  Home safety hazards include: none  Medications:   Patient is currently taking over-the-counter supplements  OTC medications include: see medication list  Patient is able to manage medications  Activities of Daily Living (ADLs)/Instrumental Activities of Daily Living (IADLs):   Walk and transfer into and out of bed and chair?: Yes  Dress and groom yourself?: Yes    Bathe or shower yourself?: Yes    Feed yourself?  Yes  Do your laundry/housekeeping?: Yes  Manage your money, pay your bills and track your expenses?: Yes  Make your own meals?: Yes    Do your own shopping?: Yes    Previous Hospitalizations:   Any hospitalizations or ED visits within the last 12 months?: Yes    How many hospitalizations have you had in the last year?: 1-2    Advance Care Planning:   Living will: Yes    Durable POA for healthcare: Yes    Advanced directive: Yes    End of Life Decisions reviewed with patient: Yes    Provider agrees with end of life decisions: Yes      Cognitive Screening:   Provider or family/friend/caregiver concerned regarding cognition?: No    PREVENTIVE SCREENINGS      Cardiovascular Screening:    General: History Lipid Disorder and Risks and Benefits Discussed    Due for: Lipid Panel      Diabetes Screening:     General: Screening Current      Colorectal Cancer Screening:     General: Screening Not Indicated      Breast Cancer Screening:     General: Patient Declines      Cervical Cancer Screening:    General: Screening Not Indicated      Osteoporosis Screening:    General: Patient Declines      Abdominal Aortic Aneurysm (AAA) Screening:        General: Screening Not Indicated      Lung Cancer Screening:     General: Screening Not Indicated      Hepatitis C Screening:    General: Screening Current    Screening, Brief Intervention, and Referral to Treatment (SBIRT)    Screening  Typical number of drinks in a day: 0  Typical number of drinks in a week: 0  Interpretation: Low risk drinking behavior      AUDIT-C Screenin) How often did you have a drink containing alcohol in the past year? never  2) How many drinks did you have on a typical day when you were drinking in the past year? 0  3) How often did you have 6 or more drinks on one occasion in the past year? never    AUDIT-C Score: 0  Interpretation: Score 0-2 (female): Negative screen for alcohol misuse    Single Item Drug Screening:  How often have you used an illegal drug (including marijuana) or a prescription medication for non-medical reasons in the past year? never    Single Item Drug Screen Score: 0  Interpretation: Negative screen for possible drug use disorder    Brief Intervention  Alcohol & drug use screenings were reviewed  No concerns regarding substance use disorder identified  Other Counseling Topics:   Calcium and vitamin D intake  No results found  Physical Exam:     /78   Pulse 76   Temp (!) 97 2 °F (36 2 °C) (Temporal)   Resp 18   Ht 5' 7" (1 702 m)   Wt 79 4 kg (175 lb)   BMI 27 41 kg/m²     Physical Exam  Vitals reviewed  Constitutional:       General: She is not in acute distress  Appearance: Normal appearance  She is not ill-appearing, toxic-appearing or diaphoretic  HENT:      Head: Normocephalic and atraumatic  Right Ear: External ear normal       Left Ear: External ear normal       Nose: Nose normal       Mouth/Throat:      Mouth: Mucous membranes are dry  Eyes:      General: No scleral icterus  Extraocular Movements: Extraocular movements intact  Conjunctiva/sclera: Conjunctivae normal       Pupils: Pupils are equal, round, and reactive to light  Cardiovascular:      Rate and Rhythm: Normal rate and regular rhythm  Pulmonary:      Effort: Pulmonary effort is normal  No respiratory distress  Breath sounds: Normal breath sounds  No wheezing  Abdominal:      General: Bowel sounds are normal  There is no distension  Palpations: Abdomen is soft  Tenderness: There is no abdominal tenderness  Musculoskeletal:      Cervical back: Normal range of motion and neck supple  Right lower leg: No edema  Left lower leg: No edema  Lymphadenopathy:      Cervical: No cervical adenopathy  Skin:     General: Skin is dry  Coloration: Skin is not jaundiced or pale  Neurological:      General: No focal deficit present  Mental Status: She is alert and oriented to person, place, and time   Mental status is at baseline  Psychiatric:         Mood and Affect: Mood normal          Behavior: Behavior normal          Thought Content:  Thought content normal          Judgment: Judgment normal           Susan Paris DO

## 2023-02-15 ENCOUNTER — APPOINTMENT (OUTPATIENT)
Dept: LAB | Facility: HOSPITAL | Age: 83
End: 2023-02-15
Attending: INTERNAL MEDICINE

## 2023-02-15 DIAGNOSIS — E78.2 MIXED HYPERLIPIDEMIA: ICD-10-CM

## 2023-02-15 DIAGNOSIS — I35.0 MILD AORTIC STENOSIS: ICD-10-CM

## 2023-02-15 LAB
ALBUMIN SERPL BCP-MCNC: 4.1 G/DL (ref 3.5–5)
ALP SERPL-CCNC: 65 U/L (ref 34–104)
ALT SERPL W P-5'-P-CCNC: 16 U/L (ref 7–52)
ANION GAP SERPL CALCULATED.3IONS-SCNC: 11 MMOL/L (ref 4–13)
AST SERPL W P-5'-P-CCNC: 18 U/L (ref 13–39)
BILIRUB SERPL-MCNC: 0.35 MG/DL (ref 0.2–1)
BUN SERPL-MCNC: 29 MG/DL (ref 5–25)
CALCIUM SERPL-MCNC: 9.7 MG/DL (ref 8.4–10.2)
CHLORIDE SERPL-SCNC: 106 MMOL/L (ref 96–108)
CHOLEST SERPL-MCNC: 194 MG/DL
CO2 SERPL-SCNC: 22 MMOL/L (ref 21–32)
CREAT SERPL-MCNC: 0.87 MG/DL (ref 0.6–1.3)
ERYTHROCYTE [DISTWIDTH] IN BLOOD BY AUTOMATED COUNT: 13.2 % (ref 11.6–15.1)
GFR SERPL CREATININE-BSD FRML MDRD: 61 ML/MIN/1.73SQ M
GLUCOSE P FAST SERPL-MCNC: 115 MG/DL (ref 65–99)
HCT VFR BLD AUTO: 38.7 % (ref 34.8–46.1)
HDLC SERPL-MCNC: 41 MG/DL
HGB BLD-MCNC: 12.7 G/DL (ref 11.5–15.4)
LDLC SERPL CALC-MCNC: 82 MG/DL (ref 0–100)
MCH RBC QN AUTO: 32.2 PG (ref 26.8–34.3)
MCHC RBC AUTO-ENTMCNC: 32.8 G/DL (ref 31.4–37.4)
MCV RBC AUTO: 98 FL (ref 82–98)
NONHDLC SERPL-MCNC: 153 MG/DL
PLATELET # BLD AUTO: 207 THOUSANDS/UL (ref 149–390)
PMV BLD AUTO: 10.5 FL (ref 8.9–12.7)
POTASSIUM SERPL-SCNC: 4 MMOL/L (ref 3.5–5.3)
PROT SERPL-MCNC: 7.3 G/DL (ref 6.4–8.4)
RBC # BLD AUTO: 3.94 MILLION/UL (ref 3.81–5.12)
SODIUM SERPL-SCNC: 139 MMOL/L (ref 135–147)
TRIGL SERPL-MCNC: 353 MG/DL
WBC # BLD AUTO: 4.38 THOUSAND/UL (ref 4.31–10.16)

## 2023-02-18 ENCOUNTER — NURSE TRIAGE (OUTPATIENT)
Dept: OTHER | Facility: OTHER | Age: 83
End: 2023-02-18

## 2023-02-18 DIAGNOSIS — R05.9 COUGH: ICD-10-CM

## 2023-02-19 ENCOUNTER — NURSE TRIAGE (OUTPATIENT)
Dept: OTHER | Facility: OTHER | Age: 83
End: 2023-02-19

## 2023-02-19 RX ORDER — BENZONATATE 100 MG/1
CAPSULE ORAL
Qty: 90 CAPSULE | Refills: 3 | Status: SHIPPED | OUTPATIENT
Start: 2023-02-19

## 2023-02-19 NOTE — TELEPHONE ENCOUNTER
Reason for Disposition  • Patient sounds very upset or troubled to the triager    Answer Assessment - Initial Assessment Questions  CONCERN: "What happened that made you call today?"      Patients son  she cannot control her anxiety   ANXIETY SYMPTOM SCREENING: "Can you describe how you have been feeling?"  (e g , tense, restless, panicky, anxious, keyed up, trouble sleeping, trouble concentrating)      Crying, anxious  ONSET: "How long have you been feeling this way?"      Started today after finding out her son    SUPPORT: "Who is with you now?" "Who do you live with?" "Do you have family or friends who you can talk to?"     Has support of family at her house at the moment, denies SI    Protocols used: ANXIETY AND PANIC ATTACK-ADULT-

## 2023-02-19 NOTE — TELEPHONE ENCOUNTER
Regarding: Just found out that my son had passed, wondering if something can be order to calm me down  ----- Message from Taylor Kim sent at 2/18/2023  7:59 PM EST -----  '' I just found out that my son had passed I was wondering if something can be order to calm me down ''

## 2023-02-19 NOTE — TELEPHONE ENCOUNTER
Reason for Disposition  • Patient sounds very sick or weak to the triager    Answer Assessment - Initial Assessment Questions  1  CONCERN: "What happened that made you call today?"      Anxiety     2  ANXIETY SYMPTOM SCREENING: "Can you describe how you have been feeling?"  (e g , tense, restless, panicky, anxious, keyed up, trouble sleeping, trouble concentrating)      Trouble sleeping, anxious, worrying    3  ONSET: "How long have you been feeling this way?"  2/18        4  RECURRENT: "Have you felt this way before?"  If Yes, ask: "What happened that time?" "What helped these feelings go away in the past?"       Hx of depression     5  RISK OF HARM - SUICIDAL IDEATION:  "Do you ever have thoughts of hurting or killing yourself?"  (e g , yes, no, no but preoccupation with thoughts about death)    - INTENT:  "Do you have thoughts of hurting or killing yourself right NOW?" (e g , yes, no, N/A)    - PLAN: "Do you have a specific plan for how you would do this?" (e g , gun, knife, overdose, no plan, N/A)      Denies     6  RISK OF HARM - HOMICIDAL IDEATION:  "Do you ever have thoughts of hurting or killing someone else?"  (e g , yes, no, no but preoccupation with thoughts about death)    - INTENT:  "Do you have thoughts of hurting or killing someone right NOW?" (e g , yes, no, N/A)    - PLAN: "Do you have a specific plan for how you would do this?" (e g , gun, knife, no plan, N/A)       Denies     7  FUNCTIONAL IMPAIRMENT: "How have things been going for you overall? Have you had more difficulty than usual doing your normal daily activities?"  (e g , better, same, worse; self-care, school, work, interactions)      Can't eat, uncontrollable crying     8  SUPPORT: "Who is with you now?" "Who do you live with?" "Do you have family or friends who you can talk to?"       Lives alone, has friends and family coming off and on for support    9  THERAPIST: "Do you have a counselor or therapist? Name?"      Denies     10  STRESSORS: "Has there been any new stress or recent changes in your life?"        Son passed away    6  CAFFEINE USE: "Do you drink caffeinated beverages, and how much each day?" (e g , coffee, tea, kelly)        Coffee- 2 cups     12  ALCOHOL USE OR SUBSTANCE USE (DRUG USE): "Do you drink alcohol or use any illegal drugs?"        Denies      13   OTHER SYMPTOMS: "Do you have any other physical symptoms right now?" (e g , chest pain, palpitations, difficulty breathing, fever)   Pt states she feels some "chest tightness" from anxiety, denies chest pain or SOB    Protocols used: ANXIETY AND PANIC ATTACK-ADULT-

## 2023-02-19 NOTE — TELEPHONE ENCOUNTER
Regarding: Anxiety, crying uncontrolably  ----- Message from Prince Eisenberg sent at 2/19/2023  1:24 PM EST -----  " My son passed away and I need to speak to Dr Gilmar West because I am having a lot of anxiety and I need to have something sent to the pharmacy for me as I can't stop crying "

## 2023-02-19 NOTE — TELEPHONE ENCOUNTER
Pt calling because her son unexpectedly passed away yesterday and is experiencing anxiety, uncontrollable crying, no appetite and chest tightness related to her anxiety  Denies chest pain or SOB  Requesting something else to help with these symptoms  On call provider contacted  Per Dr Balaji Resendez- recommended pt seek care in ER for worsening symptoms  Recommendation given to pt, pt declines ER dispo and is asking if she can speak with her provider  Reached out to Dr Balaji Resendez again, who reiterated that she still feels the pt should be evaluated and recommended [facility] walk in [place] for mental health  Recommendation given to pt and declines eval at [facility]  Pt states she talked to her pharmacist who recommends she takes 3 xanax a day and increase her Paxil for 1 month to help manage anxiety and she would like to discuss this recommendation with office staff

## 2023-02-20 ENCOUNTER — TELEPHONE (OUTPATIENT)
Dept: FAMILY MEDICINE CLINIC | Facility: CLINIC | Age: 83
End: 2023-02-20

## 2023-02-20 DIAGNOSIS — F41.9 ANXIETY: Primary | ICD-10-CM

## 2023-02-20 RX ORDER — ALPRAZOLAM 0.25 MG/1
0.25 TABLET ORAL 3 TIMES DAILY PRN
Qty: 90 TABLET | Refills: 0 | Status: SHIPPED | OUTPATIENT
Start: 2023-02-20

## 2023-02-20 NOTE — TELEPHONE ENCOUNTER
Rx sent Would recommend/encourage pt to setup appt with Jiar De Luna the psychologist who sees pts now in [place]

## 2023-02-20 NOTE — TELEPHONE ENCOUNTER
Spoke with health call twice over the weekend Pt was referred to seek help thru the ER or [facility] 24 hours behavioral health eval

## 2023-02-20 NOTE — TELEPHONE ENCOUNTER
Pt son  suddenly on Friday  Pt is really upset and is asking if you could increase her Xanax to 3 times a day  He is 500 miles away and she is waiting to find out what exactly may have happened to him  She states "I am very emotional and anxious, I promise I won't OD on them, I just may need a bit more to help me through this "     If ok, please send to [facility]

## 2023-02-21 ENCOUNTER — TELEPHONE (OUTPATIENT)
Dept: FAMILY MEDICINE CLINIC | Facility: CLINIC | Age: 83
End: 2023-02-21

## 2023-02-21 DIAGNOSIS — F32.9 REACTIVE DEPRESSION: ICD-10-CM

## 2023-02-21 DIAGNOSIS — F41.9 ANXIETY: Primary | ICD-10-CM

## 2023-02-21 NOTE — TELEPHONE ENCOUNTER
Patient's son  and the  [place] in [place] and she does not think she is able to make the  due to 10 hr drive and woke up this morning and could hardly walk  Wants confirmation from you that she shouldn't go so she can tell her family

## 2023-02-21 NOTE — TELEPHONE ENCOUNTER
She should not travel especially on her own If someone was to take her she could consider but feel it is probably best she stay home

## 2023-02-24 DIAGNOSIS — K58.2 IRRITABLE BOWEL SYNDROME WITH BOTH CONSTIPATION AND DIARRHEA: ICD-10-CM

## 2023-02-24 DIAGNOSIS — K57.90 DIVERTICULOSIS: ICD-10-CM

## 2023-02-24 RX ORDER — DICYCLOMINE HYDROCHLORIDE 10 MG/1
10 CAPSULE ORAL 3 TIMES DAILY PRN
Qty: 270 CAPSULE | Refills: 1 | Status: SHIPPED | OUTPATIENT
Start: 2023-02-24 | End: 2023-03-26

## 2023-03-20 DIAGNOSIS — F41.9 ANXIETY: ICD-10-CM

## 2023-03-20 RX ORDER — ALPRAZOLAM 0.25 MG/1
0.25 TABLET ORAL 3 TIMES DAILY PRN
Qty: 90 TABLET | Refills: 0 | Status: SHIPPED | OUTPATIENT
Start: 2023-03-20 | End: 2023-03-31 | Stop reason: SDUPTHER

## 2023-03-31 ENCOUNTER — TELEPHONE (OUTPATIENT)
Dept: FAMILY MEDICINE CLINIC | Facility: CLINIC | Age: 83
End: 2023-03-31

## 2023-03-31 DIAGNOSIS — F41.9 ANXIETY: ICD-10-CM

## 2023-03-31 RX ORDER — ALPRAZOLAM 0.5 MG/1
0.25 TABLET ORAL 3 TIMES DAILY PRN
Qty: 25 TABLET | Refills: 0 | Status: SHIPPED | OUTPATIENT
Start: 2023-03-31

## 2023-03-31 NOTE — TELEPHONE ENCOUNTER
Alprazolam 0 25 mg on Back Order    Asking if Dr would be willing to Rx a 0 5 mg with directions of 1/2 tab TID - Plenty of higher dose in stock, to get around the shortage  Pharm did a partial fill of 40 tablets of 0 25mg - No more available -- Pt is due 50 more doses    Rx refill sent to Dr with the new dosing & sig

## 2023-05-18 DIAGNOSIS — F41.9 ANXIETY: ICD-10-CM

## 2023-05-18 RX ORDER — ALPRAZOLAM 0.25 MG/1
0.25 TABLET ORAL 3 TIMES DAILY PRN
Qty: 90 TABLET | Refills: 0 | Status: SHIPPED | OUTPATIENT
Start: 2023-05-18

## 2023-06-20 DIAGNOSIS — F41.9 ANXIETY: ICD-10-CM

## 2023-06-20 RX ORDER — ALPRAZOLAM 0.25 MG/1
0.25 TABLET ORAL 3 TIMES DAILY PRN
Qty: 90 TABLET | Refills: 0 | Status: SHIPPED | OUTPATIENT
Start: 2023-06-20

## 2023-06-26 ENCOUNTER — TELEPHONE (OUTPATIENT)
Dept: FAMILY MEDICINE CLINIC | Facility: CLINIC | Age: 83
End: 2023-06-26

## 2023-06-27 ENCOUNTER — TELEPHONE (OUTPATIENT)
Dept: FAMILY MEDICINE CLINIC | Facility: CLINIC | Age: 83
End: 2023-06-27

## 2023-07-12 ENCOUNTER — APPOINTMENT (EMERGENCY)
Dept: RADIOLOGY | Facility: HOSPITAL | Age: 83
DRG: 552 | End: 2023-07-12
Payer: MEDICARE

## 2023-07-12 ENCOUNTER — APPOINTMENT (EMERGENCY)
Dept: CT IMAGING | Facility: HOSPITAL | Age: 83
DRG: 552 | End: 2023-07-12
Payer: MEDICARE

## 2023-07-12 ENCOUNTER — APPOINTMENT (EMERGENCY)
Dept: NON INVASIVE DIAGNOSTICS | Facility: HOSPITAL | Age: 83
DRG: 552 | End: 2023-07-12
Payer: MEDICARE

## 2023-07-12 ENCOUNTER — HOSPITAL ENCOUNTER (INPATIENT)
Facility: HOSPITAL | Age: 83
LOS: 3 days | Discharge: HOME HEALTH | DRG: 552 | End: 2023-07-15
Attending: EMERGENCY MEDICINE | Admitting: FAMILY MEDICINE
Payer: MEDICARE

## 2023-07-12 DIAGNOSIS — E83.42 HYPOMAGNESEMIA: ICD-10-CM

## 2023-07-12 DIAGNOSIS — M25.551 RIGHT HIP PAIN: Primary | ICD-10-CM

## 2023-07-12 LAB
ALBUMIN SERPL BCP-MCNC: 4.1 G/DL (ref 3.5–5)
ALP SERPL-CCNC: 69 U/L (ref 34–104)
ALT SERPL W P-5'-P-CCNC: 13 U/L (ref 7–52)
ANION GAP SERPL CALCULATED.3IONS-SCNC: 9 MMOL/L
AST SERPL W P-5'-P-CCNC: 17 U/L (ref 13–39)
BACTERIA UR QL AUTO: ABNORMAL /HPF
BASOPHILS # BLD AUTO: 0.04 THOUSANDS/ÂΜL (ref 0–0.1)
BASOPHILS NFR BLD AUTO: 1 % (ref 0–1)
BILIRUB SERPL-MCNC: 0.25 MG/DL (ref 0.2–1)
BILIRUB UR QL STRIP: NEGATIVE
BUN SERPL-MCNC: 30 MG/DL (ref 5–25)
CALCIUM SERPL-MCNC: 9.6 MG/DL (ref 8.4–10.2)
CHLORIDE SERPL-SCNC: 104 MMOL/L (ref 96–108)
CK SERPL-CCNC: 56 U/L (ref 26–192)
CLARITY UR: CLEAR
CO2 SERPL-SCNC: 25 MMOL/L (ref 21–32)
COLOR UR: YELLOW
CREAT SERPL-MCNC: 1.04 MG/DL (ref 0.6–1.3)
EOSINOPHIL # BLD AUTO: 0.29 THOUSAND/ÂΜL (ref 0–0.61)
EOSINOPHIL NFR BLD AUTO: 8 % (ref 0–6)
ERYTHROCYTE [DISTWIDTH] IN BLOOD BY AUTOMATED COUNT: 13.4 % (ref 11.6–15.1)
GFR SERPL CREATININE-BSD FRML MDRD: 49 ML/MIN/1.73SQ M
GLUCOSE SERPL-MCNC: 84 MG/DL (ref 65–140)
GLUCOSE UR STRIP-MCNC: NEGATIVE MG/DL
HCT VFR BLD AUTO: 39.4 % (ref 34.8–46.1)
HGB BLD-MCNC: 12.9 G/DL (ref 11.5–15.4)
HGB UR QL STRIP.AUTO: NEGATIVE
IMM GRANULOCYTES # BLD AUTO: 0.02 THOUSAND/UL (ref 0–0.2)
IMM GRANULOCYTES NFR BLD AUTO: 1 % (ref 0–2)
KETONES UR STRIP-MCNC: NEGATIVE MG/DL
LEUKOCYTE ESTERASE UR QL STRIP: NEGATIVE
LYMPHOCYTES # BLD AUTO: 1.1 THOUSANDS/ÂΜL (ref 0.6–4.47)
LYMPHOCYTES NFR BLD AUTO: 30 % (ref 14–44)
MAGNESIUM SERPL-MCNC: 1.7 MG/DL (ref 1.9–2.7)
MCH RBC QN AUTO: 32.4 PG (ref 26.8–34.3)
MCHC RBC AUTO-ENTMCNC: 32.7 G/DL (ref 31.4–37.4)
MCV RBC AUTO: 99 FL (ref 82–98)
MONOCYTES # BLD AUTO: 0.47 THOUSAND/ÂΜL (ref 0.17–1.22)
MONOCYTES NFR BLD AUTO: 13 % (ref 4–12)
NEUTROPHILS # BLD AUTO: 1.74 THOUSANDS/ÂΜL (ref 1.85–7.62)
NEUTS SEG NFR BLD AUTO: 47 % (ref 43–75)
NITRITE UR QL STRIP: POSITIVE
NON-SQ EPI CELLS URNS QL MICRO: ABNORMAL /HPF
NRBC BLD AUTO-RTO: 0 /100 WBCS
OTHER STN SPEC: ABNORMAL
PH UR STRIP.AUTO: 5 [PH]
PLATELET # BLD AUTO: 199 THOUSANDS/UL (ref 149–390)
PLATELET # BLD AUTO: 205 THOUSANDS/UL (ref 149–390)
PMV BLD AUTO: 10.1 FL (ref 8.9–12.7)
PMV BLD AUTO: 10.2 FL (ref 8.9–12.7)
POTASSIUM SERPL-SCNC: 3.4 MMOL/L (ref 3.5–5.3)
PROT SERPL-MCNC: 6.9 G/DL (ref 6.4–8.4)
PROT UR STRIP-MCNC: NEGATIVE MG/DL
RBC # BLD AUTO: 3.98 MILLION/UL (ref 3.81–5.12)
RBC #/AREA URNS AUTO: ABNORMAL /HPF
SARS-COV-2 RNA RESP QL NAA+PROBE: NEGATIVE
SODIUM SERPL-SCNC: 138 MMOL/L (ref 135–147)
SP GR UR STRIP.AUTO: 1.02 (ref 1–1.03)
UROBILINOGEN UR QL STRIP.AUTO: 0.2 E.U./DL
WBC # BLD AUTO: 3.66 THOUSAND/UL (ref 4.31–10.16)
WBC #/AREA URNS AUTO: ABNORMAL /HPF

## 2023-07-12 PROCEDURE — 97530 THERAPEUTIC ACTIVITIES: CPT

## 2023-07-12 PROCEDURE — G1004 CDSM NDSC: HCPCS

## 2023-07-12 PROCEDURE — 73521 X-RAY EXAM HIPS BI 2 VIEWS: CPT

## 2023-07-12 PROCEDURE — 81001 URINALYSIS AUTO W/SCOPE: CPT | Performed by: FAMILY MEDICINE

## 2023-07-12 PROCEDURE — 97163 PT EVAL HIGH COMPLEX 45 MIN: CPT

## 2023-07-12 PROCEDURE — 83735 ASSAY OF MAGNESIUM: CPT | Performed by: EMERGENCY MEDICINE

## 2023-07-12 PROCEDURE — 99285 EMERGENCY DEPT VISIT HI MDM: CPT | Performed by: EMERGENCY MEDICINE

## 2023-07-12 PROCEDURE — 87635 SARS-COV-2 COVID-19 AMP PRB: CPT | Performed by: FAMILY MEDICINE

## 2023-07-12 PROCEDURE — 97167 OT EVAL HIGH COMPLEX 60 MIN: CPT

## 2023-07-12 PROCEDURE — 72131 CT LUMBAR SPINE W/O DYE: CPT

## 2023-07-12 PROCEDURE — 96375 TX/PRO/DX INJ NEW DRUG ADDON: CPT

## 2023-07-12 PROCEDURE — 82550 ASSAY OF CK (CPK): CPT | Performed by: EMERGENCY MEDICINE

## 2023-07-12 PROCEDURE — 72192 CT PELVIS W/O DYE: CPT

## 2023-07-12 PROCEDURE — 80053 COMPREHEN METABOLIC PANEL: CPT | Performed by: EMERGENCY MEDICINE

## 2023-07-12 PROCEDURE — 73552 X-RAY EXAM OF FEMUR 2/>: CPT

## 2023-07-12 PROCEDURE — 99284 EMERGENCY DEPT VISIT MOD MDM: CPT

## 2023-07-12 PROCEDURE — 85025 COMPLETE CBC W/AUTO DIFF WBC: CPT | Performed by: EMERGENCY MEDICINE

## 2023-07-12 PROCEDURE — 99223 1ST HOSP IP/OBS HIGH 75: CPT | Performed by: FAMILY MEDICINE

## 2023-07-12 PROCEDURE — 85049 AUTOMATED PLATELET COUNT: CPT | Performed by: FAMILY MEDICINE

## 2023-07-12 PROCEDURE — 96374 THER/PROPH/DIAG INJ IV PUSH: CPT

## 2023-07-12 PROCEDURE — 36415 COLL VENOUS BLD VENIPUNCTURE: CPT | Performed by: EMERGENCY MEDICINE

## 2023-07-12 PROCEDURE — 73564 X-RAY EXAM KNEE 4 OR MORE: CPT

## 2023-07-12 PROCEDURE — 93971 EXTREMITY STUDY: CPT | Performed by: SURGERY

## 2023-07-12 PROCEDURE — 93971 EXTREMITY STUDY: CPT

## 2023-07-12 RX ORDER — LISINOPRIL 10 MG/1
10 TABLET ORAL 2 TIMES DAILY
Status: DISCONTINUED | OUTPATIENT
Start: 2023-07-12 | End: 2023-07-15 | Stop reason: HOSPADM

## 2023-07-12 RX ORDER — LEVOTHYROXINE SODIUM 0.1 MG/1
100 TABLET ORAL DAILY
Status: DISCONTINUED | OUTPATIENT
Start: 2023-07-13 | End: 2023-07-15 | Stop reason: HOSPADM

## 2023-07-12 RX ORDER — HYDROMORPHONE HCL/PF 1 MG/ML
0.5 SYRINGE (ML) INJECTION EVERY 4 HOURS PRN
Status: DISCONTINUED | OUTPATIENT
Start: 2023-07-12 | End: 2023-07-12

## 2023-07-12 RX ORDER — PRAVASTATIN SODIUM 40 MG
40 TABLET ORAL
Status: DISCONTINUED | OUTPATIENT
Start: 2023-07-12 | End: 2023-07-15 | Stop reason: HOSPADM

## 2023-07-12 RX ORDER — METHYLPREDNISOLONE SODIUM SUCCINATE 125 MG/2ML
60 INJECTION, POWDER, LYOPHILIZED, FOR SOLUTION INTRAMUSCULAR; INTRAVENOUS ONCE
Status: COMPLETED | OUTPATIENT
Start: 2023-07-12 | End: 2023-07-12

## 2023-07-12 RX ORDER — ENOXAPARIN SODIUM 100 MG/ML
40 INJECTION SUBCUTANEOUS DAILY
Status: DISCONTINUED | OUTPATIENT
Start: 2023-07-12 | End: 2023-07-12

## 2023-07-12 RX ORDER — FENTANYL CITRATE 50 UG/ML
25 INJECTION, SOLUTION INTRAMUSCULAR; INTRAVENOUS EVERY 4 HOURS PRN
Status: DISCONTINUED | OUTPATIENT
Start: 2023-07-12 | End: 2023-07-12

## 2023-07-12 RX ORDER — PANTOPRAZOLE SODIUM 40 MG/1
40 TABLET, DELAYED RELEASE ORAL DAILY
Status: DISCONTINUED | OUTPATIENT
Start: 2023-07-13 | End: 2023-07-15 | Stop reason: HOSPADM

## 2023-07-12 RX ORDER — BUDESONIDE AND FORMOTEROL FUMARATE DIHYDRATE 160; 4.5 UG/1; UG/1
2 AEROSOL RESPIRATORY (INHALATION) 2 TIMES DAILY
Status: DISCONTINUED | OUTPATIENT
Start: 2023-07-12 | End: 2023-07-15 | Stop reason: HOSPADM

## 2023-07-12 RX ORDER — OXYCODONE HYDROCHLORIDE 5 MG/1
5 TABLET ORAL ONCE
Status: DISCONTINUED | OUTPATIENT
Start: 2023-07-12 | End: 2023-07-12

## 2023-07-12 RX ORDER — CYCLOBENZAPRINE HCL 10 MG
10 TABLET ORAL 3 TIMES DAILY PRN
Status: DISCONTINUED | OUTPATIENT
Start: 2023-07-12 | End: 2023-07-14

## 2023-07-12 RX ORDER — ALPRAZOLAM 0.25 MG/1
0.25 TABLET ORAL 3 TIMES DAILY PRN
Status: DISCONTINUED | OUTPATIENT
Start: 2023-07-12 | End: 2023-07-14

## 2023-07-12 RX ORDER — FENTANYL CITRATE 50 UG/ML
25 INJECTION, SOLUTION INTRAMUSCULAR; INTRAVENOUS ONCE
Status: COMPLETED | OUTPATIENT
Start: 2023-07-12 | End: 2023-07-12

## 2023-07-12 RX ORDER — OXYCODONE HYDROCHLORIDE AND ACETAMINOPHEN 5; 325 MG/1; MG/1
1 TABLET ORAL EVERY 6 HOURS PRN
Status: DISCONTINUED | OUTPATIENT
Start: 2023-07-12 | End: 2023-07-14

## 2023-07-12 RX ORDER — HYDROMORPHONE HCL/PF 1 MG/ML
0.5 SYRINGE (ML) INJECTION ONCE
Status: COMPLETED | OUTPATIENT
Start: 2023-07-12 | End: 2023-07-12

## 2023-07-12 RX ORDER — LANOLIN ALCOHOL/MO/W.PET/CERES
800 CREAM (GRAM) TOPICAL 2 TIMES DAILY
Status: DISCONTINUED | OUTPATIENT
Start: 2023-07-12 | End: 2023-07-15 | Stop reason: HOSPADM

## 2023-07-12 RX ORDER — FENTANYL CITRATE 50 UG/ML
25 INJECTION, SOLUTION INTRAMUSCULAR; INTRAVENOUS EVERY 4 HOURS PRN
Status: DISCONTINUED | OUTPATIENT
Start: 2023-07-12 | End: 2023-07-14

## 2023-07-12 RX ADMIN — ALPRAZOLAM 0.25 MG: 0.25 TABLET ORAL at 22:33

## 2023-07-12 RX ADMIN — LISINOPRIL 10 MG: 10 TABLET ORAL at 17:23

## 2023-07-12 RX ADMIN — HYDROMORPHONE HYDROCHLORIDE 0.5 MG: 1 INJECTION, SOLUTION INTRAMUSCULAR; INTRAVENOUS; SUBCUTANEOUS at 12:32

## 2023-07-12 RX ADMIN — BUDESONIDE AND FORMOTEROL FUMARATE DIHYDRATE 2 PUFF: 160; 4.5 AEROSOL RESPIRATORY (INHALATION) at 17:27

## 2023-07-12 RX ADMIN — FENTANYL CITRATE 25 MCG: 50 INJECTION, SOLUTION INTRAMUSCULAR; INTRAVENOUS at 09:12

## 2023-07-12 RX ADMIN — FENTANYL CITRATE 25 MCG: 50 INJECTION, SOLUTION INTRAMUSCULAR; INTRAVENOUS at 21:03

## 2023-07-12 RX ADMIN — PAROXETINE 30 MG: 20 TABLET, FILM COATED ORAL at 17:23

## 2023-07-12 RX ADMIN — METHYLPREDNISOLONE SODIUM SUCCINATE 60 MG: 125 INJECTION, POWDER, FOR SOLUTION INTRAMUSCULAR; INTRAVENOUS at 15:58

## 2023-07-12 RX ADMIN — METOPROLOL TARTRATE 25 MG: 25 TABLET, FILM COATED ORAL at 17:23

## 2023-07-12 RX ADMIN — Medication 800 MG: at 17:23

## 2023-07-12 RX ADMIN — PRAVASTATIN SODIUM 40 MG: 40 TABLET ORAL at 17:23

## 2023-07-12 RX ADMIN — FENTANYL CITRATE 25 MCG: 50 INJECTION, SOLUTION INTRAMUSCULAR; INTRAVENOUS at 15:56

## 2023-07-12 RX ADMIN — Medication 800 MG: at 09:47

## 2023-07-12 NOTE — PLAN OF CARE
Problem: OCCUPATIONAL THERAPY ADULT  Goal: Performs self-care activities at highest level of function for planned discharge setting. See evaluation for individualized goals. Description: Treatment Interventions: ADL retraining, Functional transfer training, UE strengthening/ROM, Endurance training, Patient/family training, Equipment evaluation/education, Compensatory technique education, Activityengagement          See flowsheet documentation for full assessment, interventions and recommendations. Note: Limitation: Decreased ADL status, Decreased UE strength, Decreased Safe judgement during ADL, Decreased endurance, Decreased self-care trans, Decreased high-level ADLs     Assessment: Pt is a 78 y.o. female seen for OT evaluation s/p admit to [facility] on 7/12/2023 w/ <principal problem not specified>. Comorbidities affecting pt's functional performance at time of assessment include: HTN, HLD, disease of thyroid gland, GERD, IBS, COPD, von willebrand disease. Personal factors affecting pt at time of IE include:limited home support, difficulty performing ADLS, difficulty performing IADLS , limited insight into deficits, decreased initiation and engagement  and health management . Prior to admission, pt was (I) with ADLs and IADLs with use of no device during mobility. Upon evaluation: Pt requires (S)-max (A) with use of RW during sidestepping tasks 2* the following deficits impacting occupational performance: weakness, decreased strength, decreased balance, decreased tolerance, impaired initiation, impaired problem solving, decreased safety awareness, increased pain and impaired interpersonal skills. Pt to benefit from continued skilled OT tx while in the hospital to address deficits as defined above and maximize level of functional independence w ADL's and functional mobility.  Occupational Performance areas to address include: grooming, bathing/shower, toilet hygiene, dressing, functional mobility, community mobility and clothing management. The patient's raw score on the AM-PAC Daily Activity Inpatient Short Form is 18. A raw score of less than 19 suggests the patient may benefit from discharge to post-acute rehabilitation services. Please refer to the recommendation of the Occupational Therapist for safe discharge planning. Pt benefited from co-evaluation of skilled OT and PT therapists in order to most appropriately address functional deficits d/t extensive assistance required for safe functional mobility, decreased activity tolerance, and regression from functioning level prior to admission and/or onset of present illness. OT/PT objectives were addressed separately; please see PT note for specific goal areas targeted.      OT Discharge Recommendation: Post acute rehabilitation services

## 2023-07-12 NOTE — H&P
H&P Exam - Thor Antonio 78 y.o. female MRN: [id number]    Unit/Bed#: RM07 Encounter: [id number]    Right hip pain  Assessment & Plan  CT imaging along with Venous duplex do not show an acute fracture or DVT  Likely due to osteoarthritis and Froaminal narrowing noted on CT  Will provide IV solumedrol 60mg x 1 then PO prednisone  Pain control / muscle relaxants / PT/OT eval along with case mgmt for possible STR     Stage 3a chronic kidney disease [facility])  Assessment & Plan  Lab Results   Component Value Date    EGFR 49 07/12/2023    EGFR 61 02/15/2023    EGFR 46 04/08/2022    CREATININE 1.04 07/12/2023    CREATININE 0.87 02/15/2023    CREATININE 1.09 04/08/2022     Baseline creatinine is around 1    Depression, recurrent (HCC)  Assessment & Plan  paxil 30mg daily and PRN Xanax    Von Willebrand disease ([address])  Assessment & Plan  Will hold off on DVT prophylaxis and utilize SCD     Hypothyroidism  Assessment & Plan  Synthroid 100mcg daily     Primary hypertension  Assessment & Plan  Lisinopril 10 mg daily  Lopressor 25mg  Hold HCTZ    Other hyperlipidemia  Assessment & Plan  Statin therapy        History of Present Illness     17-year-old female with a past medical history significant for von Willebrand's disease, and osteoarthritis presents with a chief complaint of right hip pain. Patient states she initially began having worsening pain on Monday, but was able to tolerate it. Tuesday she felt some improvement, but today she was unable to get out of bed and ambulate due to severe pain in the right hip. She denies any fevers or chills, no falls or dizziness. Review of Systems   Cardiovascular: Negative for leg swelling. Musculoskeletal: Positive for arthralgias, back pain and gait problem. Negative for joint swelling, myalgias, neck pain and neck stiffness. All other systems reviewed and are negative.       Historical Information   Past Medical History:   Diagnosis Date   • Contact dermatitis     Last assessed - 8/8/12   • COPD (chronic obstructive pulmonary disease) (HCC)    • Disease of thyroid gland    • GERD (gastroesophageal reflux disease)    • Hyperlipidemia    • Hypertension    • Irritable bowel syndrome    • Von Willebrand disease ([address])      Past Surgical History:   Procedure Laterality Date   • APPENDECTOMY     • HYSTERECTOMY      Date unk      Social History   Social History     Substance and Sexual Activity   Alcohol Use No     Social History     Substance and Sexual Activity   Drug Use No     Social History     Tobacco Use   Smoking Status Never   Smokeless Tobacco Never     E-Cigarette Use: Never User     E-Cigarette/Vaping Substances   • Nicotine No    • THC No    • CBD No    • Flavoring No    • Other No    • Unknown No        Family History: non-contributory    Meds/Allergies   all medications and allergies reviewed  Allergies   Allergen Reactions   • Contrast [Iodinated Contrast Media] Itching, Tongue Swelling and Lip Swelling   • Niacin And Related Anaphylaxis   • Other      Iv contrast dye- lips and tongue thick body itchy   • Codeine Nausea Only   • Nsaids    • Penicillins Rash       Objective   First Vitals:   Blood Pressure: 142/65 (07/12/23 0825)  Pulse: 62 (07/12/23 0825)  Temperature: 97.9 °F (36.6 °C) (07/12/23 0825)  Temp Source: Temporal (07/12/23 0825)  Respirations: 18 (07/12/23 0825)  SpO2: 96 % (07/12/23 0825)    Current Vitals:   Blood Pressure: 129/54 (07/12/23 1500)  Pulse: 62 (07/12/23 1500)  Temperature: 97.9 °F (36.6 °C) (07/12/23 0825)  Temp Source: Temporal (07/12/23 0825)  Respirations: 16 (07/12/23 1500)  SpO2: 91 % (07/12/23 1500)    No intake or output data in the 24 hours ending 07/12/23 1533    Invasive Devices     Peripheral Intravenous Line  Duration           Peripheral IV 07/12/23 Right Antecubital <1 day                Physical Exam  Constitutional:       General: She is not in acute distress. Appearance: Normal appearance. She is not ill-appearing.    HENT:      Head: Normocephalic and atraumatic. Right Ear: External ear normal.      Left Ear: External ear normal.      Nose: No congestion. Mouth/Throat:      Mouth: Mucous membranes are dry. Pharynx: No oropharyngeal exudate. Eyes:      Conjunctiva/sclera: Conjunctivae normal.      Pupils: Pupils are equal, round, and reactive to light. Cardiovascular:      Rate and Rhythm: Normal rate and regular rhythm. Pulmonary:      Effort: Pulmonary effort is normal. No respiratory distress. Breath sounds: No wheezing. Abdominal:      General: Abdomen is flat. Bowel sounds are normal. There is no distension. Tenderness: There is no abdominal tenderness. There is no guarding. Musculoskeletal:      Right lower leg: No edema. Left lower leg: No edema. Comments: Right ankle: Full range of motion  Right knee: Slight decrease in flexion, due to hip pain  Right hip: point tenderness noted overlying greater trochanter, significant decrease in range of motion on internal/external flexion     Skin:     General: Skin is warm and dry. Capillary Refill: Capillary refill takes 2 to 3 seconds. Neurological:      General: No focal deficit present. Mental Status: She is alert and oriented to person, place, and time.          Lab Results:   Lab Results   Component Value Date    WBC 3.66 (L) 07/12/2023    HGB 12.9 07/12/2023    HCT 39.4 07/12/2023    MCV 99 (H) 07/12/2023     07/12/2023     Lab Results   Component Value Date     11/23/2015    SODIUM 138 07/12/2023    K 3.4 (L) 07/12/2023     07/12/2023    CO2 25 07/12/2023    ANIONGAP 9 11/23/2015    AGAP 9 07/12/2023    BUN 30 (H) 07/12/2023    CREATININE 1.04 07/12/2023    GLUC 84 07/12/2023    GLUF 115 (H) 02/15/2023    CALCIUM 9.6 07/12/2023    AST 17 07/12/2023    ALT 13 07/12/2023    ALKPHOS 69 07/12/2023    PROT 7.5 11/23/2015    TP 6.9 07/12/2023    BILITOT 0.25 11/23/2015    TBILI 0.25 07/12/2023    EGFR 49 07/12/2023 Imaging:   CT pelvis wo contrast   Final Result      No acute findings to account for the patient's hip pain. No acute fracture. Lumbar, sacroiliac, and symphyseal degenerative changes. Workstation performed: [id number]         CT spine lumbar wo contrast   Final Result      No acute osseous abnormality of lumbar spine. Moderate-to-severe foraminal narrowing at right L3-L4. Degenerative changes, as detailed above. Additional chronic/incidental findings as detailed above. Workstation performed: [id number]         VAS lower limb venous duplex study, unilateral/limited   Final Result      XR knee 4+ views Right injury   ED Interpretation   Review x-ray of the right knee shows no acute fractures, osseous abnormalities, foreign bodies or dislocations. XR hips bilateral with ap pelvis 2 vw   ED Interpretation   No acute fracture seen on pelvic or x ray of b/l hips. XR femur 2 views RIGHT   ED Interpretation   Review x-ray of the right femur shows no acute fractures, osseous abnormalities or dislocations or foreign bodies. Patient does have some calcification of vasculature seen on x-ray imaging.           EKG, Pathology, and Other Studies: NSR    Code Status: Level 1 - Full Code  Advance Directive and Living Will:      Power of :    POLST:      Counseling / Coordination of Care:

## 2023-07-12 NOTE — ASSESSMENT & PLAN NOTE
Lab Results   Component Value Date    EGFR 49 07/12/2023    EGFR 61 02/15/2023    EGFR 46 04/08/2022    CREATININE 1.04 07/12/2023    CREATININE 0.87 02/15/2023    CREATININE 1.09 04/08/2022     Baseline creatinine is around 1 80

## 2023-07-12 NOTE — PHYSICAL THERAPY NOTE
Physical Therapy Evaluation    Patient Name: Aly Jean Baptiste    BMICQ'E Date: 7/12/2023     Problem List  Active Problems: There are no active Hospital Problems. Past Medical History  Past Medical History:   Diagnosis Date    Contact dermatitis     Last assessed - 8/8/12    COPD (chronic obstructive pulmonary disease) (HCC)     Disease of thyroid gland     GERD (gastroesophageal reflux disease)     Hyperlipidemia     Hypertension     Irritable bowel syndrome     Von Willebrand disease ([address])         Past Surgical History  Past Surgical History:   Procedure Laterality Date    APPENDECTOMY      HYSTERECTOMY      Date unk            07/12/23 1435   PT Last Visit   PT Visit Date 07/12/23   Note Type   Note type Evaluation   Pain Assessment   Pain Assessment Tool 0-10   Pain Score 10 - Worst Possible Pain   Pain Location/Orientation Orientation: Right;Location: Leg   Restrictions/Precautions   Weight Bearing Precautions Per Order No   Other Precautions Pain; Fall Risk;Multiple lines   Home Living   Type of [address] [place] [address] One level;Stairs to enter with rails  (13 NAZANIN c HR)   Bathroom Shower/Tub Tub/shower unit   Bathroom Toilet Standard   Bathroom Equipment Grab bars in shower; Shower chair   Bathroom Accessibility Accessible   Home Equipment Cane   Additional Comments pt reports use of cane   Prior Function   Level of [place] Independent with ADLs; Independent with functional mobility; Independent with IADLS   Lives With Alone   Receives Help From Neighbor   IADLs Independent with driving   Falls in the last 6 months 0   Vocational Retired   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    General   Family/Caregiver Present No   Cognition   Overall Cognitive Status WFL   Arousal/Participation Alert   Orientation Level Oriented X4   Following Commands Follows all commands and directions without difficulty   Subjective   Subjective "I can't do it"   RLE Assessment   RLE Assessment X  (3+/5 grossly)   LLE Assessment   LLE Assessment WFL   Bed Mobility   Supine to Sit 5  Supervision   Additional items Increased time required;Verbal cues   Sit to Supine 4  Minimal assistance   Additional items Assist x 1; Increased time required;Verbal cues   Transfers   Sit to Stand 4  Minimal assistance   Additional items Assist x 1; Increased time required;Verbal cues   Stand to Sit 4  Minimal assistance   Additional items Assist x 1; Increased time required;Verbal cues   Additional Comments RW used   Ambulation/Elevation   Gait pattern Excessively slow; Short stride; Foward flexed;Decreased foot clearance;Narrow ASHISH; Improper Weight shift   Gait Assistance 4  Minimal assist   Additional items Assist x 1;Verbal cues   Assistive Device Rolling walker   Distance 3' sidesteppign at [facility]   Balance   Static Sitting Good   Dynamic Sitting Fair +   Static Standing Fair -   Dynamic Standing Fair -   Endurance Deficit   Endurance Deficit Yes   Endurance Deficit Description pt reporting fatigue, dizziness, and nausea both at rest and with mobility   Activity Tolerance   Activity Tolerance Patient limited by fatigue;Patient limited by pain   Assessment   Prognosis Good   Problem List Decreased strength;Decreased endurance;Decreased mobility; Decreased range of motion; Impaired balance;Pain   Assessment Patient is a 78 y.o. female evaluated by Physical Therapy s/p admit to [address] [place] [address] on 7/12/2023 with admitting diagnosis of: Leg pain. PT was consulted to assess patient's functional mobility and discharge needs. Ordered are PT Evaluation and treatment. Comorbidities affecting patient's physical performance at time of assessment include: HTN, HLD, GERD, IBS, COPD, Von Willebrand disease, disease of thyroid gland.  Personal factors affecting the patient at time of IE include: ambulating with assistive device, step(s) to enter home, inability to navigate community distances, limited insight into impairments, inability/difficulty performing IADLs and inability/difficulty performing ADLs. Please locate objective findings from PT assessment regarding body systems outlined above. Upon evaluation, pt able to perform all functional mobility with SUP-Liban, RW, and increased time. Moderate verbal cuing provided for safety awareness and sequencing. Standing EOB, pt reporting the inability to bear weight through her R LE to ambulate. Pt encouraged to attempt side stepping at EOB and was able to complete ~3' before requiring seated rest break. Pt provided with extensive education on benefits of attending STR upon d/c, specifically acute rehab. Pt hesitant but becoming more agreeable as her understanding broadened. DO and CM made aware of recommendation. All vitals remained WFL on RA throughout session. The patient's AM-PAC Basic Mobility Inpatient Short Form Raw Score is 13. A Raw score of less than or equal to 16 suggests the patient may benefit from discharge to post-acute rehabilitation services. Please also refer to the recommendation of the Physical Therapist for safe discharge planning. Co treatment with OT secondary to complex medical condition of pt, possible A of 2 required to achieve and maintain transitional movements, requiring the need of skilled therapeutic intervention of 2 therapists to achieve delivery of services. Pt will benefit from continued PT intervention during LOS to address current deficits, increase LOF, and facilitate safe d/c to next level of care when medically appropriate. D/c recommendation at this time is post-acute rehabilitation services. Goals   Patient Goals to have less pain   LTG Expiration Date 07/26/23   Long Term Goal #1 Pt will participate in B LE strengthening exercises to facilitate improved functional activity tolerance. Pt will perform all functional transfers and bed mobility mod(I) with good safety awareness.  Pt will ambulate 250' mod(I) with LRAD while maintaining good functional dynamic balance. Pt will ascend/descend a FFOS with HR and SUP to reflect the ability to safely navigate the home. Plan   Treatment/Interventions Functional transfer training;LE strengthening/ROM; Therapeutic exercise;Elevations; Endurance training;Bed mobility;Gait training   PT Frequency 3-5x/wk   Recommendation   PT Discharge Recommendation Post acute rehabilitation services   AM-PAC Basic Mobility Inpatient   Turning in Flat Bed Without Bedrails 3   Lying on Back to Sitting on Edge of Flat Bed Without Bedrails 3   Moving Bed to Chair 2   Standing Up From Chair Using Arms 3   Walk in Room 1   Climb 3-5 Stairs With Railing 1   Basic Mobility Inpatient Raw Score 13   Basic Mobility Standardized Score 33.99   Highest Level Of Mobility   JH-HLM Goal 4: Move to chair/commode   JH-HLM Achieved 5: Stand (1 or more minutes)   End of Consult   Patient Position at End of Consult Supine; All needs within reach

## 2023-07-12 NOTE — PLAN OF CARE
Problem: PHYSICAL THERAPY ADULT  Goal: Performs mobility at highest level of function for planned discharge setting. See evaluation for individualized goals. Description: Treatment/Interventions: Functional transfer training, LE strengthening/ROM, Therapeutic exercise, Elevations, Endurance training, Bed mobility, Gait training          See flowsheet documentation for full assessment, interventions and recommendations. Note: Prognosis: Good  Problem List: Decreased strength, Decreased endurance, Decreased mobility, Decreased range of motion, Impaired balance, Pain  Assessment: Patient is a 78 y.o. female evaluated by Physical Therapy s/p admit to [address] [place] [address] on 7/12/2023 with admitting diagnosis of: Leg pain. PT was consulted to assess patient's functional mobility and discharge needs. Ordered are PT Evaluation and treatment. Comorbidities affecting patient's physical performance at time of assessment include: HTN, HLD, GERD, IBS, COPD, Von Willebrand disease, disease of thyroid gland. Personal factors affecting the patient at time of IE include: ambulating with assistive device, step(s) to enter home, inability to navigate community distances, limited insight into impairments, inability/difficulty performing IADLs and inability/difficulty performing ADLs. Please locate objective findings from PT assessment regarding body systems outlined above. Upon evaluation, pt able to perform all functional mobility with SUP-Liban, RW, and increased time. Moderate verbal cuing provided for safety awareness and sequencing. Standing EOB, pt reporting the inability to bear weight through her R LE to ambulate. Pt encouraged to attempt side stepping at EOB and was able to complete ~3' before requiring seated rest break. Pt provided with extensive education on benefits of attending STR upon d/c, specifically acute rehab. Pt hesitant but becoming more agreeable as her understanding broadened.  DO and CM made aware of recommendation. All vitals remained WFL on RA throughout session. The patient's AM-PAC Basic Mobility Inpatient Short Form Raw Score is 13. A Raw score of less than or equal to 16 suggests the patient may benefit from discharge to post-acute rehabilitation services. Please also refer to the recommendation of the Physical Therapist for safe discharge planning. Co treatment with OT secondary to complex medical condition of pt, possible A of 2 required to achieve and maintain transitional movements, requiring the need of skilled therapeutic intervention of 2 therapists to achieve delivery of services. Pt will benefit from continued PT intervention during LOS to address current deficits, increase LOF, and facilitate safe d/c to next level of care when medically appropriate. D/c recommendation at this time is post-acute rehabilitation services. PT Discharge Recommendation: Post acute rehabilitation services    See flowsheet documentation for full assessment.

## 2023-07-12 NOTE — OCCUPATIONAL THERAPY NOTE
Occupational Therapy Evaluation     Patient Name: Umang Pisano  [id number] Date: 7/12/2023  Problem List  Active Problems: There are no active Hospital Problems. Past Medical History  Past Medical History:   Diagnosis Date    Contact dermatitis     Last assessed - 8/8/12    COPD (chronic obstructive pulmonary disease) (HCC)     Disease of thyroid gland     GERD (gastroesophageal reflux disease)     Hyperlipidemia     Hypertension     Irritable bowel syndrome     Von Willebrand disease ([address])      Past Surgical History  Past Surgical History:   Procedure Laterality Date    APPENDECTOMY      HYSTERECTOMY      Date unk            07/12/23 1437   OT Last Visit   OT Visit Date 07/12/23   Note Type   Note type Evaluation   Pain Assessment   Pain Assessment Tool 0-10   Pain Score 10 - Worst Possible Pain   Pain Location/Orientation Orientation: Right;Location: Leg;Location: Hip   Restrictions/Precautions   Weight Bearing Precautions Per Order No   Other Precautions Multiple lines;Telemetry; Fall Risk;Pain   Home Living   Type of [address] One level;Performs ADLs on one level; Able to live on main level with bedroom/bathroom;Stairs to enter with rails; Other (Comment)  (13 [address] c HR)   Bathroom Shower/Tub Tub/shower unit   Bathroom Toilet Standard   Bathroom Equipment Grab bars in shower; Shower chair   Bathroom Accessibility Accessible   Home Equipment Grab bars; Other (Comment)  (no DME)   Additional Comments pt does not utilize device at baseline during functional mobility   Prior Function   Level of [place] Independent with ADLs; Independent with functional mobility; Independent with IADLS   Lives With Alone   IADLs Independent with driving; Independent with meal prep; Independent with medication management   Falls in the last 6 months 0   Vocational Retired   Comments 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   Subjective   Subjective "you have no idea how tough of a patient I can be"   ADL   Where Assessed Edge of bed   Grooming Assistance 5  Supervision/Setup   UB Bathing Assistance 4  Minimal Assistance   LB Bathing Assistance 3  Moderate Assistance   [address] 5  Supervision/Setup   LB [address] [address] 2  Maximal Assistance   LB Dressing Deficit Don/doff R sock; Don/doff L sock   Toileting Assistance  4  Minimal Assistance   Toileting Deficit Increased time to complete;Clothing management up;Clothing management down   Additional Comments pt demonstrates functional reach to knees and able to bring L LE to knee, however not R LE   Bed Mobility   Supine to Sit 5  Supervision   Additional items Bedrails; Increased time required;Verbal cues   Sit to Supine 4  Minimal assistance   Additional items Assist x 1;Bedrails; Increased time required;Verbal cues;LE management   Additional Comments pt on RA during session; Spo2 WFL with no complaints of SOB; pt with dizziness upon sitting and BP WFL; reports further lightheadedness, however proceeds with evaluation   Transfers   Sit to Stand 4  Minimal assistance   Additional items Increased time required;Verbal cues; Assist x 1  (RW)   Stand to Sit 4  Minimal assistance   Additional items Increased time required;Verbal cues; Assist x 1  (RW)   Additional Comments pt performs functional transfers with min (A) level; pt in standing and weight-shifting R LE to L LE and unable to tolerate   Functional Mobility   Functional Mobility 4  Minimal assistance   Additional Comments x1 with RW; performs side stepping toward HOB; pt with increased pain in R LE and unable to tolerate further mobility forward   Additional items Rolling walker   Balance   Static Sitting Good   Dynamic Sitting Fair +   Static Standing Fair   Dynamic Standing Poor +   Ambulatory Poor +   Activity Tolerance   Activity Tolerance Patient limited by pain; Patient limited by fatigue   RUE Assessment   RUE Assessment WFL   LUE Assessment   LUE Assessment [facility] Hand Function   Gross Motor Coordination Functional   Fine Motor Coordination Functional   Sensation   Light Touch No apparent deficits   Sharp/Dull No apparent deficits   Psychosocial   Psychosocial (WDL) WDL   Cognition   Overall Cognitive Status WFL   Arousal/Participation Alert   Attention Within functional limits   Orientation Level Oriented X4   Memory Within functional limits   Following Commands Follows all commands and directions without difficulty   Assessment   Limitation Decreased ADL status; Decreased UE strength;Decreased Safe judgement during ADL;Decreased endurance;Decreased self-care trans;Decreased high-level ADLs   Assessment Pt is a 78 y.o. female seen for OT evaluation s/p admit to [facility] on 7/12/2023 w/ <principal problem not specified>. Comorbidities affecting pt's functional performance at time of assessment include: HTN, HLD, disease of thyroid gland, GERD, IBS, COPD, von willebrand disease. Personal factors affecting pt at time of IE include:limited home support, difficulty performing ADLS, difficulty performing IADLS , limited insight into deficits, decreased initiation and engagement  and health management . Prior to admission, pt was (I) with ADLs and IADLs with use of no device during mobility. Upon evaluation: Pt requires (S)-max (A) with use of RW during sidestepping tasks 2* the following deficits impacting occupational performance: weakness, decreased strength, decreased balance, decreased tolerance, impaired initiation, impaired problem solving, decreased safety awareness, increased pain and impaired interpersonal skills. Pt to benefit from continued skilled OT tx while in the hospital to address deficits as defined above and maximize level of functional independence w ADL's and functional mobility. Occupational Performance areas to address include: grooming, bathing/shower, toilet hygiene, dressing, functional mobility, community mobility and clothing management.  The patient's raw score on the [facility] Daily Activity Inpatient Short Form is 18. A raw score of less than 19 suggests the patient may benefit from discharge to post-acute rehabilitation services. Please refer to the recommendation of the Occupational Therapist for safe discharge planning. Pt benefited from co-evaluation of skilled OT and PT therapists in order to most appropriately address functional deficits d/t extensive assistance required for safe functional mobility, decreased activity tolerance, and regression from functioning level prior to admission and/or onset of present illness. OT/PT objectives were addressed separately; please see PT note for specific goal areas targeted. Goals   Patient Goals to go home   Short Term Goal  pt will perform UE strengthening exercises   Long Term Goal #1 pt will demonstrate UB/LB bathing and dressing tasks at min (A) level with or without LHAE   Long Term Goal #2 pt will demonstrate functional mobility with RW at (S) level   Long Term Goal pt will demonstrate toilet transfers and hygiene at min (A) level   Plan   Treatment Interventions ADL retraining;Functional transfer training;UE strengthening/ROM; Endurance training;Patient/family training;Equipment evaluation/education; Compensatory technique education; Activityengagement   Goal Expiration Date 07/26/23   OT Frequency 3-5x/wk   Recommendation   OT Discharge Recommendation Post acute rehabilitation services   Additional Comments  (S)  recommend the acute rehab setting   [facility] Daily Activity Inpatient   Lower Body Dressing 2   Bathing 2   Toileting 2   Upper Body Dressing 4   Grooming 4   Eating 4   Daily Activity Raw Score 18   Daily Activity Standardized Score (Calc for Raw Score >=11) 38.66   AM-PAC Applied Cognition Inpatient   Following a Speech/Presentation 4   Understanding Ordinary Conversation 4   Taking Medications 4   Remembering Where Things Are Placed or Put Away 4   Remembering List of 4-5 Errands 4   Taking Care of Complicated Tasks 4   Applied Cognition Raw Score 24   Applied Cognition Standardized Score 62.21

## 2023-07-12 NOTE — PLAN OF CARE
Problem: PAIN - ADULT  Goal: Verbalizes/displays adequate comfort level or baseline comfort level  Description: Interventions:  - Encourage patient to monitor pain and request assistance  - Assess pain using appropriate pain scale  - Administer analgesics based on type and severity of pain and evaluate response  - Implement non-pharmacological measures as appropriate and evaluate response  - Consider cultural and social influences on pain and pain management  - Notify physician/advanced practitioner if interventions unsuccessful or patient reports new pain  Outcome: Progressing     Problem: SAFETY ADULT  Goal: Patient will remain free of falls  Description: INTERVENTIONS:  - Educate patient/family on patient safety including physical limitations  - Instruct patient to call for assistance with activity   - Consult OT/PT to assist with strengthening/mobility   - Keep Call bell within reach  - Keep bed low and locked with side rails adjusted as appropriate  - Keep care items and personal belongings within reach  - Initiate and maintain comfort rounds  - Make Fall Risk Sign visible to staff  - Offer Toileting every 1-2 Hours, in advance of need  - Initiate/Maintain bed alarm  - Obtain necessary fall risk management equipment: call bell within reach  - Apply yellow socks and bracelet for high fall risk patients  - Consider moving patient to room near nurses station  Outcome: Progressing  Goal: Maintain or return to baseline ADL function  Description: INTERVENTIONS:  -  Assess patient's ability to carry out ADLs; assess patient's baseline for ADL function and identify physical deficits which impact ability to perform ADLs (bathing, care of mouth/teeth, toileting, grooming, dressing, etc.)  - Assess/evaluate cause of self-care deficits   - Assess range of motion  - Assess patient's mobility; develop plan if impaired  - Assess patient's need for assistive devices and provide as appropriate  - Encourage maximum independence but intervene and supervise when necessary  - Involve family in performance of ADLs  - Assess for home care needs following discharge   - Consider OT consult to assist with ADL evaluation and planning for discharge  - Provide patient education as appropriate  Outcome: Progressing  Goal: Maintains/Returns to pre admission functional level  Description: INTERVENTIONS:  - Perform BMAT or MOVE assessment daily.   - Set and communicate daily mobility goal to care team and patient/family/caregiver. - Collaborate with rehabilitation services on mobility goals if consulted  - Perform Range of Motion 3 times a day. - Reposition patient every 2 hours.   - Dangle patient 4 times a day  - Stand patient 3 times a day  - Ambulate patient 2-3 times a day  - Out of bed to chair 2-3 times a day   - Out of bed for meals 3 times a day  - Out of bed for toileting  - Record patient progress and toleration of activity level   Outcome: Progressing     Problem: DISCHARGE PLANNING  Goal: Discharge to home or other facility with appropriate resources  Description: INTERVENTIONS:  - Identify barriers to discharge w/patient and caregiver  - Arrange for needed discharge resources and transportation as appropriate  - Identify discharge learning needs (meds, wound care, etc.)  - Arrange for interpretive services to assist at discharge as needed  - Refer to Case Management Department for coordinating discharge planning if the patient needs post-hospital services based on physician/advanced practitioner order or complex needs related to functional status, cognitive ability, or social support system  Outcome: Progressing     Problem: RESPIRATORY - ADULT  Goal: Achieves optimal ventilation and oxygenation  Description: INTERVENTIONS:  - Assess for changes in respiratory status  - Assess for changes in mentation and behavior  - Position to facilitate oxygenation and minimize respiratory effort  - Oxygen administered by appropriate delivery if ordered  - Initiate smoking cessation education as indicated  - Encourage broncho-pulmonary hygiene including cough, deep breathe, Incentive Spirometry  - Assess the need for suctioning and aspirate as needed  - Assess and instruct to report SOB or any respiratory difficulty  - Respiratory Therapy support as indicated  Outcome: Progressing     Problem: METABOLIC, FLUID AND ELECTROLYTES - ADULT  Goal: Electrolytes maintained within normal limits  Description: INTERVENTIONS:  - Monitor labs and assess patient for signs and symptoms of electrolyte imbalances  - Administer electrolyte replacement as ordered  - Monitor response to electrolyte replacements, including repeat lab results as appropriate  - Instruct patient on fluid and nutrition as appropriate  Outcome: Progressing     Problem: SKIN/TISSUE INTEGRITY - ADULT  Goal: Skin Integrity remains intact(Skin Breakdown Prevention)  Description: Assess:  -Perform Tristan assessment every shift  -Clean and moisturize skin every day  -Inspect skin when repositioning, toileting, and assisting with ADLS  -Assess extremities for adequate circulation and sensation     Bed Management:  -Have minimal linens on bed & keep smooth, unwrinkled  -Change linens as needed when moist or perspiring  -Avoid sitting or lying in one position for more than 2 hours while in bed  -Keep HOB at 30 degrees     Toileting:  -Offer bedside commode  -Assess for incontinence every 1-2  -Use incontinent care products after each incontinent episode such as foam cleanser    Activity:  -Mobilize patient 3 times a day  -Encourage activity and walks on unit  -Encourage or provide ROM exercises   -Turn and reposition patient every 1-2 Hours  -Use appropriate equipment to lift or move patient in bed  -Instruct/ Assist with weight shifting every 2 when out of bed in chair  -Consider limitation of chair time 2-3 hour intervals    Skin Care:  -Avoid use of baby powder, tape, friction and shearing, hot water or constrictive clothing  -Relieve pressure over bony prominences using pressure relief devices  -Do not massage red bony areas  Outcome: Progressing

## 2023-07-12 NOTE — ED PROVIDER NOTES
History  Chief Complaint   Patient presents with   • Leg Pain     Pt brought in by EMS for right leg pain x2 days, denies any injury. HPI      This is a very pleasant, nontoxic-appearing, 57-year-old female presents emerged part with a 48-hour history of having right hip pain right leg pain without back pain without dysuria without fever without chills without history of trauma or injury. Patient has not been taking any medications at home for the above chief complaint. Patient offers no other complaints at this time with the exception of the right hip pain that radiates down to her right knee. Denies calf pain, denies chest pain, denies shortness of breath, denies back pain. Remaining 12 point review systems is negative. Prior to Admission Medications   Prescriptions Last Dose Informant Patient Reported? Taking? ALPRAZolam (XANAX) 0.25 mg tablet 7/11/2023  No Yes   Sig: TAKE 1 TABLET (0.25 MG TOTAL) BY MOUTH 3 (THREE) TIMES A DAY AS NEEDED FOR ANXIETY. PARoxetine (PAXIL) 30 mg tablet 7/11/2023  No Yes   Sig: TAKE 1 TABLET BY MOUTH EVERY DAY   REFRESH TEARS 0.5 % SOLN  Self Yes No   Sig: INSTILL 1 DROP INTO EACH EYE THREE TIMES DAILY AS NEEDED FOR DRY EYES   acetaminophen (TYLENOL) 325 mg tablet 7/11/2023 Self No Yes   Sig: Take 2 tablets (650 mg total) by mouth every 6 (six) hours as needed (pain, fever)   benzonatate (TESSALON PERLES) 100 mg capsule 7/11/2023  No Yes   Sig: TAKE 1 CAPSULE BY MOUTH THREE TIMES A DAY   budesonide-formoterol (SYMBICORT) 160-4.5 mcg/act inhaler 7/11/2023  No Yes   Sig: Inhale 2 puffs 2 (two) times a day Rinse mouth after use.    dicyclomine (BENTYL) 10 mg capsule   No No   Sig: TAKE 1 CAPSULE (10 MG TOTAL) BY MOUTH 3 (THREE) TIMES A DAY AS NEEDED (USE ONLY AS NEEDED)   hydrochlorothiazide (HYDRODIURIL) 50 mg tablet 7/12/2023  No Yes   Sig: TAKE 1 TABLET BY MOUTH EVERY DAY   hyoscyamine (ANASPAZ,LEVSIN) 0.125 MG tablet  Self No No   Sig: Take 1 tablet (0.125 mg total) by mouth every 6 (six) hours as needed for cramping   levothyroxine 100 mcg tablet 7/12/2023  No Yes   Sig: TAKE 1 TABLET BY MOUTH EVERY DAY   lisinopril (ZESTRIL) 10 mg tablet 7/12/2023  No Yes   Sig: TAKE 1 TABLET BY MOUTH TWICE A DAY   meclizine (ANTIVERT) 12.5 MG tablet More than a month  No No   Sig: Take 1 tablet (12.5 mg total) by mouth every 8 (eight) hours as needed for dizziness   metoprolol tartrate (LOPRESSOR) 25 mg tablet 7/12/2023  No Yes   Sig: TAKE 1 TABLET BY MOUTH TWICE A DAY   pantoprazole (PROTONIX) 40 mg tablet 7/12/2023  No Yes   Sig: TAKE 1 TABLET BY MOUTH EVERY DAY   simethicone (MYLICON) 640 MG chewable tablet Not Taking Self No No   Sig: Chew 1 tablet (125 mg total) every 6 (six) hours as needed (bloating, flatulence)   Patient not taking: Reported on 7/12/2023   simvastatin (ZOCOR) 40 mg tablet 7/11/2023  No Yes   Sig: TAKE 1 TABLET BY MOUTH DAILY AT BEDTIME      Facility-Administered Medications: None       Past Medical History:   Diagnosis Date   • Contact dermatitis     Last assessed - 8/8/12   • COPD (chronic obstructive pulmonary disease) (HCC)    • Disease of thyroid gland    • GERD (gastroesophageal reflux disease)    • Hyperlipidemia    • Hypertension    • Irritable bowel syndrome    • Von Willebrand disease ([address])        Past Surgical History:   Procedure Laterality Date   • APPENDECTOMY     • HYSTERECTOMY      Date unk        Family History   Problem Relation Age of Onset   • Clotting disorder Mother         Def of clotting factor    • Aneurysm Father      I have reviewed and agree with the history as documented. E-Cigarette/Vaping   • E-Cigarette Use Never User      E-Cigarette/Vaping Substances   • Nicotine No    • THC No    • CBD No    • Flavoring No    • Other No    • Unknown No      Social History     Tobacco Use   • Smoking status: Never   • Smokeless tobacco: Never   Vaping Use   • Vaping Use: Never used   Substance Use Topics   • Alcohol use: No   • Drug use:  No Review of Systems   Constitutional: Negative. Negative for chills and fever. HENT: Negative. Eyes: Negative. Respiratory: Negative. Cardiovascular: Negative. Gastrointestinal: Negative. Endocrine: Negative. Genitourinary: Negative. Musculoskeletal: Negative. Negative for gait problem and neck pain. Skin: Negative. Negative for pallor and rash. Allergic/Immunologic: Negative. Neurological: Negative. Hematological: Negative. Psychiatric/Behavioral: Negative. Physical Exam  Physical Exam  Vitals and nursing note reviewed. Constitutional:       General: She is not in acute distress. Appearance: Normal appearance. She is normal weight. She is not ill-appearing, toxic-appearing or diaphoretic. HENT:      Head: Normocephalic and atraumatic. Right Ear: External ear normal.      Left Ear: External ear normal.      Nose: Nose normal.      Mouth/Throat:      Mouth: Mucous membranes are moist.      Pharynx: Oropharynx is clear. Eyes:      Extraocular Movements: Extraocular movements intact. Conjunctiva/sclera: Conjunctivae normal.      Pupils: Pupils are equal, round, and reactive to light. Cardiovascular:      Rate and Rhythm: Normal rate and regular rhythm. Pulses: Normal pulses. Heart sounds: Normal heart sounds. Pulmonary:      Effort: Pulmonary effort is normal.      Breath sounds: Normal breath sounds. Abdominal:      General: Abdomen is flat. Bowel sounds are normal.   Musculoskeletal:         General: Tenderness present. Cervical back: Normal range of motion and neck supple. Legs:    Skin:     General: Skin is warm. Capillary Refill: Capillary refill takes less than 2 seconds. Neurological:      General: No focal deficit present. Mental Status: She is alert. Mental status is at baseline. Psychiatric:         Mood and Affect: Mood normal.         Behavior: Behavior normal.         Thought Content:  Thought content normal.         Judgment: Judgment normal.         Vital Signs  ED Triage Vitals [07/12/23 0825]   Temperature Pulse Respirations Blood Pressure SpO2   97.9 °F (36.6 °C) 62 18 142/65 96 %      Temp Source Heart Rate Source Patient Position - Orthostatic VS BP Location FiO2 (%)   Temporal Monitor Lying Right arm --      Pain Score       10 - Worst Possible Pain           Vitals:    07/12/23 1030 07/12/23 1229 07/12/23 1300 07/12/23 1330   BP: 128/59 112/64 147/66 112/55   Pulse: 60 64 61 67   Patient Position - Orthostatic VS: Lying Lying Sitting Lying         Visual Acuity      ED Medications  Medications   magnesium Oxide (MAG-OX) tablet 800 mg (800 mg Oral Given 7/12/23 0947)   oxyCODONE-acetaminophen (PERCOCET) 5-325 mg per tablet 1 tablet (has no administration in time range)   HYDROmorphone (DILAUDID) injection 0.5 mg (has no administration in time range)   fentanyl citrate (PF) 100 MCG/2ML 25 mcg (25 mcg Intravenous Given 7/12/23 0912)   HYDROmorphone (DILAUDID) injection 0.5 mg (0.5 mg Intravenous Given 7/12/23 1232)       Diagnostic Studies  Results Reviewed     Procedure Component Value Units Date/Time    UA w Reflex to Microscopic w Reflex to Culture [[id number]]     Lab Status: No result Specimen: Urine     COVID only [[id number]]     Lab Status: No result Specimen: Nares from Nose     Comprehensive metabolic panel [[id number]]  (Abnormal) Collected: 07/12/23 0859    Lab Status: Final result Specimen: Blood from Arm, Right Updated: 07/12/23 0932     Sodium 138 mmol/L      Potassium 3.4 mmol/L      Chloride 104 mmol/L      CO2 25 mmol/L      ANION GAP 9 mmol/L      BUN 30 mg/dL      Creatinine 1.04 mg/dL      Glucose 84 mg/dL      Calcium 9.6 mg/dL      AST 17 U/L      ALT 13 U/L      Alkaline Phosphatase 69 U/L      Total Protein 6.9 g/dL      Albumin 4.1 g/dL      Total Bilirubin 0.25 mg/dL      eGFR 49 ml/min/1.73sq m     Narrative:      [facility] for [facility] Kid[facility] Disease (CKD):   •  Stage 1 with normal or high GFR (GFR > 90 mL/min/1.73 square meters)  •  Stage 2 Mild CKD (GFR = 60-89 mL/min/1.73 square meters)  •  Stage 3A Moderate CKD (GFR = 45-59 mL/min/1.73 square meters)  •  Stage 3B Moderate CKD (GFR = 30-44 mL/min/1.73 square meters)  •  Stage 4 Severe CKD (GFR = 15-29 mL/min/1.73 square meters)  •  Stage 5 End Stage CKD (GFR <15 mL/min/1.73 square meters)  Note: GFR calculation is accurate only with a steady state creatinine    CK [[id number]]  (Normal) Collected: 07/12/23 0859    Lab Status: Final result Specimen: Blood from Arm, Right Updated: 07/12/23 0932     Total CK 56 U/L     Magnesium [[id number]]  (Abnormal) Collected: 07/12/23 0859    Lab Status: Final result Specimen: Blood from Arm, Right Updated: 07/12/23 0932     Magnesium 1.7 mg/dL     CBC and differential [[id number]]  (Abnormal) Collected: 07/12/23 0859    Lab Status: Final result Specimen: Blood from Arm, Right Updated: 07/12/23 0907     WBC 3.66 Thousand/uL      RBC 3.98 Million/uL      Hemoglobin 12.9 g/dL      Hematocrit 39.4 %      MCV 99 fL      MCH 32.4 pg      MCHC 32.7 g/dL      RDW 13.4 %      MPV 10.2 fL      Platelets 782 Thousands/uL      nRBC 0 /100 WBCs      Neutrophils Relative 47 %      Immat GRANS % 1 %      Lymphocytes Relative 30 %      Monocytes Relative 13 %      Eosinophils Relative 8 %      Basophils Relative 1 %      Neutrophils Absolute 1.74 Thousands/µL      Immature Grans Absolute 0.02 Thousand/uL      Lymphocytes Absolute 1.10 Thousands/µL      Monocytes Absolute 0.47 Thousand/µL      Eosinophils Absolute 0.29 Thousand/µL      Basophils Absolute 0.04 Thousands/µL                  CT pelvis wo contrast   Final Result by Dalton Hoskins MD (07/12 1419)      No acute findings to account for the patient's hip pain. No acute fracture. Lumbar, sacroiliac, and symphyseal degenerative changes.          Workstation performed: [id number]         CT spine lumbar wo contrast   Final Result by Juan Morales MD (07/12 1140)      No acute osseous abnormality of lumbar spine. Moderate-to-severe foraminal narrowing at right L3-L4. Degenerative changes, as detailed above. Additional chronic/incidental findings as detailed above. Workstation performed: [id number]         VAS lower limb venous duplex study, unilateral/limited   Final Result by Racquel Alfaro MD (07/12 2248)      XR knee 4+ views Right injury   ED Interpretation by Anju Moreno III, DO (07/12 8185)   Review x-ray of the right knee shows no acute fractures, osseous abnormalities, foreign bodies or dislocations. XR hips bilateral with ap pelvis 2 vw   ED Interpretation by Anju Moreno III, DO (07/12 1027)   No acute fracture seen on pelvic or x ray of b/l hips. XR femur 2 views RIGHT   ED Interpretation by Anju Moreno III, DO (07/12 3274)   Review x-ray of the right femur shows no acute fractures, osseous abnormalities or dislocations or foreign bodies. Patient does have some calcification of vasculature seen on x-ray imaging. Procedures  Procedures         ED Course  ED Course as of 07/12/23 1511   Wed Jul 12, 2023   8275 Patient seen and evaluated, orders placed, atraumatic hip pain x48 hours, no fever, no chills, no trauma, no induration or erythema in the right hip area to explain patient's presentation. Differential diagnosis in this patient is as follows electrolyte abnormalities specifically low magnesium versus rhabdomyolysis versus fracture. Plan: Lower ext DVT, labs, including CK and Mg levels, x rays, prn pain meds. 1003 Vascular tech noted DVT studies negative.    1031 Patient is reassessed still having significant mount of pain, as needed fentanyl did not improve patient's symptoms, due to the amount of pain the patient is and we will proceed with CT imaging of the lumbar spine and pelvic area without contrast.  The patient may need to be admitted to the hospital for gait abnormalities and acute pain, patient does live alone. 36 CT of the pelvis negative, bedside ultrasound of the aorta in abdominal cavity w/ normal limits, PT consulted to living alone, unable to ambulate on own. 1440 Case reviewed w/ Genet Peralta, physical therapist on-call, advised the patient to apply for short-term rehab, fall risk. 1459 Case reviewed with Dr. Leavy Crigler, [facility] provider on call, full admission. SBIRT 20yo+    Flowsheet Row Most Recent Value   Initial Alcohol Screen: US AUDIT-C     1. How often do you have a drink containing alcohol? 0 Filed at: 07/12/2023 0824   Audit-C Score 0 Filed at: 07/12/2023 6712                    Medical Decision Making  Patient presents with atraumatic right hip pain, as the patient does have some radiation of the hip pain down to the lateral aspect of the thigh concerning for either iliotibial band syndrome versus a lumbar radiculopathy pain. X-rays unremarkable, CT imaging unremarkable, ultrasound the bedside of the abdomen unremarkable, baseline labs unremarkable CK levels normal, low wheezing level placed orally, patient will be admitted to the hospital after she was evaluated by PT OT and felt to be a significant fall risk may benefit from short-term rehab as per PT, admit to the hospitalist service. Denies history of saddle anesthesia/perineal anesthesia. Denies bowel or bladder incontinence/retention.  History does not suggest diagnosis of cauda equina syndrome.  Patient denies history of IVDA, denies history of fevers, no recent surgeries or any procedures to suggest a transient bacteremia leading to a diagnosis of subdural abscess. Denies history of blood thinner use with recent history of lumbar puncture or any violation of the epidural space to suggest history of epidural hematoma.  Therefore these above diagnoses (cauda equina syndrome, epidural abscess, epidural hematoma) were not pursued with diagnostic imaging. Portions of the record may have been created with voice recognition software. Occasional wrong word or "sound a like" substitutions may have occurred due to the inherent limitations of voice recognition software. Read the chart carefully and recognize, using context, where substitutions have occurred. Amount and/or Complexity of Data Reviewed  Labs: ordered. Radiology: ordered and independent interpretation performed. Risk  OTC drugs. Prescription drug management. Decision regarding hospitalization. Disposition  Final diagnoses:   Right hip pain   Hypomagnesemia     Time reflects when diagnosis was documented in both MDM as applicable and the Disposition within this note     Time User Action Codes Description Comment    7/12/2023  2:38 PM Abram Farrell [M25.551] Right hip pain     7/12/2023  2:39 PM Abram Farrell [E83.42] Hypomagnesemia       ED Disposition     ED Disposition   Admit    Condition   Stable    Date/Time   Wed Jul 12, 2023  3:01 PM    Comment   Case was discussed with Bao Fox and the patient's admission status was agreed to be Admission Status: inpatient status to the service of Dr. Bao Fxo . Follow-up Information    None         Patient's Medications   Discharge Prescriptions    No medications on file       No discharge procedures on file.     PDMP Review       Value Time User    PDMP Reviewed  Yes 6/20/2023 10:42 AM Justyna Valencia DO          ED Provider  Electronically Signed by           Soto Enrique III, DO  07/12/23 8987

## 2023-07-12 NOTE — ASSESSMENT & PLAN NOTE
CT imaging along with Venous duplex do not show an acute fracture or DVT  Likely due to osteoarthritis and Froaminal narrowing noted on CT    S/p IV Solu-Medrol lv 60mg x 1 with improvement.   We will continue today IV Solu-Medrol and transition tomorrow P.O     Pain control / muscle relaxants / PT/OT eval along with case mgmt for possible STR

## 2023-07-13 LAB
ALBUMIN SERPL BCP-MCNC: 4.3 G/DL (ref 3.5–5)
ALP SERPL-CCNC: 63 U/L (ref 34–104)
ALT SERPL W P-5'-P-CCNC: 13 U/L (ref 7–52)
ANION GAP SERPL CALCULATED.3IONS-SCNC: 9 MMOL/L
AST SERPL W P-5'-P-CCNC: 18 U/L (ref 13–39)
BASOPHILS # BLD MANUAL: 0.11 THOUSAND/UL (ref 0–0.1)
BASOPHILS NFR MAR MANUAL: 2 % (ref 0–1)
BILIRUB SERPL-MCNC: 0.36 MG/DL (ref 0.2–1)
BUN SERPL-MCNC: 25 MG/DL (ref 5–25)
CALCIUM SERPL-MCNC: 10.1 MG/DL (ref 8.4–10.2)
CHLORIDE SERPL-SCNC: 101 MMOL/L (ref 96–108)
CO2 SERPL-SCNC: 25 MMOL/L (ref 21–32)
CREAT SERPL-MCNC: 0.85 MG/DL (ref 0.6–1.3)
EOSINOPHIL # BLD MANUAL: 0 THOUSAND/UL (ref 0–0.4)
EOSINOPHIL NFR BLD MANUAL: 0 % (ref 0–6)
ERYTHROCYTE [DISTWIDTH] IN BLOOD BY AUTOMATED COUNT: 13.3 % (ref 11.6–15.1)
GFR SERPL CREATININE-BSD FRML MDRD: 62 ML/MIN/1.73SQ M
GLUCOSE SERPL-MCNC: 178 MG/DL (ref 65–140)
HCT VFR BLD AUTO: 39.6 % (ref 34.8–46.1)
HGB BLD-MCNC: 13.2 G/DL (ref 11.5–15.4)
LYMPHOCYTES # BLD AUTO: 1.27 THOUSAND/UL (ref 0.6–4.47)
LYMPHOCYTES # BLD AUTO: 23 % (ref 14–44)
MAGNESIUM SERPL-MCNC: 1.9 MG/DL (ref 1.9–2.7)
MCH RBC QN AUTO: 32.4 PG (ref 26.8–34.3)
MCHC RBC AUTO-ENTMCNC: 33.3 G/DL (ref 31.4–37.4)
MCV RBC AUTO: 97 FL (ref 82–98)
MONOCYTES # BLD AUTO: 0.11 THOUSAND/UL (ref 0–1.22)
MONOCYTES NFR BLD: 2 % (ref 4–12)
NEUTROPHILS # BLD MANUAL: 4.04 THOUSAND/UL (ref 1.85–7.62)
NEUTS BAND NFR BLD MANUAL: 3 % (ref 0–8)
NEUTS SEG NFR BLD AUTO: 70 % (ref 43–75)
PLATELET # BLD AUTO: 222 THOUSANDS/UL (ref 149–390)
PLATELET BLD QL SMEAR: ADEQUATE
PMV BLD AUTO: 10.3 FL (ref 8.9–12.7)
POTASSIUM SERPL-SCNC: 4.8 MMOL/L (ref 3.5–5.3)
PROT SERPL-MCNC: 7.8 G/DL (ref 6.4–8.4)
RBC # BLD AUTO: 4.07 MILLION/UL (ref 3.81–5.12)
SODIUM SERPL-SCNC: 135 MMOL/L (ref 135–147)
WBC # BLD AUTO: 5.54 THOUSAND/UL (ref 4.31–10.16)

## 2023-07-13 PROCEDURE — 97110 THERAPEUTIC EXERCISES: CPT

## 2023-07-13 PROCEDURE — 85007 BL SMEAR W/DIFF WBC COUNT: CPT

## 2023-07-13 PROCEDURE — 83735 ASSAY OF MAGNESIUM: CPT

## 2023-07-13 PROCEDURE — 97116 GAIT TRAINING THERAPY: CPT

## 2023-07-13 PROCEDURE — 80053 COMPREHEN METABOLIC PANEL: CPT

## 2023-07-13 PROCEDURE — 97530 THERAPEUTIC ACTIVITIES: CPT

## 2023-07-13 PROCEDURE — 85027 COMPLETE CBC AUTOMATED: CPT

## 2023-07-13 PROCEDURE — 97535 SELF CARE MNGMENT TRAINING: CPT

## 2023-07-13 PROCEDURE — 99232 SBSQ HOSP IP/OBS MODERATE 35: CPT | Performed by: FAMILY MEDICINE

## 2023-07-13 RX ORDER — POTASSIUM CHLORIDE 20 MEQ/1
40 TABLET, EXTENDED RELEASE ORAL ONCE
Status: COMPLETED | OUTPATIENT
Start: 2023-07-13 | End: 2023-07-13

## 2023-07-13 RX ORDER — METHYLPREDNISOLONE SODIUM SUCCINATE 125 MG/2ML
60 INJECTION, POWDER, LYOPHILIZED, FOR SOLUTION INTRAMUSCULAR; INTRAVENOUS ONCE
Status: COMPLETED | OUTPATIENT
Start: 2023-07-13 | End: 2023-07-13

## 2023-07-13 RX ORDER — ACETAMINOPHEN 325 MG/1
650 TABLET ORAL EVERY 6 HOURS PRN
Status: DISCONTINUED | OUTPATIENT
Start: 2023-07-13 | End: 2023-07-14

## 2023-07-13 RX ORDER — CEFUROXIME AXETIL 250 MG/1
500 TABLET ORAL EVERY 12 HOURS SCHEDULED
Status: DISCONTINUED | OUTPATIENT
Start: 2023-07-13 | End: 2023-07-15 | Stop reason: HOSPADM

## 2023-07-13 RX ADMIN — METHYLPREDNISOLONE SODIUM SUCCINATE 60 MG: 125 INJECTION, POWDER, FOR SOLUTION INTRAMUSCULAR; INTRAVENOUS at 10:06

## 2023-07-13 RX ADMIN — CEFUROXIME AXETIL 500 MG: 250 TABLET, FILM COATED ORAL at 21:10

## 2023-07-13 RX ADMIN — ACETAMINOPHEN 650 MG: 325 TABLET, FILM COATED ORAL at 21:10

## 2023-07-13 RX ADMIN — METOPROLOL TARTRATE 25 MG: 25 TABLET, FILM COATED ORAL at 08:28

## 2023-07-13 RX ADMIN — ACETAMINOPHEN 650 MG: 325 TABLET, FILM COATED ORAL at 10:58

## 2023-07-13 RX ADMIN — LISINOPRIL 10 MG: 10 TABLET ORAL at 08:28

## 2023-07-13 RX ADMIN — FENTANYL CITRATE 25 MCG: 50 INJECTION, SOLUTION INTRAMUSCULAR; INTRAVENOUS at 02:46

## 2023-07-13 RX ADMIN — ALPRAZOLAM 0.25 MG: 0.25 TABLET ORAL at 21:10

## 2023-07-13 RX ADMIN — FENTANYL CITRATE 25 MCG: 50 INJECTION, SOLUTION INTRAMUSCULAR; INTRAVENOUS at 17:15

## 2023-07-13 RX ADMIN — BUDESONIDE AND FORMOTEROL FUMARATE DIHYDRATE 2 PUFF: 160; 4.5 AEROSOL RESPIRATORY (INHALATION) at 17:10

## 2023-07-13 RX ADMIN — PRAVASTATIN SODIUM 40 MG: 40 TABLET ORAL at 16:05

## 2023-07-13 RX ADMIN — LEVOTHYROXINE SODIUM 100 MCG: 100 TABLET ORAL at 08:28

## 2023-07-13 RX ADMIN — BUDESONIDE AND FORMOTEROL FUMARATE DIHYDRATE 2 PUFF: 160; 4.5 AEROSOL RESPIRATORY (INHALATION) at 08:28

## 2023-07-13 RX ADMIN — CYCLOBENZAPRINE 10 MG: 10 TABLET, FILM COATED ORAL at 16:08

## 2023-07-13 RX ADMIN — Medication 800 MG: at 08:28

## 2023-07-13 RX ADMIN — PANTOPRAZOLE SODIUM 40 MG: 40 TABLET, DELAYED RELEASE ORAL at 08:28

## 2023-07-13 RX ADMIN — CEFUROXIME AXETIL 500 MG: 250 TABLET, FILM COATED ORAL at 10:08

## 2023-07-13 RX ADMIN — LISINOPRIL 10 MG: 10 TABLET ORAL at 17:15

## 2023-07-13 RX ADMIN — OXYCODONE HYDROCHLORIDE AND ACETAMINOPHEN 1 TABLET: 5; 325 TABLET ORAL at 13:52

## 2023-07-13 RX ADMIN — Medication 800 MG: at 17:15

## 2023-07-13 RX ADMIN — METOPROLOL TARTRATE 25 MG: 25 TABLET, FILM COATED ORAL at 17:15

## 2023-07-13 RX ADMIN — POTASSIUM CHLORIDE 40 MEQ: 1500 TABLET, EXTENDED RELEASE ORAL at 08:28

## 2023-07-13 RX ADMIN — PAROXETINE 30 MG: 20 TABLET, FILM COATED ORAL at 08:28

## 2023-07-13 NOTE — PROGRESS NOTES
[address]  Progress Note  Name: Bull Morfin  MRN: [id number]  Unit/Bed#: 700-68 I Date of Admission: 7/12/2023   Date of Service: 7/13/2023 I Hospital Day: 1    Assessment/Plan   * Right hip pain  Assessment & Plan  CT imaging along with Venous duplex do not show an acute fracture or DVT  Likely due to osteoarthritis and Froaminal narrowing noted on CT    S/p IV Solu-Medrol lv 60mg x 1 with improvement. We will continue today IV Solu-Medrol and transition tomorrow P.O     Pain control / muscle relaxants / PT/OT eval along with case mgmt for possible STR     Stage 3a chronic kidney disease [facility])  Assessment & Plan  Lab Results   Component Value Date    EGFR 49 07/12/2023    EGFR 61 02/15/2023    EGFR 46 04/08/2022    CREATININE 1.04 07/12/2023    CREATININE 0.87 02/15/2023    CREATININE 1.09 04/08/2022     Baseline creatinine is around 1    Depression, recurrent (HCC)  Assessment & Plan  paxil 30mg daily and PRN Xanax    Von Willebrand disease ([address])  Assessment & Plan  Will hold off on DVT prophylaxis and utilize SCD     Hypothyroidism  Assessment & Plan  Synthroid 100mcg daily     Primary hypertension  Assessment & Plan  Lisinopril 10 mg daily  Lopressor 25mg  Hold HCTZ  Blood pressure is controlled    Other hyperlipidemia  Assessment & Plan  Continue statin therapy       VTE Pharmacologic Prophylaxis:     Discussions with Specialists or Other Care Team Provider:     Education and Discussions with Family / Patient: Patient's friend    Time Spent for Care: 15 minutes. More than 50% of total time spent on counseling and coordination of care as described above.     Current Length of Stay: 1 day(s)    Current Patient Status: Inpatient   Certification Statement: The patient will continue to require additional inpatient hospital stay due to IV medication use    Discharge Plan: 24 hours if stable    Code Status: Level 1 - Full Code      Subjective:   " I feel much better compared to yesterday I am able to walk doubt severe pain."    Objective:     Vitals:   Temp (24hrs), Av.2 °F (36.8 °C), Min:98 °F (36.7 °C), Max:98.3 °F (36.8 °C)    Temp:  [98 °F (36.7 °C)-98.3 °F (36.8 °C)] 98 °F (36.7 °C)  HR:  [61-82] 66  Resp:  [16-21] 20  BP: (112-147)/(54-85) 128/67  SpO2:  [91 %-95 %] 94 %  Body mass index is 28.83 kg/m². Input and Output Summary (last 24 hours): Intake/Output Summary (Last 24 hours) at 2023 1123  Last data filed at 2023 0853  Gross per 24 hour   Intake 480 ml   Output --   Net 480 ml       Physical Exam:     Physical Exam  Vitals and nursing note reviewed. Constitutional:       General: She is not in acute distress. Appearance: She is well-developed. She is not toxic-appearing. HENT:      Head: Normocephalic and atraumatic. Eyes:      Conjunctiva/sclera: Conjunctivae normal.   Cardiovascular:      Rate and Rhythm: Normal rate and regular rhythm. Pulses: Normal pulses. Heart sounds: Normal heart sounds. No murmur heard. Pulmonary:      Effort: Pulmonary effort is normal. No respiratory distress. Breath sounds: Normal breath sounds. Musculoskeletal:         General: No swelling. Cervical back: Neck supple. Right lower leg: No edema. Left lower leg: No edema. Skin:     General: Skin is warm and dry. Capillary Refill: Capillary refill takes less than 2 seconds. Neurological:      General: No focal deficit present. Mental Status: She is alert and oriented to person, place, and time. Psychiatric:         Mood and Affect: Mood normal.         Thought Content:  Thought content normal.         Judgment: Judgment normal.           Additional Data:     Labs:    Results from last 7 days   Lab Units 23  0853 23  1604 23  0859   WBC Thousand/uL 5.54  --  3.66*   HEMOGLOBIN g/dL 13.2  --  12.9   HEMATOCRIT % 39.6  --  39.4   PLATELETS Thousands/uL 222   < > 205   BANDS PCT % 3  --   --    NEUTROS PCT %  --   --  47   LYMPHS PCT %  --   --  30   LYMPHO PCT % 23  --   --    MONOS PCT %  --   --  13*   MONO PCT % 2*  --   --    EOS PCT % 0  --  8*    < > = values in this interval not displayed. Results from last 7 days   Lab Units 07/13/23  0853   SODIUM mmol/L 135   POTASSIUM mmol/L 4.8   CHLORIDE mmol/L 101   CO2 mmol/L 25   BUN mg/dL 25   CREATININE mg/dL 0.85   ANION GAP mmol/L 9   CALCIUM mg/dL 10.1   ALBUMIN g/dL 4.3   TOTAL BILIRUBIN mg/dL 0.36   ALK PHOS U/L 63   ALT U/L 13   AST U/L 18   GLUCOSE RANDOM mg/dL 178*                           * I Have Reviewed All Lab Data Listed Above. * Additional Pertinent Lab Tests Reviewed: [facility] Admission Reviewed    Imaging:    CT pelvis wo contrast   Final Result      No acute findings to account for the patient's hip pain. No acute fracture. Lumbar, sacroiliac, and symphyseal degenerative changes. Workstation performed: [id number]         CT spine lumbar wo contrast   Final Result      No acute osseous abnormality of lumbar spine. Moderate-to-severe foraminal narrowing at right L3-L4. Degenerative changes, as detailed above. Additional chronic/incidental findings as detailed above. Workstation performed: [id number]         VAS lower limb venous duplex study, unilateral/limited   Final Result      XR knee 4+ views Right injury   ED Interpretation   Review x-ray of the right knee shows no acute fractures, osseous abnormalities, foreign bodies or dislocations. Final Result      No acute osseous abnormality. Workstation performed: [id number]         XR hips bilateral with ap pelvis 2 vw   ED Interpretation   No acute fracture seen on pelvic or x ray of b/l hips. Final Result      No acute osseous abnormality. Degenerative changes in the lower lumbar spine and in the symphysis pubis.          Workstation performed: [id number]         XR femur 2 views RIGHT   ED Interpretation Review x-ray of the right femur shows no acute fractures, osseous abnormalities or dislocations or foreign bodies. Patient does have some calcification of vasculature seen on x-ray imaging. Final Result      No acute osseous abnormality. Degenerative changes in the lower lumbar spine and in the symphysis pubis. Workstation performed: [id number]                 Imaging Personally Reviewed by Myself Includes:      Recent Cultures (last 7 days):           Last 24 Hours Medication List:   Current Facility-Administered Medications   Medication Dose Route Frequency Provider Last Rate   • acetaminophen  650 mg Oral Q6H PRN Michel Sutherland MD     • ALPRAZolam  0.25 mg Oral TID PRN Annamaria Terrell MD     • budesonide-formoterol  2 puff Inhalation BID Vane Parr MD     • cefuroxime  500 mg Oral Q12H [address] Vane Parr MD     • cyclobenzaprine  10 mg Oral TID PRN Vane Parr MD     • fentanyl citrate (PF)  25 mcg Intravenous Q4H PRN Vane Parr MD     • levothyroxine  100 mcg Oral Daily Vane Parr MD     • lisinopril  10 mg Oral BID Vane Parr MD     • magnesium Oxide  800 mg Oral BID Vane Parr MD     • metoprolol tartrate  25 mg Oral BID Vane Parr MD     • oxyCODONE-acetaminophen  1 tablet Oral Q6H PRN Vane Parr MD     • pantoprazole  40 mg Oral Daily Vane Parr MD     • PARoxetine  30 mg Oral Daily Vane Parr MD     • pravastatin  40 mg Oral Daily With Karol Ford MD          Today, Patient Was Seen By: Michel Sutherland MD    ** Please Note: Dictation voice to text software may have been used in the creation of this document.  **

## 2023-07-13 NOTE — PHYSICAL THERAPY NOTE
PHYSICAL THERAPY NOTE          Patient Name: Deloris Waldron  UHRVC'V Date: 7/13/2023 07/13/23 0809   PT Last Visit   PT Visit Date 07/13/23   Note Type   Note Type Treatment   Pain Assessment   Pain Assessment Tool 0-10   Pain Score 4   Pain Location/Orientation Orientation: Right;Location: Hip;Location: Leg   Restrictions/Precautions   Weight Bearing Precautions Per Order No   Other Precautions   (Multiple lines; Fall Risk; Pain)   General   Family/Caregiver Present No   Cognition   Overall Cognitive Status WFL   Arousal/Participation Alert   Orientation Level Oriented X4   Following Commands Follows all commands and directions without difficulty   Subjective   Subjective Reports pain has decreased and now able to ambulate. Declined stairs. Bed Mobility   Additional Comments OOB on BSC start session   Transfers   Sit to Stand 5  Supervision   Additional items Armrests; Increased time required   Stand to Sit 5  Supervision   Additional items Armrests; Increased time required;Verbal cues   Stand pivot 5  Supervision   Additional items Armrests; Increased time required;Verbal cues   Toilet transfer 5  Supervision   Additional items Increased time required;Commode   Additional Comments RW used. Able to manage clothing and hygiene   Ambulation/Elevation   Gait pattern   (Excessively slow; Short stride; Foward flexed; Decreased foot clearance; Narrow ASHISH;  Improper Weight shift)   Gait Assistance 5  Supervision   Additional items Verbal cues   Assistive Device Rolling walker   Distance 5' x 1, 35' x 1   Balance   Static Sitting Good   Dynamic Sitting Fair +   Static Standing Fair   Dynamic Standing Fair   Ambulatory Fair  (RW)   Endurance Deficit   Endurance Deficit Yes   Activity Tolerance   Activity Tolerance Patient limited by fatigue;Patient limited by pain   Assessment   Prognosis Good   Problem List   (Decreased strength; Decreased endurance; Decreased mobility; Decreased range of motion; Impaired balance; Pain)   Assessment Pt. seen for PT treatment session this date with interventions consisting of  Toilet transfers,  transfers and  gait training w/ emphasis on improving pt's ability to ambulate. Pt. Requiring minimal  cues for sequence and safety. In comparison to previous session, Pt. With improvements in activity tolerance. Pain has minimized. Pt tolerating ambulation with use of RW with no episodes LOB. Able to manage clothing and hygiene when toileting. Pt is in need of continued activity in PT to improve strength balance endurance mobility transfers and ambulation with return to maximize LOF. From PT/mobility standpoint, recommendation at time of d/c would be HHPT   in order to promote return to PLOF and independence. The patient's AM-PAC Basic Mobility Inpatient Short Form Raw Score is 21. A Raw score of greater than 16 suggests the patient may benefit from discharge to home. Please also refer to physical therapy recommendation for safe DC planning. Goals   LTG Expiration Date 07/26/23   PT Treatment Day 1   Plan   Treatment/Interventions   (Decreased strength; Decreased endurance; Decreased mobility; Decreased range of motion;  Impaired balance; Pain)   Progress Progressing toward goals   PT Frequency 3-5x/wk   Recommendation   PT Discharge Recommendation Post acute rehabilitation services   AM-PAC Basic Mobility Inpatient   Turning in Flat Bed Without Bedrails 4   Lying on Back to Sitting on Edge of Flat Bed Without Bedrails 3   Moving Bed to Chair 3   Standing Up From Chair Using Arms 4   Walk in Room 4   Climb 3-5 Stairs With Railing 3   Basic Mobility Inpatient Raw Score 21   Basic Mobility Standardized Score 45.55   Highest Level Of Mobility   JH-HLM Goal 6: Walk 10 steps or more   JH-HLM Achieved 7: Walk 25 feet or more   Education   Education Provided Mobility training   Patient Demonstrates verbal understanding   End of Consult   Patient Position at End of Consult Bedside chair; All needs within reach   End of Consult Comments discussed POC with PT

## 2023-07-13 NOTE — PLAN OF CARE
Problem: PAIN - ADULT  Goal: Verbalizes/displays adequate comfort level or baseline comfort level  Description: Interventions:  - Encourage patient to monitor pain and request assistance  - Assess pain using appropriate pain scale  - Administer analgesics based on type and severity of pain and evaluate response  - Implement non-pharmacological measures as appropriate and evaluate response  - Consider cultural and social influences on pain and pain management  - Notify physician/advanced practitioner if interventions unsuccessful or patient reports new pain  Outcome: Progressing     Problem: SAFETY ADULT  Goal: Patient will remain free of falls  Description: INTERVENTIONS:  - Educate patient/family on patient safety including physical limitations  - Instruct patient to call for assistance with activity   - Consult OT/PT to assist with strengthening/mobility   - Keep Call bell within reach  - Keep bed low and locked with side rails adjusted as appropriate  - Keep care items and personal belongings within reach  - Initiate and maintain comfort rounds  - Make Fall Risk Sign visible to staff  - Offer Toileting every 1-2 Hours, in advance of need  - Initiate/Maintain bed alarm  - Obtain necessary fall risk management equipment: call bell within reach  - Apply yellow socks and bracelet for high fall risk patients  - Consider moving patient to room near nurses station  Outcome: Progressing  Goal: Maintain or return to baseline ADL function  Description: INTERVENTIONS:  -  Assess patient's ability to carry out ADLs; assess patient's baseline for ADL function and identify physical deficits which impact ability to perform ADLs (bathing, care of mouth/teeth, toileting, grooming, dressing, etc.)  - Assess/evaluate cause of self-care deficits   - Assess range of motion  - Assess patient's mobility; develop plan if impaired  - Assess patient's need for assistive devices and provide as appropriate  - Encourage maximum independence but intervene and supervise when necessary  - Involve family in performance of ADLs  - Assess for home care needs following discharge   - Consider OT consult to assist with ADL evaluation and planning for discharge  - Provide patient education as appropriate  Outcome: Progressing  Goal: Maintains/Returns to pre admission functional level  Description: INTERVENTIONS:  - Perform BMAT or MOVE assessment daily.   - Set and communicate daily mobility goal to care team and patient/family/caregiver. - Collaborate with rehabilitation services on mobility goals if consulted  - Perform Range of Motion 3 times a day. - Reposition patient every 2 hours.   - Dangle patient 4 times a day  - Stand patient 3 times a day  - Ambulate patient 2-3 times a day  - Out of bed to chair 2-3 times a day   - Out of bed for meals 3 times a day  - Out of bed for toileting  - Record patient progress and toleration of activity level   Outcome: Progressing

## 2023-07-13 NOTE — CASE MANAGEMENT
Case Management Assessment & Discharge Planning Note    Patient name Kendra Rose /[id number] MRN [id number]  : 1940 Date 2023       Current Admission Date: 2023  Current Admission Diagnosis:Right hip pain   Patient Active Problem List    Diagnosis Date Noted   • Mild aortic stenosis 2023   • Stage 3a chronic kidney disease ([address]) 2023   • Pulmonary emphysema ([address]) 2021   • Acute bilateral low back pain without sciatica 2021   • Depression, recurrent ([address]) 2020   • Positive colorectal cancer screening using Cologuard test 2020   • Medicare annual wellness visit, subsequent 2019   • Left corneal abrasion 2019   • Irritable bowel syndrome with both constipation and diarrhea 07/10/2019   • BMI 28.0-28.9,adult 07/10/2019   • Generalized abdominal pain 2018   • Back pain, chronic 2017   • Hearing loss 2017   • Diarrhea 2017   • Right hip pain 2017   • Stool guaiac positive 2015   • Vitamin D deficiency 2015   • Diverticulosis 2013   • Von Willebrand disease ([address]) 2013   • Other hyperlipidemia 2013   • Esophagitis, reflux 12/10/2012   • Anxiety 10/09/2012   • Hypothyroidism 2012   • Generalized osteoarthritis of multiple sites 2012   • Primary hypertension 2012      LOS (days): 1  Geometric Mean LOS (GMLOS) (days): 2.70  Days to GMLOS:1.8     OBJECTIVE:    Risk of Unplanned Readmission Score: 8.72         Current admission status: Inpatient       Preferred Pharmacy:   [facility] [address] RICA DELACRUZ [address] [place] [address] [address]  Phone: [phone] Fax: [phone]    [address] [place] [address]  Phone: [phone] Fax: [phone]    [id number] [place] - [address] [place] [address]  Phone: [phone] Fax: [phone]    Primary Care Provider: Aline Snider DO    Primary Insurance: MEDICARE  Secondary Insurance: CIG[employer]    ASSESSMENT:  Joann Proxies    There are no active Health Care Proxies on file. Readmission Root Cause  30 Day Readmission: No    Patient Information  Admitted from[de-identified] Home  Mental Status: Alert  During Assessment patient was accompanied by: Not accompanied during assessment  Assessment information provided by[de-identified] Patient  Primary Caregiver: Self  Support Systems: Keisha Cuellar  [place] of Residence: [address] [place] [address] do you live in?: [address] entry access options.  Select all that apply.: Stairs  Number of steps to enter home.:  (Pt has 13 [address] to get into house.)  Do the steps have railings?: Yes  Type of Current Residence: Apartment  Upon entering residence, is there a bedroom on the main floor (no further steps)?: Yes  Upon entering residence, is there a bathroom on the main floor (no further steps)?: Yes  In the last 12 months, was there a time when you were not able to pay the mortgage or rent on time?: No  In the last 12 months, how many places have you lived?: 1  In the last 12 months, was there a time when you did not have a steady place to sleep or slept in a shelter (including now)?: No  Homeless/housing insecurity resource given?: N/A  Living Arrangements: Lives Alone  Is patient a ?: No    Activities of Daily Living Prior to Admission  Functional Status: Independent  Completes ADLs independently?: Yes  Ambulates independently?: Yes  Does patient use assisted devices?: No  Does patient currently own DME?: No  Does patient have a history of Outpatient Therapy (PT/OT)?: Yes ([facility])  Does the patient have a history of Short-Term Rehab?: No  Does patient have a history of HHC?: No  Does patient currently have [facility]?: No         Patient Information Continued  Income Source: Pension/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.)  Does patient have prescription coverage?: No  Within the past 12 months, you worried that your food would run out before you got the money to buy more.: Never true  Within the past 12 months, the food you bought just didn't last and you didn't have money to get more.: Never true  Food insecurity resource given?: N/A  Does patient receive dialysis treatments?: No  Does patient have a history of substance abuse?: No  Does patient have a history of Mental Health Diagnosis?: No         Means of Transportation  Means of Transport to Appts[de-identified] Drives Self  In the past 12 months, has lack of transportation kept you from medical appointments or from getting medications?: No  In the past 12 months, has lack of transportation kept you from meetings, work, or from getting things needed for daily living?: No  Was application for public transport provided?: N/A        DISCHARGE DETAILS:    Discharge planning discussed with[de-identified] Pt  Freedom of Choice: Yes     CM contacted family/caregiver?: No- see comments (Pt is alert and oriented x3)  Were Treatment Team discharge recommendations reviewed with patient/caregiver?: Yes  Did patient/caregiver verbalize understanding of patient care needs?: Yes  Were patient/caregiver advised of the risks associated with not following Treatment Team discharge recommendations?: Yes         [facility]         Is the patient interested in [facility] [address] at discharge?: Yes  [employer] requested[de-identified] Physical [facility] [address] [facility] Name[de-identified] Bertha Provider[de-identified] PCP  Home Health Services Needed[de-identified] Evaluate Functional Status and Safety, Gait/ADL Training  Homebound Criteria Met[de-identified] Uses an Assist Device (i.e. cane, walker, etc)  Supporting Clincal Findings[de-identified] Fatigues Easliy in Short Distances          PT is now saying that patient could benefit from Home care PT. PT wanted to have patient work on steps this afternoon and patient refused. Pt will need a walker on discharge. Pt is agreeable to me making a referral to [facility]. Pt is requesting that I set up ambulance transport tomorrow because she does not feel she will be able to get up the stairs .  I will continue to follow for any Case Management needs

## 2023-07-13 NOTE — PLAN OF CARE
Problem: PAIN - ADULT  Goal: Verbalizes/displays adequate comfort level or baseline comfort level  Description: Interventions:  - Encourage patient to monitor pain and request assistance  - Assess pain using appropriate pain scale  - Administer analgesics based on type and severity of pain and evaluate response  - Implement non-pharmacological measures as appropriate and evaluate response  - Consider cultural and social influences on pain and pain management  - Notify physician/advanced practitioner if interventions unsuccessful or patient reports new pain  Outcome: Progressing     Problem: SAFETY ADULT  Goal: Patient will remain free of falls  Description: INTERVENTIONS:  - Educate patient/family on patient safety including physical limitations  - Instruct patient to call for assistance with activity   - Consult OT/PT to assist with strengthening/mobility   - Keep Call bell within reach  - Keep bed low and locked with side rails adjusted as appropriate  - Keep care items and personal belongings within reach  - Initiate and maintain comfort rounds  - Make Fall Risk Sign visible to staff  - Offer Toileting every 1-2 Hours, in advance of need  - Initiate/Maintain bed alarm  - Obtain necessary fall risk management equipment: call bell within reach  - Apply yellow socks and bracelet for high fall risk patients  - Consider moving patient to room near nurses station  Outcome: Progressing  Goal: Maintain or return to baseline ADL function  Description: INTERVENTIONS:  -  Assess patient's ability to carry out ADLs; assess patient's baseline for ADL function and identify physical deficits which impact ability to perform ADLs (bathing, care of mouth/teeth, toileting, grooming, dressing, etc.)  - Assess/evaluate cause of self-care deficits   - Assess range of motion  - Assess patient's mobility; develop plan if impaired  - Assess patient's need for assistive devices and provide as appropriate  - Encourage maximum independence but intervene and supervise when necessary  - Involve family in performance of ADLs  - Assess for home care needs following discharge   - Consider OT consult to assist with ADL evaluation and planning for discharge  - Provide patient education as appropriate  Outcome: Progressing  Goal: Maintains/Returns to pre admission functional level  Description: INTERVENTIONS:  - Perform BMAT or MOVE assessment daily.   - Set and communicate daily mobility goal to care team and patient/family/caregiver. - Collaborate with rehabilitation services on mobility goals if consulted  - Perform Range of Motion 3 times a day. - Reposition patient every 2 hours.   - Dangle patient 4 times a day  - Stand patient 3 times a day  - Ambulate patient 2-3 times a day  - Out of bed to chair 2-3 times a day   - Out of bed for meals 3 times a day  - Out of bed for toileting  - Record patient progress and toleration of activity level   Outcome: Progressing     Problem: DISCHARGE PLANNING  Goal: Discharge to home or other facility with appropriate resources  Description: INTERVENTIONS:  - Identify barriers to discharge w/patient and caregiver  - Arrange for needed discharge resources and transportation as appropriate  - Identify discharge learning needs (meds, wound care, etc.)  - Arrange for interpretive services to assist at discharge as needed  - Refer to Case Management Department for coordinating discharge planning if the patient needs post-hospital services based on physician/advanced practitioner order or complex needs related to functional status, cognitive ability, or social support system  Outcome: Progressing     Problem: RESPIRATORY - ADULT  Goal: Achieves optimal ventilation and oxygenation  Description: INTERVENTIONS:  - Assess for changes in respiratory status  - Assess for changes in mentation and behavior  - Position to facilitate oxygenation and minimize respiratory effort  - Oxygen administered by appropriate delivery if ordered  - Initiate smoking cessation education as indicated  - Encourage broncho-pulmonary hygiene including cough, deep breathe, Incentive Spirometry  - Assess the need for suctioning and aspirate as needed  - Assess and instruct to report SOB or any respiratory difficulty  - Respiratory Therapy support as indicated  Outcome: Progressing     Problem: METABOLIC, FLUID AND ELECTROLYTES - ADULT  Goal: Electrolytes maintained within normal limits  Description: INTERVENTIONS:  - Monitor labs and assess patient for signs and symptoms of electrolyte imbalances  - Administer electrolyte replacement as ordered  - Monitor response to electrolyte replacements, including repeat lab results as appropriate  - Instruct patient on fluid and nutrition as appropriate  Outcome: Progressing     Problem: SKIN/TISSUE INTEGRITY - ADULT  Goal: Skin Integrity remains intact(Skin Breakdown Prevention)  Description: Assess:  -Perform Tristan assessment every shift  -Clean and moisturize skin every day  -Inspect skin when repositioning, toileting, and assisting with ADLS  -Assess extremities for adequate circulation and sensation     Bed Management:  -Have minimal linens on bed & keep smooth, unwrinkled  -Change linens as needed when moist or perspiring  -Avoid sitting or lying in one position for more than 2 hours while in bed  -Keep HOB at 30 degrees     Toileting:  -Offer bedside commode  -Assess for incontinence every 1-2  -Use incontinent care products after each incontinent episode such as foam cleanser    Activity:  -Mobilize patient 3 times a day  -Encourage activity and walks on unit  -Encourage or provide ROM exercises   -Turn and reposition patient every 1-2 Hours  -Use appropriate equipment to lift or move patient in bed  -Instruct/ Assist with weight shifting every 2 when out of bed in chair  -Consider limitation of chair time 2-3 hour intervals    Skin Care:  -Avoid use of baby powder, tape, friction and shearing, hot water or constrictive clothing  -Relieve pressure over bony prominences using pressure relief devices  -Do not massage red bony areas  Outcome: Progressing     Problem: MOBILITY - ADULT  Goal: Maintain or return to baseline ADL function  Description: INTERVENTIONS:  -  Assess patient's ability to carry out ADLs; assess patient's baseline for ADL function and identify physical deficits which impact ability to perform ADLs (bathing, care of mouth/teeth, toileting, grooming, dressing, etc.)  - Assess/evaluate cause of self-care deficits   - Assess range of motion  - Assess patient's mobility; develop plan if impaired  - Assess patient's need for assistive devices and provide as appropriate  - Encourage maximum independence but intervene and supervise when necessary  - Involve family in performance of ADLs  - Assess for home care needs following discharge   - Consider OT consult to assist with ADL evaluation and planning for discharge  - Provide patient education as appropriate  Outcome: Progressing  Goal: Maintains/Returns to pre admission functional level  Description: INTERVENTIONS:  - Perform BMAT or MOVE assessment daily.   - Set and communicate daily mobility goal to care team and patient/family/caregiver. - Collaborate with rehabilitation services on mobility goals if consulted  - Perform Range of Motion 3 times a day. - Reposition patient every 2 hours.   - Dangle patient 4 times a day  - Stand patient 3 times a day  - Ambulate patient 2-3 times a day  - Out of bed to chair 2-3 times a day   - Out of bed for meals 3 times a day  - Out of bed for toileting  - Record patient progress and toleration of activity level   Outcome: Progressing

## 2023-07-13 NOTE — PLAN OF CARE
Problem: PHYSICAL THERAPY ADULT  Goal: Performs mobility at highest level of function for planned discharge setting. See evaluation for individualized goals. Description: Treatment/Interventions: Functional transfer training, LE strengthening/ROM, Therapeutic exercise, Elevations, Endurance training, Bed mobility, Gait training          See flowsheet documentation for full assessment, interventions and recommendations. Outcome: Progressing  Note: Prognosis: Good  Problem List:  (Decreased strength; Decreased endurance; Decreased mobility; Decreased range of motion; Impaired balance; Pain)  Assessment: Pt. seen for PT treatment session this date with interventions consisting of  Toilet transfers,  transfers and  gait training w/ emphasis on improving pt's ability to ambulate. Pt. Requiring minimal  cues for sequence and safety. In comparison to previous session, Pt. With improvements in activity tolerance. Pain has minimized. Pt tolerating ambulation with use of RW with no episodes LOB. Able to manage clothing and hygiene when toileting. Pt is in need of continued activity in PT to improve strength balance endurance mobility transfers and ambulation with return to maximize LOF. From PT/mobility standpoint, recommendation at time of d/c would be HHPT   in order to promote return to PLOF and independence. The patient's AM-PAC Basic Mobility Inpatient Short Form Raw Score is 21. A Raw score of greater than 16 suggests the patient may benefit from discharge to home. Please also refer to physical therapy recommendation for safe DC planning. PT Discharge Recommendation: Post acute rehabilitation services    See flowsheet documentation for full assessment.

## 2023-07-13 NOTE — OCCUPATIONAL THERAPY NOTE
Occupational Therapy Progress Note     Patient Name: Kendra Vail  AEMWN'A Date: 7/13/2023  Problem List  Principal Problem:    Right hip pain  Active Problems:    Other hyperlipidemia    Primary hypertension    Hypothyroidism    Von Willebrand disease ([address])    Depression, recurrent ([address])    Stage 3a chronic kidney disease ([address])            07/13/23 1119   OT Last Visit   OT Visit Date 07/13/23   Note Type   Note Type Treatment   Pain Assessment   Pain Assessment Tool 0-10   Pain Score 3   Pain Location/Orientation Location: Head   Restrictions/Precautions   Weight Bearing Precautions Per Order No   Other Precautions Multiple lines; Fall Risk;Pain   ADL   Where Assessed Chair   Grooming Assistance 5  Supervision/Setup   Grooming Deficit Setup;Verbal cueing; Increased time to complete;Supervision/safety   UB Bathing Assistance 5  Supervision/Setup   UB Bathing Deficit Setup   LB Bathing Assistance 5  Supervision/Setup   LB Bathing Deficit Setup; Increased time to complete   UB Dressing Assistance 5  Supervision/Setup   UB Dressing Deficit Setup; Increased time to complete   LB Dressing Assistance 5  Supervision/Setup   LB Dressing Deficit Setup; Increased time to complete   Toileting Assistance  5  Supervision/Setup   Toileting Deficit Setup; Bedside commode   Transfers   Sit to Stand 5  Supervision   Additional items Increased time required;Verbal cues;Armrests   Stand to Sit 5  Supervision   Additional items Increased time required;Verbal cues;Armrests   Toilet transfer 5  Supervision   Additional items Verbal cues; Commode  (Use of RW)   Additional Comments Use of RW   Functional Mobility   Functional Mobility 5  Supervision   Additional Comments Pt completed standing balance/functional mobility around room and hallway with use of RW and S with no LOB throughout. Pt completed grooming tasks standing at sink with use of RW S and no LOB.    Additional items Rolling walker   Cognition   Overall Cognitive Status [facility] Arousal/Participation Alert   Attention Within functional limits   Orientation Level Oriented X4   Memory Within functional limits   Following Commands Follows all commands and directions without difficulty   Activity Tolerance   Activity Tolerance Patient tolerated treatment well   Assessment   Assessment Patient participated in Skilled OT session this date with interventions consisting of ADL re training with the use of correct body mechnaics and  therapeutic activities to: increase activity tolerance . Patient agreeable to OT treatment session, upon arrival patient was found seated OOB to Recliner. Sit to stand txfrs from garret chair S, toilet txfr S, toileting S, UE bathing Set up A, UE dressing Set up A, LB bathing S, LB dressing S, grooming S standing at sink with use of RW and S with no LBO throughout, standing balance/functional mobility around room and hallway with use of RW and S with no LOB throughout. Patient requiring verbal cues for safety and verbal cues for correct technique. Patient continues to be functioning below baseline level, occupational performance remains limited secondary to factors listed above and increased risk for falls and injury. From OT standpoint, recommendation at time of d/c would be Home with home health rehabilitation. The patient's raw score on the AM-PAC Daily Activity inpatient short form is 21, standardized score is 44.27, greater than 39.4. Patients at this level are likely to benefit from discharge to home. Please refer to the recommendation of the Occupational Therapist for safe discharge planning.    Plan   Goal Expiration Date 07/26/23   OT Treatment Day 1   OT Frequency 3-5x/wk   Recommendation   OT Discharge Recommendation Home with home health rehabilitation   [facility] Daily Activity Inpatient   Lower Body Dressing 3   Bathing 3   Toileting 3   Upper Body Dressing 4   Grooming 4   Eating 4   Daily Activity Raw Score 21   Daily Activity Standardized Score (Calc for Raw Score >=11) 44.27   AM-PAC Applied Cognition Inpatient   Following a Speech/Presentation 4   Understanding Ordinary Conversation 4   Taking Medications 4   Remembering Where Things Are Placed or Put Away 4   Remembering List of 4-5 Errands 4   Taking Care of Complicated Tasks 4   Applied Cognition Raw Score 24   Applied Cognition Standardized Score 62.21     Pt left seated in garret chair with chair alarm on and all needs in reach.

## 2023-07-13 NOTE — PLAN OF CARE
Problem: OCCUPATIONAL THERAPY ADULT  Goal: Performs self-care activities at highest level of function for planned discharge setting. See evaluation for individualized goals. Description: Treatment Interventions: ADL retraining, Functional transfer training, UE strengthening/ROM, Endurance training, Patient/family training, Equipment evaluation/education, Compensatory technique education, Activityengagement          See flowsheet documentation for full assessment, interventions and recommendations. Outcome: Progressing  Note: Limitation: Decreased ADL status, Decreased UE strength, Decreased Safe judgement during ADL, Decreased endurance, Decreased self-care trans, Decreased high-level ADLs     Assessment: Patient participated in Skilled OT session this date with interventions consisting of ADL re training with the use of correct body mechnaics and  therapeutic activities to: increase activity tolerance . Patient agreeable to OT treatment session, upon arrival patient was found seated OOB to Recliner. Sit to stand txfrs from garret chair S, toilet txfr S, toileting S, UE bathing Set up A, UE dressing Set up A, LB bathing S, LB dressing S, grooming S standing at sink with use of RW and S with no LBO throughout, standing balance/functional mobility around room and hallway with use of RW and S with no LOB throughout. Patient requiring verbal cues for safety and verbal cues for correct technique. Patient continues to be functioning below baseline level, occupational performance remains limited secondary to factors listed above and increased risk for falls and injury. From OT standpoint, recommendation at time of d/c would be Home with home health rehabilitation. The patient's raw score on the AM-PAC Daily Activity inpatient short form is 21, standardized score is 44.27, greater than 39.4. Patients at this level are likely to benefit from discharge to home.  Please refer to the recommendation of the Occupational Therapist for safe discharge planning.      OT Discharge Recommendation: Home with home health rehabilitation

## 2023-07-14 LAB
ANION GAP SERPL CALCULATED.3IONS-SCNC: 7 MMOL/L
BASOPHILS # BLD AUTO: 0.01 THOUSANDS/ÂΜL (ref 0–0.1)
BASOPHILS NFR BLD AUTO: 0 % (ref 0–1)
BUN SERPL-MCNC: 36 MG/DL (ref 5–25)
CALCIUM SERPL-MCNC: 9.8 MG/DL (ref 8.4–10.2)
CHLORIDE SERPL-SCNC: 105 MMOL/L (ref 96–108)
CHOLEST SERPL-MCNC: 188 MG/DL
CO2 SERPL-SCNC: 25 MMOL/L (ref 21–32)
CREAT SERPL-MCNC: 0.92 MG/DL (ref 0.6–1.3)
DME PARACHUTE DELIVERY DATE REQUESTED: NORMAL
DME PARACHUTE ITEM DESCRIPTION: NORMAL
DME PARACHUTE ORDER STATUS: NORMAL
DME PARACHUTE SUPPLIER NAME: NORMAL
DME PARACHUTE SUPPLIER PHONE: NORMAL
EOSINOPHIL # BLD AUTO: 0.01 THOUSAND/ÂΜL (ref 0–0.61)
EOSINOPHIL NFR BLD AUTO: 0 % (ref 0–6)
ERYTHROCYTE [DISTWIDTH] IN BLOOD BY AUTOMATED COUNT: 13.8 % (ref 11.6–15.1)
GFR SERPL CREATININE-BSD FRML MDRD: 56 ML/MIN/1.73SQ M
GLUCOSE SERPL-MCNC: 90 MG/DL (ref 65–140)
HCT VFR BLD AUTO: 39.7 % (ref 34.8–46.1)
HDLC SERPL-MCNC: 45 MG/DL
HGB BLD-MCNC: 12.9 G/DL (ref 11.5–15.4)
IMM GRANULOCYTES # BLD AUTO: 0.05 THOUSAND/UL (ref 0–0.2)
IMM GRANULOCYTES NFR BLD AUTO: 1 % (ref 0–2)
LDLC SERPL CALC-MCNC: 92 MG/DL (ref 0–100)
LYMPHOCYTES # BLD AUTO: 2.26 THOUSANDS/ÂΜL (ref 0.6–4.47)
LYMPHOCYTES NFR BLD AUTO: 22 % (ref 14–44)
MCH RBC QN AUTO: 32.1 PG (ref 26.8–34.3)
MCHC RBC AUTO-ENTMCNC: 32.5 G/DL (ref 31.4–37.4)
MCV RBC AUTO: 99 FL (ref 82–98)
MONOCYTES # BLD AUTO: 0.88 THOUSAND/ÂΜL (ref 0.17–1.22)
MONOCYTES NFR BLD AUTO: 9 % (ref 4–12)
NEUTROPHILS # BLD AUTO: 7.05 THOUSANDS/ÂΜL (ref 1.85–7.62)
NEUTS SEG NFR BLD AUTO: 68 % (ref 43–75)
NRBC BLD AUTO-RTO: 0 /100 WBCS
PLATELET # BLD AUTO: 227 THOUSANDS/UL (ref 149–390)
PMV BLD AUTO: 10.5 FL (ref 8.9–12.7)
POTASSIUM SERPL-SCNC: 4.8 MMOL/L (ref 3.5–5.3)
RBC # BLD AUTO: 4.02 MILLION/UL (ref 3.81–5.12)
SODIUM SERPL-SCNC: 137 MMOL/L (ref 135–147)
TRIGL SERPL-MCNC: 254 MG/DL
WBC # BLD AUTO: 10.26 THOUSAND/UL (ref 4.31–10.16)

## 2023-07-14 PROCEDURE — 80048 BASIC METABOLIC PNL TOTAL CA: CPT

## 2023-07-14 PROCEDURE — 80061 LIPID PANEL: CPT | Performed by: FAMILY MEDICINE

## 2023-07-14 PROCEDURE — 85025 COMPLETE CBC W/AUTO DIFF WBC: CPT

## 2023-07-14 PROCEDURE — 97116 GAIT TRAINING THERAPY: CPT

## 2023-07-14 PROCEDURE — 99231 SBSQ HOSP IP/OBS SF/LOW 25: CPT | Performed by: FAMILY MEDICINE

## 2023-07-14 PROCEDURE — 97535 SELF CARE MNGMENT TRAINING: CPT

## 2023-07-14 RX ORDER — METHOCARBAMOL 500 MG/1
500 TABLET, FILM COATED ORAL EVERY 6 HOURS PRN
Status: DISCONTINUED | OUTPATIENT
Start: 2023-07-14 | End: 2023-07-15 | Stop reason: HOSPADM

## 2023-07-14 RX ORDER — ALPRAZOLAM 0.25 MG/1
0.25 TABLET ORAL ONCE
Status: COMPLETED | OUTPATIENT
Start: 2023-07-14 | End: 2023-07-14

## 2023-07-14 RX ORDER — PREDNISONE 20 MG/1
40 TABLET ORAL DAILY
Status: DISCONTINUED | OUTPATIENT
Start: 2023-07-14 | End: 2023-07-15 | Stop reason: HOSPADM

## 2023-07-14 RX ORDER — ALPRAZOLAM 0.5 MG/1
0.5 TABLET ORAL
Status: DISCONTINUED | OUTPATIENT
Start: 2023-07-14 | End: 2023-07-15 | Stop reason: HOSPADM

## 2023-07-14 RX ORDER — HYDROMORPHONE HYDROCHLORIDE 2 MG/1
2 TABLET ORAL EVERY 4 HOURS PRN
Status: DISCONTINUED | OUTPATIENT
Start: 2023-07-14 | End: 2023-07-15 | Stop reason: HOSPADM

## 2023-07-14 RX ORDER — ALPRAZOLAM 0.25 MG/1
0.25 TABLET ORAL
Status: DISCONTINUED | OUTPATIENT
Start: 2023-07-14 | End: 2023-07-14

## 2023-07-14 RX ORDER — ACETAMINOPHEN 325 MG/1
975 TABLET ORAL EVERY 8 HOURS SCHEDULED
Status: DISCONTINUED | OUTPATIENT
Start: 2023-07-14 | End: 2023-07-15 | Stop reason: HOSPADM

## 2023-07-14 RX ADMIN — METOPROLOL TARTRATE 25 MG: 25 TABLET, FILM COATED ORAL at 17:20

## 2023-07-14 RX ADMIN — LISINOPRIL 10 MG: 10 TABLET ORAL at 08:24

## 2023-07-14 RX ADMIN — PANTOPRAZOLE SODIUM 40 MG: 40 TABLET, DELAYED RELEASE ORAL at 08:24

## 2023-07-14 RX ADMIN — ACETAMINOPHEN 975 MG: 325 TABLET, FILM COATED ORAL at 10:56

## 2023-07-14 RX ADMIN — PRAVASTATIN SODIUM 40 MG: 40 TABLET ORAL at 17:20

## 2023-07-14 RX ADMIN — LEVOTHYROXINE SODIUM 100 MCG: 100 TABLET ORAL at 08:24

## 2023-07-14 RX ADMIN — BUDESONIDE AND FORMOTEROL FUMARATE DIHYDRATE 2 PUFF: 160; 4.5 AEROSOL RESPIRATORY (INHALATION) at 08:25

## 2023-07-14 RX ADMIN — ALPRAZOLAM 0.5 MG: 0.5 TABLET ORAL at 21:16

## 2023-07-14 RX ADMIN — FENTANYL CITRATE 25 MCG: 50 INJECTION, SOLUTION INTRAMUSCULAR; INTRAVENOUS at 10:33

## 2023-07-14 RX ADMIN — METOPROLOL TARTRATE 25 MG: 25 TABLET, FILM COATED ORAL at 08:24

## 2023-07-14 RX ADMIN — Medication 800 MG: at 08:24

## 2023-07-14 RX ADMIN — ALPRAZOLAM 0.25 MG: 0.25 TABLET ORAL at 02:06

## 2023-07-14 RX ADMIN — PAROXETINE 30 MG: 20 TABLET, FILM COATED ORAL at 08:24

## 2023-07-14 RX ADMIN — METHOCARBAMOL TABLETS 500 MG: 500 TABLET, COATED ORAL at 23:42

## 2023-07-14 RX ADMIN — Medication 800 MG: at 17:19

## 2023-07-14 RX ADMIN — BUDESONIDE AND FORMOTEROL FUMARATE DIHYDRATE 2 PUFF: 160; 4.5 AEROSOL RESPIRATORY (INHALATION) at 17:19

## 2023-07-14 RX ADMIN — CEFUROXIME AXETIL 500 MG: 250 TABLET, FILM COATED ORAL at 21:16

## 2023-07-14 RX ADMIN — ACETAMINOPHEN 975 MG: 325 TABLET, FILM COATED ORAL at 21:16

## 2023-07-14 RX ADMIN — PREDNISONE 40 MG: 20 TABLET ORAL at 10:56

## 2023-07-14 RX ADMIN — CEFUROXIME AXETIL 500 MG: 250 TABLET, FILM COATED ORAL at 08:24

## 2023-07-14 RX ADMIN — LISINOPRIL 10 MG: 10 TABLET ORAL at 17:20

## 2023-07-14 NOTE — CASE MANAGEMENT
Case Management Discharge Planning Note    Patient name Mikey Liao  Location /[id number] MRN [id number]  : 1940 Date 2023       Current Admission Date: 2023  Current Admission Diagnosis:Right hip pain   Patient Active Problem List    Diagnosis Date Noted   • Mild aortic stenosis 2023   • Stage 3a chronic kidney disease ([address]) 2023   • Pulmonary emphysema ([address]) 2021   • Acute bilateral low back pain without sciatica 2021   • Depression, recurrent ([address]) 2020   • Positive colorectal cancer screening using Cologuard test 2020   • Medicare annual wellness visit, subsequent 2019   • Left corneal abrasion 2019   • Irritable bowel syndrome with both constipation and diarrhea 07/10/2019   • BMI 28.0-28.9,adult 07/10/2019   • Generalized abdominal pain 2018   • Back pain, chronic 2017   • Hearing loss 2017   • Diarrhea 2017   • Right hip pain 2017   • Stool guaiac positive 2015   • Vitamin D deficiency 2015   • Diverticulosis 2013   • Von Willebrand disease ([address]) 2013   • Other hyperlipidemia 2013   • Esophagitis, reflux 12/10/2012   • Anxiety 10/09/2012   • Hypothyroidism 2012   • Generalized osteoarthritis of multiple sites 2012   • Primary hypertension 2012      LOS (days): 2  Geometric Mean LOS (GMLOS) (days): 2.70  Days to GMLOS:0.7     OBJECTIVE:  Risk of Unplanned Readmission Score: 9.75         Current admission status: Inpatient   Preferred Pharmacy:   [facility] DR RICA DELACRUZ [address] [place] [address]  Phone: [phone] Fax: [phone]    [facility] [address] [place] [address]  Phone: [phone] Fax: [phone]    [id number] [id number] [place] [address] - [address] [place] [address]  [address]  [address] [place] [address]  Phone: [phone] Fax: [phone]    Primary Care Provider: Alina Up DO    Primary Insurance: MEDICARE  Secondary Insurance: CORNEL    DISCHARGE DETAILS:     Pt is being discharged tomorrow. Pt is going to need nursing, PT and OT. Pt is agreeable to same. Pt is agreeable to me making a referral to [facility] care. Referral made as requested. [facility] home care accepted the patient. Pt will need a walker on discharge. Referral made to [facility]. [facility].

## 2023-07-14 NOTE — ASSESSMENT & PLAN NOTE
CT imaging along with Venous duplex do not show an acute fracture or DVT  Likely due to osteoarthritis and Froaminal narrowing noted on CT    S/p IV Solu-Medrol lv 60mg x 2 with improvement transition to p.o. steroids today  Will go home with taper dose     Pain control / muscle relaxants  Appreciate PT/OT recommendations.   Patient needs acute rehab upon discharge, unfortunately at this time she is denying

## 2023-07-14 NOTE — PROGRESS NOTES
-- Patient:  -- MRN: [id number]  -- Aidin Request ID: [id number]  -- Level of care reserved: Jake Webb  -- Partner Reserved: SALOMON BRIGGS of [facility], [address] [facility] [phone]  -- Clinical needs requested:  -- Geography searched: [phone]  -- Start of Service:  -- Request sent: 1:36pm EDT on 7/14/2023 by Geoff Bar  -- Partner reserved: 2:33pm EDT on 7/14/2023 by Geoff Bar  -- Choice list shared: 2:32pm EDT on 7/14/2023 by Geoff Bar

## 2023-07-14 NOTE — ASSESSMENT & PLAN NOTE
CT imaging along with Venous duplex do not show an acute fracture or DVT  Likely due to osteoarthritis and Froaminal narrowing noted on CT    S/p IV Solu-Medrol lv 60mg x 2 with improvement transition to p.o. steroids today  Will go home with taper dose tomorrow     Pain control / muscle relaxants  Appreciated PT/OT recommendations.   Patient needs acute rehab upon discharge, unfortunately at this time she is denying

## 2023-07-14 NOTE — OCCUPATIONAL THERAPY NOTE
Occupational Therapy         Patient Name: Hemalatha CASTRO Date: 7/14/2023 07/14/23 1032   OT Last Visit   OT Visit Date 07/14/23   Note Type   Note Type Treatment   Pain Assessment   Pain Assessment Tool 0-10   Pain Score 8   Pain Location/Orientation Orientation: Right;Location: Hip;Location: Foot   Restrictions/Precautions   Weight Bearing Precautions Per Order No   Other Precautions Fall Risk;Pain   ADL   Where Assessed Commode   Grooming Assistance 5  Supervision/Setup   Grooming Deficit Supervision/safety;Standing with assistive device;Brushing hair;Teeth care   Grooming Comments Pt stands at sink with RW nearby for support in stance; stands x 6-7 min with no LOB nor fatigue noted. UB Bathing Assistance 5  Supervision/Setup   UB Bathing Deficit Supervision/safety; Increased time to complete   LB Bathing Assistance 5  Supervision/Setup   LB Bathing Deficit Supervision/safety; Increased time to complete  (RW for support in stance)   UB Dressing Assistance 5  Supervision/Setup   UB Dressing Deficit Increased time to complete   LB Dressing Assistance 5  Supervision/Setup   LB Dressing Deficit Don/doff R sock; Don/doff L sock; Thread RLE into underwear; Thread LLE into underwear;Pull up over hips; Increased time to complete   Toileting Assistance  5  Supervision/Setup   Toileting Deficit Increased time to complete; Bedside commode   Bed Mobility   Supine to Sit 5  Supervision   Additional items Verbal cues   Sit to Supine 5  Supervision   Additional items Verbal cues   Additional Comments Pt requests to return to bed at end of session due to pain   Transfers   Sit to Stand 5  Supervision   Additional items Verbal cues; Increased time required   Stand to Sit 5  Supervision   Additional items Increased time required;Verbal cues   Stand pivot 5  Supervision   Additional items Verbal cues; Increased time required   Additional Comments RW used   Functional Mobility   Functional Mobility 5  Supervision Additional Comments Pt ambulates x household distances within her room with EVE HANDLEY for safety. Additional items Rolling walker   Subjective   Subjective "i just need another day of relaxation here."   Cognition   Overall Cognitive Status [facility]   Arousal/Participation Alert; Cooperative   Attention Within functional limits   Orientation Level Oriented X4   Memory Within functional limits   Following Commands Follows all commands and directions without difficulty   Activity Tolerance   Activity Tolerance Patient limited by pain   Assessment   Assessment Patient participated in Skilled OT session this date with interventions consisting of ADL re training with the use of correct body mechnaics, Energy Conservation techniques, Work simplification skills , safety awareness and fall prevention techniques and  therapeutic activities to: increase activity tolerance . Patient agreeable to OT treatment session, upon arrival patient was found supine in bed. Patient requiring verbal cues for safety and frequent rest periods. Patient continues to be functioning below baseline level, occupational performance remains limited secondary to factors listed above and increased risk for falls and injury. The patient's raw score on the AM-PAC Daily Activity Inpatient Short Form is 21. A raw score of greater than or equal to 19 suggests the patient may benefit from discharge to home. Please refer to the recommendation of the Occupational Therapist for safe discharge planning. From OT standpoint, recommendation at time of d/c would be Home with home health rehabilitation. Plan   Treatment Interventions ADL retraining;Functional transfer training; Endurance training;Energy conservation   Goal Expiration Date 07/26/23   OT Treatment Day 2   OT Frequency 3-5x/wk   Recommendation   OT Discharge Recommendation Home with home health rehabilitation   [facility] Daily Activity Inpatient   Lower Body Dressing 3   Bathing 3   Toileting 3   Upper Body Dressing 4   Grooming 4   Eating 4   Daily Activity Raw Score 21   Daily Activity Standardized Score (Calc for Raw Score >=11) 44.27   AM-PAC Applied Cognition Inpatient   Following a Speech/Presentation 4   Understanding Ordinary Conversation 4   Taking Medications 4   Remembering Where Things Are Placed or Put Away 4   Remembering List of 4-5 Errands 4   Taking Care of Complicated Tasks 4   Applied Cognition Raw Score 24   Applied Cognition Standardized Score 62.21     Pt will benefit from continued OT services in order to maximize (I) c ADL performance, FM c RW, and improve overall endurance/strength required to complete functional tasks in preparation for d/c. Pt left seated in chair at end of session; all needs within reach; all lines intact.     [facility]

## 2023-07-14 NOTE — PLAN OF CARE
Problem: PHYSICAL THERAPY ADULT  Goal: Performs mobility at highest level of function for planned discharge setting. See evaluation for individualized goals. Description: Treatment/Interventions: Functional transfer training, LE strengthening/ROM, Therapeutic exercise, Elevations, Endurance training, Bed mobility, Gait training          See flowsheet documentation for full assessment, interventions and recommendations. Outcome: Progressing  Note: Prognosis: Good  Problem List: Decreased strength, Decreased endurance, Impaired balance, Decreased mobility, Decreased range of motion, Pain  Assessment: Pt seen for PT treatment session this date with interventions consisting of gait training w/ emphasis on improving pt's ability to ambulate level surfaces x 75' + 10' x 2 with close S provided by therapist with RW and navigating 13 stairs w/ B/L handrail with step to pattern with close S. Pt agreeable to PT treatment session upon arrival, pt found supine in bed w/ HOB elevated, in no apparent distress. In comparison to previous session, pt with significant improvements in pt able to ambulate increased distance and negotiate 13 steps with BHR and supervision . Post session: pt returned BTB, all needs in reach, and RN notified of session findings/recommendations Continue to recommend Home PT at time of d/c in order to maximize pt's functional independence and safety w/ mobility. Pt continues to be functioning below baseline level, and remains limited 2* factors listed above and including decreased strength, balance, mobility, activity tolerance, and pain. PT will continue to see pt while here in order to address the deficits listed above and provide interventions consistent w/ POC in effort to achieve STGs.   Barriers to Discharge: Decreased caregiver support  Barriers to Discharge Comments: Pt lives alone but has family support PRN  PT Discharge Recommendation: Home with home health rehabilitation    See flowsheet documentation for full assessment.

## 2023-07-14 NOTE — ASSESSMENT & PLAN NOTE
Lab Results   Component Value Date    EGFR 56 07/14/2023    EGFR 62 07/13/2023    EGFR 49 07/12/2023    CREATININE 0.92 07/14/2023    CREATININE 0.85 07/13/2023    CREATININE 1.04 07/12/2023     Baseline creatinine is around 1

## 2023-07-14 NOTE — CASE MANAGEMENT
Case Management Discharge Planning Note    Patient name Ayleen Denis  Location /010-42 MRN [id number]  : 1940 Date 2023       Current Admission Date: 2023  Current Admission Diagnosis:Right hip pain   Patient Active Problem List    Diagnosis Date Noted   • Mild aortic stenosis 2023   • Stage 3a chronic kidney disease ([address]) 2023   • Pulmonary emphysema ([address]) 2021   • Acute bilateral low back pain without sciatica 2021   • Depression, recurrent ([address]) 2020   • Positive colorectal cancer screening using Cologuard test 2020   • Medicare annual wellness visit, subsequent 2019   • Left corneal abrasion 2019   • Irritable bowel syndrome with both constipation and diarrhea 07/10/2019   • BMI 28.0-28.9,adult 07/10/2019   • Generalized abdominal pain 2018   • Back pain, chronic 2017   • Hearing loss 2017   • Diarrhea 2017   • Right hip pain 2017   • Stool guaiac positive 2015   • Vitamin D deficiency 2015   • Diverticulosis 2013   • Von Willebrand disease ([address]) 2013   • Other hyperlipidemia 2013   • Esophagitis, reflux 12/10/2012   • Anxiety 10/09/2012   • Hypothyroidism 2012   • Generalized osteoarthritis of multiple sites 2012   • Primary hypertension 2012      LOS (days): 2  Geometric Mean LOS (GMLOS) (days): 2.70  Days to GMLOS:0.7     OBJECTIVE:  Risk of Unplanned Readmission Score: 9.75         Current admission status: Inpatient   Preferred Pharmacy:   [facility] [address] RICA DELACRUZ [address] [place] [address]  Phone: [phone] Fax: [phone]    [place] [address]  Phone: [phone] Fax: [phone]    [facility] #[id number]- [address] [place] [address] - [address] [place] [address]  [address]  [address] [place] [address]  Phone: [phone] Fax: [phone]    Primary Care Provider: Isabelle Morales DO    Primary Insurance: MEDICARE  Secondary Insurance: [employer]    DISCHARGE DETAILS:     Pt has a copay of $98.65 for walker . Pt is agreeable to paying copay for walker.  PT will give walker to patient in AM.

## 2023-07-14 NOTE — PHYSICAL THERAPY NOTE
PHYSICAL THERAPY TREATMENT NOTE  NAME:  Hossein Lozano  DATE: 07/14/23    Length Of Stay: 2  Performed at least 2 patient identifiers during session: Name and Birthday  Treatment time: 7814-2132  Treatment length: 23 min       07/14/23 1317   Note Type   Note Type Treatment   Pain Assessment   Pain Assessment Tool FLACC   Pain Location/Orientation Orientation: Right;Location: Hip   Pain Rating: FLACC (Rest) - Face 0   Pain Rating: FLACC (Rest) - Legs 0   Pain Rating: FLACC (Rest) - Activity 0   Pain Rating: FLACC (Rest) - Cry 0   Pain Rating: FLACC (Rest) - Consolability 0   Score: FLACC (Rest) 0   Pain Rating: FLACC (Activity) - Face 1   Pain Rating: FLACC (Activity) - Legs 1   Pain Rating: FLACC (Activity) - Activity 1   Pain Rating: FLACC (Activity) - Cry 1   Pain Rating: FLACC (Activity) - Consolability 1   Score: FLACC (Activity) 5   Restrictions/Precautions   Other Precautions Fall Risk;Pain   General   Chart Reviewed Yes   Response to Previous Treatment Patient with no complaints from previous session. Cognition   Overall Cognitive Status WFL   Orientation Level Oriented X4   Following Commands Follows all commands and directions without difficulty   Bed Mobility   Supine to Sit 6  Modified independent   Additional items HOB elevated; Increased time required   Sit to Supine 6  Modified independent   Additional items HOB elevated; Increased time required   Additional Comments HOB slightly elevated   Transfers   Sit to Stand 5  Supervision   Additional items Increased time required;Verbal cues   Stand to Sit 5  Supervision   Additional items Increased time required;Verbal cues   Stand pivot 5  Supervision   Additional items Increased time required;Verbal cues   Additional Comments with RW, supervision for safety with VC for hand placement   Ambulation/Elevation   Gait pattern Improper Weight shift; Antalgic;Decreased foot clearance;Decreased R stance; Foward flexed; Short stride; Excessively slow   Gait Assistance 5  Supervision   Additional items Verbal cues   Assistive Device Rolling walker   Distance 10' x 2 + 76' with RW and supervision for safety, pt limited by pain/fatigue   Stair Management Assistance 5  Supervision   Additional items Verbal cues   Stair Management Technique Two rails; Step to pattern; Foreward   Number of Stairs 13   Balance   Static Sitting Normal   Dynamic Sitting Good   Static Standing Fair +   Dynamic Standing Fair   Ambulatory Fair   Endurance Deficit   Endurance Deficit Yes   Endurance Deficit Description fatigue   Activity Tolerance   Activity Tolerance Patient limited by fatigue   Assessment   Prognosis Good   Problem List Decreased strength;Decreased endurance; Impaired balance;Decreased mobility; Decreased range of motion;Pain   Barriers to Discharge Decreased caregiver support   Barriers to Discharge Comments Pt lives alone but has family support PRN   Goals   PT Treatment Day 2   Plan   Treatment/Interventions Functional transfer training;LE strengthening/ROM; Elevations; Therapeutic exercise; Endurance training;Patient/family training;Equipment eval/education; Bed mobility;Gait training; Compensatory technique education;Spoke to nursing   Progress Progressing toward goals   PT Frequency 3-5x/wk   Recommendation   PT Discharge Recommendation Home with home health rehabilitation   Equipment Recommended [address] walker   Change/add to [employer]?  No   AM-PAC Basic Mobility Inpatient   Turning in Flat Bed Without Bedrails 4   Lying on Back to Sitting on Edge of Flat Bed Without Bedrails 4   Moving Bed to Chair 3   Standing Up From Chair Using Arms 3   Walk in Room 3   Climb 3-5 Stairs With Railing 3   Basic Mobility Inpatient Raw Score 20   Basic Mobility Standardized Score 43.99   Highest Level Of Mobility   JH-HLM Goal 6: Walk 10 steps or more   JH-HLM Achieved 7: Walk 25 feet or more   Education   Education Provided Mobility training   Patient Demonstrates acceptance/verbal understanding   End of Consult   Patient Position at End of Consult Supine; All needs within reach     The patient's AM-PAC Basic Mobility Inpatient Short Form Raw Score is 20. A Raw score of greater than 16 suggests the patient may benefit from discharge to home. Please also refer to the recommendation of the Physical Therapist for safe discharge planning. Assessment: Pt seen for PT treatment session this date with interventions consisting of gait training w/ emphasis on improving pt's ability to ambulate level surfaces x 75' + 10' x 2 with close S provided by therapist with RW and navigating 13 stairs w/ B/L handrail with step to pattern with close S. Pt agreeable to PT treatment session upon arrival, pt found supine in bed w/ HOB elevated, in no apparent distress. In comparison to previous session, pt with significant improvements in pt able to ambulate increased distance and negotiate 13 steps with BHR and supervision . Post session: pt returned BTB, all needs in reach, and RN notified of session findings/recommendations Continue to recommend Home PT at time of d/c in order to maximize pt's functional independence and safety w/ mobility. Pt continues to be functioning below baseline level, and remains limited 2* factors listed above and including decreased strength, balance, mobility, activity tolerance, and pain. PT will continue to see pt while here in order to address the deficits listed above and provide interventions consistent w/ POC in effort to achieve STGs.      Lisa Rooney, PT,DPT

## 2023-07-14 NOTE — ASSESSMENT & PLAN NOTE
Lab Results   Component Value Date    EGFR 56 07/14/2023    EGFR 62 07/13/2023    EGFR 49 07/12/2023    CREATININE 0.92 07/14/2023    CREATININE 0.85 07/13/2023    CREATININE 1.04 07/12/2023     Baseline creatinine is around 1 Additional Notes: Patient consent was obtained to proceed with the visit and recommended plan of care after discussion of all risks and benefits, including the risks of COVID-19 exposure. Detail Level: Simple

## 2023-07-14 NOTE — PROGRESS NOTES
[address]  Progress Note  Name: Elmer Hinton  MRN: [id number]  Unit/Bed#: 030-43 I Date of Admission: 7/12/2023   Date of Service: 7/14/2023 I Hospital Day: 2    Assessment/Plan   * Right hip pain  Assessment & Plan  CT imaging along with Venous duplex do not show an acute fracture or DVT  Likely due to osteoarthritis and Froaminal narrowing noted on CT    S/p IV Solu-Medrol lv 60mg x 2 with improvement transition to p.o. steroids today  Will go home with taper dose tomorrow     Pain control / muscle relaxants  Appreciated PT/OT recommendations. Patient needs acute rehab upon discharge, unfortunately at this time she is denying    Stage 3a chronic kidney disease [facility])  Assessment & Plan  Lab Results   Component Value Date    EGFR 56 07/14/2023    EGFR 62 07/13/2023    EGFR 49 07/12/2023    CREATININE 0.92 07/14/2023    CREATININE 0.85 07/13/2023    CREATININE 1.04 07/12/2023     Baseline creatinine is around 1    Depression, recurrent (HCC)  Assessment & Plan  paxil 30mg daily and PRN Xanax    Von Willebrand disease ([address])  Assessment & Plan  Will hold off on DVT prophylaxis and utilize SCD     Hypothyroidism  Assessment & Plan  Synthroid 100mcg daily     Primary hypertension  Assessment & Plan  Lisinopril 10 mg daily  Lopressor 25mg  Hold HCTZ  Blood pressure is controlled    Other hyperlipidemia  Assessment & Plan  Continue statin therapy             Discussions with Specialists or Other Care Team Provider: Physical therapy, Occupational Therapy    Education and Discussions with Family / Patient: Patient    Time Spent for Care: 20 minutes. More than 50% of total time spent on counseling and coordination of care as described above.     Current Length of Stay: 2 day(s)    Current Patient Status: Inpatient   Certification Statement: The patient will continue to require additional inpatient hospital stay due to Close  monitoring of patient pain    Discharge Plan: tomorrow  if stable    Code Status: Level 1 - Full Code      Subjective:   " I feel a little better but still have a pain on my right side when I am moving it is not that severe but still bothering me. I also have a pain on my left toe"  Objective:     Vitals:   Temp (24hrs), Av.1 °F (36.7 °C), Min:98 °F (36.7 °C), Max:98.2 °F (36.8 °C)    Temp:  [98 °F (36.7 °C)-98.2 °F (36.8 °C)] 98.2 °F (36.8 °C)  HR:  [60-84] 60  Resp:  [18-19] 18  BP: (124-147)/(65-73) 124/70  SpO2:  [92 %-95 %] 93 %  Body mass index is 28.83 kg/m². Input and Output Summary (last 24 hours): Intake/Output Summary (Last 24 hours) at 2023 1231  Last data filed at 2023 1738  Gross per 24 hour   Intake 600 ml   Output --   Net 600 ml       Physical Exam:     Physical Exam  Vitals and nursing note reviewed. Constitutional:       General: She is not in acute distress. Appearance: She is well-developed. HENT:      Head: Normocephalic and atraumatic. Eyes:      Conjunctiva/sclera: Conjunctivae normal.   Cardiovascular:      Rate and Rhythm: Normal rate and regular rhythm. Heart sounds: No murmur heard. Pulmonary:      Effort: Pulmonary effort is normal. No respiratory distress. Breath sounds: Normal breath sounds. Abdominal:      Palpations: Abdomen is soft. Tenderness: There is no abdominal tenderness. Musculoskeletal:         General: No swelling. Cervical back: Neck supple. Right hip: No deformity, lacerations or crepitus. Normal range of motion. Normal strength. Left hip: Normal.      Right upper leg: Normal.      Left upper leg: Normal.   Skin:     General: Skin is warm and dry. Capillary Refill: Capillary refill takes less than 2 seconds. Neurological:      Mental Status: She is alert.    Psychiatric:         Mood and Affect: Mood normal.         Additional Data:     Labs:    Results from last 7 days   Lab Units 23  0639 23  0853   WBC Thousand/uL 10.26* 5.54   HEMOGLOBIN g/dL 12.9 13.2   HEMATOCRIT % 39.7 39.6   PLATELETS Thousands/uL 227 222   BANDS PCT %  --  3   NEUTROS PCT % 68  --    LYMPHS PCT % 22  --    LYMPHO PCT %  --  23   MONOS PCT % 9  --    MONO PCT %  --  2*   EOS PCT % 0 0     Results from last 7 days   Lab Units 07/14/23  0639 07/13/23  0853   SODIUM mmol/L 137 135   POTASSIUM mmol/L 4.8 4.8   CHLORIDE mmol/L 105 101   CO2 mmol/L 25 25   BUN mg/dL 36* 25   CREATININE mg/dL 0.92 0.85   ANION GAP mmol/L 7 9   CALCIUM mg/dL 9.8 10.1   ALBUMIN g/dL  --  4.3   TOTAL BILIRUBIN mg/dL  --  0.36   ALK PHOS U/L  --  63   ALT U/L  --  13   AST U/L  --  18   GLUCOSE RANDOM mg/dL 90 178*                           * I Have Reviewed All Lab Data Listed Above. * Additional Pertinent Lab Tests Reviewed: [address] [place] [address] Admission Reviewed    Imaging:    CT pelvis wo contrast   Final Result      No acute findings to account for the patient's hip pain. No acute fracture. Lumbar, sacroiliac, and symphyseal degenerative changes. Workstation performed: [id number]         CT spine lumbar wo contrast   Final Result      No acute osseous abnormality of lumbar spine. Moderate-to-severe foraminal narrowing at right L3-L4. Degenerative changes, as detailed above. Additional chronic/incidental findings as detailed above. Workstation performed: [id number]         VAS lower limb venous duplex study, unilateral/limited   Final Result      XR knee 4+ views Right injury   ED Interpretation   Review x-ray of the right knee shows no acute fractures, osseous abnormalities, foreign bodies or dislocations. Final Result      No acute osseous abnormality. Workstation performed: [id number]         XR hips bilateral with ap pelvis 2 vw   ED Interpretation   No acute fracture seen on pelvic or x ray of b/l hips. Final Result      No acute osseous abnormality.    Degenerative changes in the lower lumbar spine and in the symphysis pubis.         Workstation performed: [id number]         XR femur 2 views RIGHT   ED Interpretation   Review x-ray of the right femur shows no acute fractures, osseous abnormalities or dislocations or foreign bodies. Patient does have some calcification of vasculature seen on x-ray imaging. Final Result      No acute osseous abnormality. Degenerative changes in the lower lumbar spine and in the symphysis pubis. Workstation performed: [id number]                 Imaging Personally Reviewed by Myself Includes:      Recent Cultures (last 7 days):           Last 24 Hours Medication List:   Current Facility-Administered Medications   Medication Dose Route Frequency Provider Last Rate   • acetaminophen  975 mg Oral Q8H [address] Lara Leroy MD     • ALPRAZolam  0.25 mg Oral HS PRN Ileana Mcghee MD     • budesonide-formoterol  2 puff Inhalation BID Vane Alexis MD     • cefuroxime  500 mg Oral Q12H [address] Vane Alexis MD     • HYDROmorphone  2 mg Oral Q4H PRN Vane Alexis MD     • levothyroxine  100 mcg Oral Daily Vane Alexis MD     • lisinopril  10 mg Oral BID Vane Alexis MD     • magnesium Oxide  800 mg Oral BID Vane Alexis MD     • methocarbamol  500 mg Oral Q6H PRN Vane Alexis MD     • metoprolol tartrate  25 mg Oral BID Vane Alexis MD     • pantoprazole  40 mg Oral Daily Vane Alexis MD     • PARoxetine  30 mg Oral Daily Laar Leroy MD     • pravastatin  40 mg Oral Daily With Shiva Sunshine MD     • predniSONE  40 mg Oral Daily Lara Leroy MD          Today, Patient Was Seen By: Ileana Mcghee MD    ** Please Note: Dictation voice to text software may have been used in the creation of this document.  **

## 2023-07-14 NOTE — PROGRESS NOTES
[address]  Progress Note  Name: Kasey Galloway  MRN: [id number]  Unit/Bed#: 672-42 I Date of Admission: 7/12/2023   Date of Service: 7/14/2023 I Hospital Day: 2    Assessment/Plan   * Right hip pain  Assessment & Plan  CT imaging along with Venous duplex do not show an acute fracture or DVT  Likely due to osteoarthritis and Froaminal narrowing noted on CT    S/p IV Solu-Medrol lv 60mg x 2 with improvement transition to p.o. steroids today  Will go home with taper dose     Pain control / muscle relaxants  Appreciate PT/OT recommendations. Patient needs acute rehab upon discharge, unfortunately at this time she is denying    Stage 3a chronic kidney disease [facility])  Assessment & Plan  Lab Results   Component Value Date    EGFR 56 07/14/2023    EGFR 62 07/13/2023    EGFR 49 07/12/2023    CREATININE 0.92 07/14/2023    CREATININE 0.85 07/13/2023    CREATININE 1.04 07/12/2023     Baseline creatinine is around 1    Depression, recurrent (HCC)  Assessment & Plan  paxil 30mg daily and PRN Xanax    Von Willebrand disease ([address])  Assessment & Plan  Will hold off on DVT prophylaxis and utilize SCD     Hypothyroidism  Assessment & Plan  Synthroid 100mcg daily     Primary hypertension  Assessment & Plan  Lisinopril 10 mg daily  Lopressor 25mg  Hold HCTZ  Blood pressure is controlled    Other hyperlipidemia  Assessment & Plan  Continue statin therapy               Discussions with Specialists or Other Care Team Provider:     Education and Discussions with Family / Patient: Patient    Time Spent for Care: 30 minutes. More than 50% of total time spent on counseling and coordination of care as described above. Current Length of Stay: 2 day(s)    Current Patient Status: Inpatient   Certification Statement: The patient will continue to require additional inpatient hospital stay due to Close monitoring of right hip pain.     Discharge Plan: tomorrow     Code Status: Level 1 - Full Code      Subjective:   " I feel better compared to when I came in. But I still have some pain. Also my leg is jumping at night when I am trying to sleep. I have also pain on my left toe."    Objective:     Vitals:   Temp (24hrs), Av.1 °F (36.7 °C), Min:98 °F (36.7 °C), Max:98.2 °F (36.8 °C)    Temp:  [98 °F (36.7 °C)-98.2 °F (36.8 °C)] 98.2 °F (36.8 °C)  HR:  [60-84] 60  Resp:  [18-19] 18  BP: (124-147)/(65-73) 124/70  SpO2:  [92 %-95 %] 93 %  Body mass index is 28.83 kg/m². Input and Output Summary (last 24 hours): Intake/Output Summary (Last 24 hours) at 2023 1104  Last data filed at 2023 1738  Gross per 24 hour   Intake 600 ml   Output --   Net 600 ml       Physical Exam:     Physical Exam  Vitals and nursing note reviewed. Constitutional:       General: She is not in acute distress. Appearance: She is well-developed. HENT:      Head: Normocephalic and atraumatic. Eyes:      Extraocular Movements: Extraocular movements intact. Conjunctiva/sclera: Conjunctivae normal.   Cardiovascular:      Rate and Rhythm: Normal rate and regular rhythm. Heart sounds: No murmur heard. Pulmonary:      Effort: Pulmonary effort is normal. No respiratory distress. Breath sounds: Normal breath sounds. Abdominal:      General: Abdomen is flat. Bowel sounds are normal.      Palpations: Abdomen is soft. Tenderness: There is no abdominal tenderness. Musculoskeletal:         General: No swelling. Cervical back: Neck supple. Right hip: No deformity, lacerations, bony tenderness or crepitus. Normal range of motion. Normal strength. Left hip: Normal.      Right upper leg: No swelling, edema, deformity or bony tenderness. Right knee: Normal.      Left knee: Normal.      Right lower leg: No edema. Left lower leg: No edema. Skin:     General: Skin is warm and dry. Capillary Refill: Capillary refill takes less than 2 seconds. Neurological:      General: No focal deficit present. Mental Status: She is alert and oriented to person, place, and time. Psychiatric:         Mood and Affect: Mood normal.         Thought Content: Thought content normal.         Judgment: Judgment normal.         Additional Data:     Labs:    Results from last 7 days   Lab Units 07/14/23  0639 07/13/23  0853   WBC Thousand/uL 10.26* 5.54   HEMOGLOBIN g/dL 12.9 13.2   HEMATOCRIT % 39.7 39.6   PLATELETS Thousands/uL 227 222   BANDS PCT %  --  3   NEUTROS PCT % 68  --    LYMPHS PCT % 22  --    LYMPHO PCT %  --  23   MONOS PCT % 9  --    MONO PCT %  --  2*   EOS PCT % 0 0     Results from last 7 days   Lab Units 07/14/23  0639 07/13/23  0853   SODIUM mmol/L 137 135   POTASSIUM mmol/L 4.8 4.8   CHLORIDE mmol/L 105 101   CO2 mmol/L 25 25   BUN mg/dL 36* 25   CREATININE mg/dL 0.92 0.85   ANION GAP mmol/L 7 9   CALCIUM mg/dL 9.8 10.1   ALBUMIN g/dL  --  4.3   TOTAL BILIRUBIN mg/dL  --  0.36   ALK PHOS U/L  --  63   ALT U/L  --  13   AST U/L  --  18   GLUCOSE RANDOM mg/dL 90 178*                           * I Have Reviewed All Lab Data Listed Above. * Additional Pertinent Lab Tests Reviewed: [address] [place] [address] Admission Reviewed    Imaging:    CT pelvis wo contrast   Final Result      No acute findings to account for the patient's hip pain. No acute fracture. Lumbar, sacroiliac, and symphyseal degenerative changes. Workstation performed: [id number]         CT spine lumbar wo contrast   Final Result      No acute osseous abnormality of lumbar spine. Moderate-to-severe foraminal narrowing at right L3-L4. Degenerative changes, as detailed above. Additional chronic/incidental findings as detailed above.                Workstation performed: [id number]         VAS lower limb venous duplex study, unilateral/limited   Final Result      XR knee 4+ views Right injury   ED Interpretation   Review x-ray of the right knee shows no acute fractures, osseous abnormalities, foreign bodies or dislocations. Final Result      No acute osseous abnormality. Workstation performed: [id number]         XR hips bilateral with ap pelvis 2 vw   ED Interpretation   No acute fracture seen on pelvic or x ray of b/l hips. Final Result      No acute osseous abnormality. Degenerative changes in the lower lumbar spine and in the symphysis pubis. Workstation performed: [id number]         XR femur 2 views RIGHT   ED Interpretation   Review x-ray of the right femur shows no acute fractures, osseous abnormalities or dislocations or foreign bodies. Patient does have some calcification of vasculature seen on x-ray imaging. Final Result      No acute osseous abnormality. Degenerative changes in the lower lumbar spine and in the symphysis pubis.          Workstation performed: [id number]                 Imaging Personally Reviewed by Myself Includes:      Recent Cultures (last 7 days):           Last 24 Hours Medication List:   Current Facility-Administered Medications   Medication Dose Route Frequency Provider Last Rate   • acetaminophen  975 mg Oral Q8H [address] Franklyn Soulier, MD     • ALPRAZolam  0.25 mg Oral HS PRN Yaritza Wang MD     • budesonide-formoterol  2 puff Inhalation BID Vane Hernandez MD     • cefuroxime  500 mg Oral Q12H [address] Vane Hernandez MD     • HYDROmorphone  2 mg Oral Q4H PRN Vane Hernandez MD     • levothyroxine  100 mcg Oral Daily Vane Hernandez MD     • lisinopril  10 mg Oral BID Vane Hernandez MD     • magnesium Oxide  800 mg Oral BID Vane Hernandez MD     • methocarbamol  500 mg Oral Q6H PRN Vane Hernandez MD     • metoprolol tartrate  25 mg Oral BID Vane Hernandez MD     • pantoprazole  40 mg Oral Daily Vane Hernandez MD     • PARoxetine  30 mg Oral Daily Vane Hernandez MD     • pravastatin  40 mg Oral Daily With Sara Altman MD     • predniSONE  40 mg Oral Daily Franklyn Soulier, MD          Today, Patient Was Seen By: Yaritza Wang MD    ** Please Note: Dictation voice to text software may have been used in the creation of this document.  **

## 2023-07-14 NOTE — PLAN OF CARE
Problem: OCCUPATIONAL THERAPY ADULT  Goal: Performs self-care activities at highest level of function for planned discharge setting. See evaluation for individualized goals. Description: Treatment Interventions: ADL retraining, Functional transfer training, UE strengthening/ROM, Endurance training, Patient/family training, Equipment evaluation/education, Compensatory technique education, Activityengagement          See flowsheet documentation for full assessment, interventions and recommendations. Outcome: Progressing  Note: Limitation: Decreased ADL status, Decreased UE strength, Decreased Safe judgement during ADL, Decreased endurance, Decreased self-care trans, Decreased high-level ADLs     Assessment: Patient participated in Skilled OT session this date with interventions consisting of ADL re training with the use of correct body mechnaics, Energy Conservation techniques, Work simplification skills , safety awareness and fall prevention techniques and  therapeutic activities to: increase activity tolerance . Patient agreeable to OT treatment session, upon arrival patient was found supine in bed. Patient requiring verbal cues for safety and frequent rest periods. Patient continues to be functioning below baseline level, occupational performance remains limited secondary to factors listed above and increased risk for falls and injury. The patient's raw score on the -PAC Daily Activity Inpatient Short Form is 21. A raw score of greater than or equal to 19 suggests the patient may benefit from discharge to home. Please refer to the recommendation of the Occupational Therapist for safe discharge planning. From OT standpoint, recommendation at time of d/c would be Home with home health rehabilitation.      OT Discharge Recommendation: Home with home health rehabilitation

## 2023-07-15 VITALS
WEIGHT: 184.08 LBS | DIASTOLIC BLOOD PRESSURE: 68 MMHG | HEART RATE: 77 BPM | SYSTOLIC BLOOD PRESSURE: 120 MMHG | HEIGHT: 67 IN | RESPIRATION RATE: 16 BRPM | BODY MASS INDEX: 28.89 KG/M2 | TEMPERATURE: 98 F | OXYGEN SATURATION: 93 %

## 2023-07-15 PROCEDURE — 99238 HOSP IP/OBS DSCHRG MGMT 30/<: CPT | Performed by: FAMILY MEDICINE

## 2023-07-15 RX ORDER — PREDNISONE 20 MG/1
40 TABLET ORAL DAILY
Qty: 8 TABLET | Refills: 0 | Status: SHIPPED | OUTPATIENT
Start: 2023-07-16

## 2023-07-15 RX ADMIN — LEVOTHYROXINE SODIUM 100 MCG: 100 TABLET ORAL at 08:36

## 2023-07-15 RX ADMIN — PANTOPRAZOLE SODIUM 40 MG: 40 TABLET, DELAYED RELEASE ORAL at 08:36

## 2023-07-15 RX ADMIN — HYDROMORPHONE HYDROCHLORIDE 2 MG: 2 TABLET ORAL at 02:12

## 2023-07-15 RX ADMIN — ACETAMINOPHEN 975 MG: 325 TABLET, FILM COATED ORAL at 05:57

## 2023-07-15 RX ADMIN — HYDROMORPHONE HYDROCHLORIDE 2 MG: 2 TABLET ORAL at 10:46

## 2023-07-15 RX ADMIN — Medication 800 MG: at 08:36

## 2023-07-15 RX ADMIN — BUDESONIDE AND FORMOTEROL FUMARATE DIHYDRATE 2 PUFF: 160; 4.5 AEROSOL RESPIRATORY (INHALATION) at 08:36

## 2023-07-15 RX ADMIN — CEFUROXIME AXETIL 500 MG: 250 TABLET, FILM COATED ORAL at 08:36

## 2023-07-15 RX ADMIN — METOPROLOL TARTRATE 25 MG: 25 TABLET, FILM COATED ORAL at 08:36

## 2023-07-15 RX ADMIN — PAROXETINE 30 MG: 20 TABLET, FILM COATED ORAL at 08:36

## 2023-07-15 RX ADMIN — PREDNISONE 40 MG: 20 TABLET ORAL at 08:36

## 2023-07-15 RX ADMIN — LISINOPRIL 10 MG: 10 TABLET ORAL at 08:36

## 2023-07-15 NOTE — DISCHARGE SUMMARY
Discharge Summary - Tanya Anne 78 y.o. female MRN: [id number]    Unit/Bed#: 770-59 Encounter: [id number]    Admission Date:   Admission Orders (From admission, onward)     Ordered        07/12/23 1502  INPATIENT ADMISSION  Once                        Admitting Diagnosis: Hypomagnesemia [E83.42]  Leg pain [M79.606]  Right hip pain [M25.551]    HPI: 52-year-old female with a past medical history significant for von Willebrand's disease, and osteoarthritis presents with a chief complaint of right hip pain. Patient states she initially began having worsening pain on Monday, but was able to tolerate it. Tuesday she felt some improvement, but today she was unable to get out of bed and ambulate due to severe pain in the right hip. She denies any fevers or chills, no falls or dizziness.       Procedures Performed: No orders of the defined types were placed in this encounter. Summary of Hospital Course:     Right hip pain secondary to osteoarthritis and spinal stenosis    52-year-old female presented to the emergency room with difficulty ambulating, evaluated by PT and recommending short-term rehab initially. She received IV Solu-Medrol along with muscle relaxants. She felt muscle relaxants were not helpful, and she improved enough to be deemed safe for discharge home with home health care. Will be discharged on prednisone 40 mg for another 4 days and will receive home physical therapy    Significant Findings, Care, Treatment and Services Provided:     XR    No abnormality / fracture    Complications: none    Discharge Diagnosis: see above    Medical Problems     Resolved Problems  Date Reviewed: 2/6/2023   None         Condition at Discharge: fair         Discharge instructions/Information to patient and family:   See after visit summary for information provided to patient and family.       Provisions for Follow-Up Care:  See after visit summary for information related to follow-up care and any pertinent home health orders. PCP: Tiffanie Khalil DO    Disposition: Home    Planned Readmission: No      Discharge Statement   I spent 35 minutes discharging the patient. This time was spent on the day of discharge. I had direct contact with the patient on the day of discharge. Additional documentation is required if more than 30 minutes were spent on discharge. Discharge Medications:  See after visit summary for reconciled discharge medications provided to patient and family.

## 2023-07-15 NOTE — CASE MANAGEMENT
Case Management Discharge Planning Note    Patient name Pilar Rank  Location /887-58 MRN [id number]  : 1940 Date 7/15/2023       Current Admission Date: 2023  Current Admission Diagnosis:Right hip pain   Patient Active Problem List    Diagnosis Date Noted   • Mild aortic stenosis 2023   • Stage 3a chronic kidney disease ([address]) 2023   • Pulmonary emphysema ([address]) 2021   • Acute bilateral low back pain without sciatica 2021   • Depression, recurrent ([address]) 2020   • Positive colorectal cancer screening using Cologuard test 2020   • Medicare annual wellness visit, subsequent 2019   • Left corneal abrasion 2019   • Irritable bowel syndrome with both constipation and diarrhea 07/10/2019   • BMI 28.0-28.9,adult 07/10/2019   • Generalized abdominal pain 2018   • Back pain, chronic 2017   • Hearing loss 2017   • Diarrhea 2017   • Right hip pain 2017   • Stool guaiac positive 2015   • Vitamin D deficiency 2015   • Diverticulosis 2013   • Von Willebrand disease ([address]) 2013   • Other hyperlipidemia 2013   • Esophagitis, reflux 12/10/2012   • Anxiety 10/09/2012   • Hypothyroidism 2012   • Generalized osteoarthritis of multiple sites 2012   • Primary hypertension 2012      LOS (days): 3  Geometric Mean LOS (GMLOS) (days): 2.70  Days to GMLOS:-0.1     OBJECTIVE:  Risk of Unplanned Readmission Score: 9.77         Current admission status: Inpatient   Preferred Pharmacy:   [facility] [address] RICA DELACRUZ [address] [place] [address] [address] - [address]  [address] [address]  Phone: [phone] Fax: [phone]    [facility] [address] [facility] [address] [place] [address]  Phone: [phone] Fax: [phone]    [id number] [place] [address],  [address]  [address] [place] [address] [place] [address]  Phone: [phone] Fax: [phone]    Primary Care Provider: Beatrice Corado DO    Primary Insurance: MEDICARE  Secondary Insurance: CORNEL    DISCHARGE DETAILS:  Pt is being dc'd home on this date. Refused STR. Going home with OP follow up and [facility] to follow. CM notified [facility] in [place] and AVS sent over.  [address] [facility] [address] 2050 ambulance picking pt up at 2:00pm.

## 2023-07-17 ENCOUNTER — TRANSITIONAL CARE MANAGEMENT (OUTPATIENT)
Dept: FAMILY MEDICINE CLINIC | Facility: CLINIC | Age: 83
End: 2023-07-17

## 2023-07-18 DIAGNOSIS — F41.9 ANXIETY: ICD-10-CM

## 2023-07-18 RX ORDER — ALPRAZOLAM 0.25 MG/1
TABLET ORAL
Qty: 90 TABLET | Refills: 0 | Status: SHIPPED | OUTPATIENT
Start: 2023-07-18

## 2023-07-19 ENCOUNTER — RA CDI HCC (OUTPATIENT)
Dept: OTHER | Facility: HOSPITAL | Age: 83
End: 2023-07-19

## 2023-07-19 LAB
DME PARACHUTE DELIVERY DATE ACTUAL: NORMAL
DME PARACHUTE DELIVERY DATE REQUESTED: NORMAL
DME PARACHUTE ITEM DESCRIPTION: NORMAL
DME PARACHUTE ORDER STATUS: NORMAL
DME PARACHUTE SUPPLIER NAME: NORMAL
DME PARACHUTE SUPPLIER PHONE: NORMAL

## 2023-07-24 ENCOUNTER — TELEPHONE (OUTPATIENT)
Dept: FAMILY MEDICINE CLINIC | Facility: CLINIC | Age: 83
End: 2023-07-24

## 2023-07-24 NOTE — TELEPHONE ENCOUNTER
Patient has JANET scheduled for 7/26. Nurse does not feel it is safe for her to leave the house due to she has 14 steps just to get out of her apt. Asking if you can do virtual? Please advise.      (Please call patient to let her know, no need to call home health)

## 2023-07-26 DIAGNOSIS — E03.9 HYPOTHYROIDISM, UNSPECIFIED TYPE: ICD-10-CM

## 2023-07-27 RX ORDER — LEVOTHYROXINE SODIUM 0.1 MG/1
TABLET ORAL
Qty: 90 TABLET | Refills: 3 | Status: SHIPPED | OUTPATIENT
Start: 2023-07-27

## 2023-07-31 ENCOUNTER — APPOINTMENT (OUTPATIENT)
Dept: LAB | Facility: MEDICAL CENTER | Age: 83
End: 2023-07-31
Payer: MEDICARE

## 2023-07-31 ENCOUNTER — OFFICE VISIT (OUTPATIENT)
Dept: FAMILY MEDICINE CLINIC | Facility: CLINIC | Age: 83
End: 2023-07-31
Payer: MEDICARE

## 2023-07-31 VITALS
HEART RATE: 78 BPM | TEMPERATURE: 98.1 F | HEIGHT: 67 IN | RESPIRATION RATE: 18 BRPM | SYSTOLIC BLOOD PRESSURE: 124 MMHG | BODY MASS INDEX: 26.97 KG/M2 | WEIGHT: 171.8 LBS | DIASTOLIC BLOOD PRESSURE: 70 MMHG

## 2023-07-31 DIAGNOSIS — M10.071 ACUTE IDIOPATHIC GOUT OF RIGHT FOOT: ICD-10-CM

## 2023-07-31 DIAGNOSIS — M51.36 LUMBAR DEGENERATIVE DISC DISEASE: Primary | ICD-10-CM

## 2023-07-31 DIAGNOSIS — M25.551 RIGHT HIP PAIN: ICD-10-CM

## 2023-07-31 PROBLEM — M51.369 LUMBAR DEGENERATIVE DISC DISEASE: Status: ACTIVE | Noted: 2023-07-31

## 2023-07-31 LAB
ANION GAP SERPL CALCULATED.3IONS-SCNC: 3 MMOL/L
BUN SERPL-MCNC: 26 MG/DL (ref 5–25)
CALCIUM SERPL-MCNC: 9.2 MG/DL (ref 8.3–10.1)
CHLORIDE SERPL-SCNC: 110 MMOL/L (ref 96–108)
CO2 SERPL-SCNC: 23 MMOL/L (ref 21–32)
CREAT SERPL-MCNC: 1.13 MG/DL (ref 0.6–1.3)
GFR SERPL CREATININE-BSD FRML MDRD: 44 ML/MIN/1.73SQ M
GLUCOSE SERPL-MCNC: 97 MG/DL (ref 65–140)
POTASSIUM SERPL-SCNC: 4.6 MMOL/L (ref 3.5–5.3)
SODIUM SERPL-SCNC: 136 MMOL/L (ref 135–147)
URATE SERPL-MCNC: 7.6 MG/DL (ref 2–7.5)

## 2023-07-31 PROCEDURE — 80048 BASIC METABOLIC PNL TOTAL CA: CPT

## 2023-07-31 PROCEDURE — 36415 COLL VENOUS BLD VENIPUNCTURE: CPT

## 2023-07-31 PROCEDURE — 84550 ASSAY OF BLOOD/URIC ACID: CPT

## 2023-07-31 PROCEDURE — 99495 TRANSJ CARE MGMT MOD F2F 14D: CPT | Performed by: INTERNAL MEDICINE

## 2023-07-31 RX ORDER — PREDNISONE 10 MG/1
10 TABLET ORAL DAILY
Qty: 5 TABLET | Refills: 0 | Status: SHIPPED | OUTPATIENT
Start: 2023-07-31

## 2023-07-31 NOTE — PROGRESS NOTES
Name: Wayne Villanueva      : 1940      MRN: [id number]  Encounter Provider: Devante Patel DO  Encounter Date: 2023   Encounter department: [address]     1. Lumbar degenerative disc disease  Pt has home therapy and is managing in her apartment  Had seen pain mgmt in past and with underlying clotting disorder limits intervention  2. Acute idiopathic gout of right foot  -     predniSONE 10 mg tablet; Take 1 tablet (10 mg total) by mouth daily  -     Ambulatory Referral to Podiatry; Future  -     Basic metabolic panel; Future  -     Uric acid; Future  Pt will get labs today and referral to podiatry    3. Right hip pain  Likely due to underlying back pain and has home therapy        Rto 6 weeks     Subjective      HPI   Pt hospitalized due to inability to ambulate Founds to have significant djd lumbar spine and some of hip area Pt states she also had gout and finished prednisone from the hospital She is still trying to move forward from the sudden passing of her son out of state He had struggled for many years but she is challenged by his passing and being at a distance from him when it happened She does manage in her aprtment and has friends who assist Has homecare currently and using walker in  the apartment   TCM Call     Date and time call was made  2023  9:27 AM    Hospital care reviewed  Records reviewed    Patient was hospitialized at  [facility]    Date of Admission  23    Date of discharge  07/15/23    Diagnosis  rt hip pain, GOUT, sciatica    Disposition  Home; [address] [facility]    Were the patients medications reviewed and updated  No    Current Symptoms  Fatigue  gout pain    Fatigue severity  Mild      TCM Call     Post hospital issues  Reduced activity    Scheduled for follow up?   Yes    Did you obtain your prescribed medications  Yes    Do you need help managing your prescriptions or medications  No    I have advised the patient to call PCP with any new or worsening symptoms  Agustina Villareal     Counseling  Patient          Review of Systems   Constitutional: Negative for chills and fever. HENT: Negative. Eyes: Negative for visual disturbance. Respiratory: Negative for cough and shortness of breath. Cardiovascular: Negative for chest pain, palpitations and leg swelling. Gastrointestinal: Negative for abdominal distention and abdominal pain. Genitourinary: Negative. Musculoskeletal: Positive for arthralgias, gait problem, joint swelling and myalgias. Neurological: Negative for dizziness, light-headedness and headaches. Psychiatric/Behavioral: Negative for sleep disturbance. The patient is not nervous/anxious. Current Outpatient Medications on File Prior to Visit   Medication Sig   • acetaminophen (TYLENOL) 325 mg tablet Take 2 tablets (650 mg total) by mouth every 6 (six) hours as needed (pain, fever)   • ALPRAZolam (XANAX) 0.25 mg tablet TAKE 1 TABLET BY MOUTH THREE TIMES A DAY IF NEEDED FOR ANXIETY   • benzonatate (TESSALON PERLES) 100 mg capsule TAKE 1 CAPSULE BY MOUTH THREE TIMES A DAY   • budesonide-formoterol (SYMBICORT) 160-4.5 mcg/act inhaler Inhale 2 puffs 2 (two) times a day Rinse mouth after use.    • dicyclomine (BENTYL) 10 mg capsule TAKE 1 CAPSULE (10 MG TOTAL) BY MOUTH 3 (THREE) TIMES A DAY AS NEEDED (USE ONLY AS NEEDED)   • hyoscyamine (ANASPAZ,LEVSIN) 0.125 MG tablet Take 1 tablet (0.125 mg total) by mouth every 6 (six) hours as needed for cramping   • levothyroxine 100 mcg tablet TAKE 1 TABLET BY MOUTH EVERY DAY   • lisinopril (ZESTRIL) 10 mg tablet TAKE 1 TABLET BY MOUTH TWICE A DAY   • metoprolol tartrate (LOPRESSOR) 25 mg tablet TAKE 1 TABLET BY MOUTH TWICE A DAY   • pantoprazole (PROTONIX) 40 mg tablet TAKE 1 TABLET BY MOUTH EVERY DAY   • PARoxetine (PAXIL) 30 mg tablet TAKE 1 TABLET BY MOUTH EVERY DAY   • REFRESH TEARS 0.5 % SOLN INSTILL 1 DROP INTO EACH EYE THREE TIMES DAILY AS NEEDED FOR DRY EYES   • simvastatin (ZOCOR) 40 mg tablet TAKE 1 TABLET BY MOUTH DAILY AT BEDTIME   • meclizine (ANTIVERT) 12.5 MG tablet Take 1 tablet (12.5 mg total) by mouth every 8 (eight) hours as needed for dizziness (Patient not taking: Reported on 7/31/2023)   • predniSONE 20 mg tablet Take 2 tablets (40 mg total) by mouth daily Do not start before July 16, 2023. (Patient not taking: Reported on 7/31/2023)       Objective     /70   Pulse 78   Temp 98.1 °F (36.7 °C) (Temporal)   Resp 18   Ht 5' 7" (1.702 m)   Wt 77.9 kg (171 lb 12.8 oz)   BMI 26.91 kg/m²     Physical Exam  Vitals reviewed. Constitutional:       General: She is not in acute distress. Appearance: Normal appearance. She is not ill-appearing, toxic-appearing or diaphoretic. HENT:      Head: Normocephalic and atraumatic. Right Ear: External ear normal.      Left Ear: External ear normal.      Nose: Nose normal.      Mouth/Throat:      Mouth: Mucous membranes are moist.   Eyes:      General: No scleral icterus. Extraocular Movements: Extraocular movements intact. Conjunctiva/sclera: Conjunctivae normal.      Pupils: Pupils are equal, round, and reactive to light. Cardiovascular:      Rate and Rhythm: Normal rate and regular rhythm. Pulses: Normal pulses. Heart sounds: Normal heart sounds. Pulmonary:      Effort: Pulmonary effort is normal. No respiratory distress. Breath sounds: Normal breath sounds. No wheezing. Abdominal:      General: Bowel sounds are normal. There is no distension. Palpations: Abdomen is soft. Tenderness: There is no abdominal tenderness. Musculoskeletal:      Cervical back: Normal range of motion and neck supple. Right lower leg: No edema. Left lower leg: No edema. Lymphadenopathy:      Cervical: No cervical adenopathy. Skin:     General: Skin is warm and dry. Coloration: Skin is not jaundiced or pale.    Neurological: General: No focal deficit present. Mental Status: She is alert and oriented to person, place, and time. Mental status is at baseline. Psychiatric:         Mood and Affect: Mood normal.         Behavior: Behavior normal.         Thought Content:  Thought content normal.         Judgment: Judgment normal.       Justyna Valencia, DO

## 2023-08-01 ENCOUNTER — TELEPHONE (OUTPATIENT)
Dept: FAMILY MEDICINE CLINIC | Facility: CLINIC | Age: 83
End: 2023-08-01

## 2023-08-01 NOTE — TELEPHONE ENCOUNTER
I would not with slightly elevated uric acid and decreased kidney clearance BP was stable so do not feel needed at this time

## 2023-08-04 ENCOUNTER — TELEPHONE (OUTPATIENT)
Dept: FAMILY MEDICINE CLINIC | Facility: CLINIC | Age: 83
End: 2023-08-04

## 2023-08-04 DIAGNOSIS — M10.00 ACUTE IDIOPATHIC GOUT, UNSPECIFIED SITE: Primary | ICD-10-CM

## 2023-08-04 DIAGNOSIS — M10.00 ACUTE IDIOPATHIC GOUT, UNSPECIFIED SITE: ICD-10-CM

## 2023-08-04 RX ORDER — ALLOPURINOL 100 MG/1
100 TABLET ORAL DAILY
Qty: 30 TABLET | Refills: 5 | Status: SHIPPED | OUTPATIENT
Start: 2023-08-04 | End: 2023-08-04 | Stop reason: SDUPTHER

## 2023-08-04 RX ORDER — ALLOPURINOL 100 MG/1
100 TABLET ORAL DAILY
Qty: 30 TABLET | Refills: 5 | Status: SHIPPED | OUTPATIENT
Start: 2023-08-04

## 2023-08-04 NOTE — TELEPHONE ENCOUNTER
Pt called and stated you were going to order her Allopurinol for her gout and she said pharmacy did not receive it. Please advise.

## 2023-08-15 ENCOUNTER — TELEPHONE (OUTPATIENT)
Dept: FAMILY MEDICINE CLINIC | Facility: CLINIC | Age: 83
End: 2023-08-15

## 2023-08-15 NOTE — TELEPHONE ENCOUNTER
Physical therapist suggested patient call and ask you if she could increase her Tylenol intake due to a foreign body found on knee and is non-weight bearing. Wanted to ask you because of liver and if not will you prescribe something for her?

## 2023-08-16 ENCOUNTER — TELEPHONE (OUTPATIENT)
Dept: FAMILY MEDICINE CLINIC | Facility: CLINIC | Age: 83
End: 2023-08-16

## 2023-08-16 DIAGNOSIS — S80.259S: ICD-10-CM

## 2023-08-16 DIAGNOSIS — M25.569 KNEE PAIN, UNSPECIFIED CHRONICITY, UNSPECIFIED LATERALITY: Primary | ICD-10-CM

## 2023-08-16 NOTE — TELEPHONE ENCOUNTER
Physical therapist finally found the reason for Pt's pain. CC:  Foreign body in knee. Requesting referral for Ortho Or PM    Unable to tolerate the pain - Tylenol is not doing anything for the pain - extremely uncomfortable. I'd appreciate you checking knee X-ray.

## 2023-08-18 DIAGNOSIS — F41.9 ANXIETY: ICD-10-CM

## 2023-08-18 RX ORDER — ALPRAZOLAM 0.25 MG/1
TABLET ORAL
Qty: 90 TABLET | Refills: 0 | Status: SHIPPED | OUTPATIENT
Start: 2023-08-18

## 2023-08-27 DIAGNOSIS — M10.00 ACUTE IDIOPATHIC GOUT, UNSPECIFIED SITE: ICD-10-CM

## 2023-08-28 RX ORDER — ALLOPURINOL 100 MG/1
100 TABLET ORAL DAILY
Qty: 90 TABLET | Refills: 2 | Status: SHIPPED | OUTPATIENT
Start: 2023-08-28

## 2023-09-05 ENCOUNTER — OFFICE VISIT (OUTPATIENT)
Dept: OBGYN CLINIC | Facility: CLINIC | Age: 83
End: 2023-09-05
Payer: MEDICARE

## 2023-09-05 VITALS
HEART RATE: 85 BPM | WEIGHT: 175 LBS | HEIGHT: 67 IN | TEMPERATURE: 68 F | BODY MASS INDEX: 27.47 KG/M2 | SYSTOLIC BLOOD PRESSURE: 150 MMHG | DIASTOLIC BLOOD PRESSURE: 85 MMHG

## 2023-09-05 DIAGNOSIS — M25.569 KNEE PAIN, UNSPECIFIED CHRONICITY, UNSPECIFIED LATERALITY: ICD-10-CM

## 2023-09-05 DIAGNOSIS — M51.36 LUMBAR DEGENERATIVE DISC DISEASE: Primary | ICD-10-CM

## 2023-09-05 PROCEDURE — 99204 OFFICE O/P NEW MOD 45 MIN: CPT | Performed by: STUDENT IN AN ORGANIZED HEALTH CARE EDUCATION/TRAINING PROGRAM

## 2023-09-05 NOTE — PROGRESS NOTES
Orthopedic Surgery Office Note  Shante Davis (49 y.o. female)   : 1940   MRN: [id number]   Encounter Date: 2023  Dr. Jabari Donato DO, Orthopedic Surgeon  [facility]   Chief Complaint   Patient presents with   • Right Knee - Pain   • Spine - Pain       Assessment / Plan  Diagnoses and all orders for this visit:    Lumbar degenerative disc disease  -     Ambulatory referral to Spine & Pain Management; Future    Knee pain, unspecified chronicity, unspecified laterality  -     Ambulatory Referral to Orthopedic Surgery         Discussion:   Reviewed physical exam and imaging with patient at time of visit. The patient demonstrates symptoms consistent with lumbar disc degeneration. Recommended that the patient follow-up with pain management for further evaluation and treatment. A referral was placed for pain management, Dr. Kai Gandhi. Discussed with patient that the symptoms in her knee were likely attributed to a flare-up of the lumbar degeneration. She also reports a history of gout. She will follow-up as needed. The patient expresses understanding and is in agreement with today's treatment plan.      -tiny density anterior to patella is inconsequential and no evidence that this is causing any pain whatsoever    Plan:   · Referral placed for pain managment. No follow-ups on file. History of Present Illness:  Shante Davis is a 78 y.o. female who presents for initial evaluation of her right knee. The patient states that on 23, she experienced severe knee and low back pain upon waking up. She reported to her local ED where she was admitted for four days. The patient had imaging of her knee, back, and hips at that time. The patient reported to physical therapy, who noted a ".3cm pre-patellar foreign body" which they attributed to her pain. She then followed up with her PCP and was referred to orthopedics.  On today's presentation, she states that her knee pain has significantly improved. However, she reports significant back pain. She states that she has been unable to walk due to her pain. She presents with a single point cane. The patient states that she has had back pain for over 10 years. She reports a history of Beni Hock and therefore was unable to receive spine injections in the past.       Review of Systems  Constitutional: Negative for fatigue, fever or loss of appetite. HENT: Negative. Respiratory: Negative for shortness of breath, dyspnea. Cardiovascular: Negative for chest pain/tightness. Gastrointestinal: Negative for abdominal pain, N/V. Endocrine: Negative for cold/heat intolerance, unexplained weight loss/gain. Genitourinary: Negative for flank pain, dysuria  Musculoskeletal:  Positive for arthralgia   Skin: Negative for rash. Neurological:  Negative for numbness or tingling  Psychiatric/Behavioral: Negative for agitation. Physical Exam  /85   Pulse 85   Temp (!) 68 °F (20 °C)   Ht 5' 7" (1.702 m)   Wt 79.4 kg (175 lb)   BMI 27.41 kg/m²   Cons: Appears well. No apparent distress. Psych: Alert. Oriented x3. Mood and affect normal.  Eyes: PERRLA, EOMI  Resp: Normal effort. No audible wheezing or stridor. CV: Extremities warm and well perfused. No LE edema. Abd:    No distention or guarding. Skin: Warm. No visible lesions. Neuro: Normal muscle tone.       Orthopedic Exam:   left knee(s) -   Patient ambulates with antalgic gait pattern  Uses Single Point Cane assistive device  No anatomical deformity  Skin is warm and dry to touch with no signs of erythema, ecchymosis, or infection  No soft tissue swelling or effusion noted  ROM (0° - 120°)   MMT: 4/5 throughout  TTP over lateral joint line  Flexor and extensor mechanisms are intact   Knee is stable to varus and valgus stress  - Lachman's  - Anterior Drawer, - Posterior Drawer  - Altagracia's  Patella tracks centrally without palpable crepitus  Calf compartments are soft and supple  - Valeriy's sign  2+ DP and PT pulses with brisk capillary refill to the toes  Sural, saphenous, tibial, superficial, and deep peroneal motor and sensory distributions intact  Sensation light touch intact distally      Studies Reviewed  Study type: XRAY right knee(s)  Date: 7/12/23  I have personally reviewed all relevant imaging. My impression is as follows:  mild osteoarthritis of the tibiofemoral joint    Study type: CT lumbar spine  Date: 7/12/23  I have personally reviewed all relevant imaging. I have read and agree with the radiologist report. My impression is as follows: Moderate-to-severe foraminal narrowing at right L3-L4. Degenerative changes, as detailed above. Procedures  No procedures today. Medical, Surgical, Family, and Social History  The patient's medical history, family history, and social history, were reviewed and updated as appropriate.     Past Medical History:   Diagnosis Date   • Contact dermatitis     Last assessed - 8/8/12   • COPD (chronic obstructive pulmonary disease) ([facility])    • Disease of thyroid gland    • GERD (gastroesophageal reflux disease)    • Hyperlipidemia    • Hypertension    • Irritable bowel syndrome    • Von Willebrand disease ([address])      Past Surgical History:   Procedure Laterality Date   • APPENDECTOMY     • HYSTERECTOMY      Date unk      Family History   Problem Relation Age of Onset   • Clotting disorder Mother         Def of clotting factor    • Aneurysm Father      Social History     Occupational History   • Not on file   Tobacco Use   • Smoking status: Never   • Smokeless tobacco: Never   Vaping Use   • Vaping Use: Never used   Substance and Sexual Activity   • Alcohol use: Never   • Drug use: No   • Sexual activity: Not Currently     Allergies   Allergen Reactions   • Contrast [Iodinated Contrast Media] Itching, Tongue Swelling and Lip Swelling   • Niacin And Related Anaphylaxis   • Other      Iv contrast dye- lips and tongue thick body itchy • Codeine Nausea Only   • Nsaids    • Penicillins Rash       Current Outpatient Medications:   •  acetaminophen (TYLENOL) 325 mg tablet, Take 2 tablets (650 mg total) by mouth every 6 (six) hours as needed (pain, fever), Disp: 1 Bottle, Rfl: 0  •  allopurinol (ZYLOPRIM) 100 mg tablet, TAKE 1 TABLET BY MOUTH EVERY DAY, Disp: 90 tablet, Rfl: 2  •  ALPRAZolam (XANAX) 0.25 mg tablet, TAKE 1 TABLET BY MOUTH THREE TIMES A DAY IF NEEDED FOR ANXIETY, Disp: 90 tablet, Rfl: 0  •  benzonatate (TESSALON PERLES) 100 mg capsule, TAKE 1 CAPSULE BY MOUTH THREE TIMES A DAY, Disp: 90 capsule, Rfl: 3  •  budesonide-formoterol (SYMBICORT) 160-4.5 mcg/act inhaler, Inhale 2 puffs 2 (two) times a day Rinse mouth after use., Disp: 30.6 g, Rfl: 5  •  hyoscyamine (ANASPAZ,LEVSIN) 0.125 MG tablet, Take 1 tablet (0.125 mg total) by mouth every 6 (six) hours as needed for cramping, Disp: 60 tablet, Rfl: 0  •  levothyroxine 100 mcg tablet, TAKE 1 TABLET BY MOUTH EVERY DAY, Disp: 90 tablet, Rfl: 3  •  lisinopril (ZESTRIL) 10 mg tablet, TAKE 1 TABLET BY MOUTH TWICE A DAY, Disp: 180 tablet, Rfl: 3  •  metoprolol tartrate (LOPRESSOR) 25 mg tablet, TAKE 1 TABLET BY MOUTH TWICE A DAY, Disp: 180 tablet, Rfl: 3  •  pantoprazole (PROTONIX) 40 mg tablet, TAKE 1 TABLET BY MOUTH EVERY DAY, Disp: 90 tablet, Rfl: 3  •  PARoxetine (PAXIL) 30 mg tablet, TAKE 1 TABLET BY MOUTH EVERY DAY, Disp: 90 tablet, Rfl: 3  •  REFRESH TEARS 0.5 % SOLN, INSTILL 1 DROP INTO EACH EYE THREE TIMES DAILY AS NEEDED FOR DRY EYES, Disp: , Rfl:   •  simvastatin (ZOCOR) 40 mg tablet, TAKE 1 TABLET BY MOUTH DAILY AT BEDTIME, Disp: 90 tablet, Rfl: 3  •  dicyclomine (BENTYL) 10 mg capsule, TAKE 1 CAPSULE (10 MG TOTAL) BY MOUTH 3 (THREE) TIMES A DAY AS NEEDED (USE ONLY AS NEEDED), Disp: 270 capsule, Rfl: 1  •  meclizine (ANTIVERT) 12.5 MG tablet, Take 1 tablet (12.5 mg total) by mouth every 8 (eight) hours as needed for dizziness (Patient not taking: Reported on 7/31/2023), Disp: 30 tablet, Rfl: 0  •  predniSONE 10 mg tablet, Take 1 tablet (10 mg total) by mouth daily (Patient not taking: Reported on 9/5/2023), Disp: 5 tablet, Rfl: 0  •  predniSONE 20 mg tablet, Take 2 tablets (40 mg total) by mouth daily Do not start before July 16, 2023.  (Patient not taking: Reported on 7/31/2023), Disp: 8 tablet, Rfl: 0    30 minutes was spent in the coordination of care, reviewing of imaging and with the patient on the date of service      Scribe Attestation    I,:  Rosemary Castañeda am acting as a scribe while in the presence of the attending physician.:       I,:  Rena Dahl, DO personally performed the services described in this documentation    as scribed in my presence.:

## 2023-09-12 ENCOUNTER — CONSULT (OUTPATIENT)
Dept: PAIN MEDICINE | Facility: CLINIC | Age: 83
End: 2023-09-12
Payer: MEDICARE

## 2023-09-12 VITALS
HEART RATE: 71 BPM | SYSTOLIC BLOOD PRESSURE: 157 MMHG | BODY MASS INDEX: 27.47 KG/M2 | WEIGHT: 175 LBS | DIASTOLIC BLOOD PRESSURE: 66 MMHG | HEIGHT: 67 IN

## 2023-09-12 DIAGNOSIS — D68.00 VON WILLEBRAND DISEASE (HCC): ICD-10-CM

## 2023-09-12 DIAGNOSIS — G89.29 CHRONIC BILATERAL LOW BACK PAIN WITHOUT SCIATICA: ICD-10-CM

## 2023-09-12 DIAGNOSIS — M54.50 CHRONIC BILATERAL LOW BACK PAIN WITHOUT SCIATICA: ICD-10-CM

## 2023-09-12 DIAGNOSIS — G89.4 CHRONIC PAIN SYNDROME: Primary | ICD-10-CM

## 2023-09-12 PROCEDURE — 99204 OFFICE O/P NEW MOD 45 MIN: CPT | Performed by: STUDENT IN AN ORGANIZED HEALTH CARE EDUCATION/TRAINING PROGRAM

## 2023-09-12 RX ORDER — LIDOCAINE 50 MG/G
1 PATCH TOPICAL DAILY
Qty: 60 PATCH | Refills: 2 | Status: SHIPPED | OUTPATIENT
Start: 2023-09-12

## 2023-09-12 NOTE — PROGRESS NOTES
Assessment:  1. Chronic pain syndrome    2. Chronic bilateral low back pain without sciatica    3. Von Willebrand disease ([address])      Portions of the record may have been created with voice recognition software. Occasional wrong word or "sound a like" substitutions may have occurred due to the inherent limitations of voice recognition software. Read the chart carefully and recognize, using context, where substitutions have occurred. Contact me with any questions. Plan:  47-year-old female with history of von Willebrand disease, referred by Dr Manpreet Marr, here for initial evaluation of chronic low back pain. She denies radicular pain, paresthesias, new or progressive weakness, saddle anesthesia, bowel/bladder incontinence. She notes pain limited difficulty with walking. Recent CT of lumbar spine shows multilevel DDD with variable canal and foraminal narrowing and facet arthropathy. 1.  Recommend a course of physical therapy for core/back strengthening, address gait/balance, trial of LRAD. 2. Defer interventional treatment due to elevated risk of bleeding with history of von Willebrand disease. 3.  Patient defers discussion of medication management for her symptoms due to prior adverse effects from gabapentin and muscle relaxants. 4.  Trial Lidoderm patch, TENS units for symptom management. 5.  Follow-up in 3 months or as needed. History of Present Illness: The patient is a 78 y.o. female who presents for consultation in regards to Back Pain and Knee Pain. Symptoms have been present for several years. Symptoms began without any precipitating injury or trauma. Pain is reported to be 8 on the numeric rating scale. Symptoms are felt intermittently and worst in the no typical pattern. Symptoms are characterized as sharp and dull/aching. Symptoms are associated with no weakness. Aggravating factors include standing, bending and walking. Relieving factors include lying down, sitting and relaxation. No change in symptoms with coughing/sneezing. Previously tried and failed gabapentin and muscle relaxants due to wooziness. Review of Systems:    Review of Systems   Constitutional: Negative for chills, fatigue and fever. HENT: Negative for hearing loss, sinus pain, sore throat and trouble swallowing. Eyes: Negative for pain and visual disturbance. Respiratory: Positive for shortness of breath (COPD). Negative for wheezing. Cardiovascular: Negative for chest pain and palpitations. Gastrointestinal: Positive for abdominal pain (IBS). Negative for constipation and nausea. Endocrine: Negative for polydipsia and polyuria. Genitourinary: Negative for difficulty urinating. Musculoskeletal: Positive for myalgias. Negative for arthralgias, gait problem and joint swelling. JOINT PAIN   Skin: Negative for rash. Neurological: Negative for dizziness, weakness and headaches. Hematological: Does not bruise/bleed easily. Psychiatric/Behavioral: Positive for dysphoric mood. The patient is nervous/anxious. All other systems reviewed and are negative.         Past Medical History:   Diagnosis Date   • Contact dermatitis     Last assessed - 8/8/12   • COPD (chronic obstructive pulmonary disease) (HCC)    • Disease of thyroid gland    • GERD (gastroesophageal reflux disease)    • Hyperlipidemia    • Hypertension    • Irritable bowel syndrome    • Von Willebrand disease ([address])        Past Surgical History:   Procedure Laterality Date   • APPENDECTOMY     • HYSTERECTOMY      Date unk        Family History   Problem Relation Age of Onset   • Clotting disorder Mother         Def of clotting factor    • Aneurysm Father        Social History     Occupational History   • Not on file   Tobacco Use   • Smoking status: Never   • Smokeless tobacco: Never   Vaping Use   • Vaping Use: Never used   Substance and Sexual Activity   • Alcohol use: Never   • Drug use: No   • Sexual activity: Not Currently Current Outpatient Medications:   •  acetaminophen (TYLENOL) 325 mg tablet, Take 2 tablets (650 mg total) by mouth every 6 (six) hours as needed (pain, fever), Disp: 1 Bottle, Rfl: 0  •  allopurinol (ZYLOPRIM) 100 mg tablet, TAKE 1 TABLET BY MOUTH EVERY DAY, Disp: 90 tablet, Rfl: 2  •  ALPRAZolam (XANAX) 0.25 mg tablet, TAKE 1 TABLET BY MOUTH THREE TIMES A DAY IF NEEDED FOR ANXIETY, Disp: 90 tablet, Rfl: 0  •  benzonatate (TESSALON PERLES) 100 mg capsule, TAKE 1 CAPSULE BY MOUTH THREE TIMES A DAY, Disp: 90 capsule, Rfl: 3  •  budesonide-formoterol (SYMBICORT) 160-4.5 mcg/act inhaler, Inhale 2 puffs 2 (two) times a day Rinse mouth after use., Disp: 30.6 g, Rfl: 5  •  hyoscyamine (ANASPAZ,LEVSIN) 0.125 MG tablet, Take 1 tablet (0.125 mg total) by mouth every 6 (six) hours as needed for cramping, Disp: 60 tablet, Rfl: 0  •  levothyroxine 100 mcg tablet, TAKE 1 TABLET BY MOUTH EVERY DAY, Disp: 90 tablet, Rfl: 3  •  lidocaine (Lidoderm) 5 %, Apply 1 patch topically over 12 hours daily Remove & Discard patch within 12 hours or as directed by MD, Disp: 60 patch, Rfl: 2  •  lisinopril (ZESTRIL) 10 mg tablet, TAKE 1 TABLET BY MOUTH TWICE A DAY, Disp: 180 tablet, Rfl: 3  •  metoprolol tartrate (LOPRESSOR) 25 mg tablet, TAKE 1 TABLET BY MOUTH TWICE A DAY, Disp: 180 tablet, Rfl: 3  •  pantoprazole (PROTONIX) 40 mg tablet, TAKE 1 TABLET BY MOUTH EVERY DAY, Disp: 90 tablet, Rfl: 3  •  PARoxetine (PAXIL) 30 mg tablet, TAKE 1 TABLET BY MOUTH EVERY DAY, Disp: 90 tablet, Rfl: 3  •  REFRESH TEARS 0.5 % SOLN, INSTILL 1 DROP INTO EACH EYE THREE TIMES DAILY AS NEEDED FOR DRY EYES, Disp: , Rfl:   •  amLODIPine (NORVASC) 5 mg tablet, Take 1 tablet (5 mg total) by mouth daily, Disp: 30 tablet, Rfl: 5  •  dicyclomine (BENTYL) 10 mg capsule, TAKE 1 CAPSULE (10 MG TOTAL) BY MOUTH 3 (THREE) TIMES A DAY AS NEEDED (USE ONLY AS NEEDED), Disp: 270 capsule, Rfl: 1  •  meclizine (ANTIVERT) 12.5 MG tablet, Take 1 tablet (12.5 mg total) by mouth every 8 (eight) hours as needed for dizziness (Patient not taking: Reported on 7/31/2023), Disp: 30 tablet, Rfl: 0  •  metoprolol tartrate (LOPRESSOR) 50 mg tablet, Take 1 tablet (50 mg total) by mouth in the morning 50mg dose in AM 25mg in pm (pt has 25mg), Disp: 30 tablet, Rfl: 5  •  predniSONE 20 mg tablet, Take 2 tablets (40 mg total) by mouth daily Do not start before July 16, 2023. (Patient not taking: Reported on 7/31/2023), Disp: 8 tablet, Rfl: 0    Allergies   Allergen Reactions   • Contrast [Iodinated Contrast Media] Itching, Tongue Swelling and Lip Swelling   • Niacin And Related Anaphylaxis   • Other      Iv contrast dye- lips and tongue thick body itchy   • Codeine Nausea Only   • Nsaids    • Penicillins Rash       Physical Exam:    /66   Pulse 71   Ht 5' 7" (1.702 m)   Wt 79.4 kg (175 lb)   BMI 27.41 kg/m²     Constitutional: normal, well developed, well nourished, alert, in no distress and non-toxic and no overt pain behavior. Eyes: anicteric  HEENT: grossly intact  Neck: supple, symmetric, trachea midline and no masses   Pulmonary:even and unlabored  Cardiovascular:No edema or pitting edema present  Skin:Normal without rashes or lesions and well hydrated  Psychiatric:Mood and affect appropriate  Neurologic:Cranial Nerves II-XII grossly intact  Musculoskeletal:slow, guarded gait, pain to palpation over lower lumbar paraspinals, grossly intact strength and sensation to light touch over BLE    Imaging  No orders to display     CT LUMBAR SPINE WITHOUT CONTRAST     INDICATION:   severe R hip pain.     COMPARISON: Lumbar spine radiograph 3/30/2021, 1/19/2017. MRI lumbar spine 10/19/2015.     TECHNIQUE:  Contiguous axial images through the lumbar spine were obtained. Sagittal and coronal reconstructions were performed.     Radiation dose length product (DLP) for this visit:  559.7 mGy-cm .   This examination, like all CT scans performed in the [facility], was performed utilizing techniques to minimize radiation dose exposure, including the use of iterative   reconstruction and automated exposure control.     IMAGE QUALITY:  Diagnostic.     FINDINGS:     ALIGNMENT:  There is a transitional lumbosacral junction -partially sacralized L5 vertebral body with right assimilation joint. Mild levoscoliosis of lumbar spine, unchanged unchanged. Grade 1 anterolisthesis L2-L3 and L3-L4. No pars defect.     VERTEBRAE:  No fracture. No lytic or blastic lesion. Osteopenia.     DEGENERATIVE CHANGES: Multilevel osteophytes, vacuum disc phenomenon, subchondral sclerosis, disc height loss, and facet arthropathy.     Lower Thoracic spine:  Normal lower thoracic disc spaces.     L1-2: Diffuse disc bulge. Facet arthropathy. Mild canal stenosis. No significant foraminal narrowing.     L2-3: Diffuse disc bulge, mild uncoverage of posterior disc. Facet arthropathy, ligamentum flavum thickening. Mild canal stenosis. No significant foraminal narrowing.     L3-4: Diffuse disc bulge, mild uncoverage of posterior disc, small right foraminal/extraforaminal disc protrusion contact of right L3 exiting nerve. Facet arthropathy (right worse than left). Mild canal stenosis. Moderate-to-severe right foraminal   narrowing.     L4-5: Diffuse disc bulge, small central disc protrusion. Facet arthropathy. Mild canal stenosis. Mild left foraminal narrowing.     L5-S1: Partially sacralized L5 vertebral body with right assimilation joint. Hypertrophic left facet arthropathy. No significant canal stenosis or foraminal narrowing.     PARASPINAL SOFT TISSUES:   Normal.     OTHER: Vascular calcifications.     IMPRESSION:     No acute osseous abnormality of lumbar spine.     Moderate-to-severe foraminal narrowing at right L3-L4.  Degenerative changes, as detailed above.     Additional chronic/incidental findings as detailed above.       Orders Placed This Encounter   Procedures   • Durable Medical Equipment   • Ambulatory referral to Physical Therapy

## 2023-09-13 ENCOUNTER — TELEPHONE (OUTPATIENT)
Dept: FAMILY MEDICINE CLINIC | Facility: CLINIC | Age: 83
End: 2023-09-13

## 2023-09-13 DIAGNOSIS — I10 PRIMARY HYPERTENSION: Primary | ICD-10-CM

## 2023-09-13 RX ORDER — AMLODIPINE BESYLATE 5 MG/1
5 TABLET ORAL DAILY
Qty: 30 TABLET | Refills: 5 | Status: SHIPPED | OUTPATIENT
Start: 2023-09-13 | End: 2023-09-19 | Stop reason: ALTCHOICE

## 2023-09-13 NOTE — TELEPHONE ENCOUNTER
[facility] called stating with the Amlodipine and and statin medication, pt at increased risk for Rhabdomyolysis     Please advise.

## 2023-09-13 NOTE — TELEPHONE ENCOUNTER
Patient states her BP has been running high, tested this morning and was 165/93, went to see ortho and it was high there too. Said when she was in the hospital they stopped her HCTZ and unsure why. Please advise.

## 2023-09-13 NOTE — TELEPHONE ENCOUNTER
The water pill was stopped due to decrease in kidney function and concern it was dehydrating her  If BP trend is consistently running higher would add Amlodipine 5mg daily

## 2023-09-14 ENCOUNTER — TELEPHONE (OUTPATIENT)
Dept: FAMILY MEDICINE CLINIC | Facility: CLINIC | Age: 83
End: 2023-09-14

## 2023-09-14 ENCOUNTER — HOSPITAL ENCOUNTER (EMERGENCY)
Facility: HOSPITAL | Age: 83
Discharge: HOME | End: 2023-09-14
Attending: EMERGENCY MEDICINE
Payer: MEDICARE

## 2023-09-14 VITALS
SYSTOLIC BLOOD PRESSURE: 132 MMHG | HEART RATE: 65 BPM | RESPIRATION RATE: 18 BRPM | DIASTOLIC BLOOD PRESSURE: 66 MMHG | OXYGEN SATURATION: 93 % | TEMPERATURE: 98.7 F

## 2023-09-14 DIAGNOSIS — I10 PRIMARY HYPERTENSION: Primary | ICD-10-CM

## 2023-09-14 DIAGNOSIS — I10 ASYMPTOMATIC HYPERTENSION: Primary | ICD-10-CM

## 2023-09-14 PROCEDURE — 93005 ELECTROCARDIOGRAM TRACING: CPT

## 2023-09-14 PROCEDURE — 99284 EMERGENCY DEPT VISIT MOD MDM: CPT | Performed by: EMERGENCY MEDICINE

## 2023-09-14 PROCEDURE — 99283 EMERGENCY DEPT VISIT LOW MDM: CPT

## 2023-09-14 RX ORDER — METOPROLOL TARTRATE 50 MG/1
50 TABLET, FILM COATED ORAL DAILY
Qty: 30 TABLET | Refills: 5 | Status: SHIPPED | OUTPATIENT
Start: 2023-09-14

## 2023-09-14 NOTE — TELEPHONE ENCOUNTER
You prescribed amlodipine, she is also on simvastatin 40 mg. Do you want to continue the simvastatin at 40 mg? Simvastatin can build up in the body at that dose when taking amlodipine. You may want to consider lowering to 20 mg. Please advise.

## 2023-09-14 NOTE — ED PROVIDER NOTES
tHistory  Chief Complaint   Patient presents with   • Hypertension     Pt had a home visit today and when the nurse checked her blood pressure it was 203/100. Pt complains of headache, denies chest pain. 47-year-old female presents for evaluation of hypertension. Patient has history of such, she reports being taken off of her hydrochlorothiazide in July when she was hospitalized. She believes her blood pressure has been trending upwards since this. Patient had a home health aide visit today who took her blood pressure with a systolic in the 461L, they recommended evaluation. Patient does report over the last few days a headache in the crown of her head was not worse than any previous headache. She has no other associated symptoms such as vision changes, neck or back pain, chest pain, dyspnea, extremity involvement, abdominal pain, nausea or vomiting. Patient has been in contact with her PCP to adjust her medication regimen however has not started this yet. Prior to Admission Medications   Prescriptions Last Dose Informant Patient Reported? Taking?    ALPRAZolam (XANAX) 0.25 mg tablet   No No   Sig: TAKE 1 TABLET BY MOUTH THREE TIMES A DAY IF NEEDED FOR ANXIETY   PARoxetine (PAXIL) 30 mg tablet   No No   Sig: TAKE 1 TABLET BY MOUTH EVERY DAY   REFRESH TEARS 0.5 % SOLN  Self Yes No   Sig: INSTILL 1 DROP INTO EACH EYE THREE TIMES DAILY AS NEEDED FOR DRY EYES   acetaminophen (TYLENOL) 325 mg tablet 9/14/2023 Self No Yes   Sig: Take 2 tablets (650 mg total) by mouth every 6 (six) hours as needed (pain, fever)   allopurinol (ZYLOPRIM) 100 mg tablet   No No   Sig: TAKE 1 TABLET BY MOUTH EVERY DAY   amLODIPine (NORVASC) 5 mg tablet   No No   Sig: Take 1 tablet (5 mg total) by mouth daily   benzonatate (TESSALON PERLES) 100 mg capsule   No No   Sig: TAKE 1 CAPSULE BY MOUTH THREE TIMES A DAY   budesonide-formoterol (SYMBICORT) 160-4.5 mcg/act inhaler   No No   Sig: Inhale 2 puffs 2 (two) times a day Rinse mouth after use. dicyclomine (BENTYL) 10 mg capsule   No No   Sig: TAKE 1 CAPSULE (10 MG TOTAL) BY MOUTH 3 (THREE) TIMES A DAY AS NEEDED (USE ONLY AS NEEDED)   hyoscyamine (ANASPAZ,LEVSIN) 0.125 MG tablet  Self No No   Sig: Take 1 tablet (0.125 mg total) by mouth every 6 (six) hours as needed for cramping   levothyroxine 100 mcg tablet   No No   Sig: TAKE 1 TABLET BY MOUTH EVERY DAY   lidocaine (Lidoderm) 5 %   No No   Sig: Apply 1 patch topically over 12 hours daily Remove & Discard patch within 12 hours or as directed by MD   lisinopril (ZESTRIL) 10 mg tablet   No No   Sig: TAKE 1 TABLET BY MOUTH TWICE A DAY   meclizine (ANTIVERT) 12.5 MG tablet   No No   Sig: Take 1 tablet (12.5 mg total) by mouth every 8 (eight) hours as needed for dizziness   Patient not taking: Reported on 7/31/2023   metoprolol tartrate (LOPRESSOR) 25 mg tablet   No No   Sig: TAKE 1 TABLET BY MOUTH TWICE A DAY   metoprolol tartrate (LOPRESSOR) 50 mg tablet   No No   Sig: Take 1 tablet (50 mg total) by mouth in the morning 50mg dose in AM 25mg in pm (pt has 25mg)   pantoprazole (PROTONIX) 40 mg tablet   No No   Sig: TAKE 1 TABLET BY MOUTH EVERY DAY   predniSONE 20 mg tablet   No No   Sig: Take 2 tablets (40 mg total) by mouth daily Do not start before July 16, 2023.    Patient not taking: Reported on 7/31/2023      Facility-Administered Medications: None       Past Medical History:   Diagnosis Date   • Contact dermatitis     Last assessed - 8/8/12   • COPD (chronic obstructive pulmonary disease) ([facility])    • Disease of thyroid gland    • GERD (gastroesophageal reflux disease)    • Hyperlipidemia    • Hypertension    • Irritable bowel syndrome    • Von Willebrand disease ([address])        Past Surgical History:   Procedure Laterality Date   • APPENDECTOMY     • HYSTERECTOMY      Date unk        Family History   Problem Relation Age of Onset   • Clotting disorder Mother         Def of clotting factor    • Aneurysm Father      I have reviewed and agree with the history as documented. E-Cigarette/Vaping   • E-Cigarette Use Never User      E-Cigarette/Vaping Substances   • Nicotine No    • THC No    • CBD No    • Flavoring No    • Other No    • Unknown No      Social History     Tobacco Use   • Smoking status: Never   • Smokeless tobacco: Never   Vaping Use   • Vaping Use: Never used   Substance Use Topics   • Alcohol use: Never   • Drug use: No       Review of Systems   Constitutional: Negative for activity change, appetite change, chills and fever. Eyes: Negative for visual disturbance. Respiratory: Negative for shortness of breath. Cardiovascular: Negative for chest pain and leg swelling. Gastrointestinal: Negative for abdominal pain, nausea and vomiting. Genitourinary: Negative for decreased urine volume. Musculoskeletal: Negative for back pain and neck pain. Neurological: Positive for headaches. Negative for weakness and numbness. All other systems reviewed and are negative. Physical Exam  Physical Exam  Vitals reviewed. Constitutional:       General: She is not in acute distress. Appearance: Normal appearance. She is not ill-appearing, toxic-appearing or diaphoretic. HENT:      Head: Normocephalic and atraumatic. Right Ear: External ear normal.      Left Ear: External ear normal.      Nose: Nose normal.   Eyes:      General:         Right eye: No discharge. Left eye: No discharge. Extraocular Movements: Extraocular movements intact. Cardiovascular:      Rate and Rhythm: Normal rate and regular rhythm. Pulmonary:      Effort: Pulmonary effort is normal. No respiratory distress. Breath sounds: Normal breath sounds. Musculoskeletal:         General: No deformity or signs of injury. Right lower leg: No edema. Left lower leg: No edema. Skin:     General: Skin is warm. Coloration: Skin is not jaundiced or pale. Neurological:      General: No focal deficit present.       Mental Status: She is alert and oriented to person, place, and time. Cranial Nerves: No cranial nerve deficit. Sensory: No sensory deficit. Motor: No weakness. Comments: Uses a cane to ambulate         Vital Signs  ED Triage Vitals   Temperature Pulse Respirations Blood Pressure SpO2   09/14/23 1333 09/14/23 1333 09/14/23 1333 09/14/23 1336 09/14/23 1333   98.7 °F (37.1 °C) 78 18 (!) 179/83 93 %      Temp Source Heart Rate Source Patient Position - Orthostatic VS BP Location FiO2 (%)   09/14/23 1333 09/14/23 1333 09/14/23 1336 09/14/23 1336 --   Temporal Monitor Lying Right arm       Pain Score       09/14/23 1333       6           Vitals:    09/14/23 1333 09/14/23 1336 09/14/23 1416 09/14/23 1440   BP:  (!) 179/83 147/69 132/66   Pulse: 78  65    Patient Position - Orthostatic VS:  Lying           Visual Acuity      ED Medications  Medications - No data to display    Diagnostic Studies  Results Reviewed     Procedure Component Value Units Date/Time    CBC and differential [[id number]]     Lab Status: No result Specimen: Blood     Comprehensive metabolic panel [[id number]]     Lab Status: No result Specimen: Blood     HS Troponin 0hr (reflex protocol) [[id number]]     Lab Status: No result Specimen: Blood     TSH, 3rd generation with Free T4 reflex [[id number]]     Lab Status: No result Specimen: Blood                  No orders to display              Procedures  Procedures         ED Course  ED Course as of 09/14/23 1613   Thu Sep 14, 2023   1340 Procedure Note: EKG  Date/Time: 09/14/23 1:41 PM   Interpreted by: Bivalve Se  Indications / Diagnosis: HTN  ECG reviewed by me, the ED Provider: yes   The EKG demonstrates:  Rhythm: normal sinus  Intervals: normal intervals  Axis: left axis  QRS/Blocks: normal QRS  ST Changes: No acute ST Changes, no STD/NAZANIN. No significant changes from previous.        1342 Per chart review, patient was previously on HCTZ however stopped during a hospitalization due to renal dysfunction. Patient has been in contact with her PCP regarding BP issues, rx for amlodipine given yesterday and today changed lopressor regimen. [phone] Patient previously okay with labs however no longer wants to stay for them. SBIRT 22yo+    Flowsheet Row Most Recent Value   Initial Alcohol Screen: US AUDIT-C     1. How often do you have a drink containing alcohol? 0 Filed at: 09/14/2023 1335   2. How many drinks containing alcohol do you have on a typical day you are drinking? 0 Filed at: 09/14/2023 1335   3a. Male UNDER 65: How often do you have five or more drinks on one occasion? 0 Filed at: 09/14/2023 1335   3b. FEMALE Any Age, or MALE 65+: How often do you have 4 or more drinks on one occassion? 0 Filed at: 09/14/2023 1335   Audit-C Score 0 Filed at: 09/14/2023 1335   DUNCAN: How many times in the past year have you. .. Used an illegal drug or used a prescription medication for non-medical reasons? Never Filed at: 09/14/2023 1335                    Medical Decision Making  78-year-old female presents for evaluation of hypertension. Patient was formally on hydrochlorothiazide however this was discontinued after hospitalization in July, appears it was discontinued for possible LUCIEN. Patient reports slowly increasing blood pressures since then, she had been in discussion with her PCP however has not made changes to her blood pressure regimen just yet. During our discussion patient's blood pressure normalizes, we had a long discussion regarding blood pressure numbers to worry about as well as symptoms involved with that. Patient initially excepting of blood work to assess for possible endorgan dysfunction however ultimately declines this evaluation.   I believe this is acceptable, initial EKG showing no significant changes from previous, patient is neurologically intact with no other evidence of endorgan damage at this time, additionally cording to patient blood pressure has been trending upwards over the past several weeks and did not suddenly increase. Patient does admit to increased stress and anxiety over losing a son few months ago and her living situation, she was also concerned with her increasing blood pressure. Patient is advised to check her blood pressure tonight prior to starting her new regimen, if her pressure is elevated she should take her additional metoprolol, patient should additionally follow-up with her primary care provider. Amount and/or Complexity of Data Reviewed  Labs: ordered. Disposition  Final diagnoses:   Asymptomatic hypertension     Time reflects when diagnosis was documented in both MDM as applicable and the Disposition within this note     Time User Action Codes Description Comment    9/14/2023  2:41 PM Maye Romero Add [I10] Asymptomatic hypertension       ED Disposition     ED Disposition   Discharge    Condition   Stable    Date/Time   Thu Sep 14, 2023  2:40 PM    Comment   Alma Barney discharge to home/self care.                Follow-up Information     Follow up With Specialties Details Why Contact Info Additional Information    Zeferino Corrales,  Internal Medicine, Hospice Services Schedule an appointment as soon as possible for a visit   [address] [facility] Emergency Department Emergency Medicine  If symptoms worsen [address] [place] [id number]  [address] [place] [address] Emergency Department, [address] [place] [address], [phone]          Discharge Medication List as of 9/14/2023  2:41 PM      CONTINUE these medications which have NOT CHANGED    Details   acetaminophen (TYLENOL) 325 mg tablet Take 2 tablets (650 mg total) by mouth every 6 (six) hours as needed (pain, fever), Starting Fri 5/15/2020, Normal      allopurinol (ZYLOPRIM) 100 mg tablet TAKE 1 TABLET BY MOUTH EVERY DAY, Starting Mon 8/28/2023, Normal ALPRAZolam (XANAX) 0.25 mg tablet TAKE 1 TABLET BY MOUTH THREE TIMES A DAY IF NEEDED FOR ANXIETY, Normal      amLODIPine (NORVASC) 5 mg tablet Take 1 tablet (5 mg total) by mouth daily, Starting Wed 9/13/2023, Normal      benzonatate (TESSALON PERLES) 100 mg capsule TAKE 1 CAPSULE BY MOUTH THREE TIMES A DAY, Normal      budesonide-formoterol (SYMBICORT) 160-4.5 mcg/act inhaler Inhale 2 puffs 2 (two) times a day Rinse mouth after use., Starting Fri 12/16/2022, Normal      dicyclomine (BENTYL) 10 mg capsule TAKE 1 CAPSULE (10 MG TOTAL) BY MOUTH 3 (THREE) TIMES A DAY AS NEEDED (USE ONLY AS NEEDED), Starting Fri 2/24/2023, Until Mon 7/31/2023 at 2359, Normal      hyoscyamine (ANASPAZ,LEVSIN) 0.125 MG tablet Take 1 tablet (0.125 mg total) by mouth every 6 (six) hours as needed for cramping, Starting Fri 5/22/2020, Normal      levothyroxine 100 mcg tablet TAKE 1 TABLET BY MOUTH EVERY DAY, Normal      lidocaine (Lidoderm) 5 % Apply 1 patch topically over 12 hours daily Remove & Discard patch within 12 hours or as directed by MD, Starting Tue 9/12/2023, Normal      lisinopril (ZESTRIL) 10 mg tablet TAKE 1 TABLET BY MOUTH TWICE A DAY, Normal      meclizine (ANTIVERT) 12.5 MG tablet Take 1 tablet (12.5 mg total) by mouth every 8 (eight) hours as needed for dizziness, Starting Tue 1/18/2022, Normal      !! metoprolol tartrate (LOPRESSOR) 25 mg tablet TAKE 1 TABLET BY MOUTH TWICE A DAY, Normal      !! metoprolol tartrate (LOPRESSOR) 50 mg tablet Take 1 tablet (50 mg total) by mouth in the morning 50mg dose in AM 25mg in pm (pt has 25mg), Starting u 9/14/2023, Normal      pantoprazole (PROTONIX) 40 mg tablet TAKE 1 TABLET BY MOUTH EVERY DAY, Normal      PARoxetine (PAXIL) 30 mg tablet TAKE 1 TABLET BY MOUTH EVERY DAY, Normal      predniSONE 20 mg tablet Take 2 tablets (40 mg total) by mouth daily Do not start before July 16, 2023., Starting Sun 7/16/2023, Normal      REFRESH TEARS 0.5 % SOLN INSTILL 1 DROP INTO EACH EYE THREE TIMES DAILY AS NEEDED FOR DRY EYES, Historical Med       !! - Potential duplicate medications found. Please discuss with provider. No discharge procedures on file.     PDMP Review       Value Time User    PDMP Reviewed  Yes 8/18/2023  6:04 AM Main Zhang DO          ED Provider  Electronically Signed by           Tiana Escobedo DO  09/14/23 [id number]

## 2023-09-14 NOTE — TELEPHONE ENCOUNTER
Patient made aware of recommendations and would like rx for Metoprolol 50 mg to be sent to her pharmacy

## 2023-09-14 NOTE — TELEPHONE ENCOUNTER
Patient had nurse in today and her BP was increased to 200s, nurse told her to go to ER which she did, they did EKG and sent her home as BP came down. Patient aware to take Metoprolol 50 mg now, wants to see if you want her to follow up with an appt since she was in the ER? Please advise.

## 2023-09-14 NOTE — TELEPHONE ENCOUNTER
Tell patient not recommended to take simvastatin and amlodipine so would increase Metoprolol in PM to 50mg (continue 25 mg in AM  If she wants to use pills she has that is ok or can send new rx for 50mg in PM

## 2023-09-15 ENCOUNTER — VBI (OUTPATIENT)
Dept: FAMILY MEDICINE CLINIC | Facility: CLINIC | Age: 83
End: 2023-09-15

## 2023-09-15 LAB
ATRIAL RATE: 74 BPM
P AXIS: 63 DEGREES
PR INTERVAL: 182 MS
QRS AXIS: -10 DEGREES
QRSD INTERVAL: 70 MS
QT INTERVAL: 386 MS
QTC INTERVAL: 428 MS
T WAVE AXIS: 58 DEGREES
VENTRICULAR RATE: 74 BPM

## 2023-09-15 PROCEDURE — 93010 ELECTROCARDIOGRAM REPORT: CPT | Performed by: INTERNAL MEDICINE

## 2023-09-15 NOTE — TELEPHONE ENCOUNTER
09/15/23 12:42 PM    Patient contacted post ED visit, first outreach attempt made. Message was left for patient to return a call to the VBI Department at [facility]: Phone [phone]. Thank you.   Ara Castro  [facility]

## 2023-09-15 NOTE — TELEPHONE ENCOUNTER
Patient made aware of follow up needed. She is agreeable to come in next week.  Patient adrian for 9/19 @ 10am

## 2023-09-15 NOTE — TELEPHONE ENCOUNTER
09/15/23 1:00 PM    Patient contacted post ED visit, VBI department spoke with patient/caregiver and outreach was successful. Patient called back, she stated she is not feeling back to normal, c/o headache. She has appt with pcp on 9/19/23, instructed patient to call office with any concerns. Thank you.   [address] [facility]

## 2023-09-19 ENCOUNTER — TELEPHONE (OUTPATIENT)
Dept: FAMILY MEDICINE CLINIC | Facility: CLINIC | Age: 83
End: 2023-09-19

## 2023-09-19 ENCOUNTER — OFFICE VISIT (OUTPATIENT)
Dept: FAMILY MEDICINE CLINIC | Facility: CLINIC | Age: 83
End: 2023-09-19
Payer: MEDICARE

## 2023-09-19 VITALS
SYSTOLIC BLOOD PRESSURE: 148 MMHG | HEART RATE: 78 BPM | HEIGHT: 67 IN | RESPIRATION RATE: 18 BRPM | TEMPERATURE: 97.4 F | DIASTOLIC BLOOD PRESSURE: 80 MMHG | BODY MASS INDEX: 27 KG/M2 | WEIGHT: 172 LBS

## 2023-09-19 DIAGNOSIS — F41.9 ANXIETY: ICD-10-CM

## 2023-09-19 DIAGNOSIS — G44.209 ACUTE NON INTRACTABLE TENSION-TYPE HEADACHE: Primary | ICD-10-CM

## 2023-09-19 PROCEDURE — 99214 OFFICE O/P EST MOD 30 MIN: CPT | Performed by: INTERNAL MEDICINE

## 2023-09-19 RX ORDER — ALPRAZOLAM 0.25 MG/1
0.25 TABLET ORAL 3 TIMES DAILY PRN
Qty: 90 TABLET | Refills: 0 | Status: SHIPPED | OUTPATIENT
Start: 2023-09-19

## 2023-09-19 RX ORDER — TOPIRAMATE 50 MG/1
50 TABLET, FILM COATED ORAL DAILY
Qty: 30 TABLET | Refills: 5 | Status: SHIPPED | OUTPATIENT
Start: 2023-09-19 | End: 2023-09-27

## 2023-09-19 NOTE — TELEPHONE ENCOUNTER
Pt spoke with central scheduling for her MRI. She is allergic to contrast so the order must be changed to MRI without contrast.  Order currently states w/wo contrast.    Would you replace order?

## 2023-09-19 NOTE — PROGRESS NOTES
Name: Julian Wray      : 1940      MRN: [id number]  Encounter Provider: Edward Kingsley DO  Encounter Date: 2023   Encounter department: [address]     1. Acute non intractable tension-type headache  -     MRI brain w wo contrast; Future; Expected date: 2023  -     topiramate (Topamax) 50 MG tablet; Take 1 tablet (50 mg total) by mouth daily  Increase water intake  Stress mgmt  Setup MRI - family hx meningioma  Start Topamax daily in PM   Continue adjusted BP rx which seems to be helping  Schedule eye exam     2. Anxiety  -     ALPRAZolam (XANAX) 0.25 mg tablet; Take 1 tablet (0.25 mg total) by mouth 3 (three) times a day as needed for anxiety         2 months/prn  Subjective      HPI   Pt was in ER due to elevated BP She is taking higher dose of metoprolol  In AM and tolerating No chest pain or palpitations Has headache for past few weeks No recent eye exam and does note change in vision when reading No falls or head trauma She does not check BP at home She sleeps usually if takes xanax at night and naps during the day She does not drink much water   Review of Systems   Constitutional: Negative for chills and fever. HENT: Negative. Eyes: Positive for visual disturbance. Respiratory: Negative for cough and shortness of breath. Cardiovascular: Negative for chest pain, palpitations and leg swelling. Gastrointestinal: Negative. Genitourinary: Negative. Musculoskeletal: Positive for arthralgias and back pain. Neurological: Positive for headaches. Negative for dizziness. Psychiatric/Behavioral: Negative for sleep disturbance. The patient is not nervous/anxious.         Current Outpatient Medications on File Prior to Visit   Medication Sig   • acetaminophen (TYLENOL) 325 mg tablet Take 2 tablets (650 mg total) by mouth every 6 (six) hours as needed (pain, fever)   • allopurinol (ZYLOPRIM) 100 mg tablet TAKE 1 TABLET BY MOUTH EVERY DAY   • benzonatate (TESSALON PERLES) 100 mg capsule TAKE 1 CAPSULE BY MOUTH THREE TIMES A DAY   • budesonide-formoterol (SYMBICORT) 160-4.5 mcg/act inhaler Inhale 2 puffs 2 (two) times a day Rinse mouth after use. • dicyclomine (BENTYL) 10 mg capsule TAKE 1 CAPSULE (10 MG TOTAL) BY MOUTH 3 (THREE) TIMES A DAY AS NEEDED (USE ONLY AS NEEDED)   • hyoscyamine (ANASPAZ,LEVSIN) 0.125 MG tablet Take 1 tablet (0.125 mg total) by mouth every 6 (six) hours as needed for cramping   • levothyroxine 100 mcg tablet TAKE 1 TABLET BY MOUTH EVERY DAY   • lidocaine (Lidoderm) 5 % Apply 1 patch topically over 12 hours daily Remove & Discard patch within 12 hours or as directed by MD   • lisinopril (ZESTRIL) 10 mg tablet TAKE 1 TABLET BY MOUTH TWICE A DAY   • meclizine (ANTIVERT) 12.5 MG tablet Take 1 tablet (12.5 mg total) by mouth every 8 (eight) hours as needed for dizziness   • metoprolol tartrate (LOPRESSOR) 25 mg tablet TAKE 1 TABLET BY MOUTH TWICE A DAY   • metoprolol tartrate (LOPRESSOR) 50 mg tablet Take 1 tablet (50 mg total) by mouth in the morning 50mg dose in AM 25mg in pm (pt has 25mg)   • pantoprazole (PROTONIX) 40 mg tablet TAKE 1 TABLET BY MOUTH EVERY DAY   • PARoxetine (PAXIL) 30 mg tablet TAKE 1 TABLET BY MOUTH EVERY DAY   • REFRESH TEARS 0.5 % SOLN INSTILL 1 DROP INTO EACH EYE THREE TIMES DAILY AS NEEDED FOR DRY EYES   • [DISCONTINUED] ALPRAZolam (XANAX) 0.25 mg tablet TAKE 1 TABLET BY MOUTH THREE TIMES A DAY IF NEEDED FOR ANXIETY   • predniSONE 20 mg tablet Take 2 tablets (40 mg total) by mouth daily Do not start before July 16, 2023. (Patient not taking: Reported on 7/31/2023)   • [DISCONTINUED] amLODIPine (NORVASC) 5 mg tablet Take 1 tablet (5 mg total) by mouth daily (Patient not taking: Reported on 9/19/2023)       Objective     /80   Pulse 78   Temp (!) 97.4 °F (36.3 °C) (Temporal)   Resp 18   Ht 5' 7" (1.702 m)   Wt 78 kg (172 lb)   BMI 26.94 kg/m²     Physical Exam  Vitals reviewed.    Constitutional: General: She is not in acute distress. Appearance: Normal appearance. She is not ill-appearing, toxic-appearing or diaphoretic. HENT:      Head: Normocephalic and atraumatic. Right Ear: External ear normal.      Left Ear: External ear normal.      Nose: Nose normal.      Mouth/Throat:      Mouth: Mucous membranes are moist.   Eyes:      General: No scleral icterus. Extraocular Movements: Extraocular movements intact. Conjunctiva/sclera: Conjunctivae normal.      Pupils: Pupils are equal, round, and reactive to light. Cardiovascular:      Rate and Rhythm: Normal rate and regular rhythm. Pulses: Normal pulses. Heart sounds: Normal heart sounds. No murmur heard. Pulmonary:      Effort: Pulmonary effort is normal.      Breath sounds: Normal breath sounds. Abdominal:      General: Bowel sounds are normal. There is no distension. Palpations: Abdomen is soft. Tenderness: There is no abdominal tenderness. Musculoskeletal:      Cervical back: Normal range of motion and neck supple. Right lower leg: No edema. Left lower leg: No edema. Lymphadenopathy:      Cervical: No cervical adenopathy. Skin:     General: Skin is warm and dry. Coloration: Skin is not jaundiced or pale. Neurological:      General: No focal deficit present. Mental Status: She is alert and oriented to person, place, and time. Mental status is at baseline. Psychiatric:         Mood and Affect: Mood normal.         Behavior: Behavior normal.         Thought Content:  Thought content normal.         Judgment: Judgment normal.       Donna Orlando DO

## 2023-09-21 ENCOUNTER — EVALUATION (OUTPATIENT)
Dept: PHYSICAL THERAPY | Facility: HOME HEALTHCARE | Age: 83
End: 2023-09-21
Payer: MEDICARE

## 2023-09-21 DIAGNOSIS — G89.4 CHRONIC PAIN SYNDROME: ICD-10-CM

## 2023-09-21 DIAGNOSIS — G89.29 CHRONIC BILATERAL LOW BACK PAIN WITHOUT SCIATICA: ICD-10-CM

## 2023-09-21 DIAGNOSIS — M54.50 CHRONIC BILATERAL LOW BACK PAIN WITHOUT SCIATICA: ICD-10-CM

## 2023-09-21 PROCEDURE — 97162 PT EVAL MOD COMPLEX 30 MIN: CPT | Performed by: PHYSICAL THERAPIST

## 2023-09-21 PROCEDURE — 97014 ELECTRIC STIMULATION THERAPY: CPT | Performed by: PHYSICAL THERAPIST

## 2023-09-21 PROCEDURE — 97140 MANUAL THERAPY 1/> REGIONS: CPT | Performed by: PHYSICAL THERAPIST

## 2023-09-21 PROCEDURE — 97112 NEUROMUSCULAR REEDUCATION: CPT | Performed by: PHYSICAL THERAPIST

## 2023-09-21 NOTE — PROGRESS NOTES
PT Evaluation     Today's date: 2023  Patient name: Yaneth Leach  : 1940  MRN: [id number]  Referring provider: Rosanna Govea MD  Dx:   Encounter Diagnosis     ICD-10-CM    1. Chronic bilateral low back pain without sciatica  M54.50 Ambulatory referral to Physical Therapy    G89.29       2. Chronic pain syndrome  G89.4 Ambulatory referral to Physical Therapy                     Assessment  Assessment details: Pt Jossy Rodriguez is a 78 y.o. who presents to OPPT with s/s consistent with subacute L/S, pelvic, and R knee pain. Pt presents with limited L/S mobility and core strength deficits, decreased R > L LE ROM and strength, impaired soft tissue mobility/limited flexibility, decreased postural awareness, and gait/balance dysfunctions. Pt with limitations with prolonged ambulation, difficulty with stair negotiation, disrupted sleep patterns, and difficulty with lifting/carrying. Pt with significant gait dysfunction due to pain and difficulty with WB'ing through the RLE. Pt would benefit from skilled therapy services to address outlined impairments, work towards goals, and restore pts PLOF. Thank you!    Impairments: abnormal gait, abnormal or restricted ROM, abnormal movement, activity intolerance, impaired balance, impaired physical strength, lacks appropriate home exercise program, pain with function, safety issue, weight-bearing intolerance, poor posture  and poor body mechanics    Goals  STGs to be achieved in 4 weeks:  -Pt to demonstrate reduced subjective pain rating "at worst" by at least 2-3 points from Initial Eval to allow for reduced pain with ADLs and improved functional activity tolerance.   -Pt to demonstrate L/S ROM improved WFL in order to maximize joint mobility and function and allow for progression of exercise program and achievement of goals.   -Pt to demonstrate increased MMT of R LE by at least 1/2 grade in order to improve safety and stability with ADLs and functional mobility. LTGs to be achieved upon discharge:   -Pt will be I with HEP in order to continue to improve quality of life and independence and reduce risk for re-injury.   -Pt to demonstrate return to activities of daily living without limiations or restrictions.   -Pt will return to ambulation > 20 minutes without pain to help facilitate return to community activities independently   -Pt to demonstrate return to stair negotiation with non - reciprocal pattern but pain free. -Pt to demonstrate improved function as noted by achieving or exceeding predicted score on FOTO outcomes assessment tool. Plan  Patient would benefit from: skilled physical therapy  Planned modality interventions: cryotherapy and thermotherapy: hydrocollator packs  Planned therapy interventions: abdominal trunk stabilization, balance, manual therapy, neuromuscular re-education, patient education, postural training, therapeutic activities, therapeutic exercise, home exercise program, flexibility and functional ROM exercises  Frequency: 2x week  Duration in weeks: 12  Plan of Care beginning date: 9/21/2023  Plan of Care expiration date: 12/14/2023  Treatment plan discussed with: patient and referring physician        Subjective Evaluation    History of Present Illness  Mechanism of injury: Pt reporting that on July 12th she was getting out of bed and when she put the R leg down, she couldn't put any pressure/weight through the leg. She went to ED via ambulance and was admitted x 4 days. Pt discharged and followed up with Orthopedics who told her he does not treat backs. She was further referred to pain management who will not consider injections or surgical intervention due to clotting disorder. MD referral placed to OPPT for conservative management of s/s.    Quality of life: good    Patient Goals  Patient goals for therapy: decreased edema, decreased pain, improved balance, increased motion, increased strength and independence with ADLs/IADLs    Pain  At best pain ratin  At worst pain rating: 10  Quality: radiating, sharp, dull ache and tight  Relieving factors: rest  Aggravating factors: standing, walking and stair climbing  Progression: no change    Social Support  Steps to enter house: yes  Stairs in house: no   Lives in: apartment      Diagnostic Tests  X-ray: normal  Treatments  Current treatment: physical therapy        Objective     Postural Observations  Seated posture: fair  Standing posture: poor        Palpation     Right   Hypertonic in the erector spinae, lumbar paraspinals and quadratus lumborum. Tenderness of the erector spinae, lumbar paraspinals and quadratus lumborum. Tenderness     Right Hip   Tenderness in the PSIS. Neurological Testing     Sensation     Lumbar   Left   Intact: light touch    Right   Intact: light touch    Active Range of Motion     Lumbar   Flexion:  Restriction level: moderate  Extension:  Restriction level: moderate  Left lateral flexion:  Restriction level: minimal  Right lateral flexion:  Restriction level: moderate    Strength/Myotome Testing     Left Hip   Planes of Motion   Flexion: 3+  Abduction: 3+  Adduction: 3+    Right Hip   Planes of Motion   Flexion: 3-  Abduction: 3-  Adduction: 3-    Left Knee   Flexion: 4-  Extension: 4-    Right Knee   Flexion: 3  Extension: 3    Tests     Additional Tests Details  R side posterior innominate rotation     Ambulation   Weight-Bearing Status   Assistive device used: single point cane    Ambulation: Stairs   Pattern: non-reciprocal  Pattern: non-reciprocal    Observational Gait   Gait: antalgic and asymmetric   Decreased walking speed, stride length and right stance time. Quality of Movement During Gait   Trunk  Forward lean. Trunk (Right): Positive lateral lean over stance limb.               Re-eval Date: 10/21/23     Precautions: None       Manuals        METS for LLD  HZ        R HS stretch  HZ                        Neuro Re-Ed PPT        PPT with michelle         TA        TA + OH lifts        MTP/LTP        Palloff press                 Neuromuscular education  HZ       Ther Ex        NuStep         Standing hip flex/abd/ext        Squats         Step-ups         LTR        SKTC        SLR        S/L SLR        Claudio         Heel walk outs         Bridges                                 Ther Activity                        Gait Training                        Modalities        IFC/MHP to R L/S  10 minutes

## 2023-09-22 ENCOUNTER — APPOINTMENT (OUTPATIENT)
Dept: PHYSICAL THERAPY | Facility: HOME HEALTHCARE | Age: 83
End: 2023-09-22
Payer: MEDICARE

## 2023-09-24 ENCOUNTER — APPOINTMENT (EMERGENCY)
Dept: RADIOLOGY | Facility: HOSPITAL | Age: 83
DRG: 305 | End: 2023-09-24
Payer: MEDICARE

## 2023-09-24 ENCOUNTER — HOSPITAL ENCOUNTER (EMERGENCY)
Facility: HOSPITAL | Age: 83
Discharge: HOME | DRG: 305 | End: 2023-09-24
Attending: EMERGENCY MEDICINE | Admitting: EMERGENCY MEDICINE
Payer: MEDICARE

## 2023-09-24 ENCOUNTER — APPOINTMENT (EMERGENCY)
Dept: CT IMAGING | Facility: HOSPITAL | Age: 83
DRG: 305 | End: 2023-09-24
Payer: MEDICARE

## 2023-09-24 VITALS
DIASTOLIC BLOOD PRESSURE: 70 MMHG | RESPIRATION RATE: 16 BRPM | TEMPERATURE: 99.1 F | HEART RATE: 69 BPM | OXYGEN SATURATION: 92 % | SYSTOLIC BLOOD PRESSURE: 154 MMHG

## 2023-09-24 DIAGNOSIS — I10 CHRONIC HYPERTENSION: ICD-10-CM

## 2023-09-24 DIAGNOSIS — R03.0 ELEVATED BLOOD PRESSURE READING: ICD-10-CM

## 2023-09-24 DIAGNOSIS — R51.9 HEADACHE: Primary | ICD-10-CM

## 2023-09-24 LAB
ALBUMIN SERPL BCP-MCNC: 4.4 G/DL (ref 3.5–5)
ALP SERPL-CCNC: 60 U/L (ref 34–104)
ALT SERPL W P-5'-P-CCNC: 11 U/L (ref 7–52)
ANION GAP SERPL CALCULATED.3IONS-SCNC: 9 MMOL/L
AST SERPL W P-5'-P-CCNC: 16 U/L (ref 13–39)
BASOPHILS # BLD AUTO: 0.03 THOUSANDS/ÂΜL (ref 0–0.1)
BASOPHILS NFR BLD AUTO: 1 % (ref 0–1)
BILIRUB SERPL-MCNC: 0.4 MG/DL (ref 0.2–1)
BUN SERPL-MCNC: 18 MG/DL (ref 5–25)
CALCIUM SERPL-MCNC: 9.9 MG/DL (ref 8.4–10.2)
CHLORIDE SERPL-SCNC: 106 MMOL/L (ref 96–108)
CO2 SERPL-SCNC: 25 MMOL/L (ref 21–32)
CREAT SERPL-MCNC: 0.75 MG/DL (ref 0.6–1.3)
EOSINOPHIL # BLD AUTO: 0.2 THOUSAND/ÂΜL (ref 0–0.61)
EOSINOPHIL NFR BLD AUTO: 4 % (ref 0–6)
ERYTHROCYTE [DISTWIDTH] IN BLOOD BY AUTOMATED COUNT: 14.1 % (ref 11.6–15.1)
FLUAV RNA RESP QL NAA+PROBE: NEGATIVE
FLUBV RNA RESP QL NAA+PROBE: NEGATIVE
GFR SERPL CREATININE-BSD FRML MDRD: 72 ML/MIN/1.73SQ M
GLUCOSE SERPL-MCNC: 85 MG/DL (ref 65–140)
HCT VFR BLD AUTO: 42 % (ref 34.8–46.1)
HGB BLD-MCNC: 13.5 G/DL (ref 11.5–15.4)
IMM GRANULOCYTES # BLD AUTO: 0.01 THOUSAND/UL (ref 0–0.2)
IMM GRANULOCYTES NFR BLD AUTO: 0 % (ref 0–2)
LYMPHOCYTES # BLD AUTO: 1.86 THOUSANDS/ÂΜL (ref 0.6–4.47)
LYMPHOCYTES NFR BLD AUTO: 35 % (ref 14–44)
MCH RBC QN AUTO: 31.9 PG (ref 26.8–34.3)
MCHC RBC AUTO-ENTMCNC: 32.1 G/DL (ref 31.4–37.4)
MCV RBC AUTO: 99 FL (ref 82–98)
MONOCYTES # BLD AUTO: 0.62 THOUSAND/ÂΜL (ref 0.17–1.22)
MONOCYTES NFR BLD AUTO: 12 % (ref 4–12)
NEUTROPHILS # BLD AUTO: 2.58 THOUSANDS/ÂΜL (ref 1.85–7.62)
NEUTS SEG NFR BLD AUTO: 48 % (ref 43–75)
NRBC BLD AUTO-RTO: 0 /100 WBCS
PLATELET # BLD AUTO: 215 THOUSANDS/UL (ref 149–390)
PMV BLD AUTO: 10.2 FL (ref 8.9–12.7)
POTASSIUM SERPL-SCNC: 4 MMOL/L (ref 3.5–5.3)
PROT SERPL-MCNC: 7.2 G/DL (ref 6.4–8.4)
RBC # BLD AUTO: 4.23 MILLION/UL (ref 3.81–5.12)
RSV RNA RESP QL NAA+PROBE: NEGATIVE
SARS-COV-2 RNA RESP QL NAA+PROBE: NEGATIVE
SODIUM SERPL-SCNC: 140 MMOL/L (ref 135–147)
WBC # BLD AUTO: 5.3 THOUSAND/UL (ref 4.31–10.16)

## 2023-09-24 PROCEDURE — 80053 COMPREHEN METABOLIC PANEL: CPT | Performed by: EMERGENCY MEDICINE

## 2023-09-24 PROCEDURE — 96365 THER/PROPH/DIAG IV INF INIT: CPT

## 2023-09-24 PROCEDURE — 85025 COMPLETE CBC W/AUTO DIFF WBC: CPT | Performed by: EMERGENCY MEDICINE

## 2023-09-24 PROCEDURE — 36415 COLL VENOUS BLD VENIPUNCTURE: CPT | Performed by: EMERGENCY MEDICINE

## 2023-09-24 PROCEDURE — 93005 ELECTROCARDIOGRAM TRACING: CPT

## 2023-09-24 PROCEDURE — 70450 CT HEAD/BRAIN W/O DYE: CPT

## 2023-09-24 PROCEDURE — 99285 EMERGENCY DEPT VISIT HI MDM: CPT | Performed by: EMERGENCY MEDICINE

## 2023-09-24 PROCEDURE — 0241U HB NFCT DS VIR RESP RNA 4 TRGT: CPT | Performed by: EMERGENCY MEDICINE

## 2023-09-24 PROCEDURE — 96375 TX/PRO/DX INJ NEW DRUG ADDON: CPT

## 2023-09-24 PROCEDURE — G1004 CDSM NDSC: HCPCS

## 2023-09-24 PROCEDURE — 71045 X-RAY EXAM CHEST 1 VIEW: CPT

## 2023-09-24 PROCEDURE — 99284 EMERGENCY DEPT VISIT MOD MDM: CPT

## 2023-09-24 RX ORDER — ACETAMINOPHEN 325 MG/1
650 TABLET ORAL ONCE
Status: COMPLETED | OUTPATIENT
Start: 2023-09-24 | End: 2023-09-24

## 2023-09-24 RX ORDER — LABETALOL HYDROCHLORIDE 5 MG/ML
10 INJECTION, SOLUTION INTRAVENOUS ONCE
Status: COMPLETED | OUTPATIENT
Start: 2023-09-24 | End: 2023-09-24

## 2023-09-24 RX ORDER — MAGNESIUM SULFATE HEPTAHYDRATE 40 MG/ML
2 INJECTION, SOLUTION INTRAVENOUS ONCE
Status: COMPLETED | OUTPATIENT
Start: 2023-09-24 | End: 2023-09-24

## 2023-09-24 RX ORDER — METOCLOPRAMIDE HYDROCHLORIDE 5 MG/ML
10 INJECTION INTRAMUSCULAR; INTRAVENOUS ONCE
Status: COMPLETED | OUTPATIENT
Start: 2023-09-24 | End: 2023-09-24

## 2023-09-24 RX ORDER — DIPHENHYDRAMINE HYDROCHLORIDE 50 MG/ML
25 INJECTION INTRAMUSCULAR; INTRAVENOUS ONCE
Status: COMPLETED | OUTPATIENT
Start: 2023-09-24 | End: 2023-09-24

## 2023-09-24 RX ADMIN — ACETAMINOPHEN 650 MG: 325 TABLET, FILM COATED ORAL at 15:21

## 2023-09-24 RX ADMIN — MAGNESIUM SULFATE HEPTAHYDRATE 2 G: 40 INJECTION, SOLUTION INTRAVENOUS at 15:28

## 2023-09-24 RX ADMIN — METOCLOPRAMIDE 10 MG: 5 INJECTION, SOLUTION INTRAMUSCULAR; INTRAVENOUS at 15:23

## 2023-09-24 RX ADMIN — SODIUM CHLORIDE 1000 ML: 0.9 INJECTION, SOLUTION INTRAVENOUS at 15:26

## 2023-09-24 RX ADMIN — LABETALOL HYDROCHLORIDE 10 MG: 5 INJECTION, SOLUTION INTRAVENOUS at 15:19

## 2023-09-24 RX ADMIN — DIPHENHYDRAMINE HYDROCHLORIDE 25 MG: 50 INJECTION, SOLUTION INTRAMUSCULAR; INTRAVENOUS at 15:22

## 2023-09-24 NOTE — ED PROVIDER NOTES
History  Chief Complaint   Patient presents with   • Headache     Headache for the past 2 weeks. Was seen here for same before and was told she has high blood pressure. Took 2 tylenol prior to arrival      HPI  This is an 56-year-old female she has chronic hypertension she also has anxiety. She was admitted in July for amatory dysfunction and right lower extremity pain. She was taken off of her longstanding prescription for hydrochlorothiazide during admission due to renal dysfunction. Her blood pressure was stable for some time but became elevated she was seen in the emergency department 10 days ago for asymptomatic hypertension and had no evidence of acute endorgan damage and was discharged with outpatient follow-up. It appears she is following with her PCP for this issue. She presents again today as she now complains that she has been experiencing a generalized headache continuously for 3 weeks without any other symptoms and is tearful and anxious and worried that she is going to have a stroke or that she has a meningioma given her family history of meningiomas. She denies any numbness tingling weakness syncope visual disturbances nausea vomiting falls trauma or medication noncompliance. Prior to Admission Medications   Prescriptions Last Dose Informant Patient Reported? Taking?    ALPRAZolam (XANAX) 0.25 mg tablet   No No   Sig: Take 1 tablet (0.25 mg total) by mouth 3 (three) times a day as needed for anxiety   PARoxetine (PAXIL) 30 mg tablet   No No   Sig: TAKE 1 TABLET BY MOUTH EVERY DAY   REFRESH TEARS 0.5 % SOLN  Self Yes No   Sig: INSTILL 1 DROP INTO EACH EYE THREE TIMES DAILY AS NEEDED FOR DRY EYES   acetaminophen (TYLENOL) 325 mg tablet  Self No No   Sig: Take 2 tablets (650 mg total) by mouth every 6 (six) hours as needed (pain, fever)   allopurinol (ZYLOPRIM) 100 mg tablet   No No   Sig: TAKE 1 TABLET BY MOUTH EVERY DAY   benzonatate (TESSALON PERLES) 100 mg capsule   No No   Sig: TAKE 1 CAPSULE BY MOUTH THREE TIMES A DAY   budesonide-formoterol (SYMBICORT) 160-4.5 mcg/act inhaler   No No   Sig: Inhale 2 puffs 2 (two) times a day Rinse mouth after use. dicyclomine (BENTYL) 10 mg capsule   No No   Sig: TAKE 1 CAPSULE (10 MG TOTAL) BY MOUTH 3 (THREE) TIMES A DAY AS NEEDED (USE ONLY AS NEEDED)   hyoscyamine (ANASPAZ,LEVSIN) 0.125 MG tablet  Self No No   Sig: Take 1 tablet (0.125 mg total) by mouth every 6 (six) hours as needed for cramping   levothyroxine 100 mcg tablet   No No   Sig: TAKE 1 TABLET BY MOUTH EVERY DAY   lidocaine (Lidoderm) 5 %   No No   Sig: Apply 1 patch topically over 12 hours daily Remove & Discard patch within 12 hours or as directed by MD   lisinopril (ZESTRIL) 10 mg tablet   No No   Sig: TAKE 1 TABLET BY MOUTH TWICE A DAY   meclizine (ANTIVERT) 12.5 MG tablet   No No   Sig: Take 1 tablet (12.5 mg total) by mouth every 8 (eight) hours as needed for dizziness   metoprolol tartrate (LOPRESSOR) 25 mg tablet   No No   Sig: TAKE 1 TABLET BY MOUTH TWICE A DAY   metoprolol tartrate (LOPRESSOR) 50 mg tablet   No No   Sig: Take 1 tablet (50 mg total) by mouth in the morning 50mg dose in AM 25mg in pm (pt has 25mg)   pantoprazole (PROTONIX) 40 mg tablet   No No   Sig: TAKE 1 TABLET BY MOUTH EVERY DAY   predniSONE 20 mg tablet   No No   Sig: Take 2 tablets (40 mg total) by mouth daily Do not start before July 16, 2023.    Patient not taking: Reported on 7/31/2023   topiramate (Topamax) 50 MG tablet   No No   Sig: Take 1 tablet (50 mg total) by mouth daily      Facility-Administered Medications: None       Past Medical History:   Diagnosis Date   • Contact dermatitis     Last assessed - 8/8/12   • COPD (chronic obstructive pulmonary disease) (HCC)    • Disease of thyroid gland    • GERD (gastroesophageal reflux disease)    • Hyperlipidemia    • Hypertension    • Irritable bowel syndrome    • Von Willebrand disease ([address])        Past Surgical History:   Procedure Laterality Date   • APPENDECTOMY     • HYSTERECTOMY      Date unk        Family History   Problem Relation Age of Onset   • Clotting disorder Mother         Def of clotting factor    • Aneurysm Father      I have reviewed and agree with the history as documented. E-Cigarette/Vaping   • E-Cigarette Use Never User      E-Cigarette/Vaping Substances   • Nicotine No    • THC No    • CBD No    • Flavoring No    • Other No    • Unknown No      Social History     Tobacco Use   • Smoking status: Never   • Smokeless tobacco: Never   Vaping Use   • Vaping Use: Never used   Substance Use Topics   • Alcohol use: Never   • Drug use: No       Review of Systems   Constitutional: Negative for chills and fever. HENT: Negative for ear pain and sore throat. Eyes: Negative for pain and visual disturbance. Respiratory: Negative for cough and shortness of breath. Cardiovascular: Negative for chest pain and palpitations. Gastrointestinal: Negative for abdominal pain and vomiting. Genitourinary: Negative for dysuria and hematuria. Musculoskeletal: Negative for arthralgias and back pain. Skin: Negative for color change and rash. Neurological: Positive for headaches. Negative for seizures and syncope. All other systems reviewed and are negative. Physical Exam  Physical Exam  Vitals and nursing note reviewed. Constitutional:       General: She is not in acute distress. Appearance: Normal appearance. She is well-developed. HENT:      Head: Normocephalic and atraumatic. Right Ear: External ear normal.      Left Ear: External ear normal.      Nose: Nose normal.      Mouth/Throat:      Mouth: Mucous membranes are moist.      Pharynx: Oropharynx is clear. Eyes:      Conjunctiva/sclera: Conjunctivae normal.   Cardiovascular:      Rate and Rhythm: Normal rate and regular rhythm. Pulses: Normal pulses. Heart sounds: Normal heart sounds. No murmur heard.   Pulmonary:      Effort: Pulmonary effort is normal. No respiratory distress. Breath sounds: Normal breath sounds. No wheezing, rhonchi or rales. Abdominal:      Palpations: Abdomen is soft. Tenderness: There is no abdominal tenderness. Musculoskeletal:         General: No swelling. Cervical back: Neck supple. Skin:     General: Skin is warm and dry. Capillary Refill: Capillary refill takes less than 2 seconds. Neurological:      General: No focal deficit present. Mental Status: She is alert. Mental status is at baseline. GCS: GCS eye subscore is 4. GCS verbal subscore is 5. GCS motor subscore is 6. Cranial Nerves: No facial asymmetry. Sensory: Sensation is intact. Motor: Motor function is intact. Coordination: Coordination is intact. Gait: Gait is intact. Psychiatric:         Mood and Affect: Mood is anxious. Affect is tearful.          Vital Signs  ED Triage Vitals [09/24/23 1431]   Temperature Pulse Respirations Blood Pressure SpO2   99.1 °F (37.3 °C) 76 20 (!) 217/97 95 %      Temp Source Heart Rate Source Patient Position - Orthostatic VS BP Location FiO2 (%)   Temporal Monitor Lying Right arm --      Pain Score       10 - Worst Possible Pain           Vitals:    09/24/23 1431 09/24/23 1530 09/24/23 1545 09/24/23 1645   BP: (!) 217/97 161/63 164/75 154/70   Pulse: 76 68 70 69   Patient Position - Orthostatic VS: Lying            Visual Acuity      ED Medications  Medications   metoclopramide (REGLAN) injection 10 mg (10 mg Intravenous Given 9/24/23 1523)   acetaminophen (TYLENOL) tablet 650 mg (650 mg Oral Given 9/24/23 1521)   diphenhydrAMINE (BENADRYL) injection 25 mg (25 mg Intravenous Given 9/24/23 1522)   magnesium sulfate 2 g/50 mL IVPB (premix) 2 g (0 g Intravenous Stopped 9/24/23 1653)   sodium chloride 0.9 % bolus 1,000 mL (0 mL Intravenous Stopped 9/24/23 1626)   labetalol (NORMODYNE) injection 10 mg (10 mg Intravenous Given 9/24/23 1519)       Diagnostic Studies  Results Reviewed Procedure Component Value Units Date/Time    Comprehensive metabolic panel [[id number]] Collected: 09/24/23 1516    Lab Status: Final result Specimen: Blood from Arm, Right Updated: 09/24/23 1613     Sodium 140 mmol/L      Potassium 4.0 mmol/L      Chloride 106 mmol/L      CO2 25 mmol/L      ANION GAP 9 mmol/L      BUN 18 mg/dL      Creatinine 0.75 mg/dL      Glucose 85 mg/dL      Calcium 9.9 mg/dL      AST 16 U/L      ALT 11 U/L      Alkaline Phosphatase 60 U/L      Total Protein 7.2 g/dL      Albumin 4.4 g/dL      Total Bilirubin 0.40 mg/dL      eGFR 72 ml/min/1.73sq m     Narrative:      [facility] guidelines for Chronic Kidney Disease (CKD):   •  Stage 1 with normal or high GFR (GFR > 90 mL/min/1.73 square meters)  •  Stage 2 Mild CKD (GFR = 60-89 mL/min/1.73 square meters)  •  Stage 3A Moderate CKD (GFR = 45-59 mL/min/1.73 square meters)  •  Stage 3B Moderate CKD (GFR = 30-44 mL/min/1.73 square meters)  •  Stage 4 Severe CKD (GFR = 15-29 mL/min/1.73 square meters)  •  Stage 5 End Stage CKD (GFR <15 mL/min/1.73 square meters)  Note: GFR calculation is accurate only with a steady state creatinine    FLU/RSV/COVID - if FLU/RSV clinically relevant [[id number]]  (Normal) Collected: 09/24/23 1516    Lab Status: Final result Specimen: Nares from Nose Updated: 09/24/23 1610     SARS-CoV-2 Negative     INFLUENZA A PCR Negative     INFLUENZA B PCR Negative     RSV PCR Negative    Narrative:      FOR PEDIATRIC PATIENTS - copy/paste COVID Guidelines URL to browser: [email or web address]    SARS-CoV-2 assay is a Nucleic Acid Amplification assay intended for the  qualitative detection of nucleic acid from SARS-CoV-2 in nasopharyngeal  swabs. Results are for the presumptive identification of SARS-CoV-2 RNA.     Positive results are indicative of infection with SARS-CoV-2, the virus  causing COVID-19, but do not rule out bacterial infection or co-infection  with other viruses. Laboratories within the [facility] and its  territories are required to report all positive results to the appropriate  public health authorities. Negative results do not preclude SARS-CoV-2  infection and should not be used as the sole basis for treatment or other  patient management decisions. Negative results must be combined with  clinical observations, patient history, and epidemiological information. This test has not been FDA cleared or approved. This test has been authorized by FDA under an Emergency Use Authorization  (EUA). This test is only authorized for the duration of time the  declaration that circumstances exist justifying the authorization of the  emergency use of an in vitro diagnostic tests for detection of SARS-CoV-2  virus and/or diagnosis of COVID-19 infection under section 564(b)(1) of  the Act, 21 U. S.C. 615QLJ-8(T)(5), unless the authorization is terminated  or revoked sooner. The test has been validated but independent review by FDA  and CLIA is pending. Test performed using [employer] GeneXpert: This RT-PCR assay targets N2,  a region unique to SARS-CoV-2. A conserved region in the E-gene was chosen  for pan-Sarbecovirus detection which includes SARS-CoV-2. According to CMS-2020-01-R, this platform meets the definition of high-throughput technology.     CBC and differential [[id number]]  (Abnormal) Collected: 09/24/23 1516    Lab Status: Final result Specimen: Blood from Arm, Right Updated: 09/24/23 1533     WBC 5.30 Thousand/uL      RBC 4.23 Million/uL      Hemoglobin 13.5 g/dL      Hematocrit 42.0 %      MCV 99 fL      MCH 31.9 pg      MCHC 32.1 g/dL      RDW 14.1 %      MPV 10.2 fL      Platelets 053 Thousands/uL      nRBC 0 /100 WBCs      Neutrophils Relative 48 %      Immat GRANS % 0 %      Lymphocytes Relative 35 %      Monocytes Relative 12 %      Eosinophils Relative 4 %      Basophils Relative 1 %      Neutrophils Absolute 2.58 Thousands/µL      Immature Grans Absolute 0.01 Thousand/uL      Lymphocytes Absolute 1.86 Thousands/µL      Monocytes Absolute 0.62 Thousand/µL      Eosinophils Absolute 0.20 Thousand/µL      Basophils Absolute 0.03 Thousands/µL                  CT head without contrast   ED Interpretation by Remington Kenyon DO (09/24 1633)   CT head interpreted by myself pending official radiology report. No acute intracranial hemorrhage. Final Result by Orestes Elmore MD (09/24 1640)      No acute intracranial abnormality. Workstation performed: [id number]         XR chest 1 view portable    (Results Pending)              Procedures  Procedures         ED Course  ED Course as of 09/24/23 1653   Sun Sep 24, 2023   1542 Blood Pressure: 161/63   1542 Pulse: 68   1542 Respirations: 20   1542 SpO2: 93 %   1542 EKG interpreted by myself. EKG dated 9/24/2023 1517 demonstrates normal sinus rhythm at 60 bpm, normal NC, QRS, QTc intervals, no STEMI. SBIRT 20yo+    Flowsheet Row Most Recent Value   Initial Alcohol Screen: US AUDIT-C     1. How often do you have a drink containing alcohol? 0 Filed at: 09/24/2023 1433   2. How many drinks containing alcohol do you have on a typical day you are drinking? 0 Filed at: 09/24/2023 1433   3b. FEMALE Any Age, or MALE 65+: How often do you have 4 or more drinks on one occassion? 0 Filed at: 09/24/2023 1433   Audit-C Score 0 Filed at: 09/24/2023 1433   DUNCAN: How many times in the past year have you. .. Used an illegal drug or used a prescription medication for non-medical reasons? Never Filed at: 09/24/2023 1433                    Medical Decision Making  This is an 25-year-old female she has chronic hypertension there were some blood pressure medication adjustments recently she is presenting quite worried about blood pressure readings, generalized headache and worried about the possibility of having a stroke in the near future.   She has been following with her PCP for this. She was seen here 10 days ago with an unremarkable work-up and discharged. She presents hypertensive today without any neurologic symptoms she does have a generalized headache which is not associate with photophobia or neurodeficits. It is unclear the duration of this headache based on chart review but the patient this time endorsed a 3-week history. Her PCP is aware of this she did order an outpatient MRI to be obtained the patient decided not to get this done. She presents here for evaluation she received a single 10 mg dose of labetalol with improvement in her blood pressure to the 898U systolic significant reduction. This reduction in blood pressure was sustained throughout the ED course. There were no exam findings to suggest acute endorgan damage. A CT brain was obtained without findings of meningioma or mass or stroke or bleed. The patient was reassessed her headache was much improved I do not think she requires admission she can pursue her outpatient MRI and follow-up with her PCP for blood pressure check in the office she is agreeable to that plan she is given return to ER precautions. Chronic hypertension: chronic illness or injury  Elevated blood pressure reading: acute illness or injury  Headache: acute illness or injury  Amount and/or Complexity of Data Reviewed  Labs: ordered. Radiology: ordered and independent interpretation performed. ECG/medicine tests: ordered and independent interpretation performed. Decision-making details documented in ED Course. Risk  OTC drugs. Prescription drug management.           Disposition  Final diagnoses:   Headache   Elevated blood pressure reading   Chronic hypertension     Time reflects when diagnosis was documented in both MDM as applicable and the Disposition within this note     Time User Action Codes Description Comment    9/24/2023  4:44 PM Stephenie Mcfarland [R51.9] Headache     9/24/2023  4:44 PM Michelle Ana Add [R03.0] Elevated blood pressure reading     9/24/2023  4:44 PM Michelle Ana Add [I10] Chronic hypertension       ED Disposition     ED Disposition   Discharge    Condition   Stable    Date/Time   Sun Sep 24, 2023  4:44 PM    Comment   Isaias Brown discharge to home/self care. Follow-up Information     Follow up With Specialties Details Why [address] [facility] [place] [facility],  Internal Medicine, Hospice Services Schedule an appointment as soon as possible for a visit  for BP check in office [address] [address]  [phone]            Patient's Medications   Discharge Prescriptions    No medications on file       No discharge procedures on file.     PDMP Review       Value Time User    PDMP Reviewed  Yes 9/19/2023 11:30 AM Yanely Peña DO          ED Provider  Electronically Signed by           Eliseo Berman DO  09/24/23 2069

## 2023-09-25 ENCOUNTER — HOSPITAL ENCOUNTER (INPATIENT)
Facility: HOSPITAL | Age: 83
LOS: 2 days | Discharge: HOME HEALTH | DRG: 305 | End: 2023-09-27
Attending: EMERGENCY MEDICINE | Admitting: INTERNAL MEDICINE
Payer: MEDICARE

## 2023-09-25 ENCOUNTER — TELEPHONE (OUTPATIENT)
Dept: FAMILY MEDICINE CLINIC | Facility: CLINIC | Age: 83
End: 2023-09-25

## 2023-09-25 DIAGNOSIS — R51.9 HEADACHE: Primary | ICD-10-CM

## 2023-09-25 DIAGNOSIS — I10 HYPERTENSION: ICD-10-CM

## 2023-09-25 PROBLEM — I16.0 HYPERTENSIVE URGENCY: Status: ACTIVE | Noted: 2023-09-25

## 2023-09-25 PROBLEM — G44.52 NEW DAILY PERSISTENT HEADACHE: Status: ACTIVE | Noted: 2023-09-25

## 2023-09-25 LAB
ALBUMIN SERPL BCP-MCNC: 4.5 G/DL (ref 3.5–5)
ALP SERPL-CCNC: 58 U/L (ref 34–104)
ALT SERPL W P-5'-P-CCNC: 13 U/L (ref 7–52)
ANION GAP SERPL CALCULATED.3IONS-SCNC: 8 MMOL/L
AST SERPL W P-5'-P-CCNC: 18 U/L (ref 13–39)
BASOPHILS # BLD AUTO: 0.05 THOUSANDS/ÂΜL (ref 0–0.1)
BASOPHILS NFR BLD AUTO: 1 % (ref 0–1)
BILIRUB SERPL-MCNC: 0.51 MG/DL (ref 0.2–1)
BUN SERPL-MCNC: 14 MG/DL (ref 5–25)
CALCIUM SERPL-MCNC: 9.9 MG/DL (ref 8.4–10.2)
CHLORIDE SERPL-SCNC: 102 MMOL/L (ref 96–108)
CO2 SERPL-SCNC: 27 MMOL/L (ref 21–32)
CREAT SERPL-MCNC: 0.7 MG/DL (ref 0.6–1.3)
EOSINOPHIL # BLD AUTO: 0.15 THOUSAND/ÂΜL (ref 0–0.61)
EOSINOPHIL NFR BLD AUTO: 4 % (ref 0–6)
ERYTHROCYTE [DISTWIDTH] IN BLOOD BY AUTOMATED COUNT: 14.2 % (ref 11.6–15.1)
GFR SERPL CREATININE-BSD FRML MDRD: 79 ML/MIN/1.73SQ M
GLUCOSE SERPL-MCNC: 109 MG/DL (ref 65–140)
HCT VFR BLD AUTO: 42.1 % (ref 34.8–46.1)
HGB BLD-MCNC: 13.4 G/DL (ref 11.5–15.4)
IMM GRANULOCYTES # BLD AUTO: 0.01 THOUSAND/UL (ref 0–0.2)
IMM GRANULOCYTES NFR BLD AUTO: 0 % (ref 0–2)
LYMPHOCYTES # BLD AUTO: 1.52 THOUSANDS/ÂΜL (ref 0.6–4.47)
LYMPHOCYTES NFR BLD AUTO: 40 % (ref 14–44)
MCH RBC QN AUTO: 32.1 PG (ref 26.8–34.3)
MCHC RBC AUTO-ENTMCNC: 31.8 G/DL (ref 31.4–37.4)
MCV RBC AUTO: 101 FL (ref 82–98)
MONOCYTES # BLD AUTO: 0.39 THOUSAND/ÂΜL (ref 0.17–1.22)
MONOCYTES NFR BLD AUTO: 10 % (ref 4–12)
NEUTROPHILS # BLD AUTO: 1.66 THOUSANDS/ÂΜL (ref 1.85–7.62)
NEUTS SEG NFR BLD AUTO: 45 % (ref 43–75)
NRBC BLD AUTO-RTO: 0 /100 WBCS
PLATELET # BLD AUTO: 213 THOUSANDS/UL (ref 149–390)
PMV BLD AUTO: 10.2 FL (ref 8.9–12.7)
POTASSIUM SERPL-SCNC: 3.6 MMOL/L (ref 3.5–5.3)
PROT SERPL-MCNC: 7.7 G/DL (ref 6.4–8.4)
RBC # BLD AUTO: 4.17 MILLION/UL (ref 3.81–5.12)
SODIUM SERPL-SCNC: 137 MMOL/L (ref 135–147)
WBC # BLD AUTO: 3.78 THOUSAND/UL (ref 4.31–10.16)

## 2023-09-25 PROCEDURE — 99284 EMERGENCY DEPT VISIT MOD MDM: CPT

## 2023-09-25 PROCEDURE — 80053 COMPREHEN METABOLIC PANEL: CPT | Performed by: PHYSICIAN ASSISTANT

## 2023-09-25 PROCEDURE — 99285 EMERGENCY DEPT VISIT HI MDM: CPT | Performed by: PHYSICIAN ASSISTANT

## 2023-09-25 PROCEDURE — 93005 ELECTROCARDIOGRAM TRACING: CPT

## 2023-09-25 PROCEDURE — 85025 COMPLETE CBC W/AUTO DIFF WBC: CPT | Performed by: PHYSICIAN ASSISTANT

## 2023-09-25 PROCEDURE — 99223 1ST HOSP IP/OBS HIGH 75: CPT | Performed by: INTERNAL MEDICINE

## 2023-09-25 PROCEDURE — 96374 THER/PROPH/DIAG INJ IV PUSH: CPT

## 2023-09-25 PROCEDURE — 36415 COLL VENOUS BLD VENIPUNCTURE: CPT | Performed by: PHYSICIAN ASSISTANT

## 2023-09-25 PROCEDURE — 96375 TX/PRO/DX INJ NEW DRUG ADDON: CPT

## 2023-09-25 RX ORDER — DICYCLOMINE HYDROCHLORIDE 10 MG/1
10 CAPSULE ORAL 3 TIMES DAILY PRN
Status: DISCONTINUED | OUTPATIENT
Start: 2023-09-25 | End: 2023-09-27 | Stop reason: HOSPADM

## 2023-09-25 RX ORDER — DIAZEPAM 5 MG/ML
5 INJECTION, SOLUTION INTRAMUSCULAR; INTRAVENOUS ONCE
Status: COMPLETED | OUTPATIENT
Start: 2023-09-25 | End: 2023-09-25

## 2023-09-25 RX ORDER — HYDROCHLOROTHIAZIDE 25 MG/1
25 TABLET ORAL DAILY
Status: DISCONTINUED | OUTPATIENT
Start: 2023-09-25 | End: 2023-09-27 | Stop reason: HOSPADM

## 2023-09-25 RX ORDER — ENOXAPARIN SODIUM 100 MG/ML
40 INJECTION SUBCUTANEOUS DAILY
Status: DISCONTINUED | OUTPATIENT
Start: 2023-09-25 | End: 2023-09-25

## 2023-09-25 RX ORDER — LABETALOL HYDROCHLORIDE 5 MG/ML
10 INJECTION, SOLUTION INTRAVENOUS ONCE
Status: COMPLETED | OUTPATIENT
Start: 2023-09-25 | End: 2023-09-25

## 2023-09-25 RX ORDER — HYDROCHLOROTHIAZIDE 25 MG/1
25 TABLET ORAL DAILY
Status: DISCONTINUED | OUTPATIENT
Start: 2023-09-25 | End: 2023-09-25

## 2023-09-25 RX ORDER — DEXAMETHASONE SODIUM PHOSPHATE 10 MG/ML
10 INJECTION, SOLUTION INTRAMUSCULAR; INTRAVENOUS ONCE
Status: COMPLETED | OUTPATIENT
Start: 2023-09-25 | End: 2023-09-25

## 2023-09-25 RX ORDER — ACETAMINOPHEN 325 MG/1
650 TABLET ORAL EVERY 6 HOURS PRN
Status: DISCONTINUED | OUTPATIENT
Start: 2023-09-25 | End: 2023-09-27 | Stop reason: HOSPADM

## 2023-09-25 RX ORDER — PANTOPRAZOLE SODIUM 40 MG/1
40 TABLET, DELAYED RELEASE ORAL DAILY
Status: DISCONTINUED | OUTPATIENT
Start: 2023-09-26 | End: 2023-09-27 | Stop reason: HOSPADM

## 2023-09-25 RX ORDER — ALPRAZOLAM 0.25 MG/1
0.25 TABLET ORAL 3 TIMES DAILY PRN
Status: DISCONTINUED | OUTPATIENT
Start: 2023-09-25 | End: 2023-09-25

## 2023-09-25 RX ORDER — ALPRAZOLAM 0.5 MG/1
0.5 TABLET ORAL
Status: DISCONTINUED | OUTPATIENT
Start: 2023-09-25 | End: 2023-09-27 | Stop reason: HOSPADM

## 2023-09-25 RX ORDER — LISINOPRIL 10 MG/1
10 TABLET ORAL 2 TIMES DAILY
Status: DISCONTINUED | OUTPATIENT
Start: 2023-09-25 | End: 2023-09-27 | Stop reason: HOSPADM

## 2023-09-25 RX ORDER — HYDROCHLOROTHIAZIDE 12.5 MG/1
12.5 TABLET ORAL DAILY
Status: DISCONTINUED | OUTPATIENT
Start: 2023-09-25 | End: 2023-09-25

## 2023-09-25 RX ORDER — BUDESONIDE AND FORMOTEROL FUMARATE DIHYDRATE 160; 4.5 UG/1; UG/1
2 AEROSOL RESPIRATORY (INHALATION) 2 TIMES DAILY
Status: DISCONTINUED | OUTPATIENT
Start: 2023-09-25 | End: 2023-09-27 | Stop reason: HOSPADM

## 2023-09-25 RX ORDER — CYCLOBENZAPRINE HCL 10 MG
5 TABLET ORAL ONCE
Status: COMPLETED | OUTPATIENT
Start: 2023-09-25 | End: 2023-09-25

## 2023-09-25 RX ORDER — LABETALOL HYDROCHLORIDE 5 MG/ML
20 INJECTION, SOLUTION INTRAVENOUS ONCE
Status: COMPLETED | OUTPATIENT
Start: 2023-09-25 | End: 2023-09-25

## 2023-09-25 RX ORDER — LEVOTHYROXINE SODIUM 0.1 MG/1
100 TABLET ORAL DAILY
Status: DISCONTINUED | OUTPATIENT
Start: 2023-09-26 | End: 2023-09-27 | Stop reason: HOSPADM

## 2023-09-25 RX ADMIN — ALPRAZOLAM 0.5 MG: 0.5 TABLET ORAL at 22:09

## 2023-09-25 RX ADMIN — LISINOPRIL 10 MG: 10 TABLET ORAL at 17:58

## 2023-09-25 RX ADMIN — ACETAMINOPHEN 650 MG: 325 TABLET, FILM COATED ORAL at 22:09

## 2023-09-25 RX ADMIN — CYCLOBENZAPRINE 5 MG: 10 TABLET, FILM COATED ORAL at 11:17

## 2023-09-25 RX ADMIN — HYDROCHLOROTHIAZIDE 25 MG: 25 TABLET ORAL at 17:58

## 2023-09-25 RX ADMIN — LABETALOL HYDROCHLORIDE 20 MG: 5 INJECTION, SOLUTION INTRAVENOUS at 15:03

## 2023-09-25 RX ADMIN — DIAZEPAM 5 MG: 5 INJECTION, SOLUTION INTRAMUSCULAR; INTRAVENOUS at 16:30

## 2023-09-25 RX ADMIN — LABETALOL HYDROCHLORIDE 10 MG: 5 INJECTION, SOLUTION INTRAVENOUS at 10:11

## 2023-09-25 RX ADMIN — MORPHINE SULFATE 2 MG: 2 INJECTION, SOLUTION INTRAMUSCULAR; INTRAVENOUS at 17:02

## 2023-09-25 RX ADMIN — BUDESONIDE AND FORMOTEROL FUMARATE DIHYDRATE 2 PUFF: 160; 4.5 AEROSOL RESPIRATORY (INHALATION) at 17:58

## 2023-09-25 RX ADMIN — DEXAMETHASONE SODIUM PHOSPHATE 10 MG: 10 INJECTION, SOLUTION INTRAMUSCULAR; INTRAVENOUS at 10:11

## 2023-09-25 RX ADMIN — PAROXETINE 30 MG: 20 TABLET, FILM COATED ORAL at 15:08

## 2023-09-25 RX ADMIN — DICYCLOMINE HYDROCHLORIDE 10 MG: 10 CAPSULE ORAL at 17:58

## 2023-09-25 NOTE — ED PROVIDER NOTES
History  Chief Complaint   Patient presents with   • Headache     Patient arrived to the ED due to having a headache which she was seen for yesterday. Patient states shes been having issues with headaches for a while along with high blood pressure. Patient states her metoprolol was increased last week for the HTN. Pain 10/10 at this time. Patient took a xanax prior to arrival. Denies dizziness, chest pain, SOB or blurred vision. This is an 78-year-old female who is here for her third evaluation of hypertension and headache over the last week and a half. She presents ambulatory via private vehicle today no acute distress. She complains of a headache that is of the bilateral frontal aspect of her head as well as the vertex. It is unrelieved with over-the-counter Tylenol that she last took last night. Per her history she believes the headaches are being derived from increased blood pressure, she states she has been on blood pressure pills since she was 38years old. She had a hospital stay for ambulatory dysfunction this summer and was taken off diuretics secondary to chronic kidney disease. She believes that her blood pressure has been creeping up since that time. She has had multiple emergency department visits with systolic blood pressure readings over 200. She had emergency department visit about a week and a half ago similar presentation to follow-up with primary care who increased her beta-blocker in the evening. Patient states she has been compliant with this however is still experiencing high blood pressure readings and headaches. She had another emergency department visit yesterday, CT scan was negative for acute process, labs within reasonable limits she was given migraine cocktail as well as IV labetalol blood pressure trended down into the 160s and she was subsequently discharged home. Patient tells me the headache is no better however.   There is no red flag headache symptoms noted concerning for meningitis she has no neck pain no neck stiffness there is no fever this headache has been ongoing for greater than a week at this point. She has no focal neurologic deficits on exam.  She has no visual complaints. She has an MRI that is ordered as an outpatient however she did not make an appointment for this as of yet. She certainly has concern for stroke. She does have a history of von Willebrand's disease therefore avoids NSAIDs. Prior to Admission Medications   Prescriptions Last Dose Informant Patient Reported? Taking? ALPRAZolam (XANAX) 0.25 mg tablet   No Yes   Sig: Take 1 tablet (0.25 mg total) by mouth 3 (three) times a day as needed for anxiety   PARoxetine (PAXIL) 30 mg tablet   No Yes   Sig: TAKE 1 TABLET BY MOUTH EVERY DAY   REFRESH TEARS 0.5 % SOLN  Self Yes Yes   Sig: INSTILL 1 DROP INTO EACH EYE THREE TIMES DAILY AS NEEDED FOR DRY EYES   acetaminophen (TYLENOL) 325 mg tablet  Self No Yes   Sig: Take 2 tablets (650 mg total) by mouth every 6 (six) hours as needed (pain, fever)   allopurinol (ZYLOPRIM) 100 mg tablet Not Taking  No No   Sig: TAKE 1 TABLET BY MOUTH EVERY DAY   Patient not taking: Reported on 9/25/2023   benzonatate (TESSALON PERLES) 100 mg capsule Not Taking  No No   Sig: TAKE 1 CAPSULE BY MOUTH THREE TIMES A DAY   Patient not taking: Reported on 9/25/2023   budesonide-formoterol (SYMBICORT) 160-4.5 mcg/act inhaler   No Yes   Sig: Inhale 2 puffs 2 (two) times a day Rinse mouth after use.    dicyclomine (BENTYL) 10 mg capsule   No Yes   Sig: TAKE 1 CAPSULE (10 MG TOTAL) BY MOUTH 3 (THREE) TIMES A DAY AS NEEDED (USE ONLY AS NEEDED)   hyoscyamine (ANASPAZ,LEVSIN) 0.125 MG tablet  Self No No   Sig: Take 1 tablet (0.125 mg total) by mouth every 6 (six) hours as needed for cramping   levothyroxine 100 mcg tablet   No Yes   Sig: TAKE 1 TABLET BY MOUTH EVERY DAY   lidocaine (Lidoderm) 5 % Not Taking  No No   Sig: Apply 1 patch topically over 12 hours daily Remove & Discard patch within 12 hours or as directed by MD   Patient not taking: Reported on 9/25/2023   lisinopril (ZESTRIL) 10 mg tablet   No Yes   Sig: TAKE 1 TABLET BY MOUTH TWICE A DAY   meclizine (ANTIVERT) 12.5 MG tablet   No Yes   Sig: Take 1 tablet (12.5 mg total) by mouth every 8 (eight) hours as needed for dizziness   metoprolol tartrate (LOPRESSOR) 25 mg tablet   No Yes   Sig: TAKE 1 TABLET BY MOUTH TWICE A DAY   metoprolol tartrate (LOPRESSOR) 50 mg tablet   No No   Sig: Take 1 tablet (50 mg total) by mouth in the morning 50mg dose in AM 25mg in pm (pt has 25mg)   Patient taking differently: Take 50 mg by mouth in the morning 25mg dose in AM 50mg in pm (pt has 25mg)   pantoprazole (PROTONIX) 40 mg tablet   No Yes   Sig: TAKE 1 TABLET BY MOUTH EVERY DAY   predniSONE 20 mg tablet   No No   Sig: Take 2 tablets (40 mg total) by mouth daily Do not start before July 16, 2023. Patient not taking: Reported on 7/31/2023   topiramate (Topamax) 50 MG tablet Not Taking  No No   Sig: Take 1 tablet (50 mg total) by mouth daily   Patient not taking: Reported on 9/25/2023      Facility-Administered Medications: None       Past Medical History:   Diagnosis Date   • Contact dermatitis     Last assessed - 8/8/12   • COPD (chronic obstructive pulmonary disease) (HCC)    • Disease of thyroid gland    • GERD (gastroesophageal reflux disease)    • Hyperlipidemia    • Hypertension    • Irritable bowel syndrome    • Von Willebrand disease ([address])        Past Surgical History:   Procedure Laterality Date   • APPENDECTOMY     • HYSTERECTOMY      Date unk        Family History   Problem Relation Age of Onset   • Clotting disorder Mother         Def of clotting factor    • Aneurysm Father      I have reviewed and agree with the history as documented.     E-Cigarette/Vaping   • E-Cigarette Use Never User      E-Cigarette/Vaping Substances   • Nicotine No    • THC No    • CBD No    • Flavoring No    • Other No    • Unknown No      Social History     Tobacco Use   • Smoking status: Never   • Smokeless tobacco: Never   Vaping Use   • Vaping Use: Never used   Substance Use Topics   • Alcohol use: Never   • Drug use: No       Review of Systems   Constitutional: Negative for chills and fever. HENT: Negative for ear pain and sore throat. Eyes: Negative for pain and visual disturbance. Respiratory: Negative for cough and shortness of breath. Cardiovascular: Negative for chest pain and palpitations. Gastrointestinal: Negative for abdominal pain and vomiting. Genitourinary: Negative for dysuria and hematuria. Musculoskeletal: Negative for arthralgias and back pain. Skin: Negative for color change and rash. Neurological: Positive for headaches. Negative for seizures and syncope. All other systems reviewed and are negative. Physical Exam  Physical Exam  Vitals reviewed. Constitutional:       General: She is not in acute distress. Appearance: She is well-developed. She is not ill-appearing or diaphoretic. HENT:      Right Ear: External ear normal. No swelling. Tympanic membrane is not bulging. Left Ear: External ear normal. No swelling. Tympanic membrane is not bulging. Nose: Nose normal.      Mouth/Throat:      Pharynx: No oropharyngeal exudate. Eyes:      General: Lids are normal. No visual field deficit. Conjunctiva/sclera: Conjunctivae normal.      Pupils: Pupils are equal, round, and reactive to light. Neck:      Thyroid: No thyromegaly. Vascular: No JVD. Trachea: No tracheal deviation. Cardiovascular:      Rate and Rhythm: Normal rate and regular rhythm. Pulses: Normal pulses. Heart sounds: Normal heart sounds. No murmur heard. No friction rub. No gallop. Pulmonary:      Effort: Pulmonary effort is normal. No respiratory distress. Breath sounds: Normal breath sounds. No stridor. No wheezing or rales. Chest:      Chest wall: No tenderness.    Abdominal:      General: Bowel sounds are normal. There is no distension. Palpations: Abdomen is soft. There is no mass. Tenderness: There is no abdominal tenderness. There is no guarding or rebound. Negative signs include Stanton's sign. Hernia: No hernia is present. Musculoskeletal:         General: No tenderness. Normal range of motion. Cervical back: Normal range of motion and neck supple. No edema. Normal range of motion. Lymphadenopathy:      Cervical: No cervical adenopathy. Skin:     General: Skin is warm and dry. Capillary Refill: Capillary refill takes less than 2 seconds. Coloration: Skin is not pale. Findings: No erythema or rash. Neurological:      General: No focal deficit present. Mental Status: She is alert and oriented to person, place, and time. Mental status is at baseline. GCS: GCS eye subscore is 4. GCS verbal subscore is 5. GCS motor subscore is 6. Cranial Nerves: No cranial nerve deficit, dysarthria or facial asymmetry. Sensory: No sensory deficit. Motor: No weakness, tremor, abnormal muscle tone or pronator drift. Coordination: Romberg sign negative. Coordination normal. Finger-Nose-Finger Test and Heel to [facility] Test normal.      Gait: Gait normal.      Deep Tendon Reflexes: Reflexes are normal and symmetric.    Psychiatric:         Speech: Speech normal.         Behavior: Behavior normal.         Vital Signs  ED Triage Vitals   Temperature Pulse Respirations Blood Pressure SpO2   09/25/23 0944 09/25/23 0944 09/25/23 0944 09/25/23 0944 09/25/23 0944   97.8 °F (36.6 °C) 72 16 (!) 208/86 96 %      Temp Source Heart Rate Source Patient Position - Orthostatic VS BP Location FiO2 (%)   09/25/23 0944 09/25/23 0944 09/25/23 1000 09/25/23 1000 --   Temporal Monitor Lying Right arm       Pain Score       09/25/23 0944       10 - Worst Possible Pain           Vitals:    09/25/23 0944 09/25/23 1000 09/25/23 1030   BP: (!) 208/86 (!) 198/88 (!) 176/72   Pulse: 72 69 69   Patient Position - Orthostatic VS:  Lying Lying         Visual Acuity  Visual Acuity    Flowsheet Row Most Recent Value   L Pupil Size (mm) 3   R Pupil Size (mm) 3          ED Medications  Medications   dexamethasone (PF) (DECADRON) injection 10 mg (10 mg Intravenous Given 9/25/23 1011)   labetalol (NORMODYNE) injection 10 mg (10 mg Intravenous Given 9/25/23 1011)       Diagnostic Studies  Results Reviewed     Procedure Component Value Units Date/Time    Comprehensive metabolic panel [[id number]] Collected: 09/25/23 1000    Lab Status: Final result Specimen: Blood from Arm, Right Updated: 09/25/23 1036     Sodium 137 mmol/L      Potassium 3.6 mmol/L      Chloride 102 mmol/L      CO2 27 mmol/L      ANION GAP 8 mmol/L      BUN 14 mg/dL      Creatinine 0.70 mg/dL      Glucose 109 mg/dL      Calcium 9.9 mg/dL      AST 18 U/L      ALT 13 U/L      Alkaline Phosphatase 58 U/L      Total Protein 7.7 g/dL      Albumin 4.5 g/dL      Total Bilirubin 0.51 mg/dL      eGFR 79 ml/min/1.73sq m     Narrative:      [place] guidelines for Chronic Kidney Disease (CKD):   •  Stage 1 with normal or high GFR (GFR > 90 mL/min/1.73 square meters)  •  Stage 2 Mild CKD (GFR = 60-89 mL/min/1.73 square meters)  •  Stage 3A Moderate CKD (GFR = 45-59 mL/min/1.73 square meters)  •  Stage 3B Moderate CKD (GFR = 30-44 mL/min/1.73 square meters)  •  Stage 4 Severe CKD (GFR = 15-29 mL/min/1.73 square meters)  •  Stage 5 End Stage CKD (GFR <15 mL/min/1.73 square meters)  Note: GFR calculation is accurate only with a steady state creatinine    CBC and differential [[id number]]  (Abnormal) Collected: 09/25/23 1000    Lab Status: Final result Specimen: Blood from Arm, Right Updated: 09/25/23 1026     WBC 3.78 Thousand/uL      RBC 4.17 Million/uL      Hemoglobin 13.4 g/dL      Hematocrit 42.1 %       fL      MCH 32.1 pg      MCHC 31.8 g/dL      RDW 14.2 %      MPV 10.2 fL      Platelets 880 Thousands/uL      nRBC 0 /100 WBCs      Neutrophils Relative 45 %      Immat GRANS % 0 %      Lymphocytes Relative 40 %      Monocytes Relative 10 %      Eosinophils Relative 4 %      Basophils Relative 1 %      Neutrophils Absolute 1.66 Thousands/µL      Immature Grans Absolute 0.01 Thousand/uL      Lymphocytes Absolute 1.52 Thousands/µL      Monocytes Absolute 0.39 Thousand/µL      Eosinophils Absolute 0.15 Thousand/µL      Basophils Absolute 0.05 Thousands/µL                  No orders to display              Procedures  ECG 12 Lead Documentation Only    Date/Time: 9/25/2023 11:03 AM    Performed by: Rambo Westbrook PA-C  Authorized by: Rambo Westbrook PA-C    Indications / Diagnosis:  HTN  ECG reviewed by me, the ED Provider: yes    Patient location:  ED  Previous ECG:     Comparison to cardiac monitor: Yes    Rate:     ECG rate:  70    ECG rate assessment: normal    Rhythm:     Rhythm: sinus rhythm    Ectopy:     Ectopy: none    QRS:     QRS axis:  Normal    QRS intervals:  Normal  Conduction:     Conduction: normal    ST segments:     ST segments:  Normal  T waves:     T waves: normal               ED Course  ED Course as of 09/25/23 1104   Mon Sep 25, 2023   1202 Blood Pressure(!): 208/86   0952 Temperature: 97.8 °F (36.6 °C)   0952 Pulse: 72   0952 Respirations: 16   0952 SpO2: 96 %  hypertensive   1045 TT sent to Blood cell Storage 20yo+    Flowsheet Row Most Recent Value   Initial Alcohol Screen: US AUDIT-C     1. How often do you have a drink containing alcohol? 0 Filed at: 09/25/2023 1019   2. How many drinks containing alcohol do you have on a typical day you are drinking? 0 Filed at: 09/25/2023 1019   3a. Male UNDER 65: How often do you have five or more drinks on one occasion? 0 Filed at: 09/25/2023 1019   3b. FEMALE Any Age, or MALE 65+: How often do you have 4 or more drinks on one occassion?  0 Filed at: 09/25/2023 1019   Audit-C Score 0 Filed at: 09/25/2023 1019   DUNCAN: How many times in the past year have you. .. Used an illegal drug or used a prescription medication for non-medical reasons? Never Filed at: 09/25/2023 1019                    Medical Decision Making  57-year-old female here for evaluation of hypertension as well as headache. I did review the last 2 emergency department visits as well as primary care visit notes. She has consistently had blood pressure readings greater than 691 mmHg systolic which appears to be in correlation with her headache. She will have evaluation for hypertensive headache here she will likely require IV medications and likely admission for better blood pressure control given the multiple ED visits. Patient is in agreement with this plan. Laboratory evaluation does show some mild leukopenia which is changed from prior unsure of the significance of this. Metabolic panel within reasonable limits EKG normal.  Discussed case with internal medicine secondary to multiple ED visits for hypertensive headache she will be admitted to the hospital service for further evaluation and treatment. Amount and/or Complexity of Data Reviewed  Labs: ordered. Risk  Prescription drug management. Decision regarding hospitalization. Disposition  Final diagnoses:   Headache   Hypertension     Time reflects when diagnosis was documented in both MDM as applicable and the Disposition within this note     Time User Action Codes Description Comment    9/25/2023 10:52 AM Dinorah ACEVEDO Add [R51.9] Headache     9/25/2023 10:52 AM Darrian Foss Add [I10] Hypertension       ED Disposition     ED Disposition   Admit    Condition   Stable    Date/Time   Mon Sep 25, 2023 10:52 AM    Comment   Case was discussed with Dr. MARTINEZ and the patient's admission status was agreed to be Admission Status: inpatient status to the service of Dr. MARTINEZ .            Follow-up Information    None         Patient's Medications   Discharge Prescriptions    No medications on file No discharge procedures on file.     PDMP Review       Value Time User    PDMP Reviewed  Yes 9/19/2023 11:30 AM Latrice Reaves DO          ED Provider  Electronically Signed by           Janny Ortega PA-C  09/25/23 1108

## 2023-09-25 NOTE — H&P
[address]  H&P  Name: Mahsa Bauman 78 y.o. female I MRN: [id number]  Unit/Bed#: 277-53 I Date of Admission: 9/25/2023   Date of Service: 9/25/2023 I Hospital Day: 0      Assessment/Plan   * Hypertensive urgency  Assessment & Plan  · Presented to the ER with a blood pressure of 208/186  · Reports severe headache which appears to be in correlation with her high blood pressure. · Last 2 ED visits notable for systolic blood pressure over 200  · S/p labetalol 10 mg IV x 1 in the ED today  · CMP within reasonable limits EKG normal  · CT head negative for acute intracranial pathology  · CXR within normal limits  · No end-organ damage noted  · Patient reports worsening of her blood pressure since HCTZ was discontinued in July, 2023  · We will resume her home blood pressure medication  · We will give her additional labetalol 20 mg IV dose  · We will start her on hydrochlorothiazide 25 mg daily  · Monitor BMP  · Monitor on telemetry    Headache  Assessment & Plan  · Patient reports a severe headache since 3 weeks which appear to be in correlation with high blood pressure  · Patient reports that migraine cocktails that were used in the past gives no relief  · CT head negative for any acute intracranial pathology  · Given just to iodine dyes, not a candidate for CTA  · We will check MRI brain  · Tylenol as needed for pain, not a candidate for NSAIDs given von Willebrand disease    Depression, recurrent (HCC)  Assessment & Plan  · Continue oral Paxil 30 mg daily, Xanax as needed    Von Willebrand disease ([address])  Assessment & Plan  · Patient has a history of VWD  · We will hold off on DVT prophylaxis and utilize SCDs    Hypothyroidism  Assessment & Plan  · Continue levothyroxine 100 mcg daily    VTE Pharmacologic Prophylaxis: VTE Score: 5 High Risk (Score >/= 5) - Pharmacological DVT Prophylaxis Contraindicated. Sequential Compression Devices Ordered.   Code Status: Level 3 - DNAR and DNI Anticipated Length of Stay: Patient will be admitted on an observation basis with an anticipated length of stay of less than 2 midnights secondary to High blood pressure, headaches. Total Time Spent on Date of Encounter in care of patient: 45 mins. This time was spent on one or more of the following: performing physical exam; counseling and coordination of care; obtaining or reviewing history; documenting in the medical record; reviewing/ordering tests, medications or procedures; communicating with other healthcare professionals and discussing with patient's family/caregivers. Chief Complaint: Headache    History of Present Illness:  Joe Tucker is a 78 y.o. female with a PMH of COPD, hypothyroid, GERD, HLD, HTN, IBS, von Willebrand disease who presents with headache since 3 weeks. Patient presented to the ED with complaints of severe headache around the vertex of the head and around the occipital region. Patient rates the pain 10/10, with no radiation. Patient reports that she was seen for similar complaint yesterday and couple of weeks ago. Patient believes that her headaches are correlated with her increased blood pressure. Patient reports that her blood pressure has been slowly creeping up since several months. She states that ever since that she was taken off HCTZ her blood pressure has been going up. Patient states that she is compliant with her blood pressure medications. Patient reports that migraine cocktail given in the ED for her headaches do not help resolve her pain. Patient otherwise denies chest pain, SOB, loss of consciousness, blurring vision, dizziness, neck stiffness, numbness, weakness, nausea/vomiting. Patient reports that MRI was ordered by her PCP for which she is yet to make an appointment for. Patient denies any history of migraines in the past.    Review of Systems:  Review of Systems   Constitutional: Negative for chills and fever.    HENT: Negative for ear pain and sore throat. Eyes: Negative for pain and visual disturbance. Respiratory: Negative for cough and shortness of breath. Cardiovascular: Negative for chest pain and palpitations. Gastrointestinal: Negative for abdominal pain and vomiting. Genitourinary: Negative for dysuria and hematuria. Musculoskeletal: Negative for arthralgias and back pain. Skin: Negative for color change and rash. Neurological: Positive for headaches. Negative for dizziness, seizures, syncope and numbness. All other systems reviewed and are negative. Past Medical and Surgical History:   Past Medical History:   Diagnosis Date   • Contact dermatitis     Last assessed - 8/8/12   • COPD (chronic obstructive pulmonary disease) (HCC)    • Disease of thyroid gland    • GERD (gastroesophageal reflux disease)    • Hyperlipidemia    • Hypertension    • Irritable bowel syndrome    • Von Willebrand disease ([address])        Past Surgical History:   Procedure Laterality Date   • APPENDECTOMY     • HYSTERECTOMY      Date unk        Meds/Allergies:  Prior to Admission medications    Medication Sig Start Date End Date Taking? Authorizing Provider   acetaminophen (TYLENOL) 325 mg tablet Take 2 tablets (650 mg total) by mouth every 6 (six) hours as needed (pain, fever) 5/15/20  Yes Parveen Alfaro MD   ALPRAZolam Rylie Lizama) 0.25 mg tablet Take 1 tablet (0.25 mg total) by mouth 3 (three) times a day as needed for anxiety 9/19/23  Yes Mai Loop, DO   budesonide-formoterol (SYMBICORT) 160-4.5 mcg/act inhaler Inhale 2 puffs 2 (two) times a day Rinse mouth after use.  12/16/22  Yes Mai Loop, DO   dicyclomine (BENTYL) 10 mg capsule TAKE 1 CAPSULE (10 MG TOTAL) BY MOUTH 3 (THREE) TIMES A DAY AS NEEDED (USE ONLY AS NEEDED) 2/24/23 9/25/23 Yes Mai Loop, DO   hyoscyamine (ANASPAZ,LEVSIN) 0.125 MG tablet Take 1 tablet (0.125 mg total) by mouth every 6 (six) hours as needed for cramping 5/22/20  Yes Reji Jennings MD   levothyroxine 100 mcg tablet TAKE 1 TABLET BY MOUTH EVERY DAY 7/27/23  Yes Skip Phoenix, DO   lisinopril (ZESTRIL) 10 mg tablet TAKE 1 TABLET BY MOUTH TWICE A DAY 10/25/22  Yes Skip Phoenix, DO   metoprolol tartrate (LOPRESSOR) 25 mg tablet TAKE 1 TABLET BY MOUTH TWICE A DAY 10/25/22  Yes Skip Phoenix, DO   metoprolol tartrate (LOPRESSOR) 50 mg tablet Take 1 tablet (50 mg total) by mouth in the morning 50mg dose in AM 25mg in pm (pt has 25mg)  Patient taking differently: Take 50 mg by mouth in the morning 25mg dose in AM 50mg in pm (pt has 25mg) 9/14/23  Yes Skip Phoenix, DO   pantoprazole (PROTONIX) 40 mg tablet TAKE 1 TABLET BY MOUTH EVERY DAY 11/25/22  Yes Skip Phoenix, DO   PARoxetine (PAXIL) 30 mg tablet TAKE 1 TABLET BY MOUTH EVERY DAY 10/25/22  Yes Skip Phoenix, DO   allopurinol (ZYLOPRIM) 100 mg tablet TAKE 1 TABLET BY MOUTH EVERY DAY  Patient not taking: Reported on 9/25/2023 8/28/23   Skip Phoenix, DO   benzonatate (TESSALON PERLES) 100 mg capsule TAKE 1 CAPSULE BY MOUTH THREE TIMES A DAY  Patient not taking: Reported on 9/25/2023 2/19/23   Skip Phoenix, DO   lidocaine (Lidoderm) 5 % Apply 1 patch topically over 12 hours daily Remove & Discard patch within 12 hours or as directed by MD  Patient not taking: Reported on 9/25/2023 9/12/23   Pearl Garcia MD   meclizine (ANTIVERT) 12.5 MG tablet Take 1 tablet (12.5 mg total) by mouth every 8 (eight) hours as needed for dizziness 1/18/22   Skip Phoenix, DO   predniSONE 20 mg tablet Take 2 tablets (40 mg total) by mouth daily Do not start before July 16, 2023.   Patient not taking: Reported on 7/31/2023 7/16/23   Lillian Price MD   REFRESH TEARS 0.5 % SOLN INSTILL 1 DROP INTO EACH EYE THREE TIMES DAILY AS NEEDED FOR DRY EYES 5/21/20   Historical Provider, MD   topiramate (Topamax) 50 MG tablet Take 1 tablet (50 mg total) by mouth daily  Patient not taking: Reported on 9/25/2023 9/19/23   Skip Patrice, DO     I have reviewed home medications with patient personally. Allergies: Allergies   Allergen Reactions   • Contrast [Iodinated Contrast Media] Itching, Tongue Swelling and Lip Swelling   • Niacin And Related Anaphylaxis   • Other      Iv contrast dye- lips and tongue thick body itchy   • Codeine Nausea Only   • Nsaids    • Penicillins Rash       Social History:  Marital Status:    Patient Pre-hospital Living Situation: Home  Patient Pre-hospital Level of Mobility: walks with cane  Substance Use History:   Social History     Substance and Sexual Activity   Alcohol Use Never     Social History     Tobacco Use   Smoking Status Never   Smokeless Tobacco Never     Social History     Substance and Sexual Activity   Drug Use No       Family History:  Family History   Problem Relation Age of Onset   • Clotting disorder Mother         Def of clotting factor    • Aneurysm Father        Physical Exam:     Vitals:   Blood Pressure: (!) 183/89 (09/25/23 1702)  Pulse: 98 (09/25/23 1702)  Temperature: 97.5 °F (36.4 °C) (09/25/23 1437)  Temp Source: Oral (09/25/23 1437)  Respirations: 18 (09/25/23 1437)  Height: 5' 7" (170.2 cm) (09/25/23 1437)  Weight - Scale: 77.2 kg (170 lb 3.1 oz) (09/25/23 1437)  SpO2: (!) 89 % (09/25/23 1702)    Physical Exam  Vitals and nursing note reviewed. Constitutional:       General: She is not in acute distress. Appearance: She is well-developed. HENT:      Head: Normocephalic and atraumatic. Eyes:      Conjunctiva/sclera: Conjunctivae normal.   Cardiovascular:      Rate and Rhythm: Normal rate and regular rhythm. Heart sounds: No murmur heard. Pulmonary:      Effort: Pulmonary effort is normal. No respiratory distress. Breath sounds: Normal breath sounds. Abdominal:      Palpations: Abdomen is soft. Tenderness: There is no abdominal tenderness. Musculoskeletal:         General: No swelling. Cervical back: Neck supple. Right lower leg: No edema.       Left lower leg: No edema.   Lymphadenopathy:      Cervical: Cervical adenopathy present. Skin:     General: Skin is warm and dry. Capillary Refill: Capillary refill takes less than 2 seconds. Neurological:      General: No focal deficit present. Mental Status: She is alert and oriented to person, place, and time. Cranial Nerves: No cranial nerve deficit. Sensory: No sensory deficit. Motor: No weakness. Coordination: Coordination normal.   Psychiatric:         Mood and Affect: Mood normal.         Speech: Speech normal.         Behavior: Behavior normal.         Additional Data:     Lab Results:  Results from last 7 days   Lab Units 09/25/23  1000   WBC Thousand/uL 3.78*   HEMOGLOBIN g/dL 13.4   HEMATOCRIT % 42.1   PLATELETS Thousands/uL 213   NEUTROS PCT % 45   LYMPHS PCT % 40   MONOS PCT % 10   EOS PCT % 4     Results from last 7 days   Lab Units 09/25/23  1000   SODIUM mmol/L 137   POTASSIUM mmol/L 3.6   CHLORIDE mmol/L 102   CO2 mmol/L 27   BUN mg/dL 14   CREATININE mg/dL 0.70   ANION GAP mmol/L 8   CALCIUM mg/dL 9.9   ALBUMIN g/dL 4.5   TOTAL BILIRUBIN mg/dL 0.51   ALK PHOS U/L 58   ALT U/L 13   AST U/L 18   GLUCOSE RANDOM mg/dL 109                       Lines/Drains:  Invasive Devices     Peripheral Intravenous Line  Duration           Peripheral IV 09/25/23 Right Antecubital <1 day                    Imaging: Reviewed radiology reports from this admission including: none  MRI inpatient order    (Results Pending)       EKG and Other Studies Reviewed on Admission:   · EKG: NSR. HR 70.    ** Please Note: This note has been constructed using a voice recognition system.  **

## 2023-09-25 NOTE — ASSESSMENT & PLAN NOTE
· Patient reports a severe headache since 3 weeks which appear to be in correlation with high blood pressure  · Patient reports that migraine cocktails that were used in the past gives no relief  · CT head negative for any acute intracranial pathology  · Given just to iodine dyes, not a candidate for CTA  · We will check MRI brain  · Tylenol as needed for pain, not a candidate for NSAIDs given von Willebrand disease

## 2023-09-25 NOTE — TELEPHONE ENCOUNTER
Pt made aware but states they only gave her one dose of BP medication, did not change any of her BP meds indefinitely. States she is sitting home with an ice pack on her head and unsure what to do.  States if new BP meds cannot be rx'd then she is asking for a direct admit into hosp vs. Everything she went through yesterday (only being treated for migraine sx.)

## 2023-09-25 NOTE — TELEPHONE ENCOUNTER
I do not have admitting privileges since they have hospitalists now  - she would have to go thru the emergency room

## 2023-09-25 NOTE — ASSESSMENT & PLAN NOTE
· Presented to the ER with a blood pressure of 208/186  · Reports severe headache which appears to be in correlation with her high blood pressure.   · Last 2 ED visits notable for systolic blood pressure over 200  · S/p labetalol 10 mg IV x 1 in the ED today  · CMP within reasonable limits EKG normal  · CT head negative for acute intracranial pathology  · CXR within normal limits  · No end-organ damage noted  · Patient reports worsening of her blood pressure since HCTZ was discontinued in July, 2023  · We will resume her home blood pressure medication  · We will give her additional labetalol 20 mg IV dose  · We will start her on hydrochlorothiazide 25 mg daily  · Monitor BMP  · Monitor on telemetry

## 2023-09-25 NOTE — ED NOTES
Patient reports worsening headache, moving down the back of her head and into her neck. Provider notified.      Jerel Chang RN  09/25/23 8164

## 2023-09-25 NOTE — PLAN OF CARE

## 2023-09-25 NOTE — TELEPHONE ENCOUNTER
Went to the ER yesterday for hypertension, they gave her a migraine cocktail, did CT scan and a new BP med. She has a severe headache, neck pain, eyes hurt. She does not know what to do, she does not want to go back to the hospital. Please advise.

## 2023-09-25 NOTE — TELEPHONE ENCOUNTER
If symptoms have not changed or have progressed she should return to the hospital as they may keep her for observation to get BP under control although it may take more than a day for the new medication to have its effect

## 2023-09-26 ENCOUNTER — APPOINTMENT (INPATIENT)
Dept: MRI IMAGING | Facility: HOSPITAL | Age: 83
DRG: 305 | End: 2023-09-26
Payer: MEDICARE

## 2023-09-26 ENCOUNTER — APPOINTMENT (OUTPATIENT)
Dept: PHYSICAL THERAPY | Facility: HOME HEALTHCARE | Age: 83
End: 2023-09-26
Payer: MEDICARE

## 2023-09-26 LAB
ANION GAP SERPL CALCULATED.3IONS-SCNC: 9 MMOL/L
ATRIAL RATE: 62 BPM
ATRIAL RATE: 70 BPM
BASOPHILS # BLD AUTO: 0 THOUSANDS/ÂΜL (ref 0–0.1)
BASOPHILS NFR BLD AUTO: 0 % (ref 0–1)
BUN SERPL-MCNC: 22 MG/DL (ref 5–25)
CALCIUM SERPL-MCNC: 10 MG/DL (ref 8.4–10.2)
CHLORIDE SERPL-SCNC: 102 MMOL/L (ref 96–108)
CO2 SERPL-SCNC: 25 MMOL/L (ref 21–32)
CREAT SERPL-MCNC: 0.81 MG/DL (ref 0.6–1.3)
EOSINOPHIL # BLD AUTO: 0 THOUSAND/ÂΜL (ref 0–0.61)
EOSINOPHIL NFR BLD AUTO: 0 % (ref 0–6)
ERYTHROCYTE [DISTWIDTH] IN BLOOD BY AUTOMATED COUNT: 14 % (ref 11.6–15.1)
GFR SERPL CREATININE-BSD FRML MDRD: 66 ML/MIN/1.73SQ M
GLUCOSE SERPL-MCNC: 134 MG/DL (ref 65–140)
HCT VFR BLD AUTO: 38.4 % (ref 34.8–46.1)
HGB BLD-MCNC: 12.6 G/DL (ref 11.5–15.4)
IMM GRANULOCYTES # BLD AUTO: 0.02 THOUSAND/UL (ref 0–0.2)
IMM GRANULOCYTES NFR BLD AUTO: 0 % (ref 0–2)
LYMPHOCYTES # BLD AUTO: 1.15 THOUSANDS/ÂΜL (ref 0.6–4.47)
LYMPHOCYTES NFR BLD AUTO: 20 % (ref 14–44)
MCH RBC QN AUTO: 32.1 PG (ref 26.8–34.3)
MCHC RBC AUTO-ENTMCNC: 32.8 G/DL (ref 31.4–37.4)
MCV RBC AUTO: 98 FL (ref 82–98)
MONOCYTES # BLD AUTO: 0.38 THOUSAND/ÂΜL (ref 0.17–1.22)
MONOCYTES NFR BLD AUTO: 7 % (ref 4–12)
NEUTROPHILS # BLD AUTO: 4.13 THOUSANDS/ÂΜL (ref 1.85–7.62)
NEUTS SEG NFR BLD AUTO: 73 % (ref 43–75)
NRBC BLD AUTO-RTO: 0 /100 WBCS
P AXIS: 61 DEGREES
P AXIS: 65 DEGREES
PLATELET # BLD AUTO: 210 THOUSANDS/UL (ref 149–390)
PMV BLD AUTO: 9.9 FL (ref 8.9–12.7)
POTASSIUM SERPL-SCNC: 4 MMOL/L (ref 3.5–5.3)
PR INTERVAL: 194 MS
PR INTERVAL: 206 MS
QRS AXIS: -9 DEGREES
QRS AXIS: 4 DEGREES
QRSD INTERVAL: 72 MS
QRSD INTERVAL: 74 MS
QT INTERVAL: 408 MS
QT INTERVAL: 430 MS
QTC INTERVAL: 436 MS
QTC INTERVAL: 440 MS
RBC # BLD AUTO: 3.93 MILLION/UL (ref 3.81–5.12)
SODIUM SERPL-SCNC: 136 MMOL/L (ref 135–147)
T WAVE AXIS: 57 DEGREES
T WAVE AXIS: 62 DEGREES
VENTRICULAR RATE: 62 BPM
VENTRICULAR RATE: 70 BPM
WBC # BLD AUTO: 5.68 THOUSAND/UL (ref 4.31–10.16)

## 2023-09-26 PROCEDURE — 80048 BASIC METABOLIC PNL TOTAL CA: CPT

## 2023-09-26 PROCEDURE — 70544 MR ANGIOGRAPHY HEAD W/O DYE: CPT

## 2023-09-26 PROCEDURE — 93010 ELECTROCARDIOGRAM REPORT: CPT | Performed by: INTERNAL MEDICINE

## 2023-09-26 PROCEDURE — 70551 MRI BRAIN STEM W/O DYE: CPT

## 2023-09-26 PROCEDURE — 99232 SBSQ HOSP IP/OBS MODERATE 35: CPT | Performed by: INTERNAL MEDICINE

## 2023-09-26 PROCEDURE — 85025 COMPLETE CBC W/AUTO DIFF WBC: CPT

## 2023-09-26 RX ORDER — ALPRAZOLAM 0.25 MG/1
0.25 TABLET ORAL ONCE
Status: COMPLETED | OUTPATIENT
Start: 2023-09-26 | End: 2023-09-26

## 2023-09-26 RX ADMIN — LISINOPRIL 10 MG: 10 TABLET ORAL at 18:11

## 2023-09-26 RX ADMIN — PAROXETINE 30 MG: 20 TABLET, FILM COATED ORAL at 09:25

## 2023-09-26 RX ADMIN — ALPRAZOLAM 0.5 MG: 0.5 TABLET ORAL at 11:50

## 2023-09-26 RX ADMIN — PANTOPRAZOLE SODIUM 40 MG: 40 TABLET, DELAYED RELEASE ORAL at 15:23

## 2023-09-26 RX ADMIN — BUDESONIDE AND FORMOTEROL FUMARATE DIHYDRATE 2 PUFF: 160; 4.5 AEROSOL RESPIRATORY (INHALATION) at 09:21

## 2023-09-26 RX ADMIN — ACETAMINOPHEN 650 MG: 325 TABLET, FILM COATED ORAL at 09:29

## 2023-09-26 RX ADMIN — LEVOTHYROXINE SODIUM 100 MCG: 100 TABLET ORAL at 05:17

## 2023-09-26 RX ADMIN — ALPRAZOLAM 0.25 MG: 0.25 TABLET ORAL at 20:37

## 2023-09-26 RX ADMIN — ACETAMINOPHEN 650 MG: 325 TABLET, FILM COATED ORAL at 19:57

## 2023-09-26 RX ADMIN — LISINOPRIL 10 MG: 10 TABLET ORAL at 09:25

## 2023-09-26 RX ADMIN — METOPROLOL TARTRATE 25 MG: 25 TABLET, FILM COATED ORAL at 09:25

## 2023-09-26 RX ADMIN — HYDROCHLOROTHIAZIDE 25 MG: 25 TABLET ORAL at 09:25

## 2023-09-26 NOTE — PLAN OF CARE

## 2023-09-26 NOTE — PLAN OF CARE
Problem: Potential for Falls  Goal: Patient will remain free of falls  Description: INTERVENTIONS:  - Educate patient/family on patient safety including physical limitations  - Instruct patient to call for assistance with activity   - Consult OT/PT to assist with strengthening/mobility   - Keep Call bell within reach  - Keep bed low and locked with side rails adjusted as appropriate  - Keep care items and personal belongings within reach  - Initiate and maintain comfort rounds  - Make Fall Risk Sign visible to staff  - Offer Toileting every 2 Hours, in advance of need  - Initiate/Maintain bed/chair alarm  - Obtain necessary fall risk management equipment: bed/chair alarm, nonskid socks   - Apply yellow socks and bracelet for high fall risk patients  - Consider moving patient to room near nurses station  Outcome: Progressing     Problem: PAIN - ADULT  Goal: Verbalizes/displays adequate comfort level or baseline comfort level  Description: Interventions:  - Encourage patient to monitor pain and request assistance  - Assess pain using appropriate pain scale  - Administer analgesics based on type and severity of pain and evaluate response  - Implement non-pharmacological measures as appropriate and evaluate response  - Consider cultural and social influences on pain and pain management  - Notify physician/advanced practitioner if interventions unsuccessful or patient reports new pain  Outcome: Progressing     Problem: INFECTION - ADULT  Goal: Absence or prevention of progression during hospitalization  Description: INTERVENTIONS:  - Assess and monitor for signs and symptoms of infection  - Monitor lab/diagnostic results  - Monitor all insertion sites, i.e. indwelling lines, tubes, and drains  - Monitor endotracheal if appropriate and nasal secretions for changes in amount and color  - [place] appropriate cooling/warming therapies per order  - Administer medications as ordered  - Instruct and encourage patient and family to use good hand hygiene technique  - Identify and instruct in appropriate isolation precautions for identified infection/condition  Outcome: Progressing  Goal: Absence of fever/infection during neutropenic period  Description: INTERVENTIONS:  - Monitor WBC    Outcome: Progressing

## 2023-09-26 NOTE — ASSESSMENT & PLAN NOTE
· Presented to the ER with a blood pressure of 208/186  · Reports severe headache which appears to be in correlation with her high blood pressure.   · Patient reports worsening of her blood pressure since HCTZ was discontinued in July, 2023  · Last 2 ED visits notable for systolic blood pressure over 200  · S/p labetalol 10 mg IV x 1 in the ED , additional labetalol 20 mg IV dose given 9/25  · CBC, BMP, EKG within normal limits  · CT head negative for acute intracranial pathology  · CXR within normal limits  · No end-organ damage noted  · BP improved today , last BP- 118/61  · Continue home blood pressure medication  · Continue hydrochlorothiazide 25 mg daily  · MRI brain, MRA head negative for acute pathology

## 2023-09-26 NOTE — PROGRESS NOTES
[address]  Progress Note  Name: Ramos Diaz  MRN: [id number]  Unit/Bed#: 989-60 I Date of Admission: 9/25/2023   Date of Service: 9/26/2023 I Hospital Day: 1    Assessment/Plan   * Hypertensive urgency  Assessment & Plan  · Presented to the ER with a blood pressure of 208/186  · Reports severe headache which appears to be in correlation with her high blood pressure.   · Patient reports worsening of her blood pressure since HCTZ was discontinued in July, 2023  · Last 2 ED visits notable for systolic blood pressure over 200  · S/p labetalol 10 mg IV x 1 in the ED , additional labetalol 20 mg IV dose given 9/25  · CBC, BMP, EKG within normal limits  · CT head negative for acute intracranial pathology  · CXR within normal limits  · No end-organ damage noted  · BP improved today , last BP- 118/61  · Continue home blood pressure medication  · Continue hydrochlorothiazide 25 mg daily  · MRI brain, MRA head negative for acute pathology      Headache  Assessment & Plan  · Patient reports a severe headache since 3 weeks which appear to be in correlation with high blood pressure  · Patient reports that migraine cocktails that were used in the past gives no relief  · CT head negative for any acute intracranial pathology  · Given just to iodine dyes, not a candidate for CTA  · We will check MRI brain  · Tylenol and IV morphine as needed for pain, not a candidate for NSAIDs given von Willebrand disease  · Patient reports improvement in her headache, will monitor for another day  · Consider neurology consult if symptoms do not resolve by tomorrow      Depression, recurrent (HCC)  Assessment & Plan  · Continue oral Paxil 30 mg daily, Xanax as needed    Von Willebrand disease ([address])  Assessment & Plan  · Patient has a history of VWD  · We will hold off on DVT prophylaxis and utilize SCDs    Hypothyroidism  Assessment & Plan  · Continue levothyroxine 100 mcg daily           VTE Pharmacologic Prophylaxis: VTE Score: 5 Moderate Risk (Score 3-4) - Pharmacological DVT Prophylaxis Contraindicated. Sequential Compression Devices Ordered. Patient Centered Rounds: I performed bedside rounds with nursing staff today. Total Time Spent on Date of Encounter in care of patient: 30 mins. This time was spent on one or more of the following: performing physical exam; counseling and coordination of care; obtaining or reviewing history; documenting in the medical record; reviewing/ordering tests, medications or procedures; communicating with other healthcare professionals and discussing with patient's family/caregivers. Current Length of Stay: 1 day(s)  Current Patient Status: Inpatient   Certification Statement: The patient will continue to require additional inpatient hospital stay due to BP monitoring and headaches  Discharge Plan: Anticipate discharge tomorrow to home. Code Status: Level 3 - DNAR and DNI    Subjective:   Patient seen and examined at bedside today. Patient states that her headache has improved although not completely. Patient otherwise denies any acute symptoms. Objective:     Vitals:   Temp (24hrs), Av °F (36.7 °C), Min:97.5 °F (36.4 °C), Max:98.4 °F (36.9 °C)    Temp:  [97.5 °F (36.4 °C)-98.4 °F (36.9 °C)] 98.4 °F (36.9 °C)  HR:  [76-98] 81  Resp:  [17-20] 18  BP: (117-186)/(60-89) 131/70  SpO2:  [89 %-96 %] 96 %  Body mass index is 26.48 kg/m². Input and Output Summary (last 24 hours): Intake/Output Summary (Last 24 hours) at 2023 1131  Last data filed at 2023 1007  Gross per 24 hour   Intake 420 ml   Output --   Net 420 ml       Physical Exam:   Physical Exam  Vitals and nursing note reviewed. Constitutional:       General: She is not in acute distress. Appearance: She is well-developed. HENT:      Head: Normocephalic and atraumatic. Eyes:      Conjunctiva/sclera: Conjunctivae normal.   Cardiovascular:      Rate and Rhythm: Normal rate and regular rhythm. Heart sounds: No murmur heard. Pulmonary:      Effort: Pulmonary effort is normal. No respiratory distress. Breath sounds: Normal breath sounds. Abdominal:      Palpations: Abdomen is soft. Tenderness: There is no abdominal tenderness. Musculoskeletal:      Cervical back: Neck supple. Skin:     General: Skin is warm and dry. Neurological:      Mental Status: She is alert and oriented to person, place, and time.         Additional Data:     Labs:  Results from last 7 days   Lab Units 09/26/23  0528   WBC Thousand/uL 5.68   HEMOGLOBIN g/dL 12.6   HEMATOCRIT % 38.4   PLATELETS Thousands/uL 210   NEUTROS PCT % 73   LYMPHS PCT % 20   MONOS PCT % 7   EOS PCT % 0     Results from last 7 days   Lab Units 09/26/23  0528 09/25/23  1000   SODIUM mmol/L 136 137   POTASSIUM mmol/L 4.0 3.6   CHLORIDE mmol/L 102 102   CO2 mmol/L 25 27   BUN mg/dL 22 14   CREATININE mg/dL 0.81 0.70   ANION GAP mmol/L 9 8   CALCIUM mg/dL 10.0 9.9   ALBUMIN g/dL  --  4.5   TOTAL BILIRUBIN mg/dL  --  0.51   ALK PHOS U/L  --  58   ALT U/L  --  13   AST U/L  --  18   GLUCOSE RANDOM mg/dL 134 109                       Lines/Drains:  Invasive Devices     Peripheral Intravenous Line  Duration           Peripheral IV 09/25/23 Right Antecubital 1 day                      Imaging: Reviewed radiology reports from this admission including: MRI brain    Recent Cultures (last 7 days):         Last 24 Hours Medication List:   Current Facility-Administered Medications   Medication Dose Route Frequency Provider Last Rate   • acetaminophen  650 mg Oral Q6H PRN Edgar Burleson PA-C     • ALPRAZolam  0.5 mg Oral HS PRN Juan Diego Montenegro DO     • budesonide-formoterol  2 puff Inhalation BID Bulmaro Yen MD     • dicyclomine  10 mg Oral TID PRN Bulmaro Yen MD     • hydrochlorothiazide  25 mg Oral Daily Juan Diego Montenegro DO     • levothyroxine  100 mcg Oral Daily Bulmaro Yen MD     • lisinopril  10 mg Oral BID [facility] Rosemarie     • metoprolol tartrate  25 mg Oral Daily Shaina Salgado MD     • morphine injection  2 mg Intravenous Q3H PRN Efrain Montenegro DO     • pantoprazole  40 mg Oral Daily Shaina Salgado MD     • PARoxetine  30 mg Oral Daily Shaina Salgado MD          Today, Patient Was Seen By: Shaina Salgado MD    **Please Note: This note may have been constructed using a voice recognition system. ** Closure 3 Information: This tab is for additional flaps and grafts above and beyond our usual structured repairs.  Please note if you enter information here it will not currently bill and you will need to add the billing information manually.

## 2023-09-26 NOTE — ASSESSMENT & PLAN NOTE
· Patient reports a severe headache since 3 weeks which appear to be in correlation with high blood pressure  · Patient reports that migraine cocktails that were used in the past gives no relief  · CT head negative for any acute intracranial pathology  · Given just to iodine dyes, not a candidate for CTA  · We will check MRI brain  · Tylenol and IV morphine as needed for pain, not a candidate for NSAIDs given von Willebrand disease  · Patient reports improvement in her headache, will monitor for another day  · Consider neurology consult if symptoms do not resolve by tomorrow

## 2023-09-26 NOTE — CASE MANAGEMENT
Case Management Assessment & Discharge Planning Note    Patient name Yuri Presley  Location /765-87 MRN [id number]  : 1940 Date 2023       Current Admission Date: 2023  Current Admission Diagnosis:Hypertensive urgency   Patient Active Problem List    Diagnosis Date Noted   • Hypertensive urgency 2023   • Headache 2023   • Acute non intractable tension-type headache 2023   • Chronic pain syndrome 2023   • Lumbar degenerative disc disease 2023   • Acute idiopathic gout of right foot 2023   • Mild aortic stenosis 2023   • Stage 3a chronic kidney disease ([address]) 2023   • Pulmonary emphysema ([address]) 2021   • Acute bilateral low back pain without sciatica 2021   • Depression, recurrent ([address]) 2020   • Positive colorectal cancer screening using Cologuard test 2020   • Medicare annual wellness visit, subsequent 2019   • Left corneal abrasion 2019   • Irritable bowel syndrome with both constipation and diarrhea 07/10/2019   • BMI 28.0-28.9,adult 07/10/2019   • Generalized abdominal pain 2018   • Back pain, chronic 2017   • Hearing loss 2017   • Diarrhea 2017   • Right hip pain 2017   • Stool guaiac positive 2015   • Vitamin D deficiency 2015   • Diverticulosis 2013   • Von Willebrand disease ([address]) 2013   • Other hyperlipidemia 2013   • Esophagitis, reflux 12/10/2012   • Anxiety 10/09/2012   • Hypothyroidism 2012   • Generalized osteoarthritis of multiple sites 2012   • Primary hypertension 2012      LOS (days): 1  Geometric Mean LOS (GMLOS) (days): 2.30  Days to GMLOS:1.3     OBJECTIVE:    Risk of Unplanned Readmission Score: 14.18         Current admission status: Inpatient  Referral Reason:  (discharge planning)    Preferred Pharmacy:   [id number] [place] [facility] [address] [address]  Phone: [phone] Fax: [phone]    Primary Care Provider: Anatoly Ibrahim DO    Primary Insurance: MEDICARE  Secondary Insurance: MIKONA    ASSESSMENT:    CM met with patient at the bedside,baseline information  was obtained. CM discussed the role of CM in helping the patient develop a discharge plan and assist the patient in carry out their plan. Patient lives in [address] [place] [address] with 16 steps out side building but are covered into home. Patient independent and driving at baseline    Patient asking to have [facility] back post hospital BP checks      Active Health Care Proxies    There are no active Health Care Proxies on file. Advance Directives  Does patient have a [address]?: Yes  Does patient have Advance Directives?: Yes  Advance Directives: Living will, Power of  for health care  Primary Contact: Tarun Ford         Readmission Root Cause  30 Day Readmission: No    Patient Information  Admitted from[de-identified] Home  Mental Status: Alert  During Assessment patient was accompanied by: Not accompanied during assessment  Assessment information provided by[de-identified] Patient  [place] [facility]: [address] [place] [address] do you live in?: [facility] entry access options.  Select all that apply.: Stairs  Number of steps to enter home.:  (16 out side covered)  Do the steps have railings?: Yes  Type of Current Residence: Apartment  Floor Level: 2  Upon entering residence, is there a bedroom on the main floor (no further steps)?: Yes  Upon entering residence, is there a bathroom on the main floor (no further steps)?: Yes  In the last 12 months, was there a time when you were not able to pay the mortgage or rent on time?: No  In the last 12 months, how many places have you lived?: 1  In the last 12 months, was there a time when you did not have a steady place to sleep or slept in a shelter (including now)?: No  Homeless/housing insecurity resource given?: N/A  Living Arrangements: Lives Alone  Is patient a ?: No    Activities of Daily Living Prior to Admission  Functional Status: Independent  Completes ADLs independently?: Yes  Ambulates independently?: Yes  Does patient use assisted devices?: Yes  Assisted Devices (DME) used:  Shower Chair, [address], Walker  Does patient currently own DME?: Yes  What DME does the patient currently own?: Shower Chair, Straight Cane, Walker  Does patient have a history of Outpatient Therapy (PT/OT)?: No  Does the patient have a history of Short-Term Rehab?: No  Does patient have a history of HHC?: Yes ([facility] in past)  Does patient currently have [facility]?: No         Patient Information Continued  Income Source: Pension/[employer]  Does patient have prescription coverage?: Yes  Within the past 12 months, you worried that your food would run out before you got the money to buy more.: Never true  Within the past 12 months, the food you bought just didn't last and you didn't have money to get more.: Never true  Food insecurity resource given?: N/A  Does patient receive dialysis treatments?: No  Does patient have a history of substance abuse?: No  Does patient have a history of Mental Health Diagnosis?: No         Means of Transportation  Means of Transport to Appts[de-identified] Drives Self  In the past 12 months, has lack of transportation kept you from medical appointments or from getting medications?: No  In the past 12 months, has lack of transportation kept you from meetings, work, or from getting things needed for daily living?: No  Was application for public transport provided?: N/A        DISCHARGE DETAILS:    Discharge planning discussed with[de-identified] patient     Comments - Freedom of Choice: patient asking for [address] [place] [address] to resume care on dischage SN  CM contacted family/caregiver?: No- see comments (no at this time)  Were Treatment Team discharge recommendations reviewed with patient/caregiver?: Yes  Did patient/caregiver verbalize understanding of patient care needs?: Yes  Were patient/caregiver advised of the risks associated with not following Treatment Team discharge recommendations?: Yes    Contacts  Patient Contacts: Shawnee Roper friend  Relationship to Patient[de-identified] Friend  Reason/Outcome: Discharge [address] [facility] [address]         Is the patient interested in [facility] at discharge?: Yes  [facility] requested[de-identified] [facility] Name[de-identified] [address] [place] [address] External Referral Reason (only applicable if external HHA name selected): Patient has established relationship with provider  [address] Provider[de-identified] PCP  Home Health Services Needed[de-identified] Other (comment) (SN assessment and medications management)  Homebound Criteria Met[de-identified] Uses an Assist Device (i.e. cane, walker, etc)  Supporting Clincal Findings[de-identified] Limited Endurance, Fatigues Easliy in [employer]         Treatment Team Recommendation: Home with [address]  Discharge Destination Plan[de-identified] Home with [address]           Patient asked for [facility] on discharge. [facility]    Referral placed in system.     CM to follow

## 2023-09-27 ENCOUNTER — TRANSITIONAL CARE MANAGEMENT (OUTPATIENT)
Dept: FAMILY MEDICINE CLINIC | Facility: CLINIC | Age: 83
End: 2023-09-27

## 2023-09-27 VITALS
HEART RATE: 67 BPM | RESPIRATION RATE: 18 BRPM | DIASTOLIC BLOOD PRESSURE: 53 MMHG | OXYGEN SATURATION: 91 % | HEIGHT: 67 IN | TEMPERATURE: 97.8 F | WEIGHT: 167 LBS | SYSTOLIC BLOOD PRESSURE: 106 MMHG | BODY MASS INDEX: 26.21 KG/M2

## 2023-09-27 PROCEDURE — 99239 HOSP IP/OBS DSCHRG MGMT >30: CPT | Performed by: INTERNAL MEDICINE

## 2023-09-27 RX ORDER — HYDROCHLOROTHIAZIDE 25 MG/1
25 TABLET ORAL DAILY
Qty: 30 TABLET | Refills: 0 | Status: SHIPPED | OUTPATIENT
Start: 2023-09-28

## 2023-09-27 RX ADMIN — LEVOTHYROXINE SODIUM 100 MCG: 100 TABLET ORAL at 05:41

## 2023-09-27 RX ADMIN — METOPROLOL TARTRATE 25 MG: 25 TABLET, FILM COATED ORAL at 08:25

## 2023-09-27 RX ADMIN — BUDESONIDE AND FORMOTEROL FUMARATE DIHYDRATE 2 PUFF: 160; 4.5 AEROSOL RESPIRATORY (INHALATION) at 08:26

## 2023-09-27 RX ADMIN — PANTOPRAZOLE SODIUM 40 MG: 40 TABLET, DELAYED RELEASE ORAL at 08:25

## 2023-09-27 RX ADMIN — HYDROCHLOROTHIAZIDE 25 MG: 25 TABLET ORAL at 08:25

## 2023-09-27 RX ADMIN — ALPRAZOLAM 0.5 MG: 0.5 TABLET ORAL at 00:16

## 2023-09-27 RX ADMIN — LISINOPRIL 10 MG: 10 TABLET ORAL at 08:25

## 2023-09-27 RX ADMIN — PAROXETINE 30 MG: 20 TABLET, FILM COATED ORAL at 08:25

## 2023-09-27 NOTE — ASSESSMENT & PLAN NOTE
Headache likely related to uncontrolled hypertension, blood pressure control has markedly improved after restarting hydrochlorothiazide at reduced dose of 25 mg daily    Headache resolved

## 2023-09-27 NOTE — ASSESSMENT & PLAN NOTE
Blood pressure control is markedly improved. Patient's blood pressure control improved drastically when hydrochlorothiazide was restarted, continue reduced dose of 25 mg daily. Discharged on oral regimen, Lopressor 25 mg BID, lisinopril 10 mg twice daily, hydrochlorothiazide 25 mg daily.

## 2023-09-27 NOTE — PLAN OF CARE
Problem: Potential for Falls  Goal: Patient will remain free of falls  Description: INTERVENTIONS:  - Educate patient/family on patient safety including physical limitations  - Instruct patient to call for assistance with activity   - Consult OT/PT to assist with strengthening/mobility   - Keep Call bell within reach  - Keep bed low and locked with side rails adjusted as appropriate  - Keep care items and personal belongings within reach  - Initiate and maintain comfort rounds  - Make Fall Risk Sign visible to staff  - Offer Toileting every 2 Hours, in advance of need  - Initiate/Maintain bed/chair alarm  - Obtain necessary fall risk management equipment: bed/chair alarm, nonskid socks   - Apply yellow socks and bracelet for high fall risk patients  - Consider moving patient to room near nurses station  9/27/2023 1213 by Lauren Osman LPN  Outcome: Adequate for Discharge  9/27/2023 1041 by Lauren Osman LPN  Outcome: Progressing     Problem: PAIN - ADULT  Goal: Verbalizes/displays adequate comfort level or baseline comfort level  Description: Interventions:  - Encourage patient to monitor pain and request assistance  - Assess pain using appropriate pain scale  - Administer analgesics based on type and severity of pain and evaluate response  - Implement non-pharmacological measures as appropriate and evaluate response  - Consider cultural and social influences on pain and pain management  - Notify physician/advanced practitioner if interventions unsuccessful or patient reports new pain  9/27/2023 1213 by Lauren Osman LPN  Outcome: Adequate for Discharge  9/27/2023 1041 by Lauren Osman LPN  Outcome: Progressing     Problem: INFECTION - ADULT  Goal: Absence or prevention of progression during hospitalization  Description: INTERVENTIONS:  - Assess and monitor for signs and symptoms of infection  - Monitor lab/diagnostic results  - Monitor all insertion sites, i.e. indwelling lines, tubes, and drains  - Monitor endotracheal if appropriate and nasal secretions for changes in amount and color  - [place] appropriate cooling/warming therapies per order  - Administer medications as ordered  - Instruct and encourage patient and family to use good hand hygiene technique  - Identify and instruct in appropriate isolation precautions for identified infection/condition  9/27/2023 1213 by Kaiser Tom LPN  Outcome: Adequate for Discharge  9/27/2023 1041 by Kaiser Tom LPN  Outcome: Progressing     Problem: SAFETY ADULT  Goal: Patient will remain free of falls  Description: INTERVENTIONS:  - Educate patient/family on patient safety including physical limitations  - Instruct patient to call for assistance with activity   - Consult OT/PT to assist with strengthening/mobility   - Keep Call bell within reach  - Keep bed low and locked with side rails adjusted as appropriate  - Keep care items and personal belongings within reach  - Initiate and maintain comfort rounds  - Make Fall Risk Sign visible to staff  - Offer Toileting every 2 Hours, in advance of need  - Initiate/Maintain bed/chair alarm  - Obtain necessary fall risk management equipment: bed/chair alarm, nonskid socks   - Apply yellow socks and bracelet for high fall risk patients  - Consider moving patient to room near nurses station  9/27/2023 1213 by Kaiser Tom LPN  Outcome: Adequate for Discharge  9/27/2023 1041 by Kaiser Tom LPN  Outcome: Progressing  Goal: Maintain or return to baseline ADL function  Description: INTERVENTIONS:  -  Assess patient's ability to carry out ADLs; assess patient's baseline for ADL function and identify physical deficits which impact ability to perform ADLs (bathing, care of mouth/teeth, toileting, grooming, dressing, etc.)  - Assess/evaluate cause of self-care deficits   - Assess range of motion  - Assess patient's mobility; develop plan if impaired  - Assess patient's need for assistive devices and provide as appropriate  - Encourage maximum independence but intervene and supervise when necessary  - Involve family in performance of ADLs  - Assess for home care needs following discharge   - Consider OT consult to assist with ADL evaluation and planning for discharge  - Provide patient education as appropriate  9/27/2023 1213 by Kina Dorado LPN  Outcome: Adequate for Discharge  9/27/2023 1041 by Kina Dorado LPN  Outcome: Progressing  Goal: Maintains/Returns to pre admission functional level  Description: INTERVENTIONS:  - Perform BMAT or MOVE assessment daily.   - Set and communicate daily mobility goal to care team and patient/family/caregiver. - Collaborate with rehabilitation services on mobility goals if consulted  - Perform Range of Motion 3 times a day. - Reposition patient every 3 hours.   - Dangle patient 3 times a day  - Stand patient 3 times a day  - Ambulate patient 3 times a day  - Out of bed to chair 3 times a day   - Out of bed for meals 3 times a day  - Out of bed for toileting  - Record patient progress and toleration of activity level   9/27/2023 1213 by Kina Dorado LPN  Outcome: Adequate for Discharge  9/27/2023 1041 by Kina Dorado LPN  Outcome: Progressing     Problem: DISCHARGE PLANNING  Goal: Discharge to home or other facility with appropriate resources  Description: INTERVENTIONS:  - Identify barriers to discharge w/patient and caregiver  - Arrange for needed discharge resources and transportation as appropriate  - Identify discharge learning needs (meds, wound care, etc.)  - Arrange for interpretive services to assist at discharge as needed  - Refer to Case Management Department for coordinating discharge planning if the patient needs post-hospital services based on physician/advanced practitioner order or complex needs related to functional status, cognitive ability, or social support system  9/27/2023 1213 by Kina Dorado LPN  Outcome: Adequate for Discharge  9/27/2023 1041 by Chelita Hennessy LPN  Outcome: Progressing     Problem: Knowledge Deficit  Goal: Patient/family/caregiver demonstrates understanding of disease process, treatment plan, medications, and discharge instructions  Description: Complete learning assessment and assess knowledge base.   Interventions:  - Provide teaching at level of understanding  - Provide teaching via preferred learning methods  9/27/2023 1213 by Chelita Hennessy LPN  Outcome: Adequate for Discharge  9/27/2023 1041 by Chelita Hennessy LPN  Outcome: Progressing

## 2023-09-27 NOTE — CASE MANAGEMENT
Case Management Discharge Planning Note    Patient name Maxine Bustos  Location /083-97 MRN [id number]  : 1940 Date 2023       Current Admission Date: 2023  Current Admission Diagnosis:Hypertensive urgency   Patient Active Problem List    Diagnosis Date Noted   • Hypertensive urgency 2023   • Headache 2023   • Acute non intractable tension-type headache 2023   • Chronic pain syndrome 2023   • Lumbar degenerative disc disease 2023   • Acute idiopathic gout of right foot 2023   • Mild aortic stenosis 2023   • Stage 3a chronic kidney disease ([address]) 2023   • Pulmonary emphysema ([address]) 2021   • Acute bilateral low back pain without sciatica 2021   • Depression, recurrent ([address]) 2020   • Positive colorectal cancer screening using Cologuard test 2020   • Medicare annual wellness visit, subsequent 2019   • Left corneal abrasion 2019   • Irritable bowel syndrome with both constipation and diarrhea 07/10/2019   • BMI 28.0-28.9,adult 07/10/2019   • Generalized abdominal pain 2018   • Back pain, chronic 2017   • Hearing loss 2017   • Diarrhea 2017   • Right hip pain 2017   • Stool guaiac positive 2015   • Vitamin D deficiency 2015   • Diverticulosis 2013   • Von Willebrand disease ([address]) 2013   • Other hyperlipidemia 2013   • Esophagitis, reflux 12/10/2012   • Anxiety 10/09/2012   • Hypothyroidism 2012   • Generalized osteoarthritis of multiple sites 2012   • Primary hypertension 2012      LOS (days): 2  Geometric Mean LOS (GMLOS) (days): 2.10  Days to GMLOS:0     OBJECTIVE:  Risk of Unplanned Readmission Score: 13.82         Current admission status: Inpatient   Preferred Pharmacy:   [id number] [place] - [address]  [address] [place] [address]  Phone: [phone] Fax: [phone]    Primary Care Provider: Monica Chowdhury DO    Primary Insurance: MEDICARE  Secondary Insurance: CIG[employer]    DISCHARGE DETAILS:    Discharge planning discussed with[de-identified] patient  Freedom of Choice: Yes  Comments - Freedom of Choice: referral Raphael MULLEN

## 2023-09-27 NOTE — ASSESSMENT & PLAN NOTE
Patient with increased depressive symptoms due to the recent death of her son who committed suicide. Patient advised to continue psychotherapy as an outpatient.

## 2023-09-27 NOTE — PLAN OF CARE

## 2023-09-27 NOTE — DISCHARGE SUMMARY
[address]  Discharge- Prudence Mahmood 1940, 78 y.o. female MRN: [id number]  Unit/Bed#: 155-67 Encounter: [id number]  Primary Care Provider: Lidia Manuel DO   Date and time admitted to hospital: 2023  9:39 AM    * Hypertensive urgency  Assessment & Plan  Blood pressure control is markedly improved. Patient's blood pressure control improved drastically when hydrochlorothiazide was restarted, continue reduced dose of 25 mg daily. Discharged on oral regimen, Lopressor 25 mg BID, lisinopril 10 mg twice daily, hydrochlorothiazide 25 mg daily. Headache  Assessment & Plan  Headache likely related to uncontrolled hypertension, blood pressure control has markedly improved after restarting hydrochlorothiazide at reduced dose of 25 mg daily    Headache resolved    Depression, recurrent ([address])  Assessment & Plan  Patient with increased depressive symptoms due to the recent death of her son who committed suicide. Patient advised to continue psychotherapy as an outpatient. Von Willebrand disease ([address])  Assessment & Plan  Stable disease    Hypothyroidism  Assessment & Plan  Discharged on levothyroxine 100 mcg daily        Code Status: Level 3 - DNAR and DNI    Subjective:   No pain    Objective:     Vitals:   Temp (24hrs), Av.8 °F (36.6 °C), Min:97.6 °F (36.4 °C), Max:97.9 °F (36.6 °C)    Temp:  [97.6 °F (36.4 °C)-97.9 °F (36.6 °C)] 97.8 °F (36.6 °C)  HR:  [67-75] 67  Resp:  [18] 18  BP: (106-129)/(53-82) 106/53  SpO2:  [89 %-93 %] 91 %  Body mass index is 26.15 kg/m². Input and Output Summary (last 24 hours): Intake/Output Summary (Last 24 hours) at 2023 1212  Last data filed at 2023 0827  Gross per 24 hour   Intake 480 ml   Output --   Net 480 ml       Physical Exam:   Physical Exam  Vitals and nursing note reviewed. Constitutional:       General: She is not in acute distress. Appearance: Normal appearance. She is normal weight.  She is not ill-appearing, toxic-appearing or diaphoretic. HENT:      Head: Normocephalic and atraumatic. Cardiovascular:      Rate and Rhythm: Normal rate. Pulses: Normal pulses. Pulmonary:      Effort: Pulmonary effort is normal.   Abdominal:      General: Abdomen is flat. Bowel sounds are normal. There is no distension. Palpations: Abdomen is soft. Tenderness: There is no abdominal tenderness. Musculoskeletal:      Cervical back: Normal range of motion. Skin:     General: Skin is warm and dry. Neurological:      General: No focal deficit present. Mental Status: She is alert. Psychiatric:         Mood and Affect: Mood normal.         Behavior: Behavior normal.         Thought Content:  Thought content normal.         Judgment: Judgment normal.          Additional Data:     Labs:  Results from last 7 days   Lab Units 09/26/23  0528   WBC Thousand/uL 5.68   HEMOGLOBIN g/dL 12.6   HEMATOCRIT % 38.4   PLATELETS Thousands/uL 210   NEUTROS PCT % 73   LYMPHS PCT % 20   MONOS PCT % 7   EOS PCT % 0     Results from last 7 days   Lab Units 09/26/23  0528 09/25/23  1000   SODIUM mmol/L 136 137   POTASSIUM mmol/L 4.0 3.6   CHLORIDE mmol/L 102 102   CO2 mmol/L 25 27   BUN mg/dL 22 14   CREATININE mg/dL 0.81 0.70   ANION GAP mmol/L 9 8   CALCIUM mg/dL 10.0 9.9   ALBUMIN g/dL  --  4.5   TOTAL BILIRUBIN mg/dL  --  0.51   ALK PHOS U/L  --  58   ALT U/L  --  13   AST U/L  --  18   GLUCOSE RANDOM mg/dL 134 109                       Lines/Drains:  Invasive Devices     Peripheral Intravenous Line  Duration           Peripheral IV 09/25/23 Right Antecubital 2 days                        Recent Cultures (last 7 days):         Last 24 Hours Medication List:   Current Facility-Administered Medications   Medication Dose Route Frequency Provider Last Rate   • acetaminophen  650 mg Oral Q6H PRN Sarah Payton PA-C     • ALPRAZolam  0.5 mg Oral HS PRN Gretta Montenegro DO     • budesonide-formoterol  2 puff Inhalation BID Carli Bar MD     • dicyclomine  10 mg Oral TID PRN Carli Bar MD     • hydrochlorothiazide  25 mg Oral Daily Solomon Montenegro DO     • levothyroxine  100 mcg Oral Daily Carli Bar MD     • lisinopril  10 mg Oral BID Carli Bar MD     • metoprolol tartrate  25 mg Oral Daily Carli Bar MD     • morphine injection  2 mg Intravenous Q3H PRN Solomon Montenegro DO     • pantoprazole  40 mg Oral Daily Carli Bar MD     • PARoxetine  30 mg Oral Daily Carli Bar MD          Today, Patient Was Seen By: Gurwinder Og DO    **Please Note: This note may have been constructed using a voice recognition system. **

## 2023-09-27 NOTE — CASE MANAGEMENT
Case Management Discharge Planning Note    Patient name Javad Smith  Location /831-93 MRN [id number]  : 1940 Date 2023       Current Admission Date: 2023  Current Admission Diagnosis:Hypertensive urgency   Patient Active Problem List    Diagnosis Date Noted   • Hypertensive urgency 2023   • Headache 2023   • Acute non intractable tension-type headache 2023   • Chronic pain syndrome 2023   • Lumbar degenerative disc disease 2023   • Acute idiopathic gout of right foot 2023   • Mild aortic stenosis 2023   • Stage 3a chronic kidney disease ([address]) 2023   • Pulmonary emphysema ([address]) 2021   • Acute bilateral low back pain without sciatica 2021   • Depression, recurrent ([address]) 2020   • Positive colorectal cancer screening using Cologuard test 2020   • Medicare annual wellness visit, subsequent 2019   • Left corneal abrasion 2019   • Irritable bowel syndrome with both constipation and diarrhea 07/10/2019   • BMI 28.0-28.9,adult 07/10/2019   • Generalized abdominal pain 2018   • Back pain, chronic 2017   • Hearing loss 2017   • Diarrhea 2017   • Right hip pain 2017   • Stool guaiac positive 2015   • Vitamin D deficiency 2015   • Diverticulosis 2013   • Von Willebrand disease ([address]) 2013   • Other hyperlipidemia 2013   • Esophagitis, reflux 12/10/2012   • Anxiety 10/09/2012   • Hypothyroidism 2012   • Generalized osteoarthritis of multiple sites 2012   • Primary hypertension 2012      LOS (days): 2  Geometric Mean LOS (GMLOS) (days): 2.10  Days to GMLOS:0     OBJECTIVE:  Risk of Unplanned Readmission Score: 13.98         Current admission status: Inpatient   Preferred Pharmacy:   [id number]  [place] - [address]  [address] [place] [address]  Phone: [phone] Fax: [phone]    Primary Care Provider: Nena Zhang DO    Primary Insurance: MEDICARE  Secondary Insurance: MIKONA    DISCHARGE DETAILS:  Pt was dc'd to home with OP follow up and Raphael [place]STONA to follow. AVS faxed over to [facility] on this date. SOC will be on Monday Oct. 2nd.

## 2023-09-28 DIAGNOSIS — Z71.89 COMPLEX CARE COORDINATION: Primary | ICD-10-CM

## 2023-09-29 ENCOUNTER — PATIENT OUTREACH (OUTPATIENT)
Dept: FAMILY MEDICINE CLINIC | Facility: CLINIC | Age: 83
End: 2023-09-29

## 2023-09-29 NOTE — PROGRESS NOTES
Contacted patient for f/u post hospitalization for hypertension. Patient lives alone but has supportive family and friends who assist as needed. She will be receiving home health services of . She will be going for initial eval for outpatient PT on Tuesday. She relays she is having back pain and finding it difficult to walk. She uses walker and cane to assist with ambulation. She does not monitor BP at home but is anxious to see what her reading is when SN visits today. He continues to have a headache. She takes tylenol for pain to back and has used heating pad. Nutritional intake adequate. We reviewed medications and she is taking all as prescribed. Follow up appointments scheduled. No transportation issues noted. She denies the need for additional assistance at home. Explained the complex care management program and she is willing to participate. Arranged to contact her back in a few weeks for follow up.

## 2023-10-01 ENCOUNTER — APPOINTMENT (EMERGENCY)
Dept: RADIOLOGY | Facility: HOSPITAL | Age: 83
End: 2023-10-01
Payer: MEDICARE

## 2023-10-01 ENCOUNTER — APPOINTMENT (EMERGENCY)
Dept: CT IMAGING | Facility: HOSPITAL | Age: 83
End: 2023-10-01
Payer: MEDICARE

## 2023-10-01 ENCOUNTER — HOSPITAL ENCOUNTER (EMERGENCY)
Facility: HOSPITAL | Age: 83
Discharge: HOME | End: 2023-10-01
Attending: EMERGENCY MEDICINE
Payer: MEDICARE

## 2023-10-01 ENCOUNTER — DOCUMENTATION (OUTPATIENT)
Dept: EMERGENCY DEPT | Facility: HOSPITAL | Age: 83
End: 2023-10-01

## 2023-10-01 VITALS
RESPIRATION RATE: 16 BRPM | HEART RATE: 72 BPM | OXYGEN SATURATION: 95 % | DIASTOLIC BLOOD PRESSURE: 65 MMHG | TEMPERATURE: 97.5 F | SYSTOLIC BLOOD PRESSURE: 140 MMHG

## 2023-10-01 DIAGNOSIS — R51.9 HEADACHE: ICD-10-CM

## 2023-10-01 DIAGNOSIS — I10 HYPERTENSION: Primary | ICD-10-CM

## 2023-10-01 LAB
ALBUMIN SERPL BCP-MCNC: 4.2 G/DL (ref 3.5–5)
ALP SERPL-CCNC: 60 U/L (ref 34–104)
ALT SERPL W P-5'-P-CCNC: 18 U/L (ref 7–52)
ANION GAP SERPL CALCULATED.3IONS-SCNC: 8 MMOL/L
AST SERPL W P-5'-P-CCNC: 17 U/L (ref 13–39)
BASOPHILS # BLD AUTO: 0.03 THOUSANDS/ÂΜL (ref 0–0.1)
BASOPHILS NFR BLD AUTO: 1 % (ref 0–1)
BILIRUB SERPL-MCNC: 0.47 MG/DL (ref 0.2–1)
BUN SERPL-MCNC: 48 MG/DL (ref 5–25)
CALCIUM SERPL-MCNC: 10 MG/DL (ref 8.4–10.2)
CARDIAC TROPONIN I PNL SERPL HS: <2 NG/L
CARDIAC TROPONIN I PNL SERPL HS: <2 NG/L
CHLORIDE SERPL-SCNC: 101 MMOL/L (ref 96–108)
CO2 SERPL-SCNC: 25 MMOL/L (ref 21–32)
CREAT SERPL-MCNC: 1.22 MG/DL (ref 0.6–1.3)
EOSINOPHIL # BLD AUTO: 0.2 THOUSAND/ÂΜL (ref 0–0.61)
EOSINOPHIL NFR BLD AUTO: 4 % (ref 0–6)
ERYTHROCYTE [DISTWIDTH] IN BLOOD BY AUTOMATED COUNT: 13.9 % (ref 11.6–15.1)
GFR SERPL CREATININE-BSD FRML MDRD: 40 ML/MIN/1.73SQ M
GLUCOSE SERPL-MCNC: 141 MG/DL (ref 65–140)
HCT VFR BLD AUTO: 44.1 % (ref 34.8–46.1)
HGB BLD-MCNC: 14 G/DL (ref 11.5–15.4)
IMM GRANULOCYTES # BLD AUTO: 0.02 THOUSAND/UL (ref 0–0.2)
IMM GRANULOCYTES NFR BLD AUTO: 0 % (ref 0–2)
LIPASE SERPL-CCNC: 17 U/L (ref 11–82)
LYMPHOCYTES # BLD AUTO: 1.39 THOUSANDS/ÂΜL (ref 0.6–4.47)
LYMPHOCYTES NFR BLD AUTO: 26 % (ref 14–44)
MCH RBC QN AUTO: 31.9 PG (ref 26.8–34.3)
MCHC RBC AUTO-ENTMCNC: 31.7 G/DL (ref 31.4–37.4)
MCV RBC AUTO: 101 FL (ref 82–98)
MONOCYTES # BLD AUTO: 0.52 THOUSAND/ÂΜL (ref 0.17–1.22)
MONOCYTES NFR BLD AUTO: 10 % (ref 4–12)
NEUTROPHILS # BLD AUTO: 3.19 THOUSANDS/ÂΜL (ref 1.85–7.62)
NEUTS SEG NFR BLD AUTO: 59 % (ref 43–75)
NRBC BLD AUTO-RTO: 0 /100 WBCS
PLATELET # BLD AUTO: 220 THOUSANDS/UL (ref 149–390)
PMV BLD AUTO: 10 FL (ref 8.9–12.7)
POTASSIUM SERPL-SCNC: 4.3 MMOL/L (ref 3.5–5.3)
PROT SERPL-MCNC: 7.3 G/DL (ref 6.4–8.4)
RBC # BLD AUTO: 4.39 MILLION/UL (ref 3.81–5.12)
SODIUM SERPL-SCNC: 134 MMOL/L (ref 135–147)
TSH SERPL DL<=0.05 MIU/L-ACNC: 0.54 UIU/ML (ref 0.45–4.5)
WBC # BLD AUTO: 5.35 THOUSAND/UL (ref 4.31–10.16)

## 2023-10-01 PROCEDURE — 99284 EMERGENCY DEPT VISIT MOD MDM: CPT

## 2023-10-01 PROCEDURE — 85025 COMPLETE CBC W/AUTO DIFF WBC: CPT | Performed by: EMERGENCY MEDICINE

## 2023-10-01 PROCEDURE — 84484 ASSAY OF TROPONIN QUANT: CPT | Performed by: EMERGENCY MEDICINE

## 2023-10-01 PROCEDURE — 70450 CT HEAD/BRAIN W/O DYE: CPT

## 2023-10-01 PROCEDURE — 96361 HYDRATE IV INFUSION ADD-ON: CPT

## 2023-10-01 PROCEDURE — 83690 ASSAY OF LIPASE: CPT | Performed by: EMERGENCY MEDICINE

## 2023-10-01 PROCEDURE — 96375 TX/PRO/DX INJ NEW DRUG ADDON: CPT

## 2023-10-01 PROCEDURE — 96365 THER/PROPH/DIAG IV INF INIT: CPT

## 2023-10-01 PROCEDURE — 71045 X-RAY EXAM CHEST 1 VIEW: CPT

## 2023-10-01 PROCEDURE — 99285 EMERGENCY DEPT VISIT HI MDM: CPT | Performed by: EMERGENCY MEDICINE

## 2023-10-01 PROCEDURE — 80053 COMPREHEN METABOLIC PANEL: CPT | Performed by: EMERGENCY MEDICINE

## 2023-10-01 PROCEDURE — 36415 COLL VENOUS BLD VENIPUNCTURE: CPT | Performed by: EMERGENCY MEDICINE

## 2023-10-01 PROCEDURE — 84443 ASSAY THYROID STIM HORMONE: CPT | Performed by: EMERGENCY MEDICINE

## 2023-10-01 PROCEDURE — G1004 CDSM NDSC: HCPCS

## 2023-10-01 PROCEDURE — 93005 ELECTROCARDIOGRAM TRACING: CPT

## 2023-10-01 RX ORDER — ACETAMINOPHEN 325 MG/1
975 TABLET ORAL ONCE
Status: DISCONTINUED | OUTPATIENT
Start: 2023-10-01 | End: 2023-10-01 | Stop reason: HOSPADM

## 2023-10-01 RX ORDER — METOCLOPRAMIDE HYDROCHLORIDE 5 MG/ML
10 INJECTION INTRAMUSCULAR; INTRAVENOUS ONCE
Status: COMPLETED | OUTPATIENT
Start: 2023-10-01 | End: 2023-10-01

## 2023-10-01 RX ORDER — MAGNESIUM SULFATE HEPTAHYDRATE 40 MG/ML
2 INJECTION, SOLUTION INTRAVENOUS ONCE
Status: COMPLETED | OUTPATIENT
Start: 2023-10-01 | End: 2023-10-01

## 2023-10-01 RX ORDER — ALPRAZOLAM 0.25 MG/1
0.25 TABLET ORAL ONCE
Status: COMPLETED | OUTPATIENT
Start: 2023-10-01 | End: 2023-10-01

## 2023-10-01 RX ADMIN — METOCLOPRAMIDE 10 MG: 5 INJECTION, SOLUTION INTRAMUSCULAR; INTRAVENOUS at 07:30

## 2023-10-01 RX ADMIN — ALPRAZOLAM 0.25 MG: 0.25 TABLET ORAL at 08:22

## 2023-10-01 RX ADMIN — SODIUM CHLORIDE 500 ML: 9 INJECTION, SOLUTION INTRAVENOUS at 07:30

## 2023-10-01 RX ADMIN — MAGNESIUM SULFATE HEPTAHYDRATE 2 G: 40 INJECTION, SOLUTION INTRAVENOUS at 08:15

## 2023-10-01 NOTE — DISCHARGE INSTRUCTIONS
Please call neurology to make a follow-up appointment. Please continue to stay hydrated, please take Tylenol every 6 hours as needed for headache. You may try magnesium sulfate 400 mg over-the-counter for headaches.

## 2023-10-01 NOTE — ED PROVIDER NOTES
History  Chief Complaint   Patient presents with   • Hypertension     Pt reports persistent hypertension with headache beginning last night. Pt also reports nausea. Denies any dizziness, cp, or sob      78year old F with a PMHx of hypertension, recent admission for headache, who presents for headache that began at around 12 AM.  Patient states that this was sudden onset around that time. It feels similar to her prior headache. She states that involves the entire forehead, and radiates down to the right side of her neck. She states that she checked her blood pressure was in the 170s. She also described having nausea initially although this has resolved. She denies any numbness, weakness, tingling. No blurry vision. No chest pain or shortness of breath. She has no other complaints other than the headache. She denies any fevers or chills. Review of systems otherwise negative          Prior to Admission Medications   Prescriptions Last Dose Informant Patient Reported? Taking? ALPRAZolam (XANAX) 0.25 mg tablet   No No   Sig: Take 1 tablet (0.25 mg total) by mouth 3 (three) times a day as needed for anxiety   PARoxetine (PAXIL) 30 mg tablet   No No   Sig: TAKE 1 TABLET BY MOUTH EVERY DAY   REFRESH TEARS 0.5 % SOLN  Self Yes No   Sig: INSTILL 1 DROP INTO EACH EYE THREE TIMES DAILY AS NEEDED FOR DRY EYES   acetaminophen (TYLENOL) 325 mg tablet  Self No No   Sig: Take 2 tablets (650 mg total) by mouth every 6 (six) hours as needed (pain, fever)   budesonide-formoterol (SYMBICORT) 160-4.5 mcg/act inhaler   No No   Sig: Inhale 2 puffs 2 (two) times a day Rinse mouth after use.    dicyclomine (BENTYL) 10 mg capsule   No No   Sig: TAKE 1 CAPSULE (10 MG TOTAL) BY MOUTH 3 (THREE) TIMES A DAY AS NEEDED (USE ONLY AS NEEDED)   hydrochlorothiazide (HYDRODIURIL) 25 mg tablet   No No   Sig: Take 1 tablet (25 mg total) by mouth daily Do not start before September 28, 2023.   hyoscyamine (ANASPAZ,LEVSIN) 0.125 MG tablet  Self No No   Sig: Take 1 tablet (0.125 mg total) by mouth every 6 (six) hours as needed for cramping   levothyroxine 100 mcg tablet   No No   Sig: TAKE 1 TABLET BY MOUTH EVERY DAY   lisinopril (ZESTRIL) 10 mg tablet   No No   Sig: TAKE 1 TABLET BY MOUTH TWICE A DAY   meclizine (ANTIVERT) 12.5 MG tablet   No No   Sig: Take 1 tablet (12.5 mg total) by mouth every 8 (eight) hours as needed for dizziness   metoprolol tartrate (LOPRESSOR) 25 mg tablet   No No   Sig: TAKE 1 TABLET BY MOUTH TWICE A DAY   pantoprazole (PROTONIX) 40 mg tablet   No No   Sig: TAKE 1 TABLET BY MOUTH EVERY DAY      Facility-Administered Medications: None       Past Medical History:   Diagnosis Date   • Contact dermatitis     Last assessed - 8/8/12   • COPD (chronic obstructive pulmonary disease) (HCC)    • Disease of thyroid gland    • GERD (gastroesophageal reflux disease)    • Hyperlipidemia    • Hypertension    • Irritable bowel syndrome    • Von Willebrand disease ([address])        Past Surgical History:   Procedure Laterality Date   • APPENDECTOMY     • HYSTERECTOMY      Date unk        Family History   Problem Relation Age of Onset   • Clotting disorder Mother         Def of clotting factor    • Aneurysm Father      I have reviewed and agree with the history as documented. E-Cigarette/Vaping   • E-Cigarette Use Never User      E-Cigarette/Vaping Substances   • Nicotine No    • THC No    • CBD No    • Flavoring No    • Other No    • Unknown No      Social History     Tobacco Use   • Smoking status: Never   • Smokeless tobacco: Never   Vaping Use   • Vaping Use: Never used   Substance Use Topics   • Alcohol use: Never   • Drug use: No       Review of Systems   Constitutional: Negative for chills and fever. HENT: Negative for congestion, rhinorrhea and sore throat. Respiratory: Negative for cough and shortness of breath. Cardiovascular: Negative for chest pain and palpitations. Gastrointestinal: Positive for nausea.  Negative for abdominal pain, constipation, diarrhea and vomiting. Genitourinary: Negative for difficulty urinating and flank pain. Musculoskeletal: Negative for arthralgias. Neurological: Positive for headaches. Negative for dizziness, weakness and light-headedness. Psychiatric/Behavioral: Negative for agitation, behavioral problems and confusion. All other systems reviewed and are negative. Physical Exam  Physical Exam  Constitutional:       Appearance: She is well-developed. HENT:      Head: Normocephalic and atraumatic. Cardiovascular:      Rate and Rhythm: Normal rate and regular rhythm. Heart sounds: Normal heart sounds. No murmur heard. No friction rub. Pulmonary:      Effort: Pulmonary effort is normal. No respiratory distress. Breath sounds: Normal breath sounds. No wheezing or rales. Abdominal:      General: Bowel sounds are normal. There is no distension. Palpations: Abdomen is soft. Tenderness: There is no abdominal tenderness. Musculoskeletal:         General: Normal range of motion. Cervical back: Normal range of motion and neck supple. Skin:     General: Skin is warm. Neurological:      Mental Status: She is alert and oriented to person, place, and time. Coordination: Coordination normal.      Comments: Patient AAOx3. CN II-XII intact. Normal CFTs. 5/5 strength in all extremities. Normal sensation in all extremities. Psychiatric:         Behavior: Behavior normal.         Thought Content:  Thought content normal.         Judgment: Judgment normal.         Vital Signs  ED Triage Vitals   Temperature Pulse Respirations Blood Pressure SpO2   10/01/23 0730 10/01/23 0642 10/01/23 0642 10/01/23 0645 10/01/23 0642   97.5 °F (36.4 °C) 63 (!) 32 150/75 95 %      Temp Source Heart Rate Source Patient Position - Orthostatic VS BP Location FiO2 (%)   10/01/23 0730 10/01/23 0645 10/01/23 0645 10/01/23 0645 --   Temporal Monitor Lying Left arm       Pain Score       -- Vitals:    10/01/23 0645 10/01/23 0700 10/01/23 0730 10/01/23 0909   BP: 150/75 155/73 158/72 140/65   Pulse: 62 64 69 72   Patient Position - Orthostatic VS: Lying  Lying Sitting         Visual Acuity      ED Medications  Medications   metoclopramide (REGLAN) injection 10 mg (10 mg Intravenous Given 10/1/23 0730)   sodium chloride 0.9 % bolus 500 mL (0 mL Intravenous Stopped 10/1/23 0908)   magnesium sulfate 2 g/50 mL IVPB (premix) 2 g (0 g Intravenous Stopped 10/1/23 0908)   ALPRAZolam (XANAX) tablet 0.25 mg (0.25 mg Oral Given 10/1/23 0822)       Diagnostic Studies  Results Reviewed     Procedure Component Value Units Date/Time    HS Troponin I 2hr [[id number]] Collected: 10/01/23 0828    Lab Status: Final result Specimen: Blood from Arm, Right Updated: 10/01/23 0854     hs TnI 2hr <2 ng/L      Delta 2hr hsTnI --    TSH [[id number]]  (Normal) Collected: 10/01/23 0639    Lab Status: Final result Specimen: Blood from Arm, Right Updated: 10/01/23 0715     TSH 3RD GENERATON 0.540 uIU/mL     HS Troponin 0hr (reflex protocol) [[id number]]  (Normal) Collected: 10/01/23 0639    Lab Status: Final result Specimen: Blood from Arm, Right Updated: 10/01/23 0708     hs TnI 0hr <2 ng/L     Comprehensive metabolic panel [[id number]]  (Abnormal) Collected: 10/01/23 0639    Lab Status: Final result Specimen: Blood from Arm, Right Updated: 10/01/23 0703     Sodium 134 mmol/L      Potassium 4.3 mmol/L      Chloride 101 mmol/L      CO2 25 mmol/L      ANION GAP 8 mmol/L      BUN 48 mg/dL      Creatinine 1.22 mg/dL      Glucose 141 mg/dL      Calcium 10.0 mg/dL      AST 17 U/L      ALT 18 U/L      Alkaline Phosphatase 60 U/L      Total Protein 7.3 g/dL      Albumin 4.2 g/dL      Total Bilirubin 0.47 mg/dL      eGFR 40 ml/min/1.73sq m     Narrative:      [place] guidelines for Chronic Kidney Disease (CKD):   •  Stage 1 with normal or high GFR (GFR > 90 mL/min/1.73 square meters)  •  Stage 2 Mild CKD (GFR = 60-89 mL/min/1.73 square meters)  •  Stage 3A Moderate CKD (GFR = 45-59 mL/min/1.73 square meters)  •  Stage 3B Moderate CKD (GFR = 30-44 mL/min/1.73 square meters)  •  Stage 4 Severe CKD (GFR = 15-29 mL/min/1.73 square meters)  •  Stage 5 End Stage CKD (GFR <15 mL/min/1.73 square meters)  Note: GFR calculation is accurate only with a steady state creatinine    Lipase [[id number]]  (Normal) Collected: 10/01/23 0639    Lab Status: Final result Specimen: Blood from Arm, Right Updated: 10/01/23 0703     Lipase 17 u/L     CBC and differential [[id number]]  (Abnormal) Collected: 10/01/23 0639    Lab Status: Final result Specimen: Blood from Arm, Right Updated: 10/01/23 0646     WBC 5.35 Thousand/uL      RBC 4.39 Million/uL      Hemoglobin 14.0 g/dL      Hematocrit 44.1 %       fL      MCH 31.9 pg      MCHC 31.7 g/dL      RDW 13.9 %      MPV 10.0 fL      Platelets 797 Thousands/uL      nRBC 0 /100 WBCs      Neutrophils Relative 59 %      Immat GRANS % 0 %      Lymphocytes Relative 26 %      Monocytes Relative 10 %      Eosinophils Relative 4 %      Basophils Relative 1 %      Neutrophils Absolute 3.19 Thousands/µL      Immature Grans Absolute 0.02 Thousand/uL      Lymphocytes Absolute 1.39 Thousands/µL      Monocytes Absolute 0.52 Thousand/µL      Eosinophils Absolute 0.20 Thousand/µL      Basophils Absolute 0.03 Thousands/µL                  CT head without contrast   Final Result by Arcadio Gardner MD (10/01 0827)      Mild periventricular and white matter hypodensity seen related to chronic small vessel ischemic change   No extra-axial collection   No mass effect                  Workstation performed: [id number]         XR chest 1 view portable   Final Result by Mitesh Lynch MD (10/02 0428)      No acute cardiopulmonary disease.                   Workstation performed: [id number]                    Procedures  Procedures         ED Course                               SBIRT 20yo+    Flowsheet Row Most Recent Value   Initial Alcohol Screen: US AUDIT-C     1. How often do you have a drink containing alcohol? 0 Filed at: 10/01/2023 0614   2. How many drinks containing alcohol do you have on a typical day you are drinking? 0 Filed at: 10/01/2023 0614   3a. Male UNDER 65: How often do you have five or more drinks on one occasion? 0 Filed at: 10/01/2023 0614   3b. FEMALE Any Age, or MALE 65+: How often do you have 4 or more drinks on one occassion? 0 Filed at: 10/01/2023 4473   Audit-C Score 0 Filed at: 10/01/2023 7504   [place]UNCAN: How many times in the past year have you. .. Used an illegal drug or used a prescription medication for non-medical reasons? Never Filed at: 10/01/2023 0346                    Medical Decision Making  I reviewed the patient's medical chart, PMHx, prior encounters, medications. I reviewed patient's recent admission for hypertension, headache. My independent interpretation of ECG demonstrated: No acute ischemic changes  My independent interpretation of CXR demonstrated: No acute cardiopulmonary disease    My DDx includes: Given sudden onset headache, I cannot rule out [place], will perform CTH. Patient had no aneurysms on recent CTA. Tension headache. Hypertension-induced headache. Patient's blood pressure is not significantly elevated or I would suspect that be the cause of her symptoms. She has no evidence of encephalopathy. Given that patient does report some onset headache, will repeat CT head although have lesser suspicion for subarachnoid hemorrhage at this time given no aneurysm on prior MRA. We will treat, reassess symptoms. Patient signed out to Dr. Mohan Gregory pending labs, repeat assessment of pain. Will treat patient's symptoms, repeat CTH given headache came on suddenly, cardiac workup, re-assess. Amount and/or Complexity of Data Reviewed  Labs: ordered. Radiology: ordered. Risk  OTC drugs. Prescription drug management.           Disposition  Final diagnoses: Hypertension   Headache     Time reflects when diagnosis was documented in both MDM as applicable and the Disposition within this note     Time User Action Codes Description Comment    10/1/2023  9:03 AM Gela Bee Add [I10] Hypertension     10/1/2023  9:03 AM Gela Bee Add [R51.9] Headache       ED Disposition     ED Disposition   Discharge    Condition   Stable    Date/Time   Sun Oct 1, 2023  9:03 AM    Comment   Yaima Gearing discharge to home/self care.                Follow-up Information     Follow up With Specialties Details Why Contact Info Additional [address], DO Internal Medicine, Hospice Services   [address] [facility] [address]  [address] [facility]   [address]  [address] [address]  [facility], [address] [facility] [place] [place], [address]          Discharge Medication List as of 10/1/2023  9:04 AM      CONTINUE these medications which have NOT CHANGED    Details   acetaminophen (TYLENOL) 325 mg tablet Take 2 tablets (650 mg total) by mouth every 6 (six) hours as needed (pain, fever), Starting Fri 5/15/2020, Normal      ALPRAZolam (XANAX) 0.25 mg tablet Take 1 tablet (0.25 mg total) by mouth 3 (three) times a day as needed for anxiety, Starting Tue 9/19/2023, Normal      budesonide-formoterol (SYMBICORT) 160-4.5 mcg/act inhaler Inhale 2 puffs 2 (two) times a day Rinse mouth after use., Starting Fri 12/16/2022, Normal      dicyclomine (BENTYL) 10 mg capsule TAKE 1 CAPSULE (10 MG TOTAL) BY MOUTH 3 (THREE) TIMES A DAY AS NEEDED (USE ONLY AS NEEDED), Starting Fri 2/24/2023, Until Mon 9/25/2023 at 2359, Normal      hydrochlorothiazide (HYDRODIURIL) 25 mg tablet Take 1 tablet (25 mg total) by mouth daily Do not start before September 28, 2023., Starting Thu 9/28/2023, Normal      hyoscyamine (ANASPAZ,LEVSIN) 0.125 MG tablet Take 1 tablet (0.125 mg total) by mouth every 6 (six) hours as needed for cramping, Starting Fri 5/22/2020, Normal      levothyroxine 100 mcg tablet TAKE 1 TABLET BY MOUTH EVERY DAY, Normal      lisinopril (ZESTRIL) 10 mg tablet TAKE 1 TABLET BY MOUTH TWICE A DAY, Normal      metoprolol tartrate (LOPRESSOR) 25 mg tablet TAKE 1 TABLET BY MOUTH TWICE A DAY, Normal      pantoprazole (PROTONIX) 40 mg tablet TAKE 1 TABLET BY MOUTH EVERY DAY, Normal      PARoxetine (PAXIL) 30 mg tablet TAKE 1 TABLET BY MOUTH EVERY DAY, Normal      meclizine (ANTIVERT) 12.5 MG tablet Take 1 tablet (12.5 mg total) by mouth every 8 (eight) hours as needed for dizziness, Starting Tue 1/18/2022, Normal      REFRESH TEARS 0.5 % SOLN INSTILL 1 DROP INTO EACH EYE THREE TIMES DAILY AS NEEDED FOR DRY EYES, Historical Med                 PDMP Review       Value Time User    PDMP Reviewed  Yes 9/19/2023 11:30 AM Alina Solorzano DO          ED Provider  Electronically Signed by           Jabari Hanley MD  10/03/23 6953

## 2023-10-01 NOTE — ED NOTES
Pt cousin Flor Burger called for a ride home. Family will be over to pick her up. Pt made aware.        Surinder Mueller RN  10/01/23 7559

## 2023-10-02 LAB
ATRIAL RATE: 63 BPM
P AXIS: 57 DEGREES
PR INTERVAL: 202 MS
QRS AXIS: -5 DEGREES
QRSD INTERVAL: 72 MS
QT INTERVAL: 404 MS
QTC INTERVAL: 413 MS
T WAVE AXIS: 56 DEGREES
VENTRICULAR RATE: 63 BPM

## 2023-10-02 PROCEDURE — 93010 ELECTROCARDIOGRAM REPORT: CPT | Performed by: INTERNAL MEDICINE

## 2023-10-03 ENCOUNTER — TELEPHONE (OUTPATIENT)
Dept: FAMILY MEDICINE CLINIC | Facility: CLINIC | Age: 83
End: 2023-10-03

## 2023-10-03 ENCOUNTER — OFFICE VISIT (OUTPATIENT)
Dept: PHYSICAL THERAPY | Facility: HOME HEALTHCARE | Age: 83
End: 2023-10-03
Payer: MEDICARE

## 2023-10-03 DIAGNOSIS — M54.50 CHRONIC BILATERAL LOW BACK PAIN WITHOUT SCIATICA: Primary | ICD-10-CM

## 2023-10-03 DIAGNOSIS — G89.29 CHRONIC BILATERAL LOW BACK PAIN WITHOUT SCIATICA: Primary | ICD-10-CM

## 2023-10-03 DIAGNOSIS — G89.4 CHRONIC PAIN SYNDROME: ICD-10-CM

## 2023-10-03 PROCEDURE — 97110 THERAPEUTIC EXERCISES: CPT

## 2023-10-03 PROCEDURE — 97014 ELECTRIC STIMULATION THERAPY: CPT

## 2023-10-03 PROCEDURE — 97140 MANUAL THERAPY 1/> REGIONS: CPT

## 2023-10-03 NOTE — PROGRESS NOTES
Daily Note     Today's date: 10/3/2023  Patient name: Isrrael Andrew  : 1940  MRN: [id number]  Referring provider: Ni Chauhan MD  Dx:   Encounter Diagnosis     ICD-10-CM    1. Chronic bilateral low back pain without sciatica  M54.50     G89.29       2. Chronic pain syndrome  G89.4                      Subjective: Pt reports she has pain in her back and R hip and is tired and sore today. Pt reports she was recently in the hospital for BP. Objective: See treatment diary below      Assessment: Tolerated treatment fair. Verbal cues needed to perform exercises correctly. Pt reports soreness t/o exercise. Fatigue evident with exercise. Pt ambulates with antalgic gait. Offered to take BP, Pt declined. Pt with good tolerance to Estim IFC with MHP. Patient would benefit from continued PT      Plan: Continue per plan of care.       Re-eval Date: 10/21/23    Precautions: None       Manuals  10/3      METS for LLD  HZ  (- LLD)      R HS stretch  HZ  JK R HS/Calf                      Neuro Re-Ed         PPT        PPT with marches         TA        TA + OH lifts        MTP/LTP        Palloff press                 Neuromuscular education  HZ       Ther Ex        NuStep   L1 6'      Standing hip flex/abd/ext  Flex and abd  1x10 ea Tyrell      Squats         Step-ups         LTR  5"x10      SKTC        SLR        S/L SLR        Clamshells         Heel walk outs         Bridges                                 Ther Activity                        Gait Training                        Modalities        IFC/MHP to R L/S  10 minutes  10 minutes seated

## 2023-10-05 ENCOUNTER — OFFICE VISIT (OUTPATIENT)
Dept: PHYSICAL THERAPY | Facility: HOME HEALTHCARE | Age: 83
End: 2023-10-05
Payer: MEDICARE

## 2023-10-05 ENCOUNTER — TELEPHONE (OUTPATIENT)
Dept: FAMILY MEDICINE CLINIC | Facility: CLINIC | Age: 83
End: 2023-10-05

## 2023-10-05 ENCOUNTER — VBI (OUTPATIENT)
Dept: FAMILY MEDICINE CLINIC | Facility: CLINIC | Age: 83
End: 2023-10-05

## 2023-10-05 DIAGNOSIS — M54.50 CHRONIC BILATERAL LOW BACK PAIN WITHOUT SCIATICA: Primary | ICD-10-CM

## 2023-10-05 DIAGNOSIS — G89.29 CHRONIC BILATERAL LOW BACK PAIN WITHOUT SCIATICA: Primary | ICD-10-CM

## 2023-10-05 DIAGNOSIS — G89.4 CHRONIC PAIN SYNDROME: ICD-10-CM

## 2023-10-05 PROCEDURE — 97110 THERAPEUTIC EXERCISES: CPT | Performed by: PHYSICAL THERAPIST

## 2023-10-05 PROCEDURE — 97140 MANUAL THERAPY 1/> REGIONS: CPT | Performed by: PHYSICAL THERAPIST

## 2023-10-05 PROCEDURE — 97014 ELECTRIC STIMULATION THERAPY: CPT | Performed by: PHYSICAL THERAPIST

## 2023-10-05 NOTE — TELEPHONE ENCOUNTER
10/05/23 12:37 PM    Patient contacted post ED visit, first outreach attempt made. Message was left for patient to return a call to the VBI Department at [facility]: Phone [phone]. Thank you.   Giana Zhang  [facility] VALUE BASED VIR

## 2023-10-05 NOTE — TELEPHONE ENCOUNTER
PT STATES THAT SHE WAS LM TO R/S AN APPT / SHE STATES THAT SHE IS NOT FEELING WELL AND IS GOING TO WAIT TO R/S. SHE WILL C/B WHEN SHE IS FEELING BETTER.

## 2023-10-05 NOTE — LETTER
[facility]  [address] [id number]    Date: 10/10/23  [address] [place],[address] [phone]    Dear Chanel Casarez:                                                                                                                              Thank you for choosing [facility] emergency department for care. Your primary care provider wants to make sure that your ongoing medical care is being addressed. If you require follow up care as a result of your emergency department visit, there are a few things the practice would like you to know. As part of the network's continuing commitment to caring for our patients, we have added more same day appointments and have extended office hours to meet your medical needs. After hours, on-call physicians are available via your primary care provider's main office line. We encourage you to contact our office prior to seeking treatment to discuss your symptoms with the medical staff. Together, we can determine the correct course of action. A majority of non-emergent conditions such as: common cold, flu-like symptoms, fevers, strains/sprains, dislocations, minor burns, cuts and animal bites can be treated at [facility] facilities. Diagnostic testing is available at some sites. Of course, if you are experiencing a life threatening medical emergency call 911 or proceed directly to the nearest emergency room.     Your nearest [facility] facility is conveniently located at:    [facility]  [address] [facility] [address]  [phone]  [address] offered at most [facility] locations  [facility] your spot online at [email or web address] or on the [address] [place] [address] Drive    Sincerely,    [address] [facility] CARE  Dept: [phone]

## 2023-10-05 NOTE — PROGRESS NOTES
Daily Note     Today's date: 10/5/2023  Patient name: Hoda Mauricio  : 1940  MRN: [id number]  Referring provider: Samson Merlin, MD  Dx:   Encounter Diagnosis     ICD-10-CM    1. Chronic bilateral low back pain without sciatica  M54.50     G89.29       2. Chronic pain syndrome  G89.4                      Subjective: Pt notes that she hardly slept last night so she is really tired. Objective: See treatment diary below      Assessment: Focus on manual stretching to R HS and piriformis to address sciatic pain; pt with some relief to follow. No c/o pain with SLR or PPT added today but will assess response next week. IFC/MHP to decreased mm tightness and help with pain management. Plan: Continue per plan of care.       Re-eval Date: 10/21/23    Precautions: None       Manuals 9/21 10/3 10/5     METS for LLD  HZ  (- LLD) (-) LLD      R HS stretch  HZ  JK R HS/Calf HZ R HS/Piriformis                      Neuro Re-Ed         PPT   5" x 15      PPT with michelle         TA        TA + OH lifts        MTP/LTP        Palloff press                 Neuromuscular education  HZ       Ther Ex        NuStep   L1 6'      Standing hip flex/abd/ext  Flex and abd  1x10 ea Darlin      Squats         Step-ups         LTR  5"x10      SKTC   10" x 5 darlin      SLR   X 10 darlin      S/L SLR        Clamshells         Heel walk outs         Bridges                                 Ther Activity                        Gait Training                        Modalities        IFC/MHP to R L/S  10 minutes  10 minutes seated  10 min seated

## 2023-10-06 NOTE — TELEPHONE ENCOUNTER
10/06/23 1:42 PM    Patient contacted post ED visit, second outreach attempt made. Message was left for patient to return a call to the VBI Department at [facility]: Phone [phone]. Thank you.   Giana Zhang  [facility] VAL[facility] BASED VIR

## 2023-10-10 ENCOUNTER — OFFICE VISIT (OUTPATIENT)
Dept: PHYSICAL THERAPY | Facility: HOME HEALTHCARE | Age: 83
End: 2023-10-10
Payer: MEDICARE

## 2023-10-10 DIAGNOSIS — G89.29 CHRONIC BILATERAL LOW BACK PAIN WITHOUT SCIATICA: Primary | ICD-10-CM

## 2023-10-10 DIAGNOSIS — M54.50 CHRONIC BILATERAL LOW BACK PAIN WITHOUT SCIATICA: Primary | ICD-10-CM

## 2023-10-10 DIAGNOSIS — G89.4 CHRONIC PAIN SYNDROME: ICD-10-CM

## 2023-10-10 PROCEDURE — 97140 MANUAL THERAPY 1/> REGIONS: CPT

## 2023-10-10 PROCEDURE — 97110 THERAPEUTIC EXERCISES: CPT

## 2023-10-10 PROCEDURE — 97014 ELECTRIC STIMULATION THERAPY: CPT

## 2023-10-10 PROCEDURE — 97112 NEUROMUSCULAR REEDUCATION: CPT

## 2023-10-10 NOTE — PROGRESS NOTES
Daily Note     Today's date: 10/10/2023  Patient name: Wenceslao Ballard  : 1940  MRN: [id number]  Referring provider: Vanessa Will MD  Dx:   Encounter Diagnosis     ICD-10-CM    1. Chronic bilateral low back pain without sciatica  M54.50     G89.29       2. Chronic pain syndrome  G89.4                      Subjective: Pt reports pain R LB and down R LE to knee. Objective: See treatment diary below      Assessment: Tolerated treatment fairly well. Verbal cues needed t/o exercises to perform correctly. Progressed to MTP/LTP and increased time on Nustep today. No increased pain reported t/o session. Tightness evident R LE with manual stretching. Pt reports some relief at end of session after Estim IFC with MHP. Patient would benefit from continued PT      Plan: Continue per plan of care.       Re-eval Date: 10/21/23    Precautions: None       Manuals 9/21 10/3 10/5 10/10    METS for LLD  HZ  (- LLD) (-) LLD  (-) LLD    R HS stretch  HZ  JK R HS/Calf HZ R HS/Piriformis  JK R HS/Piriformis                     Neuro Re-Ed         PPT   5" x 15  5"x15    PPT with michelle         TA        TA + OH lifts        MTP/LTP    Red 1x10 ea     Palloff press                 Neuromuscular education  HZ       Ther Ex        NuStep   L1 6'  L1  7 min     Standing hip flex/abd/ext  Flex and abd  1x10 ea Darlin  Flex/abd  1x10 ea Darlin     Squats         Step-ups         LTR  5"x10  5"x10    SKTC   10" x 5 darlin  10" x 5 Darlin    SLR   X 10 darlin  X 10 Darlin     S/L SLR        Clamshells         Heel walk outs         Bridges                                 Ther Activity                        Gait Training                        Modalities        IFC/MHP to R L/S  10 minutes  10 minutes seated  10 min seated  10 min seated

## 2023-10-10 NOTE — TELEPHONE ENCOUNTER
10/10/23 1:28 PM    Patient contacted post ED visit, third outreach attempt made. Message was left for patient to return a call to either the VBI Department at [facility]: Phone [phone] or the PCP office. Thank you.   Giana Zhang  [facility] VALUE BASED VIR

## 2023-10-12 ENCOUNTER — TELEPHONE (OUTPATIENT)
Dept: FAMILY MEDICINE CLINIC | Facility: CLINIC | Age: 83
End: 2023-10-12

## 2023-10-12 ENCOUNTER — OFFICE VISIT (OUTPATIENT)
Dept: PHYSICAL THERAPY | Facility: HOME HEALTHCARE | Age: 83
End: 2023-10-12
Payer: MEDICARE

## 2023-10-12 DIAGNOSIS — M54.50 CHRONIC BILATERAL LOW BACK PAIN WITHOUT SCIATICA: Primary | ICD-10-CM

## 2023-10-12 DIAGNOSIS — J01.00 SUBACUTE MAXILLARY SINUSITIS: Primary | ICD-10-CM

## 2023-10-12 DIAGNOSIS — G89.4 CHRONIC PAIN SYNDROME: ICD-10-CM

## 2023-10-12 DIAGNOSIS — G89.29 CHRONIC BILATERAL LOW BACK PAIN WITHOUT SCIATICA: Primary | ICD-10-CM

## 2023-10-12 PROCEDURE — 97140 MANUAL THERAPY 1/> REGIONS: CPT

## 2023-10-12 PROCEDURE — 97014 ELECTRIC STIMULATION THERAPY: CPT

## 2023-10-12 PROCEDURE — 97110 THERAPEUTIC EXERCISES: CPT

## 2023-10-12 RX ORDER — AZITHROMYCIN 250 MG/1
TABLET, FILM COATED ORAL
Qty: 6 TABLET | Refills: 0 | Status: SHIPPED | OUTPATIENT
Start: 2023-10-12 | End: 2023-10-16

## 2023-10-12 NOTE — TELEPHONE ENCOUNTER
Patient states she thinks she needs a Mauricio Orts called in due to the severe headache she has and thinks it is coming from her sinuses. Does not want to go to ER due to there several times for this issue. Please advise.

## 2023-10-12 NOTE — PROGRESS NOTES
Daily Note     Today's date: 10/12/2023  Patient name: Jose Medeiros  : 1940  MRN: [id number]  Referring provider: Scott Medina MD  Dx:   Encounter Diagnosis     ICD-10-CM    1. Chronic bilateral low back pain without sciatica  M54.50     G89.29       2. Chronic pain syndrome  G89.4                  Subjective: Pt reports she didn't sleep well last night and does not feel so well today. Pt reports she wants to do PT today. Pt reports  she has pain R side of LB and down R LE to R knee. Objective: See treatment diary below      Assessment: Tolerated treatment fairly well. Verbal cues needed for correct form with exercises. All exercises to Pt's tolerance. Monitored HR and Spo2 t/o session. PTA offered to take BP; Pt  declined BP taken. No increased pain reported t/o session or at end of session. Pt reports some relief at end of session after Estim IFC with MHP. Patient would benefit from continued PT      Plan: Continue per plan of care.       Re-eval Date: 10/21/23    Precautions: None       Manuals 9/21 10/3 10/5 10/10 10/12   METS for LLD  HZ  (- LLD) (-) LLD  (-) LLD (-) LLD   R HS stretch  HZ  JK R HS/Calf HZ R HS/Piriformis  JK R HS/Piriformis  JK R HS/ piriformis                    Neuro Re-Ed         PPT   5" x 15  5"x15 5"x15   PPT with michelle         TA        TA + OH lifts        MTP/LTP    Red 1x10 ea  NP   Palloff press                 Neuromuscular education  HZ       Ther Ex        NuStep   L1 6'  L1  7 min  L1  11 min HR Pre Nustep:  78 bpm  Pre Nustep: Spo2 93%  BP Pt declined    During exercise   HR 67 bpm  Spo2 93%    End of session  HR 71 bpm  Spo2 93%        Standing hip flex/abd/ext  Flex and abd  1x10 ea Darlin  Flex/abd  1x10 ea Darlin  NP   Squats         Step-ups         LTR  5"x10  5"x10 5"x10   SKTC   10" x 5 darlin  10" x 5 Darlin 10"x5 Darlin   SLR   X 10 darlin  X 10 Darlin  X 10 Darlin    S/L SLR        [facility]         Heel walk outs         [employer] Ther Activity                        Gait Training                        Modalities        IFC/MHP to R L/S  10 minutes  10 minutes seated  10 min seated  10 min seated  10 min seated

## 2023-10-13 ENCOUNTER — PATIENT OUTREACH (OUTPATIENT)
Dept: FAMILY MEDICINE CLINIC | Facility: CLINIC | Age: 83
End: 2023-10-13

## 2023-10-13 NOTE — PROGRESS NOTES
Contacted patient for f/u. She is having symptoms of sinusitis and was started on azithromycin yesterday. She continues to have headaches but feels they are somewhat better. She is not monitoring her BP at home. She attends outpatient PT but declined to have her BP checked. She is having a lot of anxiety following her last hospitalization and how high her BP was. She also disclosed her son passed away last year which attributes to her anxiety. She does take xanax at bedtime. She denies blurry vision, dizziness or lightheadedness. She does feel weak and is resting during the day. She manages at home independently and has a good support system of family and friends. Her friends provide her meals and she reports a good appetite. She takes all medications as prescribed. Follow up appointments scheduled. She denies any needs at this time. She is agreeable to continued outreach.

## 2023-10-17 ENCOUNTER — OFFICE VISIT (OUTPATIENT)
Dept: PHYSICAL THERAPY | Facility: HOME HEALTHCARE | Age: 83
End: 2023-10-17
Payer: MEDICARE

## 2023-10-17 DIAGNOSIS — G89.4 CHRONIC PAIN SYNDROME: ICD-10-CM

## 2023-10-17 DIAGNOSIS — M54.50 CHRONIC BILATERAL LOW BACK PAIN WITHOUT SCIATICA: Primary | ICD-10-CM

## 2023-10-17 DIAGNOSIS — G89.29 CHRONIC BILATERAL LOW BACK PAIN WITHOUT SCIATICA: Primary | ICD-10-CM

## 2023-10-17 PROCEDURE — 97140 MANUAL THERAPY 1/> REGIONS: CPT

## 2023-10-17 PROCEDURE — 97110 THERAPEUTIC EXERCISES: CPT

## 2023-10-17 PROCEDURE — 97014 ELECTRIC STIMULATION THERAPY: CPT

## 2023-10-17 NOTE — PROGRESS NOTES
Daily Note     Today's date: 10/17/2023  Patient name: Maxine Bustos  : 1940  MRN: [id number]  Referring provider: Kandi Krueger MD  Dx:   Encounter Diagnosis     ICD-10-CM    1. Chronic bilateral low back pain without sciatica  M54.50     G89.29       2. Chronic pain syndrome  G89.4                  Subjective: Pt reports she has pain R LB and down R LE to R knee. Objective: See treatment diary below      Assessment: Tolerated treatment fairly well. Verbal cues needed t/o exercises to perform correctly. Added bridges to program today. Tightness evident R LE with stretching. Pt reports some relief at end of session after Estim IFC with MHP. Patient would benefit from continued PT      Plan: Continue per plan of care.       Re-eval Date: 10/21/23    Precautions: None       Manuals 10/17 10/3 10/5 10/10 10/12   METS for LLD  (-) LLD  (- LLD) (-) LLD  (-) LLD (-) LLD   R HS stretch  JK R HS/ piriformis  JK R HS/Calf HZ R HS/Piriformis  JK R HS/Piriformis  JK R HS/ piriformis                    Neuro Re-Ed         PPT 5"x15  5" x 15  5"x15 5"x15   PPT with michelle         TA        TA + OH lifts        MTP/LTP    Red 1x10 ea  NP   Palloff press                 Neuromuscular education         Ther Ex        NuStep  L1 10 min  L1 6'  L1  7 min  L1  11 min HR Pre Nustep:  78 bpm  Pre Nustep: Spo2 93%  BP Pt declined    During exercise   HR 67 bpm  Spo2 93%    End of session  HR 71 bpm  Spo2 93%        Standing hip flex/abd/ext Flex/abd  1x10 ea Darlin  Flex and abd  1x10 ea Darlin  Flex/abd  1x10 ea Darlin  NP   Squats         Step-ups         LTR 5"x10  5"x10  5"x10 5"x10   SKTC 10"x 5 Darlin   10" x 5 darlin  10" x 5 Darlin 10"x5 Darlin   SLR 1x10 Darlin   X 10 darlin  X 10 Darlin  X 10 Darlin    S/L SLR        Clamshells         Heel walk outs         Bridges  3" 1x10                                Ther Activity                        Gait Training                        Modalities        IFC/MHP to R L/S  10 minutes  10 minutes seated  10 min seated  10 min seated  10 min seated

## 2023-10-18 ENCOUNTER — TELEPHONE (OUTPATIENT)
Dept: FAMILY MEDICINE CLINIC | Facility: CLINIC | Age: 83
End: 2023-10-18

## 2023-10-18 DIAGNOSIS — J01.00 SUBACUTE MAXILLARY SINUSITIS: Primary | ICD-10-CM

## 2023-10-18 RX ORDER — AZITHROMYCIN 250 MG/1
TABLET, FILM COATED ORAL
Qty: 6 TABLET | Refills: 0 | Status: SHIPPED | OUTPATIENT
Start: 2023-10-18 | End: 2023-10-22

## 2023-10-18 NOTE — TELEPHONE ENCOUNTER
She thinks she has a sinus infection, she said she had one last week and you prescribed a z pack.   She is not taking sinus medication do to her hypertension

## 2023-10-19 ENCOUNTER — APPOINTMENT (OUTPATIENT)
Dept: PHYSICAL THERAPY | Facility: HOME HEALTHCARE | Age: 83
End: 2023-10-19
Payer: MEDICARE

## 2023-10-19 DIAGNOSIS — I10 ESSENTIAL HYPERTENSION: ICD-10-CM

## 2023-10-19 DIAGNOSIS — F41.9 ANXIETY: ICD-10-CM

## 2023-10-19 DIAGNOSIS — F32.9 REACTIVE DEPRESSION: ICD-10-CM

## 2023-10-19 DIAGNOSIS — I10 HYPERTENSION: ICD-10-CM

## 2023-10-19 RX ORDER — ALPRAZOLAM 0.25 MG/1
0.25 TABLET ORAL 3 TIMES DAILY PRN
Qty: 90 TABLET | Refills: 0 | Status: SHIPPED | OUTPATIENT
Start: 2023-10-19

## 2023-10-19 RX ORDER — ALPRAZOLAM 0.25 MG/1
0.25 TABLET ORAL 3 TIMES DAILY PRN
Qty: 90 TABLET | Refills: 0 | OUTPATIENT
Start: 2023-10-19

## 2023-10-19 RX ORDER — PAROXETINE 30 MG/1
TABLET, FILM COATED ORAL
Qty: 90 TABLET | Refills: 3 | Status: SHIPPED | OUTPATIENT
Start: 2023-10-19

## 2023-10-19 RX ORDER — HYDROCHLOROTHIAZIDE 25 MG/1
25 TABLET ORAL DAILY
Qty: 30 TABLET | Refills: 0 | Status: SHIPPED | OUTPATIENT
Start: 2023-10-19

## 2023-10-19 RX ORDER — LISINOPRIL 10 MG/1
TABLET ORAL
Qty: 180 TABLET | Refills: 3 | Status: SHIPPED | OUTPATIENT
Start: 2023-10-19

## 2023-10-24 ENCOUNTER — OFFICE VISIT (OUTPATIENT)
Dept: PHYSICAL THERAPY | Facility: HOME HEALTHCARE | Age: 83
End: 2023-10-24
Payer: MEDICARE

## 2023-10-24 DIAGNOSIS — G89.4 CHRONIC PAIN SYNDROME: ICD-10-CM

## 2023-10-24 DIAGNOSIS — G89.29 CHRONIC BILATERAL LOW BACK PAIN WITHOUT SCIATICA: Primary | ICD-10-CM

## 2023-10-24 DIAGNOSIS — M54.50 CHRONIC BILATERAL LOW BACK PAIN WITHOUT SCIATICA: Primary | ICD-10-CM

## 2023-10-24 PROCEDURE — 97110 THERAPEUTIC EXERCISES: CPT

## 2023-10-24 PROCEDURE — 97014 ELECTRIC STIMULATION THERAPY: CPT

## 2023-10-24 NOTE — PROGRESS NOTES
Daily Note     Today's date: 10/24/2023  Patient name: Jorden Najjar  : 1940  MRN: [id number]  Referring provider: Yoan Lepe MD  Dx:   Encounter Diagnosis     ICD-10-CM    1. Chronic bilateral low back pain without sciatica  M54.50     G89.29       2. Chronic pain syndrome  G89.4                      Subjective: Pt reports she has more pain in her back this week. Objective: See treatment diary below      Assessment: Tolerated treatment fairly well. Verbal cues needed to perform exercises correctly. Progressed to TA, clamshells and increased reps with supine SLR today. No increased pain reported t/o session. Fatigue evident with supine SLR. Pt reports relief at end of session after Estim IFC with MHP. Patient would benefit from continued PT      Plan: Continue per plan of care.       Re-eval Date: 10/21/23    Precautions: None       Manuals 10/17 10/24 10/5 10/10 10/12   METS for LLD  (-) LLD  (- LLD) (-) LLD  (-) LLD (-) LLD   R HS stretch  JK R HS/ piriformis  JK R HS/piriformis  HZ R HS/Piriformis  JK R HS/Piriformis  JK R HS/ piriformis                    Neuro Re-Ed         PPT 5"x15 5"x15 5" x 15  5"x15 5"x15   PPT with marches         TA  5"x10      TA + OH lifts        MTP/LTP    Red 1x10 ea  NP   Palloff press                 Neuromuscular education         Ther Ex        NuStep  L1 10 min  L1 10'  L1  7 min  L1  11 min HR Pre Nustep:  78 bpm  Pre Nustep: Spo2 93%  BP Pt declined    During exercise   HR 67 bpm  Spo2 93%    End of session  HR 71 bpm  Spo2 93%        Standing hip flex/abd/ext Flex/abd  1x10 ea Tyrell  Flex and abd  1x10 ea Tyrell  Flex/abd  1x10 ea Tyrell  NP   Squats         Step-ups         LTR 5"x10  5"x10  5"x10 5"x10   SKTC 10"x 5 Tyrell  10" x 5 Tyrell 10" x 5 tyrell  10" x 5 Tyrell 10"x5 Tyrell   SLR 1x10 Tyrell  1x15 Tyrell X 10 tyrell  X 10 Tyrell  X 10 Tyrell    S/L SLR        Clamshells   1x10 R only laying on L side      Heel walk outs         Bridges  3" 1x10  3" 1x10 Ther Activity                        Gait Training                        Modalities        IFC/MHP to R L/S  10 minutes  10 minutes seated  10 min seated  10 min seated  10 min seated

## 2023-10-26 ENCOUNTER — OFFICE VISIT (OUTPATIENT)
Dept: PHYSICAL THERAPY | Facility: HOME HEALTHCARE | Age: 83
End: 2023-10-26
Payer: MEDICARE

## 2023-10-26 DIAGNOSIS — G89.4 CHRONIC PAIN SYNDROME: ICD-10-CM

## 2023-10-26 DIAGNOSIS — G89.29 CHRONIC BILATERAL LOW BACK PAIN WITHOUT SCIATICA: Primary | ICD-10-CM

## 2023-10-26 DIAGNOSIS — M54.50 CHRONIC BILATERAL LOW BACK PAIN WITHOUT SCIATICA: Primary | ICD-10-CM

## 2023-10-26 PROCEDURE — 97140 MANUAL THERAPY 1/> REGIONS: CPT

## 2023-10-26 PROCEDURE — 97110 THERAPEUTIC EXERCISES: CPT

## 2023-10-26 PROCEDURE — 97014 ELECTRIC STIMULATION THERAPY: CPT

## 2023-10-26 NOTE — PROGRESS NOTES
Daily Note     Today's date: 10/26/2023  Patient name: Rebecca Owens  : 1940  MRN: [id number]  Referring provider: Brian Warren MD  Dx:   Encounter Diagnosis     ICD-10-CM    1. Chronic bilateral low back pain without sciatica  M54.50     G89.29       2. Chronic pain syndrome  G89.4                      Subjective: Pt reports she is not feeling so well today because of her IBS. Pt reports she has pain R side LB. Objective: See treatment diary below      Assessment: Tolerated treatment fair. Program modified today due to Pt request and Pt not feeling well today. No increased pain reported t/o session. Pt reports she gets temporary relief with Estim IFC with MHP. Patient would benefit from continued PT      Plan: Continue per plan of care.       Re-eval Date: 10/21/23    Precautions: None       Manuals 10/17 10/24 10/26 10/10 10/12   METS for LLD  (-) LLD  (- LLD) (-) LLD  (-) LLD (-) LLD   R HS stretch  JK R HS/ piriformis  JK R HS/piriformis  HZ R HS/Piriformis  JK R HS/Piriformis  JK R HS/ piriformis                    Neuro Re-Ed         PPT 5"x15 5"x15 Pt declined  5"x15 5"x15   PPT with marches         TA  5"x10 Pt declined      TA + OH lifts        MTP/LTP    Red 1x10 ea  NP   Palloff press                 Neuromuscular education         Ther Ex        NuStep  L1 10 min  L1 10' L1 10'  L1  7 min  L1  11 min HR Pre Nustep:  78 bpm  Pre Nustep: Spo2 93%  BP Pt declined    During exercise   HR 67 bpm  Spo2 93%    End of session  HR 71 bpm  Spo2 93%        Standing hip flex/abd/ext Flex/abd  1x10 ea Tyrell  Flex and abd  1x10 ea Tyrell Pt declined  Flex/abd  1x10 ea Tyrell  NP   Squats         Step-ups         LTR 5"x10  5"x10 5"x10 5"x10 5"x10   SKTC 10"x 5 Tyrell  10" x 5 Tyrell Pt declined  10" x 5 Tyrell 10"x5 Tyrell   SLR 1x10 Tyrell  1x15 Tyrell 1 X 15  tyrell  X 10 Tyrell  X 10 Tyrell    S/L SLR        Clamshells   1x10 R only laying on L side 1x10 R only  Laying on L side      Heel walk outs         Bridges  3" 1x10  3" 1x10  Pt declined                              Ther Activity                        Gait Training                        Modalities        IFC/MHP to R L/S  10 minutes  10 minutes seated  10 min seated  10 min seated  10 min seated

## 2023-11-02 ENCOUNTER — EVALUATION (OUTPATIENT)
Dept: PHYSICAL THERAPY | Facility: HOME HEALTHCARE | Age: 83
End: 2023-11-02
Payer: MEDICARE

## 2023-11-02 DIAGNOSIS — G89.4 CHRONIC PAIN SYNDROME: ICD-10-CM

## 2023-11-02 DIAGNOSIS — G89.29 CHRONIC BILATERAL LOW BACK PAIN WITHOUT SCIATICA: Primary | ICD-10-CM

## 2023-11-02 DIAGNOSIS — M54.50 CHRONIC BILATERAL LOW BACK PAIN WITHOUT SCIATICA: Primary | ICD-10-CM

## 2023-11-02 PROCEDURE — 97110 THERAPEUTIC EXERCISES: CPT

## 2023-11-02 PROCEDURE — 97014 ELECTRIC STIMULATION THERAPY: CPT

## 2023-11-02 NOTE — PROGRESS NOTES
PT Re-Evaluation     Today's date: 2023  Patient name: Apolinar Wallis  : 1940  MRN: [id number]  Referring provider: Maye Cano MD  Dx:   Encounter Diagnosis     ICD-10-CM    1. Chronic bilateral low back pain without sciatica  M54.50     G89.29       2. Chronic pain syndrome  G89.4           Start Time: 1105  Stop Time: 1200  Total time in clinic (min): 55 minutes    Assessment  Assessment details: UPDATE: Pt with minimal objective measure changes noted, however she is reporting decrease in her pain and better mobility with daily activities. PT does observe improved posture and gait, however pt continues to rely on [facility]. No recent falls reported. PT to continue with POC in order to further progress and work towards goals. Pt Carmina Cardoza is a 78 y.o. who presents to OPPT with s/s consistent with subacute L/S, pelvic, and R knee pain. Pt presents with limited L/S mobility and core strength deficits, decreased R > L LE ROM and strength, impaired soft tissue mobility/limited flexibility, decreased postural awareness, and gait/balance dysfunctions. Pt with limitations with prolonged ambulation, difficulty with stair negotiation, disrupted sleep patterns, and difficulty with lifting/carrying. Pt with significant gait dysfunction due to pain and difficulty with WB'ing through the RLE. Pt would benefit from skilled therapy services to address outlined impairments, work towards goals, and restore pts PLOF. Thank you!    Impairments: abnormal gait, abnormal or restricted ROM, abnormal movement, activity intolerance, impaired balance, impaired physical strength, lacks appropriate home exercise program, pain with function, safety issue, weight-bearing intolerance, poor posture  and poor body mechanics    Goals  STGs to be achieved in 4 weeks: - ONGOING   -Pt to demonstrate reduced subjective pain rating "at worst" by at least 2-3 points from Initial Eval to allow for reduced pain with ADLs and improved functional activity tolerance.   -Pt to demonstrate L/S ROM improved WFL in order to maximize joint mobility and function and allow for progression of exercise program and achievement of goals.   -Pt to demonstrate increased MMT of R LE by at least 1/2 grade in order to improve safety and stability with ADLs and functional mobility. LTGs to be achieved upon discharge:   -Pt will be I with HEP in order to continue to improve quality of life and independence and reduce risk for re-injury.   -Pt to demonstrate return to activities of daily living without limiations or restrictions.   -Pt will return to ambulation > 20 minutes without pain to help facilitate return to community activities independently   -Pt to demonstrate return to stair negotiation with non - reciprocal pattern but pain free. -Pt to demonstrate improved function as noted by achieving or exceeding predicted score on FOTO outcomes assessment tool. Plan  Patient would benefit from: skilled physical therapy  Planned modality interventions: cryotherapy and thermotherapy: hydrocollator packs  Planned therapy interventions: abdominal trunk stabilization, balance, manual therapy, neuromuscular re-education, patient education, postural training, therapeutic activities, therapeutic exercise, home exercise program, flexibility and functional ROM exercises  Frequency: 2x week  Duration in weeks: 12  Plan of Care beginning date: 9/21/2023  Plan of Care expiration date: 12/14/2023  Treatment plan discussed with: patient and referring physician        Subjective Evaluation    History of Present Illness  Mechanism of injury: UPDATE: Pt reporting that she feels that the therapy has been helpful. She feels better when she is able to come consistently but when she misses therapy she has pain again. Pt reporting that on July 12th she was getting out of bed and when she put the R leg down, she couldn't put any pressure/weight through the leg. She went to ED via ambulance and was admitted x 4 days. Pt discharged and followed up with Orthopedics who told her he does not treat backs. She was further referred to pain management who will not consider injections or surgical intervention due to clotting disorder. MD referral placed to OPPT for conservative management of s/s. Quality of life: good    Patient Goals  Patient goals for therapy: decreased edema, decreased pain, improved balance, increased motion, increased strength and independence with ADLs/IADLs    Pain  At best pain ratin  At worst pain rating: 10  Quality: radiating, sharp, dull ache and tight  Relieving factors: rest  Aggravating factors: standing, walking and stair climbing  Progression: no change    Social Support  Steps to enter house: yes  Stairs in house: no   Lives in: apartment      Diagnostic Tests  X-ray: normal  Treatments  Current treatment: physical therapy        Objective     Postural Observations  Seated posture: fair  Standing posture: fair      Palpation     Right   Hypertonic in the erector spinae, lumbar paraspinals and quadratus lumborum. Tenderness of the erector spinae, lumbar paraspinals and quadratus lumborum. Tenderness     Right Hip   Tenderness in the PSIS.      Neurological Testing     Sensation     Lumbar   Left   Intact: light touch    Right   Intact: light touch    Active Range of Motion     Lumbar   Flexion:  Restriction level: moderate  Extension:  Restriction level: moderate  Left lateral flexion:  Restriction level: minimal  Right lateral flexion:  Restriction level: moderate    Strength/Myotome Testing     Left Hip   Planes of Motion   Flexion: 3+  Abduction: 3+  Adduction: 3+    Right Hip   Planes of Motion   Flexion: 3-  Abduction: 3-  Adduction: 3-    Left Knee   Flexion: 4-  Extension: 4-    Right Knee   Flexion: 3  Extension: 3    Tests     Additional Tests Details  R side posterior innominate rotation     Ambulation   Weight-Bearing Status Assistive device used: single point cane    Ambulation: Stairs   Pattern: non-reciprocal  Pattern: non-reciprocal    Observational Gait   Gait: antalgic   Decreased walking speed, stride length and right stance time. Quality of Movement During Gait   Trunk  Forward lean. Trunk (Right): Negative lateral lean over stance limb.

## 2023-11-02 NOTE — PROGRESS NOTES
Daily Note     Today's date: 2023  Patient name: Shante Davis  : 1940  MRN: [id number]  Referring provider: Nishi Mason MD  Dx:   Encounter Diagnosis     ICD-10-CM    1. Chronic bilateral low back pain without sciatica  M54.50     G89.29       2. Chronic pain syndrome  G89.4                      Subjective: Pt reports she has pain in her LB. Objective: See treatment diary below      Assessment: Tolerated treatment fairly well. Verbal cues needed to perform exercises correctly. Updated, reviewed and educated Pt on HEP. Pt reports relief at end of session after Estim IFC with MHP. Patient would benefit from continued PT      Plan: Continue per plan of care.       Re-eval Date: 10/21/23    Precautions: None       Manuals 10/17 10/24 10/26 11/2 10/12   METS for LLD  (-) LLD  (- LLD) (-) LLD   (-) LLD   R HS stretch  JK R HS/ piriformis  JK R HS/piriformis  JK  R HS/Piriformis  Updated, reviewed and educated Pt on HEP JK R HS/ piriformis                    Neuro Re-Ed         PPT 5"x15 5"x15 Pt declined  5"x15 5"x15   PPT with marches         TA  5"x10 Pt declined      TA + OH lifts        MTP/LTP     NP   Palloff press                 Neuromuscular education         Ther Ex        NuStep  L1 10 min  L1 10' L1 10'  L1  10 min  L1  11 min HR Pre Nustep:  78 bpm  Pre Nustep: Spo2 93%  BP Pt declined    During exercise   HR 67 bpm  Spo2 93%    End of session  HR 71 bpm  Spo2 93%        Standing hip flex/abd/ext Flex/abd  1x10 ea Tyrell  Flex and abd  1x10 ea Tyrell Pt declined  Flex/abd  1x10 ea Tyrell  NP   Squats         Step-ups         LTR 5"x10  5"x10 5"x10 5"x10 5"x10   SKTC 10"x 5 Tyrell  10" x 5 Tyrell Pt declined  10" x 5 Tyrell 10"x5 Tyrell   SLR 1x10 Tyrell  1x15 Tyrell 1 X 15  tyrell  X 10 Tyrell  X 10 Tyrell    S/L SLR        Clamshells   1x10 R only laying on L side 1x10 R only  Laying on L side      Heel walk outs         Bridges  3" 1x10  3" 1x10  Pt declined  3" 1x10                             Ther Activity Gait Training                        Modalities        IFC/MHP to R L/S  10 minutes  10 minutes seated  10 min seated  10 min seated  10 min seated

## 2023-11-03 ENCOUNTER — RA CDI HCC (OUTPATIENT)
Dept: OTHER | Facility: HOSPITAL | Age: 83
End: 2023-11-03

## 2023-11-03 ENCOUNTER — PATIENT OUTREACH (OUTPATIENT)
Dept: FAMILY MEDICINE CLINIC | Facility: CLINIC | Age: 83
End: 2023-11-03

## 2023-11-03 NOTE — PROGRESS NOTES
Contacted patient for f/u. She attends outpatient PT. She informs this CM  she continues to refuse to have her BP checked as she becomes anxious if it's elevated. She also notes she will be changing providers and has initial visit scheduled for Monday. Informed her once she is established, this CM can transfer her to CM covering her new provider. She was agreeable.

## 2023-11-06 ENCOUNTER — OFFICE VISIT (OUTPATIENT)
Dept: FAMILY MEDICINE CLINIC | Facility: HOME HEALTHCARE | Age: 83
End: 2023-11-06
Payer: MEDICARE

## 2023-11-06 VITALS
TEMPERATURE: 98 F | DIASTOLIC BLOOD PRESSURE: 70 MMHG | HEIGHT: 67 IN | WEIGHT: 173.2 LBS | HEART RATE: 68 BPM | OXYGEN SATURATION: 95 % | BODY MASS INDEX: 27.18 KG/M2 | RESPIRATION RATE: 18 BRPM | SYSTOLIC BLOOD PRESSURE: 122 MMHG

## 2023-11-06 DIAGNOSIS — I10 ESSENTIAL HYPERTENSION: Primary | ICD-10-CM

## 2023-11-06 DIAGNOSIS — I10 ESSENTIAL HYPERTENSION: ICD-10-CM

## 2023-11-06 DIAGNOSIS — J43.9 PULMONARY EMPHYSEMA, UNSPECIFIED EMPHYSEMA TYPE (HCC): ICD-10-CM

## 2023-11-06 LAB
ALBUMIN SERPL BCP-MCNC: 4.1 G/DL (ref 3.5–5)
ALP SERPL-CCNC: 66 U/L (ref 34–104)
ALT SERPL W P-5'-P-CCNC: 15 U/L (ref 7–52)
ANION GAP SERPL CALCULATED.3IONS-SCNC: 8 MMOL/L
AST SERPL W P-5'-P-CCNC: 20 U/L (ref 13–39)
BASOPHILS # BLD AUTO: 0.04 THOUSANDS/ÂΜL (ref 0–0.1)
BASOPHILS NFR BLD AUTO: 1 % (ref 0–1)
BILIRUB SERPL-MCNC: 0.32 MG/DL (ref 0.2–1)
BUN SERPL-MCNC: 23 MG/DL (ref 5–25)
CALCIUM SERPL-MCNC: 9.5 MG/DL (ref 8.4–10.2)
CHLORIDE SERPL-SCNC: 106 MMOL/L (ref 96–108)
CO2 SERPL-SCNC: 25 MMOL/L (ref 21–32)
CREAT SERPL-MCNC: 0.96 MG/DL (ref 0.6–1.3)
EOSINOPHIL # BLD AUTO: 0.27 THOUSAND/ÂΜL (ref 0–0.61)
EOSINOPHIL NFR BLD AUTO: 7 % (ref 0–6)
ERYTHROCYTE [DISTWIDTH] IN BLOOD BY AUTOMATED COUNT: 13.3 % (ref 11.6–15.1)
GFR SERPL CREATININE-BSD FRML MDRD: 54 ML/MIN/1.73SQ M
GLUCOSE SERPL-MCNC: 82 MG/DL (ref 65–140)
HCT VFR BLD AUTO: 40.4 % (ref 34.8–46.1)
HGB BLD-MCNC: 13.1 G/DL (ref 11.5–15.4)
IMM GRANULOCYTES # BLD AUTO: 0.01 THOUSAND/UL (ref 0–0.2)
IMM GRANULOCYTES NFR BLD AUTO: 0 % (ref 0–2)
LYMPHOCYTES # BLD AUTO: 1.87 THOUSANDS/ÂΜL (ref 0.6–4.47)
LYMPHOCYTES NFR BLD AUTO: 47 % (ref 14–44)
MCH RBC QN AUTO: 32.9 PG (ref 26.8–34.3)
MCHC RBC AUTO-ENTMCNC: 32.4 G/DL (ref 31.4–37.4)
MCV RBC AUTO: 102 FL (ref 82–98)
MONOCYTES # BLD AUTO: 0.64 THOUSAND/ÂΜL (ref 0.17–1.22)
MONOCYTES NFR BLD AUTO: 16 % (ref 4–12)
NEUTROPHILS # BLD AUTO: 1.17 THOUSANDS/ÂΜL (ref 1.85–7.62)
NEUTS SEG NFR BLD AUTO: 29 % (ref 43–75)
NRBC BLD AUTO-RTO: 0 /100 WBCS
PLATELET # BLD AUTO: 207 THOUSANDS/UL (ref 149–390)
PMV BLD AUTO: 11.8 FL (ref 8.9–12.7)
POTASSIUM SERPL-SCNC: 4.6 MMOL/L (ref 3.5–5.3)
PROT SERPL-MCNC: 6.9 G/DL (ref 6.4–8.4)
RBC # BLD AUTO: 3.98 MILLION/UL (ref 3.81–5.12)
SODIUM SERPL-SCNC: 139 MMOL/L (ref 135–147)
WBC # BLD AUTO: 4 THOUSAND/UL (ref 4.31–10.16)

## 2023-11-06 PROCEDURE — 80053 COMPREHEN METABOLIC PANEL: CPT | Performed by: FAMILY MEDICINE

## 2023-11-06 PROCEDURE — 85025 COMPLETE CBC W/AUTO DIFF WBC: CPT | Performed by: FAMILY MEDICINE

## 2023-11-06 RX ORDER — LISINOPRIL 10 MG/1
10 TABLET ORAL 2 TIMES DAILY
Qty: 180 TABLET | Refills: 3 | Status: SHIPPED | OUTPATIENT
Start: 2023-11-06

## 2023-11-06 RX ORDER — BUDESONIDE AND FORMOTEROL FUMARATE DIHYDRATE 160; 4.5 UG/1; UG/1
2 AEROSOL RESPIRATORY (INHALATION) 2 TIMES DAILY
Qty: 30.6 G | Refills: 5 | Status: SHIPPED | OUTPATIENT
Start: 2023-11-06

## 2023-11-07 ENCOUNTER — OFFICE VISIT (OUTPATIENT)
Dept: PHYSICAL THERAPY | Facility: HOME HEALTHCARE | Age: 83
End: 2023-11-07
Payer: MEDICARE

## 2023-11-07 DIAGNOSIS — G89.4 CHRONIC PAIN SYNDROME: ICD-10-CM

## 2023-11-07 DIAGNOSIS — M54.50 CHRONIC BILATERAL LOW BACK PAIN WITHOUT SCIATICA: Primary | ICD-10-CM

## 2023-11-07 DIAGNOSIS — G89.29 CHRONIC BILATERAL LOW BACK PAIN WITHOUT SCIATICA: Primary | ICD-10-CM

## 2023-11-07 PROCEDURE — 97140 MANUAL THERAPY 1/> REGIONS: CPT

## 2023-11-07 PROCEDURE — 97112 NEUROMUSCULAR REEDUCATION: CPT

## 2023-11-07 PROCEDURE — 97110 THERAPEUTIC EXERCISES: CPT

## 2023-11-07 NOTE — PROGRESS NOTES
Daily Note     Today's date: 2023  Patient name: Cuca Ratliff  : 1940  MRN: [id number]  Referring provider: Bib Kingston MD  Dx:   Encounter Diagnosis     ICD-10-CM    1. Chronic bilateral low back pain without sciatica  M54.50     G89.29       2. Chronic pain syndrome  G89.4                      Subjective: Pt reports she has pain in her LB and R hip. Objective: See treatment diary below      Assessment: Tolerated treatment fairly well. Verbal cues needed to perform exercises correctly. Progressed to step ups, squats, MTP/LTP and increased level on Nustep today. Pt with improved gait today with SPC. Pt  reports she notices she is getting around better at home. Trial of MHP only this session. Patient would benefit from continued PT      Plan: Continue per plan of care.       Re-eval Date: 10/21/23    Precautions: None       Manuals 10/17 10/24 10/26 11/2 11/7   METS for LLD  (-) LLD  (- LLD) (-) LLD      R HS stretch  JK R HS/ piriformis  JK R HS/piriformis  JK  R HS/Piriformis  Updated, reviewed and educated Pt on HEP JK R HS/piriformis                    Neuro Re-Ed         PPT 5"x15 5"x15 Pt declined  5"x15 5"x15   PPT with marches         TA  5"x10 Pt declined      TA + OH lifts        MTP/LTP     Red 1x10 ea    Palloff press                 Neuromuscular education         Ther Ex        NuStep  L1 10 min  L1 10' L1 10'  L1  10 min  L2  10 min        Standing hip flex/abd/ext Flex/abd  1x10 ea Tyrell  Flex and abd  1x10 ea Tyrell Pt declined  Flex/abd  1x10 ea Tyrell  Flex/abd  1x10 ea Tyrell   Squats      1x10    Step-ups      C fwd 1x10 Tyrell   LTR 5"x10  5"x10 5"x10 5"x10 5"x10   SKTC 10"x 5 Tyrell  10" x 5 Tyrell Pt declined  10" x 5 Tyrell    SLR 1x10 Tyrell  1x15 Tyrell 1 X 15  tyrell  X 10 Tyrell  X 10 Tyrell    S/L SLR        Clamshells   1x10 R only laying on L side 1x10 R only  Laying on L side      Heel walk outs         Bridges  3" 1x10  3" 1x10  Pt declined  3" 1x10  3" 1x10                            Ther Activity                        Gait Training                        Modalities        IFC/MHP to R L/S  10 minutes  10 minutes seated  10 min seated  10 min seated  MHP only   10 min seated

## 2023-11-08 ENCOUNTER — TELEPHONE (OUTPATIENT)
Dept: FAMILY MEDICINE CLINIC | Facility: CLINIC | Age: 83
End: 2023-11-08

## 2023-11-09 ENCOUNTER — APPOINTMENT (OUTPATIENT)
Dept: PHYSICAL THERAPY | Facility: HOME HEALTHCARE | Age: 83
End: 2023-11-09
Payer: MEDICARE

## 2023-11-09 ENCOUNTER — TELEPHONE (OUTPATIENT)
Dept: OTHER | Facility: OTHER | Age: 83
End: 2023-11-09

## 2023-11-10 DIAGNOSIS — I10 HYPERTENSION: ICD-10-CM

## 2023-11-11 RX ORDER — HYDROCHLOROTHIAZIDE 25 MG/1
25 TABLET ORAL DAILY
Qty: 30 TABLET | Refills: 0 | Status: SHIPPED | OUTPATIENT
Start: 2023-11-11

## 2023-11-14 ENCOUNTER — PATIENT OUTREACH (OUTPATIENT)
Dept: FAMILY MEDICINE CLINIC | Facility: HOME HEALTHCARE | Age: 83
End: 2023-11-14

## 2023-11-14 ENCOUNTER — OFFICE VISIT (OUTPATIENT)
Dept: PHYSICAL THERAPY | Facility: HOME HEALTHCARE | Age: 83
End: 2023-11-14
Payer: MEDICARE

## 2023-11-14 ENCOUNTER — TELEPHONE (OUTPATIENT)
Dept: FAMILY MEDICINE CLINIC | Facility: HOME HEALTHCARE | Age: 83
End: 2023-11-14

## 2023-11-14 DIAGNOSIS — M54.50 CHRONIC BILATERAL LOW BACK PAIN WITHOUT SCIATICA: Primary | ICD-10-CM

## 2023-11-14 DIAGNOSIS — G89.4 CHRONIC PAIN SYNDROME: ICD-10-CM

## 2023-11-14 DIAGNOSIS — G89.29 CHRONIC BILATERAL LOW BACK PAIN WITHOUT SCIATICA: Primary | ICD-10-CM

## 2023-11-14 DIAGNOSIS — R05.2 SUBACUTE COUGH: Primary | ICD-10-CM

## 2023-11-14 PROCEDURE — 97112 NEUROMUSCULAR REEDUCATION: CPT

## 2023-11-14 PROCEDURE — 97110 THERAPEUTIC EXERCISES: CPT

## 2023-11-14 PROCEDURE — 97140 MANUAL THERAPY 1/> REGIONS: CPT

## 2023-11-14 RX ORDER — BENZONATATE 100 MG/1
100 CAPSULE ORAL 3 TIMES DAILY PRN
Qty: 20 CAPSULE | Refills: 0 | Status: SHIPPED | OUTPATIENT
Start: 2023-11-14 | End: 2023-12-09

## 2023-11-14 NOTE — PROGRESS NOTES
Inbasket message received from [address] [place] [address]  with soft handoff. Chart review completed. Patient continues to follow with PT for treatment of low back pain. Current health concerns include HTN and anxiety. Telephone outreach attempt #1 made to introduce self No answer. Left voicemail message with general contact information with name, title, call back phone number office hours when I am available and message encouraging return call to this CM. Will attempt second outreach within 1 week. IB message reminder has been set.

## 2023-11-14 NOTE — PROGRESS NOTES
Daily Note     Today's date: 2023  Patient name: Marylu Willams  : 1940  MRN: [id number]  Referring provider: Festus Seals MD  Dx:   Encounter Diagnosis     ICD-10-CM    1. Chronic bilateral low back pain without sciatica  M54.50     G89.29       2. Chronic pain syndrome  G89.4                      Subjective:  Pt reports she has pain R LB and R hip. Objective: See treatment diary below      Assessment: Tolerated treatment fairly well. Verbal cues needed to perform exercises correctly. Progressed to PPT with marches today. No increased pain reported R LB or R hip t/o session. Pt reports some relief at end of session. Patient would benefit from continued PT      Plan: Continue per plan of care.       Re-eval Date: 10/21/23    Precautions: None       Manuals 11/14 10/24 10/26 11/2 11/7   METS for LLD   (- LLD) (-) LLD      R HS stretch  JK R HS/ piriformis  JK R HS/piriformis  JK  R HS/Piriformis  Updated, reviewed and educated Pt on HEP JK R HS/piriformis                    Neuro Re-Ed         PPT  5"x15 Pt declined  5"x15 5"x15   PPT with marches  1x10        TA  5"x10 Pt declined      TA + OH lifts        MTP/LTP Red 1x10 ea     Red 1x10 ea    Palloff press                 Neuromuscular education         Ther Ex        NuStep  L2 10 min  L1 10' L1 10'  L1  10 min  L2  10 min        Standing hip flex/abd/ext Flex/abd  1x10 ea Tyrell  Flex and abd  1x10 ea Tyrell Pt declined  Flex/abd  1x10 ea Tyrell  Flex/abd  1x10 ea Tyrell   Squats  1x10     1x10    Step-ups  C Fwd 1x10 Tyrell     C fwd 1x10 Tyrell   LTR 5"x10  5"x10 5"x10 5"x10 5"x10   SKTC  10" x 5 Tyrell Pt declined  10" x 5 Tyrell    SLR 1x10 Tyrell  1x15 Tyrell 1 X 15  tyrell  X 10 Tyrell  X 10 Tyrell    S/L SLR        Clamshells   1x10 R only laying on L side 1x10 R only  Laying on L side      Heel walk outs         Bridges  3" 1x10  3" 1x10  Pt declined  3" 1x10  3" 1x10                            Ther Activity                        Gait Training Modalities        IFC/MHP to R L/S  MHP only   10 minutes seated  10 minutes seated  10 min seated  10 min seated  MHP only   10 min seated

## 2023-11-14 NOTE — TELEPHONE ENCOUNTER
Patient came in needing a refill on the [employer] Pearls She recently seen you but  always prescribed this for her.

## 2023-11-15 ENCOUNTER — PATIENT OUTREACH (OUTPATIENT)
Dept: FAMILY MEDICINE CLINIC | Facility: HOME HEALTHCARE | Age: 83
End: 2023-11-15

## 2023-11-15 NOTE — PROGRESS NOTES
OutPatient Complex Care Management Note:  Received a voice mail message from patient yesterday. Returned phone call this AM.  Introduced self and purpose of call. Patient states she is doing well and her BP is well controlled since re-starting hyrochlorothiazide. Patient informs me she is very happy in the switch of PC practices and felt she was treated "in a compassionate and caring" way by the MD as well as the office staff. Patient demonstrates knowledge and understanding of her current treatment plan and has the ability to self-manage her health independently. Patient expresses no other needs or concerns at this time. Patient has my name and contact information and was encouraged to contact me with any future needs or concerns.     Complex episode closed and I removed myself from care team.

## 2023-11-16 ENCOUNTER — OFFICE VISIT (OUTPATIENT)
Dept: PHYSICAL THERAPY | Facility: HOME HEALTHCARE | Age: 83
End: 2023-11-16
Payer: MEDICARE

## 2023-11-16 DIAGNOSIS — M54.50 CHRONIC BILATERAL LOW BACK PAIN WITHOUT SCIATICA: Primary | ICD-10-CM

## 2023-11-16 DIAGNOSIS — G89.4 CHRONIC PAIN SYNDROME: ICD-10-CM

## 2023-11-16 DIAGNOSIS — G89.29 CHRONIC BILATERAL LOW BACK PAIN WITHOUT SCIATICA: Primary | ICD-10-CM

## 2023-11-16 PROCEDURE — 97110 THERAPEUTIC EXERCISES: CPT

## 2023-11-16 PROCEDURE — 97140 MANUAL THERAPY 1/> REGIONS: CPT

## 2023-11-16 NOTE — PROGRESS NOTES
Daily Note     Today's date: 2023  Patient name: Maxine Bustos  : 1940  MRN: [id number]  Referring provider: Kandi Krueger MD  Dx:   Encounter Diagnosis     ICD-10-CM    1. Chronic bilateral low back pain without sciatica  M54.50     G89.29       2. Chronic pain syndrome  G89.4           Start Time: 1130  Stop Time: 1210  Total time in clinic (min): 40 minutes    Subjective: Pt reports her back is bothering her more than usual today. Objective: See treatment diary below      Assessment: Tolerated treatment well. Attempted trial MHP while pt was warming up on the Nustep, which pt noted feeling good with. Pt noted feeling better post treatment. Patient demonstrated fatigue post treatment, exhibited good technique with therapeutic exercises, and would benefit from continued PT. Plan: Continue per plan of care.       Re-eval Date: 10/21/23    Precautions: None       Manuals    METS for LLD         R HS stretch  JK R HS/ piriformis  NA R HS/piriformis  Updated, reviewed and educated Pt on HEP JK R HS/piriformis                    Neuro Re-Ed         PPT    5"x15 5"x15   PPT with marches  1x10  1x10       TA        TA + OH lifts        MTP/LTP Red 1x10 ea  Red 1 x 10 ea   Red 1x10 ea    Palloff press                 Neuromuscular education         Ther Ex        NuStep  L2 10 min  L2 10 min  L1  10 min  L2  10 min        Standing hip flex/abd/ext Flex/abd  1x10 ea Tyrell  Flex/abd 1x10 ea Tyrell  Flex/abd  1x10 ea Tyrell  Flex/abd  1x10 ea Tyrell   Squats  1x10  1x10    1x10    Step-ups  C Fwd 1x10 Tyrell  C Fwd 1x10 Tyrell   C fwd 1x10 Tyrell   LTR 5"x10  5' x10   5"x10 5"x10   SKTC    10" x 5 Tyrell    SLR 1x10 Tyrell  1x10 Tyrell  X 10 Tyrell  X 10 Tyrell    S/L SLR        Clamshells         Heel walk outs         Bridges  3" 1x10  3" 1x10   3" 1x10  3" 1x10                            Ther Activity                        Gait Training                        Modalities        IFC/MHP to R L/S  MHP only   10 minutes seated  MHP only 10 minutes seated while on Nustep   10 min seated  MHP only   10 min seated Post-Care Instructions: I reviewed with the patient in detail post-care instructions. Patient is to keep the biopsy site dry overnight, and then apply bacitracin twice daily until healed. Patient may apply hydrogen peroxide soaks to remove any crusting. Hemostasis: Electrocautery Bill For Surgical Tray: no Medical Necessity Clause: This procedure was medically necessary because the lesion that was treated was: lesion incompletely excised, Detail Level: Detailed Consent was obtained from the patient. The risks and benefits to therapy were discussed in detail. Specifically, the risks of infection, scarring, bleeding, prolonged wound healing, incomplete removal, allergy to anesthesia, nerve injury and recurrence were addressed. Prior to the procedure, the treatment site was clearly identified and confirmed by the patient. All components of Universal Protocol/PAUSE Rule completed. Notification Instructions: Patient will be notified of biopsy results. However, patient instructed to call the office if not contacted within 2 weeks. Lab Facility: [facility] Size Of Lesion In Cm (Required): 1.1 Path Notes (To The Dermatopathologist): Please check margins. Was A Bandage Applied: Yes Wound Care: Vaseline Anesthesia Type: 2% lidocaine without epinephrine X Size Of Lesion In Cm (Optional): 0 Anesthesia Volume In Cc: 2 Biopsy Method: Personna blade Medical Necessity Information: It is in your best interest to select a reason for this procedure from the list below. All of these items fulfill various CMS LCD requirements except the new and changing color options. Lab: [facility] Billing Type: [employer]

## 2023-11-16 NOTE — TELEPHONE ENCOUNTER
11/16/23 11:36 AM      Please remind staff to NOT remove PCP at office level for this scenario; [facility] Department will remove. The office's has been received, reviewed, and the patient chart updated. The PCP has successfully been removed with a patient attribution note. This message will now be completed.         Thank you  Carmen Whittaker

## 2023-11-20 DIAGNOSIS — F41.9 ANXIETY: ICD-10-CM

## 2023-11-21 ENCOUNTER — APPOINTMENT (OUTPATIENT)
Dept: PHYSICAL THERAPY | Facility: HOME HEALTHCARE | Age: 83
End: 2023-11-21
Payer: MEDICARE

## 2023-11-22 RX ORDER — ALPRAZOLAM 0.25 MG/1
0.25 TABLET ORAL 3 TIMES DAILY PRN
Qty: 90 TABLET | Refills: 0 | Status: SHIPPED | OUTPATIENT
Start: 2023-11-22

## 2023-11-28 ENCOUNTER — APPOINTMENT (OUTPATIENT)
Dept: PHYSICAL THERAPY | Facility: HOME HEALTHCARE | Age: 83
End: 2023-11-28
Payer: MEDICARE

## 2023-11-30 ENCOUNTER — APPOINTMENT (OUTPATIENT)
Dept: PHYSICAL THERAPY | Facility: HOME HEALTHCARE | Age: 83
End: 2023-11-30
Payer: MEDICARE

## 2023-12-09 DIAGNOSIS — R05.2 SUBACUTE COUGH: ICD-10-CM

## 2023-12-09 RX ORDER — BENZONATATE 100 MG/1
CAPSULE ORAL
Qty: 20 CAPSULE | Refills: 0 | Status: SHIPPED | OUTPATIENT
Start: 2023-12-09

## 2023-12-10 DIAGNOSIS — K57.90 DIVERTICULOSIS: ICD-10-CM

## 2023-12-11 ENCOUNTER — OFFICE VISIT (OUTPATIENT)
Dept: FAMILY MEDICINE CLINIC | Facility: HOME HEALTHCARE | Age: 83
End: 2023-12-11
Payer: MEDICARE

## 2023-12-11 VITALS
DIASTOLIC BLOOD PRESSURE: 82 MMHG | HEART RATE: 71 BPM | OXYGEN SATURATION: 93 % | TEMPERATURE: 98.6 F | RESPIRATION RATE: 18 BRPM | HEIGHT: 67 IN | BODY MASS INDEX: 26.9 KG/M2 | SYSTOLIC BLOOD PRESSURE: 142 MMHG | WEIGHT: 171.4 LBS

## 2023-12-11 DIAGNOSIS — I10 ESSENTIAL HYPERTENSION: ICD-10-CM

## 2023-12-11 DIAGNOSIS — J18.9 PNEUMONIA OF RIGHT LOWER LOBE DUE TO INFECTIOUS ORGANISM: Primary | ICD-10-CM

## 2023-12-11 PROCEDURE — 99213 OFFICE O/P EST LOW 20 MIN: CPT | Performed by: FAMILY MEDICINE

## 2023-12-11 RX ORDER — METOPROLOL TARTRATE 50 MG/1
50 TABLET, FILM COATED ORAL DAILY
Qty: 90 TABLET | Refills: 3 | Status: SHIPPED | OUTPATIENT
Start: 2023-12-11 | End: 2024-03-10

## 2023-12-11 RX ORDER — AZITHROMYCIN 250 MG/1
TABLET, FILM COATED ORAL
Qty: 6 TABLET | Refills: 0 | Status: SHIPPED | OUTPATIENT
Start: 2023-12-11 | End: 2023-12-15

## 2023-12-11 RX ORDER — PANTOPRAZOLE SODIUM 40 MG/1
TABLET, DELAYED RELEASE ORAL
Qty: 90 TABLET | Refills: 3 | Status: SHIPPED | OUTPATIENT
Start: 2023-12-11

## 2023-12-12 NOTE — PROGRESS NOTES
Assessment/Plan:    No problem-specific Assessment & Plan notes found for this encounter. Diagnoses and all orders for this visit:    Pneumonia of right lower lobe due to infectious organism  Comments:  z-shaina  Orders:  -     azithromycin (ZITHROMAX) 250 mg tablet; Take 2 tablets today then 1 tablet daily x 4 days    Essential hypertension  -     metoprolol tartrate (LOPRESSOR) 25 mg tablet; Take 1 tablet (25 mg total) by mouth daily at bedtime  -     metoprolol tartrate (LOPRESSOR) 50 mg tablet; Take 1 tablet (50 mg total) by mouth daily for 90 doses          Subjective:      Patient ID: Ruben Schmidt is a 78 y.o. female. Cough  Pertinent negatives include no chest pain, chills, ear pain, fever, headaches, myalgias, rash, sore throat or shortness of breath. Patient presents with 3 days of URI sxs - congestion and cough. No ear or throat pain. Slight exacerbation of her irritable bowel syndrome. No N/V. No fever/ chills. No SOB/CP. Needs refills BP meds. The following portions of the patient's history were reviewed and updated as appropriate: allergies, current medications, past family history, past medical history, past social history, past surgical history, and problem list.    Review of Systems   Constitutional:  Negative for appetite change, chills and fever. HENT:  Positive for congestion. Negative for ear pain, sinus pain and sore throat. Respiratory:  Positive for cough. Negative for shortness of breath. Cardiovascular:  Negative for chest pain and palpitations. Gastrointestinal:  Negative for abdominal pain, nausea and vomiting. Musculoskeletal:  Negative for myalgias. Skin:  Negative for rash. Neurological:  Negative for light-headedness and headaches. Psychiatric/Behavioral:  Negative for decreased concentration.           Objective:      /82 (BP Location: Left arm, Patient Position: Sitting, Cuff Size: Large)   Pulse 71   Temp 98.6 °F (37 °C)   Resp 18   Ht 5' 7" (1.702 m)   Wt 77.7 kg (171 lb 6.4 oz)   SpO2 93%   BMI 26.85 kg/m²          Physical Exam  Vitals reviewed. Constitutional:       General: She is not in acute distress. Appearance: Normal appearance. HENT:      Head: Normocephalic and atraumatic. Right Ear: Tympanic membrane and ear canal normal.      Left Ear: Tympanic membrane, ear canal and external ear normal.      Ears:      Comments: Scab R lower earlobe     Nose: Nose normal.      Mouth/Throat:      Mouth: Mucous membranes are moist.      Pharynx: Oropharynx is clear. No oropharyngeal exudate or posterior oropharyngeal erythema. Eyes:      General: No scleral icterus. Conjunctiva/sclera: Conjunctivae normal.   Cardiovascular:      Rate and Rhythm: Normal rate and regular rhythm. Pulses: Normal pulses. Heart sounds: Normal heart sounds. No murmur heard. No gallop. Pulmonary:      Effort: Pulmonary effort is normal.      Comments: Mild crackles RLL  Abdominal:      General: Bowel sounds are normal.   Musculoskeletal:      Cervical back: Normal range of motion. Lymphadenopathy:      Cervical: No cervical adenopathy. Skin:     General: Skin is warm and dry. Neurological:      General: No focal deficit present. Mental Status: She is alert.    Psychiatric:         Mood and Affect: Mood normal.         Behavior: Behavior normal.

## 2023-12-15 ENCOUNTER — OFFICE VISIT (OUTPATIENT)
Dept: FAMILY MEDICINE CLINIC | Facility: HOME HEALTHCARE | Age: 83
End: 2023-12-15
Payer: MEDICARE

## 2023-12-15 ENCOUNTER — TELEPHONE (OUTPATIENT)
Dept: FAMILY MEDICINE CLINIC | Facility: HOME HEALTHCARE | Age: 83
End: 2023-12-15

## 2023-12-15 ENCOUNTER — APPOINTMENT (OUTPATIENT)
Dept: LAB | Facility: HOSPITAL | Age: 83
End: 2023-12-15
Payer: MEDICARE

## 2023-12-15 ENCOUNTER — HOSPITAL ENCOUNTER (OUTPATIENT)
Dept: RADIOLOGY | Facility: HOSPITAL | Age: 83
Discharge: HOME | End: 2023-12-15
Payer: MEDICARE

## 2023-12-15 ENCOUNTER — TELEPHONE (OUTPATIENT)
Dept: FAMILY MEDICINE CLINIC | Facility: CLINIC | Age: 83
End: 2023-12-15

## 2023-12-15 VITALS
BODY MASS INDEX: 26.74 KG/M2 | HEIGHT: 67 IN | OXYGEN SATURATION: 97 % | TEMPERATURE: 97.7 F | RESPIRATION RATE: 18 BRPM | WEIGHT: 170.4 LBS | HEART RATE: 94 BPM | DIASTOLIC BLOOD PRESSURE: 75 MMHG | SYSTOLIC BLOOD PRESSURE: 138 MMHG

## 2023-12-15 DIAGNOSIS — J18.9 PNEUMONIA OF RIGHT LOWER LOBE DUE TO INFECTIOUS ORGANISM: Primary | ICD-10-CM

## 2023-12-15 DIAGNOSIS — I10 PRIMARY HYPERTENSION: ICD-10-CM

## 2023-12-15 DIAGNOSIS — J18.9 PNEUMONIA OF RIGHT LOWER LOBE DUE TO INFECTIOUS ORGANISM: ICD-10-CM

## 2023-12-15 LAB
ALBUMIN SERPL BCP-MCNC: 4.3 G/DL (ref 3.5–5)
ALP SERPL-CCNC: 80 U/L (ref 34–104)
ALT SERPL W P-5'-P-CCNC: 14 U/L (ref 7–52)
ANION GAP SERPL CALCULATED.3IONS-SCNC: 10 MMOL/L
AST SERPL W P-5'-P-CCNC: 20 U/L (ref 13–39)
BASOPHILS # BLD AUTO: 0.03 THOUSANDS/ÂΜL (ref 0–0.1)
BASOPHILS NFR BLD AUTO: 1 % (ref 0–1)
BILIRUB SERPL-MCNC: 0.34 MG/DL (ref 0.2–1)
BNP SERPL-MCNC: 36 PG/ML (ref 0–100)
BUN SERPL-MCNC: 28 MG/DL (ref 5–25)
CALCIUM SERPL-MCNC: 9.7 MG/DL (ref 8.4–10.2)
CHLORIDE SERPL-SCNC: 100 MMOL/L (ref 96–108)
CO2 SERPL-SCNC: 23 MMOL/L (ref 21–32)
CREAT SERPL-MCNC: 1.06 MG/DL (ref 0.6–1.3)
EOSINOPHIL # BLD AUTO: 0.32 THOUSAND/ÂΜL (ref 0–0.61)
EOSINOPHIL NFR BLD AUTO: 6 % (ref 0–6)
ERYTHROCYTE [DISTWIDTH] IN BLOOD BY AUTOMATED COUNT: 13.2 % (ref 11.6–15.1)
GFR SERPL CREATININE-BSD FRML MDRD: 47 ML/MIN/1.73SQ M
GLUCOSE SERPL-MCNC: 113 MG/DL (ref 65–140)
HCT VFR BLD AUTO: 42.2 % (ref 34.8–46.1)
HGB BLD-MCNC: 13.8 G/DL (ref 11.5–15.4)
IMM GRANULOCYTES # BLD AUTO: 0.02 THOUSAND/UL (ref 0–0.2)
IMM GRANULOCYTES NFR BLD AUTO: 0 % (ref 0–2)
LYMPHOCYTES # BLD AUTO: 1.11 THOUSANDS/ÂΜL (ref 0.6–4.47)
LYMPHOCYTES NFR BLD AUTO: 21 % (ref 14–44)
MCH RBC QN AUTO: 31.7 PG (ref 26.8–34.3)
MCHC RBC AUTO-ENTMCNC: 32.7 G/DL (ref 31.4–37.4)
MCV RBC AUTO: 97 FL (ref 82–98)
MONOCYTES # BLD AUTO: 0.38 THOUSAND/ÂΜL (ref 0.17–1.22)
MONOCYTES NFR BLD AUTO: 7 % (ref 4–12)
NEUTROPHILS # BLD AUTO: 3.51 THOUSANDS/ÂΜL (ref 1.85–7.62)
NEUTS SEG NFR BLD AUTO: 65 % (ref 43–75)
NRBC BLD AUTO-RTO: 0 /100 WBCS
PLATELET # BLD AUTO: 203 THOUSANDS/UL (ref 149–390)
PMV BLD AUTO: 10.3 FL (ref 8.9–12.7)
POTASSIUM SERPL-SCNC: 4.3 MMOL/L (ref 3.5–5.3)
PROT SERPL-MCNC: 7.5 G/DL (ref 6.4–8.4)
RBC # BLD AUTO: 4.36 MILLION/UL (ref 3.81–5.12)
SARS-COV-2 AG UPPER RESP QL IA: NEGATIVE
SODIUM SERPL-SCNC: 133 MMOL/L (ref 135–147)
VALID CONTROL: NORMAL
WBC # BLD AUTO: 5.37 THOUSAND/UL (ref 4.31–10.16)

## 2023-12-15 PROCEDURE — 85025 COMPLETE CBC W/AUTO DIFF WBC: CPT

## 2023-12-15 PROCEDURE — 87636 SARSCOV2 & INF A&B AMP PRB: CPT

## 2023-12-15 PROCEDURE — 83880 ASSAY OF NATRIURETIC PEPTIDE: CPT

## 2023-12-15 PROCEDURE — 99213 OFFICE O/P EST LOW 20 MIN: CPT

## 2023-12-15 PROCEDURE — 80053 COMPREHEN METABOLIC PANEL: CPT

## 2023-12-15 PROCEDURE — 36415 COLL VENOUS BLD VENIPUNCTURE: CPT

## 2023-12-15 PROCEDURE — 71046 X-RAY EXAM CHEST 2 VIEWS: CPT

## 2023-12-15 RX ORDER — DOXYCYCLINE HYCLATE 100 MG/1
100 CAPSULE ORAL EVERY 12 HOURS SCHEDULED
Qty: 14 CAPSULE | Refills: 0 | Status: SHIPPED | OUTPATIENT
Start: 2023-12-15 | End: 2023-12-22

## 2023-12-15 NOTE — TELEPHONE ENCOUNTER
Patient called asking if it is ok for her to start taking her antibiotic tomorrow or would you rather her start it today

## 2023-12-15 NOTE — PROGRESS NOTES
Assessment/Plan:    No problem-specific Assessment & Plan notes found for this encounter. Diagnoses and all orders for this visit:    Pneumonia of right lower lobe due to infectious organism  Comments:  finished azithromycin  still not feeling ok, PE+ crackles/wheezing  Will do CBC CMP BNP chest x-ray COVID/flu testing  Start Doxy  + Penicillin allergy  No feve  Orders:  -     CBC and differential; Future  -     Comprehensive metabolic panel; Future  -     B-Type Natriuretic Peptide(BNP); Future  -     XR chest pa & lateral; Future  -     POCT Rapid Covid Ag  -     COVID/FLU; Future  -     doxycycline hyclate (VIBRAMYCIN) 100 mg capsule; Take 1 capsule (100 mg total) by mouth every 12 (twelve) hours for 7 days  -     COVID/FLU    Primary hypertension  Comments:  Continue lisinopril 10 mg hydrochlorothiazide 25 mg          Subjective:      Patient ID: Juan Kamara is a 78 y.o. female. HPI    Patient is here to follow-up regarding her cough. She was seen on December 11 was diagnosed with pneumonia right lower lobe. Patient completed azithromycin course. Patient complains that till now she is not feeling well she still has productive cough, fatigue. During the encounter she is anxious and tearful. We will do CBC CMP chest x-ray to rule out pneumonia we will do COVID/flu swab. Also will check for BNP. Once patient is at baseline we will do pulmonary function test.      I will call the patient around 9 PM is to check how she is doing. The following portions of the patient's history were reviewed and updated as appropriate: allergies, current medications, past family history, past medical history, past social history, past surgical history, and problem list.    Review of Systems   Constitutional:  Positive for fatigue. Negative for chills and fever. HENT:  Negative for ear pain and sore throat. Eyes:  Negative for pain and visual disturbance. Respiratory:  Positive for cough.  Negative for shortness of breath. Cardiovascular:  Negative for chest pain and palpitations. Gastrointestinal:  Negative for abdominal pain and vomiting. Genitourinary:  Negative for dysuria and hematuria. Musculoskeletal:  Negative for arthralgias and back pain. Skin:  Negative for color change and rash. Neurological:  Negative for seizures and syncope. All other systems reviewed and are negative. Objective:      /75 (BP Location: Left arm, Patient Position: Sitting, Cuff Size: Standard)   Pulse 94   Temp 97.7 °F (36.5 °C)   Resp 18   Ht 5' 7" (1.702 m)   Wt 77.3 kg (170 lb 6.4 oz)   SpO2 97%   BMI 26.69 kg/m²          Physical Exam  Constitutional:       Appearance: She is normal weight. HENT:      Right Ear: Tympanic membrane normal.      Left Ear: Tympanic membrane normal.      Mouth/Throat:      Mouth: Mucous membranes are moist.   Eyes:      Extraocular Movements: Extraocular movements intact. Conjunctiva/sclera: Conjunctivae normal.   Cardiovascular:      Rate and Rhythm: Normal rate and regular rhythm. Pulses: Normal pulses. Heart sounds: Normal heart sounds. Pulmonary:      Effort: Pulmonary effort is normal.      Comments: Right lower lobe positive for wheezes/crackles  Musculoskeletal:      Right lower leg: No edema. Left lower leg: No edema. Skin:     General: Skin is warm. Capillary Refill: Capillary refill takes less than 2 seconds. Neurological:      General: No focal deficit present. Mental Status: She is alert and oriented to person, place, and time. Psychiatric:         Behavior: Behavior normal.         Thought Content:  Thought content normal.      Comments: Patient looks anxious and emotional

## 2023-12-16 LAB
FLUAV RNA RESP QL NAA+PROBE: NEGATIVE
FLUBV RNA RESP QL NAA+PROBE: NEGATIVE
SARS-COV-2 RNA RESP QL NAA+PROBE: NEGATIVE

## 2023-12-16 NOTE — TELEPHONE ENCOUNTER
Called and spoke with Mrs Kimberly Serrano. ] she was seen today in the clinic regarding cough. We did order cbc. Cmp, chest x ray , BNP for her. I reviewed her chest xray  result, which shows that her lungs are clear. Additionally we did cbc - her wbc - WNL and she just completed antibiotic course , last dose today. Her BNP- wnl , we ruled out HF, her PE - NO signs of volume overload. Her cmp remarkable for slightly  low sodium level. I discussed with her that she is taking water pill and this can contribute to he Na level. She is on ACEI 10 mg and HCTZ 25mg. Her blood pressure mediation can be adjusted in the future by increasing ACEi to max dose for kidney protection in the setting of HTN and tapering down HCTZ. Additionally, I discussed with her that she needs to do PFT so we can give her proper inhaler base on her PFT  result. Kimberly Serrano will be having appt with Dr Nayla Lynn on Monday for her cough follow up.

## 2023-12-18 ENCOUNTER — OFFICE VISIT (OUTPATIENT)
Dept: FAMILY MEDICINE CLINIC | Facility: HOME HEALTHCARE | Age: 83
End: 2023-12-18
Payer: MEDICARE

## 2023-12-18 VITALS
SYSTOLIC BLOOD PRESSURE: 132 MMHG | BODY MASS INDEX: 26.84 KG/M2 | HEIGHT: 67 IN | DIASTOLIC BLOOD PRESSURE: 72 MMHG | WEIGHT: 171 LBS | TEMPERATURE: 98.6 F | HEART RATE: 71 BPM | RESPIRATION RATE: 18 BRPM | OXYGEN SATURATION: 95 %

## 2023-12-18 DIAGNOSIS — J40 BRONCHITIS: Primary | ICD-10-CM

## 2023-12-18 PROCEDURE — 99213 OFFICE O/P EST LOW 20 MIN: CPT | Performed by: FAMILY MEDICINE

## 2023-12-18 NOTE — PROGRESS NOTES
"Assessment/Plan:    No problem-specific Assessment & Plan notes found for this encounter.       Diagnoses and all orders for this visit:    Bronchitis  Comments:  prn cough medicine          Subjective:      Patient ID: Roxy Farmer is a 83 y.o. female.    HPIFollow-up for bronchial infection. Still with cough but no fever or SOB. Feeling better since Friday. Tired and resting. No CP. No GI sxs. Did not start taking doxy. Aware of CXR and lab results.    The following portions of the patient's history were reviewed and updated as appropriate: allergies, current medications, past family history, past medical history, past social history, past surgical history, and problem list.    Review of Systems   Constitutional:  Negative for activity change, appetite change, diaphoresis and fever.   HENT:  Negative for ear pain and sore throat.    Respiratory:  Positive for cough. Negative for shortness of breath.    Cardiovascular:  Negative for chest pain.   Gastrointestinal:  Negative for abdominal pain, nausea and vomiting.   Neurological:  Negative for light-headedness and headaches.         Objective:      /72 (BP Location: Left arm, Patient Position: Sitting, Cuff Size: Large)   Pulse 71   Temp 98.6 °F (37 °C)   Resp 18   Ht 5' 7\" (1.702 m)   Wt 77.6 kg (171 lb)   SpO2 95%   BMI 26.78 kg/m²          Physical Exam  Vitals reviewed.   Constitutional:       General: She is not in acute distress.     Appearance: Normal appearance.   Eyes:      General: No scleral icterus.     Conjunctiva/sclera: Conjunctivae normal.   Cardiovascular:      Rate and Rhythm: Normal rate and regular rhythm.      Pulses: Normal pulses.      Heart sounds: Normal heart sounds. No murmur heard.     No gallop.   Pulmonary:      Effort: Pulmonary effort is normal.      Breath sounds: Wheezing and rales present.      Comments: B/L wheezes and R sided crackles, less than Friday  Skin:     General: Skin is warm and dry.   Neurological:     "  General: No focal deficit present.      Mental Status: She is alert and oriented to person, place, and time.   Psychiatric:         Mood and Affect: Mood normal.         Behavior: Behavior normal.

## 2023-12-29 ENCOUNTER — TELEPHONE (OUTPATIENT)
Age: 83
End: 2023-12-29

## 2023-12-29 DIAGNOSIS — F41.9 ANXIETY: ICD-10-CM

## 2023-12-29 DIAGNOSIS — I10 HYPERTENSION: ICD-10-CM

## 2023-12-29 DIAGNOSIS — R05.2 SUBACUTE COUGH: ICD-10-CM

## 2024-01-02 RX ORDER — ALPRAZOLAM 0.25 MG/1
0.25 TABLET ORAL 3 TIMES DAILY PRN
Qty: 90 TABLET | Refills: 0 | Status: SHIPPED | OUTPATIENT
Start: 2024-01-02

## 2024-01-02 RX ORDER — HYDROCHLOROTHIAZIDE 25 MG/1
25 TABLET ORAL DAILY
Qty: 30 TABLET | Refills: 0 | Status: SHIPPED | OUTPATIENT
Start: 2024-01-02

## 2024-01-02 RX ORDER — BENZONATATE 100 MG/1
CAPSULE ORAL
Qty: 20 CAPSULE | Refills: 0 | Status: SHIPPED | OUTPATIENT
Start: 2024-01-02

## 2024-01-25 DIAGNOSIS — I10 HYPERTENSION: ICD-10-CM

## 2024-01-26 RX ORDER — HYDROCHLOROTHIAZIDE 25 MG/1
25 TABLET ORAL DAILY
Qty: 90 TABLET | Refills: 1 | Status: SHIPPED | OUTPATIENT
Start: 2024-01-26 | End: 2024-01-29 | Stop reason: SDUPTHER

## 2024-01-29 ENCOUNTER — OFFICE VISIT (OUTPATIENT)
Dept: FAMILY MEDICINE CLINIC | Facility: HOME HEALTHCARE | Age: 84
End: 2024-01-29
Payer: MEDICARE

## 2024-01-29 VITALS
HEART RATE: 94 BPM | WEIGHT: 172.8 LBS | TEMPERATURE: 98 F | OXYGEN SATURATION: 96 % | SYSTOLIC BLOOD PRESSURE: 130 MMHG | RESPIRATION RATE: 18 BRPM | BODY MASS INDEX: 27.12 KG/M2 | DIASTOLIC BLOOD PRESSURE: 80 MMHG | HEIGHT: 67 IN

## 2024-01-29 DIAGNOSIS — F33.9 DEPRESSION, RECURRENT (HCC): ICD-10-CM

## 2024-01-29 DIAGNOSIS — F41.9 ANXIETY: ICD-10-CM

## 2024-01-29 DIAGNOSIS — D68.00 VON WILLEBRAND DISEASE (HCC): ICD-10-CM

## 2024-01-29 DIAGNOSIS — R05.2 SUBACUTE COUGH: ICD-10-CM

## 2024-01-29 DIAGNOSIS — I10 HYPERTENSION: ICD-10-CM

## 2024-01-29 PROCEDURE — 99213 OFFICE O/P EST LOW 20 MIN: CPT | Performed by: FAMILY MEDICINE

## 2024-01-29 RX ORDER — BENZONATATE 100 MG/1
100 CAPSULE ORAL 3 TIMES DAILY PRN
Qty: 60 CAPSULE | Refills: 0 | Status: SHIPPED | OUTPATIENT
Start: 2024-01-29

## 2024-01-29 RX ORDER — ALPRAZOLAM 0.25 MG/1
0.25 TABLET ORAL 3 TIMES DAILY PRN
Qty: 90 TABLET | Refills: 0 | Status: SHIPPED | OUTPATIENT
Start: 2024-01-29

## 2024-01-29 RX ORDER — HYDROCHLOROTHIAZIDE 25 MG/1
25 TABLET ORAL DAILY
Qty: 90 TABLET | Refills: 1 | Status: SHIPPED | OUTPATIENT
Start: 2024-01-29

## 2024-01-30 NOTE — PROGRESS NOTES
Assessment/Plan:    No problem-specific Assessment & Plan notes found for this encounter.       Diagnoses and all orders for this visit:    Hypertension  Comments:  stable; no changes  Orders:  -     hydroCHLOROthiazide 25 mg tablet; Take 1 tablet (25 mg total) by mouth daily    Subacute cough  Comments:  tessalon perles prn per request  Orders:  -     benzonatate (TESSALON PERLES) 100 mg capsule; Take 1 capsule (100 mg total) by mouth 3 (three) times a day as needed for cough    Von Willebrand disease (HCC)  Comments:  stable    Depression, recurrent (HCC)  Comments:  offrerd SSRI and counseling - pateint to consider    Anxiety  -     ALPRAZolam (XANAX) 0.25 mg tablet; Take 1 tablet (0.25 mg total) by mouth 3 (three) times a day as needed for anxiety          Subjective:      Patient ID: Roxy Farmer is a 83 y.o. female.    HPI Patient presents for f/up medical problems. Still has difficulty with grieving over son's suicide about one year ago. Has difficulty falling asleep. She is not currently interested in counseling or antidepressant treatment. She has attended grief group in past. She prays a lot. She denies SI. She has been cautious about screenings due to von Willebrand's ( we discussed this). Has a mild cough and was told years ago about granulomas but denies any sarcoidosis.    The following portions of the patient's history were reviewed and updated as appropriate: allergies, current medications, past family history, past medical history, past social history, past surgical history, and problem list.    Review of Systems   Constitutional:  Negative for activity change, appetite change, diaphoresis and fever.   Respiratory:  Positive for cough. Negative for shortness of breath and wheezing.    Cardiovascular:  Negative for chest pain, palpitations and leg swelling.   Gastrointestinal:  Negative for abdominal pain, nausea and vomiting.   Neurological:  Negative for syncope, numbness and headaches.  "  Psychiatric/Behavioral:  Positive for sleep disturbance. Negative for suicidal ideas.          Objective:      /80 (BP Location: Left arm, Patient Position: Sitting, Cuff Size: Standard)   Pulse 94   Temp 98 °F (36.7 °C)   Resp 18   Ht 5' 7\" (1.702 m)   Wt 78.4 kg (172 lb 12.8 oz)   SpO2 96%   BMI 27.06 kg/m²          Physical Exam  Vitals reviewed.   Constitutional:       General: She is not in acute distress.     Appearance: Normal appearance.   Eyes:      General: No scleral icterus.     Conjunctiva/sclera: Conjunctivae normal.   Cardiovascular:      Rate and Rhythm: Normal rate and regular rhythm.      Pulses: Normal pulses.      Heart sounds: Normal heart sounds. No murmur heard.     No friction rub. No gallop.   Pulmonary:      Effort: Pulmonary effort is normal.      Breath sounds: Normal breath sounds.   Skin:     General: Skin is warm and dry.   Neurological:      General: No focal deficit present.      Mental Status: She is alert.   Psychiatric:         Mood and Affect: Mood normal.           "

## 2024-02-21 PROBLEM — Z00.00 MEDICARE ANNUAL WELLNESS VISIT, SUBSEQUENT: Status: RESOLVED | Noted: 2019-12-23 | Resolved: 2024-02-21

## 2024-03-05 DIAGNOSIS — F41.9 ANXIETY: ICD-10-CM

## 2024-03-05 DIAGNOSIS — I10 HYPERTENSION: ICD-10-CM

## 2024-03-05 DIAGNOSIS — E03.9 HYPOTHYROIDISM, UNSPECIFIED TYPE: ICD-10-CM

## 2024-03-05 DIAGNOSIS — K58.2 IRRITABLE BOWEL SYNDROME WITH BOTH CONSTIPATION AND DIARRHEA: ICD-10-CM

## 2024-03-05 DIAGNOSIS — R05.2 SUBACUTE COUGH: ICD-10-CM

## 2024-03-05 DIAGNOSIS — K57.90 DIVERTICULOSIS: ICD-10-CM

## 2024-03-05 DIAGNOSIS — I10 ESSENTIAL HYPERTENSION: ICD-10-CM

## 2024-03-05 RX ORDER — METOPROLOL TARTRATE 50 MG/1
50 TABLET, FILM COATED ORAL DAILY
Qty: 30 TABLET | Refills: 2 | Status: SHIPPED | OUTPATIENT
Start: 2024-03-05 | End: 2024-03-28

## 2024-03-05 RX ORDER — BENZONATATE 100 MG/1
CAPSULE ORAL
Qty: 60 CAPSULE | Refills: 0 | Status: SHIPPED | OUTPATIENT
Start: 2024-03-05

## 2024-03-05 RX ORDER — LISINOPRIL 10 MG/1
10 TABLET ORAL 2 TIMES DAILY
Qty: 180 TABLET | Refills: 3 | Status: SHIPPED | OUTPATIENT
Start: 2024-03-05

## 2024-03-05 RX ORDER — DICYCLOMINE HYDROCHLORIDE 10 MG/1
10 CAPSULE ORAL 3 TIMES DAILY PRN
Qty: 90 CAPSULE | Refills: 0 | Status: SHIPPED | OUTPATIENT
Start: 2024-03-05 | End: 2024-03-29

## 2024-03-05 RX ORDER — LEVOTHYROXINE SODIUM 0.1 MG/1
100 TABLET ORAL DAILY
Qty: 90 TABLET | Refills: 3 | Status: SHIPPED | OUTPATIENT
Start: 2024-03-05

## 2024-03-05 RX ORDER — HYDROCHLOROTHIAZIDE 25 MG/1
25 TABLET ORAL DAILY
Qty: 90 TABLET | Refills: 1 | Status: SHIPPED | OUTPATIENT
Start: 2024-03-05

## 2024-03-05 RX ORDER — ALPRAZOLAM 0.25 MG/1
0.25 TABLET ORAL 3 TIMES DAILY PRN
Qty: 90 TABLET | Refills: 0 | OUTPATIENT
Start: 2024-03-05

## 2024-03-05 RX ORDER — ALPRAZOLAM 0.25 MG/1
0.25 TABLET ORAL 3 TIMES DAILY PRN
Qty: 90 TABLET | Refills: 0 | Status: SHIPPED | OUTPATIENT
Start: 2024-03-05 | End: 2024-04-02 | Stop reason: SDUPTHER

## 2024-03-05 RX ORDER — PANTOPRAZOLE SODIUM 40 MG/1
40 TABLET, DELAYED RELEASE ORAL DAILY
Qty: 90 TABLET | Refills: 3 | Status: SHIPPED | OUTPATIENT
Start: 2024-03-05

## 2024-03-28 DIAGNOSIS — I10 ESSENTIAL HYPERTENSION: ICD-10-CM

## 2024-03-28 DIAGNOSIS — R05.2 SUBACUTE COUGH: ICD-10-CM

## 2024-03-28 RX ORDER — METOPROLOL TARTRATE 50 MG/1
50 TABLET, FILM COATED ORAL DAILY
Qty: 90 TABLET | Refills: 0 | Status: SHIPPED | OUTPATIENT
Start: 2024-03-28 | End: 2024-06-26

## 2024-03-29 DIAGNOSIS — K57.90 DIVERTICULOSIS: ICD-10-CM

## 2024-03-29 DIAGNOSIS — K58.2 IRRITABLE BOWEL SYNDROME WITH BOTH CONSTIPATION AND DIARRHEA: ICD-10-CM

## 2024-03-29 RX ORDER — DICYCLOMINE HYDROCHLORIDE 10 MG/1
10 CAPSULE ORAL 3 TIMES DAILY PRN
Qty: 270 CAPSULE | Refills: 1 | Status: SHIPPED | OUTPATIENT
Start: 2024-03-29 | End: 2024-04-28

## 2024-03-29 RX ORDER — BENZONATATE 100 MG/1
CAPSULE ORAL
Qty: 60 CAPSULE | Refills: 0 | Status: SHIPPED | OUTPATIENT
Start: 2024-03-29

## 2024-04-02 DIAGNOSIS — F41.9 ANXIETY: ICD-10-CM

## 2024-04-02 RX ORDER — ALPRAZOLAM 0.25 MG/1
0.25 TABLET ORAL 3 TIMES DAILY PRN
Qty: 90 TABLET | Refills: 0 | Status: SHIPPED | OUTPATIENT
Start: 2024-04-02

## 2024-05-08 DIAGNOSIS — F41.9 ANXIETY: ICD-10-CM

## 2024-05-09 DIAGNOSIS — F41.9 ANXIETY: ICD-10-CM

## 2024-05-09 RX ORDER — ALPRAZOLAM 0.25 MG/1
0.25 TABLET ORAL 3 TIMES DAILY PRN
Qty: 90 TABLET | Refills: 0 | Status: SHIPPED | OUTPATIENT
Start: 2024-05-09

## 2024-05-09 RX ORDER — ALPRAZOLAM 0.25 MG/1
0.25 TABLET ORAL 3 TIMES DAILY PRN
Qty: 90 TABLET | Refills: 0 | OUTPATIENT
Start: 2024-05-09

## 2024-05-10 DIAGNOSIS — I10 ESSENTIAL HYPERTENSION: ICD-10-CM

## 2024-05-10 RX ORDER — METOPROLOL TARTRATE 50 MG/1
50 TABLET, FILM COATED ORAL DAILY
Qty: 90 TABLET | Refills: 1 | Status: SHIPPED | OUTPATIENT
Start: 2024-05-10 | End: 2024-11-06

## 2024-06-10 DIAGNOSIS — F41.9 ANXIETY: ICD-10-CM

## 2024-06-10 NOTE — TELEPHONE ENCOUNTER
Medication Refill Request     Name Xanax    Dose/Frequency .25mg 3x daily as needed  Quantity 90   Verified pharmacy   [x]  Verified ordering Provider   [x]  Does patient have enough for the next 3 days? Yes [x] No []

## 2024-06-11 DIAGNOSIS — F41.9 ANXIETY: ICD-10-CM

## 2024-06-11 RX ORDER — ALPRAZOLAM 0.25 MG/1
0.25 TABLET ORAL 3 TIMES DAILY PRN
Qty: 90 TABLET | Refills: 0 | Status: SHIPPED | OUTPATIENT
Start: 2024-06-11

## 2024-06-11 RX ORDER — ALPRAZOLAM 0.25 MG/1
0.25 TABLET ORAL 3 TIMES DAILY PRN
Qty: 90 TABLET | Refills: 0 | OUTPATIENT
Start: 2024-06-11

## 2024-06-17 ENCOUNTER — OFFICE VISIT (OUTPATIENT)
Dept: FAMILY MEDICINE CLINIC | Facility: HOME HEALTHCARE | Age: 84
End: 2024-06-17
Payer: MEDICARE

## 2024-06-17 VITALS
DIASTOLIC BLOOD PRESSURE: 80 MMHG | SYSTOLIC BLOOD PRESSURE: 138 MMHG | HEIGHT: 67 IN | TEMPERATURE: 98.2 F | OXYGEN SATURATION: 94 % | HEART RATE: 69 BPM | BODY MASS INDEX: 27.15 KG/M2 | RESPIRATION RATE: 18 BRPM | WEIGHT: 173 LBS

## 2024-06-17 DIAGNOSIS — F33.9 DEPRESSION, RECURRENT (HCC): ICD-10-CM

## 2024-06-17 DIAGNOSIS — L29.9 PRURITUS: Primary | ICD-10-CM

## 2024-06-17 DIAGNOSIS — Z00.00 ENCOUNTER FOR SUBSEQUENT ANNUAL WELLNESS VISIT (AWV) IN MEDICARE PATIENT: ICD-10-CM

## 2024-06-17 LAB
ALBUMIN SERPL BCP-MCNC: 4.4 G/DL (ref 3.5–5)
ALP SERPL-CCNC: 75 U/L (ref 34–104)
ALT SERPL W P-5'-P-CCNC: 21 U/L (ref 7–52)
ANION GAP SERPL CALCULATED.3IONS-SCNC: 11 MMOL/L (ref 4–13)
AST SERPL W P-5'-P-CCNC: 26 U/L (ref 13–39)
BASOPHILS # BLD AUTO: 0.05 THOUSANDS/ÂΜL (ref 0–0.1)
BASOPHILS NFR BLD AUTO: 1 % (ref 0–1)
BILIRUB SERPL-MCNC: 0.47 MG/DL (ref 0.2–1)
BUN SERPL-MCNC: 30 MG/DL (ref 5–25)
CALCIUM SERPL-MCNC: 10 MG/DL (ref 8.4–10.2)
CHLORIDE SERPL-SCNC: 102 MMOL/L (ref 96–108)
CO2 SERPL-SCNC: 26 MMOL/L (ref 21–32)
CREAT SERPL-MCNC: 0.98 MG/DL (ref 0.6–1.3)
EOSINOPHIL # BLD AUTO: 0.35 THOUSAND/ÂΜL (ref 0–0.61)
EOSINOPHIL NFR BLD AUTO: 7 % (ref 0–6)
ERYTHROCYTE [DISTWIDTH] IN BLOOD BY AUTOMATED COUNT: 14 % (ref 11.6–15.1)
GFR SERPL CREATININE-BSD FRML MDRD: 52 ML/MIN/1.73SQ M
GLUCOSE P FAST SERPL-MCNC: 95 MG/DL (ref 65–99)
HCT VFR BLD AUTO: 41.9 % (ref 34.8–46.1)
HGB BLD-MCNC: 13.5 G/DL (ref 11.5–15.4)
IMM GRANULOCYTES # BLD AUTO: 0.02 THOUSAND/UL (ref 0–0.2)
IMM GRANULOCYTES NFR BLD AUTO: 0 % (ref 0–2)
LYMPHOCYTES # BLD AUTO: 1.87 THOUSANDS/ÂΜL (ref 0.6–4.47)
LYMPHOCYTES NFR BLD AUTO: 40 % (ref 14–44)
MCH RBC QN AUTO: 32.1 PG (ref 26.8–34.3)
MCHC RBC AUTO-ENTMCNC: 32.2 G/DL (ref 31.4–37.4)
MCV RBC AUTO: 100 FL (ref 82–98)
MONOCYTES # BLD AUTO: 0.6 THOUSAND/ÂΜL (ref 0.17–1.22)
MONOCYTES NFR BLD AUTO: 13 % (ref 4–12)
NEUTROPHILS # BLD AUTO: 1.85 THOUSANDS/ÂΜL (ref 1.85–7.62)
NEUTS SEG NFR BLD AUTO: 39 % (ref 43–75)
NRBC BLD AUTO-RTO: 0 /100 WBCS
PLATELET # BLD AUTO: 219 THOUSANDS/UL (ref 149–390)
PMV BLD AUTO: 11.5 FL (ref 8.9–12.7)
POTASSIUM SERPL-SCNC: 4.8 MMOL/L (ref 3.5–5.3)
PROT SERPL-MCNC: 7.4 G/DL (ref 6.4–8.4)
RBC # BLD AUTO: 4.2 MILLION/UL (ref 3.81–5.12)
SODIUM SERPL-SCNC: 139 MMOL/L (ref 135–147)
WBC # BLD AUTO: 4.74 THOUSAND/UL (ref 4.31–10.16)

## 2024-06-17 PROCEDURE — 85025 COMPLETE CBC W/AUTO DIFF WBC: CPT | Performed by: FAMILY MEDICINE

## 2024-06-17 PROCEDURE — G0439 PPPS, SUBSEQ VISIT: HCPCS | Performed by: FAMILY MEDICINE

## 2024-06-17 PROCEDURE — 80053 COMPREHEN METABOLIC PANEL: CPT | Performed by: FAMILY MEDICINE

## 2024-06-17 NOTE — PROGRESS NOTES
Ambulatory Visit  Name: Roxy Farmer      : 1940      MRN: [id number]  Encounter Provider: Casey Barrera MD  Encounter Date: 2024   Encounter department: [facility]    Assessment & Plan   1. Pruritus  -     CBC and differential; Future  -     Comprehensive metabolic panel; Future  -     CBC and differential  -     Comprehensive metabolic panel  2. Depression, recurrent (HCC)  3. Encounter for subsequent annual wellness visit (AWV) in Medicare patient       Preventive health issues were discussed with patient, and age appropriate screening tests were ordered as noted in patient's After Visit Summary. Personalized health advice and appropriate referrals for health education or preventive services given if needed, as noted in patient's After Visit Summary.    History of Present Illness     Reviewed. Discussed with patient her stress with relationships with grandchildren and children. Son in [place] does not want patient to pursue late ex-'s pension and intermediate funds as it would hold up his receiving 8,000$ for him. Her son in [place] is depressed and will not seek counseling. She does not wish counseling as she does not believe it helped her in the past. 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 Has pain and prominence R great toe. Generalized pruritus despite no rash and changes of soaps and detergents.No Gi sxs.       Patient Care Team:  Casey Barrera MD as PCP - General (Family Medicine)    Review of Systems  Medical History Reviewed by provider this encounter:  Tobacco  Allergies  Meds  Problems  Med Hx  Surg Hx  Fam Hx       Annual Wellness Visit Questionnaire       Health Risk Assessment:   Patient rates overall health as very good. Patient feels that their physical health rating is slightly worse. Patient is satisfied with their life. Eyesight was rated as same. Hearing was rated as same.  Patient feels that their emotional and mental health rating is slightly worse. Patients states they are never, rarely angry. Patient states they are often unusually tired/fatigued. Pain experienced in the last 7 days has been some. Patient's pain rating has been 7/10. Patient states that she has experienced no weight loss or gain in last 6 months.     Depression Screening:   PHQ-9 Score: 6      Fall Risk Screening:   In the past year, patient has experienced: no history of falling in past year      Urinary Incontinence Screening:   Patient has not leaked urine accidently in the last six months.     Home Safety:  Patient does not have trouble with stairs inside or outside of their home. Patient has working smoke alarms and has working carbon monoxide detector. Home safety hazards include: none.     Nutrition:   Current diet is Regular.     Medications:   Patient is not currently taking any over-the-counter supplements. Patient is able to manage medications.     Activities of Daily Living (ADLs)/Instrumental Activities of Daily Living (IADLs):   Walk and transfer into and out of bed and chair?: Yes  Dress and groom yourself?: Yes    Bathe or shower yourself?: Yes    Feed yourself? Yes  Do your laundry/housekeeping?: Yes  Manage your money, pay your bills and track your expenses?: Yes  Make your own meals?: Yes    Do your own shopping?: Yes    Previous Hospitalizations:   Any hospitalizations or ED visits within the last 12 months?: Yes    How many hospitalizations have you had in the last year?: 1-2    Advance Care Planning:   Living will: Yes    Advanced directive: Yes      PREVENTIVE SCREENINGS      Cardiovascular Screening:    General: Screening Not Indicated and History Lipid Disorder      Diabetes Screening:     General: Screening Current      Colorectal Cancer Screening:     General: Screening Not Indicated      Cervical Cancer Screening:    General: Screening Not Indicated      Lung Cancer Screening:      "General: Screening Not Indicated      Hepatitis C Screening:    General: Screening Current    Screening, Brief Intervention, and Referral to Treatment (SBIRT)    Screening  Typical number of drinks in a day: 0  Typical number of drinks in a week: 0  Interpretation: Low risk drinking behavior.    Single Item Drug Screening:  How often have you used an illegal drug (including marijuana) or a prescription medication for non-medical reasons in the past year? never    Single Item Drug Screen Score: 0  Interpretation: Negative screen for possible drug use disorder    Social Determinants of Health     Financial Resource Strain: Low Risk  (2/6/2023)    Overall Financial Resource Strain (CARDIA)     Difficulty of Paying Living Expenses: Not very hard   Food Insecurity: No Food Insecurity (6/17/2024)    Hunger Vital Sign     Worried About Running Out of Food in the Last Year: Never true     Ran Out of Food in the Last Year: Never true   Transportation Needs: No Transportation Needs (6/17/2024)    PRAPARE - Transportation     Lack of Transportation (Medical): No     Lack of Transportation (Non-Medical): No   Housing Stability: Low Risk  (6/17/2024)    Housing Stability Vital Sign     Unable to Pay for Housing in the Last Year: No     Number of Times Moved in the Last Year: 1     Homeless in the Last Year: No   Utilities: Not At Risk (6/17/2024)    [facility] Utilities     Threatened with loss of utilities: No     No results found.    Objective     /80 (BP Location: Left arm, Patient Position: Sitting, Cuff Size: Large)   Pulse 69   Temp 98.2 °F (36.8 °C)   Resp 18   Ht 5' 7\" (1.702 m)   Wt 78.5 kg (173 lb)   SpO2 94%   BMI 27.10 kg/m²     Physical Exam  Vitals reviewed.   Constitutional:       General: She is not in acute distress.     Appearance: Normal appearance.   Cardiovascular:      Rate and Rhythm: Normal rate and regular rhythm.      Pulses: Normal pulses.      Heart sounds: Murmur heard.      No friction rub. No " gallop.   Pulmonary:      Effort: Pulmonary effort is normal.      Breath sounds: Normal breath sounds.   Musculoskeletal:      Right lower leg: No edema.      Left lower leg: No edema.      Comments: Slight bony prominence R great toe (uninterested in work up)   Skin:     General: Skin is warm and dry.      Coloration: Skin is not jaundiced.      Findings: No lesion or rash.   Neurological:      General: No focal deficit present.      Mental Status: She is alert and oriented to person, place, and time.   Psychiatric:         Mood and Affect: Mood normal.         Behavior: Behavior normal.       Administrative Statements

## 2024-06-17 NOTE — PATIENT INSTRUCTIONS
Medicare Preventive Visit Patient Instructions  Thank you for completing your Welcome to Medicare Visit or Medicare Annual Wellness Visit today. Your next wellness visit will be due in one year (6/18/2025).  The screening/preventive services that you may require over the next 5-10 years are detailed below. Some tests may not apply to you based off risk factors and/or age. Screening tests ordered at today's visit but not completed yet may show as past due. Also, please note that scanned in results may not display below.  Preventive Screenings:  Service Recommendations Previous Testing/Comments   Colorectal Cancer Screening  * Colonoscopy    * Fecal Occult Blood Test (FOBT)/Fecal Immunochemical Test (FIT)  * Fecal DNA/Cologuard Test  * Flexible Sigmoidoscopy Age: 45-75 years old   Colonoscopy: every 10 years (may be performed more frequently if at higher risk)  OR  FOBT/FIT: every 1 year  OR  Cologuard: every 3 years  OR  Sigmoidoscopy: every 5 years  Screening may be recommended earlier than age 45 if at higher risk for colorectal cancer. Also, an individualized decision between you and your healthcare provider will decide whether screening between the ages of 76-85 would be appropriate. Colonoscopy: Not on file  FOBT/FIT: Not on file  Cologuard: Not on file  Sigmoidoscopy: Not on file          Breast Cancer Screening Age: 40+ years old  Frequency: every 1-2 years  Not required if history of left and right mastectomy Mammogram: Not on file        Cervical Cancer Screening Between the ages of 21-29, pap smear recommended once every 3 years.   Between the ages of 30-65, can perform pap smear with HPV co-testing every 5 years.   Recommendations may differ for women with a history of total hysterectomy, cervical cancer, or abnormal pap smears in past. Pap Smear: Not on file        Hepatitis C Screening Once for adults born between 1945 and 1965  More frequently in patients at high risk for Hepatitis C Hep C Antibody:  01/03/2020        Diabetes Screening 1-2 times per year if you're at risk for diabetes or have pre-diabetes Fasting glucose: 115 mg/dL (2/15/2023)  A1C: No results in last 5 years (No results in last 5 years)      Cholesterol Screening Once every 5 years if you don't have a lipid disorder. May order more often based on risk factors. Lipid panel: 07/14/2023          Other Preventive Screenings Covered by Medicare:  Abdominal Aortic Aneurysm (AAA) Screening: covered once if your at risk. You're considered to be at risk if you have a family history of AAA.  Lung Cancer Screening: covers low dose CT scan once per year if you meet all of the following conditions: (1) Age 55-77; (2) No signs or symptoms of lung cancer; (3) Current smoker or have quit smoking within the last 15 years; (4) You have a tobacco smoking history of at least 20 pack years (packs per day multiplied by number of years you smoked); (5) You get a written order from a healthcare provider.  Glaucoma Screening: covered annually if you're considered high risk: (1) You have diabetes OR (2) Family history of glaucoma OR (3)  aged 50 and older OR (4)  American aged 65 and older  Osteoporosis Screening: covered every 2 years if you meet one of the following conditions: (1) You're estrogen deficient and at risk for osteoporosis based off medical history and other findings; (2) Have a vertebral abnormality; (3) On glucocorticoid therapy for more than 3 months; (4) Have primary hyperparathyroidism; (5) On osteoporosis medications and need to assess response to drug therapy.   Last bone density test (DXA Scan): Not on file.  HIV Screening: covered annually if you're between the age of 15-65. Also covered annually if you are younger than 15 and older than 65 with risk factors for HIV infection. For pregnant patients, it is covered up to 3 times per pregnancy.    Immunizations:  Immunization Recommendations   Influenza Vaccine Annual  influenza vaccination during flu season is recommended for all persons aged >= 6 months who do not have contraindications   Pneumococcal Vaccine   * Pneumococcal conjugate vaccine = PCV13 (Prevnar 13), PCV15 (Vaxneuvance), PCV20 (Prevnar 20)  * Pneumococcal polysaccharide vaccine = PPSV23 (Pneumovax) Adults 19-63 yo with certain risk factors or if 65+ yo  If never received any pneumonia vaccine: recommend Prevnar 20 (PCV20)  Give PCV20 if previously received 1 dose of PCV13 or PPSV23   Hepatitis B Vaccine 3 dose series if at intermediate or high risk (ex: diabetes, end stage renal disease, liver disease)   Respiratory syncytial virus (RSV) Vaccine - COVERED BY MEDICARE PART D  * RSVPreF3 (Arexvy) CDC recommends that adults 60 years of age and older may receive a single dose of RSV vaccine using shared clinical decision-making (SCDM)   Tetanus (Td) Vaccine - COST NOT COVERED BY MEDICARE PART B Following completion of primary series, a booster dose should be given every 10 years to maintain immunity against tetanus. Td may also be given as tetanus wound prophylaxis.   Tdap Vaccine - COST NOT COVERED BY MEDICARE PART B Recommended at least once for all adults. For pregnant patients, recommended with each pregnancy.   Shingles Vaccine (Shingrix) - COST NOT COVERED BY MEDICARE PART B  2 shot series recommended in those 19 years and older who have or will have weakened immune systems or those 50 years and older     Health Maintenance Due:      Topic Date Due   • Hepatitis C Screening  Completed     Immunizations Due:      Topic Date Due   • COVID-19 Vaccine (5 - 2023-24 season) 09/01/2023   • Influenza Vaccine (Season Ended) 09/01/2024     Advance Directives   What are advance directives?  Advance directives are legal documents that state your wishes and plans for medical care. These plans are made ahead of time in case you lose your ability to make decisions for yourself. Advance directives can apply to any medical  decision, such as the treatments you want, and if you want to donate organs.   What are the types of advance directives?  There are many types of advance directives, and each state has rules about how to use them. You may choose a combination of any of the following:  Living will:  This is a written record of the treatment you want. You can also choose which treatments you do not want, which to limit, and which to stop at a certain time. This includes surgery, medicine, IV fluid, and tube feedings.   Durable power of  for healthcare (DPAHC):  This is a written record that states who you want to make healthcare choices for you when you are unable to make them for yourself. This person, called a proxy, is usually a family member or a friend. You may choose more than 1 proxy.  Do not resuscitate (DNR) order:  A DNR order is used in case your heart stops beating or you stop breathing. It is a request not to have certain forms of treatment, such as CPR. A DNR order may be included in other types of advance directives.  Medical directive:  This covers the care that you want if you are in a coma, near death, or unable to make decisions for yourself. You can list the treatments you want for each condition. Treatment may include pain medicine, surgery, blood transfusions, dialysis, IV or tube feedings, and a ventilator (breathing machine).  Values history:  This document has questions about your views, beliefs, and how you feel and think about life. This information can help others choose the care that you would choose.  Why are advance directives important?  An advance directive helps you control your care. Although spoken wishes may be used, it is better to have your wishes written down. Spoken wishes can be misunderstood, or not followed. Treatments may be given even if you do not want them. An advance directive may make it easier for your family to make difficult choices about your care.   Weight Management   Why  it is important to manage your weight:  Being overweight increases your risk of health conditions such as heart disease, high blood pressure, type 2 diabetes, and certain types of cancer. It can also increase your risk for osteoarthritis, sleep apnea, and other respiratory problems. Aim for a slow, steady weight loss. Even a small amount of weight loss can lower your risk of health problems.  How to lose weight safely:  A safe and healthy way to lose weight is to eat fewer calories and get regular exercise. You can lose up about 1 pound a week by decreasing the number of calories you eat by 500 calories each day.   Healthy meal plan for weight management:  A healthy meal plan includes a variety of foods, contains fewer calories, and helps you stay healthy. A healthy meal plan includes the following:  Eat whole-grain foods more often.  A healthy meal plan should contain fiber. Fiber is the part of grains, fruits, and vegetables that is not broken down by your body. Whole-grain foods are healthy and provide extra fiber in your diet. Some examples of whole-grain foods are whole-wheat breads and pastas, oatmeal, brown rice, and bulgur.  Eat a variety of vegetables every day.  Include dark, leafy greens such as spinach, kale, álvaro greens, and mustard greens. Eat yellow and orange vegetables such as carrots, sweet potatoes, and winter squash.   Eat a variety of fruits every day.  Choose fresh or canned fruit (canned in its own juice or light syrup) instead of juice. Fruit juice has very little or no fiber.  Eat low-fat dairy foods.  Drink fat-free (skim) milk or 1% milk. Eat fat-free yogurt and low-fat cottage cheese. Try low-fat cheeses such as mozzarella and other reduced-fat cheeses.  Choose meat and other protein foods that are low in fat.  Choose beans or other legumes such as split peas or lentils. Choose fish, skinless poultry (chicken or turkey), or lean cuts of red meat (beef or pork). Before you cook meat or  poultry, cut off any visible fat.   Use less fat and oil.  Try baking foods instead of frying them. Add less fat, such as margarine, sour cream, regular salad dressing and mayonnaise to foods. Eat fewer high-fat foods. Some examples of high-fat foods include french fries, doughnuts, ice cream, and cakes.  Eat fewer sweets.  Limit foods and drinks that are high in sugar. This includes candy, cookies, regular soda, and sweetened drinks.  Exercise:  Exercise at least 30 minutes per day on most days of the week. Some examples of exercise include walking, biking, dancing, and swimming. You can also fit in more physical activity by taking the stairs instead of the elevator or parking farther away from stores. Ask your healthcare provider about the best exercise plan for you.      © Copyright [employer] 2018 Information is for End User's use only and may not be sold, redistributed or otherwise used for commercial purposes. All illustrations and images included in CareNotes® are the copyrighted property of A.D.A.M., Inc. or [employer]

## 2024-06-26 DIAGNOSIS — J43.9 PULMONARY EMPHYSEMA, UNSPECIFIED EMPHYSEMA TYPE (HCC): ICD-10-CM

## 2024-06-27 RX ORDER — FLUTICASONE PROPIONATE AND SALMETEROL XINAFOATE 45; 21 UG/1; UG/1
2 AEROSOL, METERED RESPIRATORY (INHALATION) DAILY
Qty: 12 G | Refills: 1 | Status: SHIPPED | OUTPATIENT
Start: 2024-06-27 | End: 2024-07-03 | Stop reason: ALTCHOICE

## 2024-07-01 ENCOUNTER — TELEPHONE (OUTPATIENT)
Dept: FAMILY MEDICINE CLINIC | Facility: HOME HEALTHCARE | Age: 84
End: 2024-07-01

## 2024-07-03 ENCOUNTER — TELEPHONE (OUTPATIENT)
Dept: FAMILY MEDICINE CLINIC | Facility: HOME HEALTHCARE | Age: 84
End: 2024-07-03

## 2024-07-03 DIAGNOSIS — J43.9 PULMONARY EMPHYSEMA, UNSPECIFIED EMPHYSEMA TYPE (HCC): Primary | ICD-10-CM

## 2024-07-03 RX ORDER — BUDESONIDE AND FORMOTEROL FUMARATE DIHYDRATE 80; 4.5 UG/1; UG/1
2 AEROSOL RESPIRATORY (INHALATION) 2 TIMES DAILY
Qty: 10.2 G | Refills: 3 | Status: SHIPPED | OUTPATIENT
Start: 2024-07-03

## 2024-07-03 NOTE — TELEPHONE ENCOUNTER
This is Roxy Farmer. I am Doctor Bruce's patient. I checked on the new inhaler he ordered because of cost but I found out the other one is the same cost so I'd prefer to stay on Symbicort and not use the advair if he could. Please call the pharmacy at [facility] and reactivate this Symbicort prescription. I would appreciate it. Roxy Farmer [phone] I would like Symbicort reactivated and cancelled the Advair. They are the same price of $8. Thank you.

## 2024-07-16 DIAGNOSIS — F41.9 ANXIETY: ICD-10-CM

## 2024-07-17 DIAGNOSIS — F41.9 ANXIETY: ICD-10-CM

## 2024-07-17 RX ORDER — ALPRAZOLAM 0.25 MG/1
0.25 TABLET ORAL 3 TIMES DAILY PRN
Qty: 90 TABLET | Refills: 0 | Status: CANCELLED | OUTPATIENT
Start: 2024-07-17

## 2024-07-17 RX ORDER — ALPRAZOLAM 0.25 MG/1
0.25 TABLET ORAL 3 TIMES DAILY PRN
Qty: 90 TABLET | Refills: 0 | Status: SHIPPED | OUTPATIENT
Start: 2024-07-17

## 2024-07-22 ENCOUNTER — OFFICE VISIT (OUTPATIENT)
Dept: FAMILY MEDICINE CLINIC | Facility: HOME HEALTHCARE | Age: 84
End: 2024-07-22
Payer: MEDICARE

## 2024-07-22 VITALS
BODY MASS INDEX: 27.69 KG/M2 | SYSTOLIC BLOOD PRESSURE: 102 MMHG | RESPIRATION RATE: 18 BRPM | HEART RATE: 68 BPM | WEIGHT: 176.8 LBS | OXYGEN SATURATION: 98 % | DIASTOLIC BLOOD PRESSURE: 60 MMHG | TEMPERATURE: 98 F

## 2024-07-22 DIAGNOSIS — L29.9 PRURITUS: ICD-10-CM

## 2024-07-22 DIAGNOSIS — R20.2 PARESTHESIA OF BOTH LOWER EXTREMITIES: Primary | ICD-10-CM

## 2024-07-22 DIAGNOSIS — R20.2 PARESTHESIA OF BOTH LOWER EXTREMITIES: ICD-10-CM

## 2024-07-22 DIAGNOSIS — D75.89 MACROCYTOSIS: ICD-10-CM

## 2024-07-22 LAB
FOLATE SERPL-MCNC: >22.3 NG/ML
TSH SERPL DL<=0.05 MIU/L-ACNC: 0.53 UIU/ML (ref 0.45–4.5)
VIT B12 SERPL-MCNC: 313 PG/ML (ref 180–914)

## 2024-07-22 PROCEDURE — 99213 OFFICE O/P EST LOW 20 MIN: CPT | Performed by: FAMILY MEDICINE

## 2024-07-22 PROCEDURE — 36415 COLL VENOUS BLD VENIPUNCTURE: CPT

## 2024-07-22 PROCEDURE — 82607 VITAMIN B-12: CPT

## 2024-07-22 PROCEDURE — 82746 ASSAY OF FOLIC ACID SERUM: CPT

## 2024-07-22 PROCEDURE — 84443 ASSAY THYROID STIM HORMONE: CPT | Performed by: FAMILY MEDICINE

## 2024-07-22 NOTE — PROGRESS NOTES
Ambulatory Visit  Name: Roxy Farmer      : 1940      MRN: [id number]  Encounter Provider: Casey Barrera MD  Encounter Date: 2024   Encounter department: [facility]    Assessment & Plan   1. Paresthesia of both lower extremities  Comments:  check labs (B12/folate), tsh  Orders:  -     Vitamin B12/Folate, Serum Panel; Future  -     TSH, 3rd generation with Free T4 reflex; Future  2. Macrocytosis  Comments:  check labs (B12/folate), tsh  3. Pruritus  Comments:  improved       History of Present Illness     Presents for f/up today. Pruritus is improving but she is bothered more by burning on the bottom of both feet. While she has had problem for awhile, it now keeps her up at night sometimes. Gets some relief with soaking in water and epsom salts. Capsaicin has been too uncomfortable.        Review of Systems   Constitutional:  Negative for activity change, appetite change and fever.   HENT:  Negative for trouble swallowing.    Respiratory:  Negative for shortness of breath.    Cardiovascular:  Negative for chest pain.   Gastrointestinal:  Negative for blood in stool, constipation, diarrhea, nausea and vomiting.        Some foods upset her  - recently cantaloupe   Genitourinary:  Negative for dysuria and hematuria.   Musculoskeletal:  Positive for back pain.   Skin:  Negative for rash.   Neurological:  Negative for weakness.   Psychiatric/Behavioral:  Negative for confusion.        Objective     /60   Pulse 68   Temp 98 °F (36.7 °C)   Resp 18   Wt 80.2 kg (176 lb 12.8 oz)   SpO2 98%   BMI 27.69 kg/m²     Physical Exam  Vitals reviewed.   Constitutional:       Appearance: Normal appearance.   HENT:      Head: Normocephalic and atraumatic.   Eyes:      General: No scleral icterus.     Conjunctiva/sclera: Conjunctivae normal.   Cardiovascular:      Rate and Rhythm: Normal rate and regular rhythm.      Pulses: Normal pulses.      Heart sounds: Normal heart  sounds.   Pulmonary:      Effort: Pulmonary effort is normal.      Breath sounds: Normal breath sounds.   Abdominal:      Palpations: Abdomen is soft.      Tenderness: There is no abdominal tenderness.   Musculoskeletal:         General: No swelling.      Right lower leg: No edema.      Left lower leg: No edema.   Skin:     General: Skin is warm and dry.      Capillary Refill: Capillary refill takes less than 2 seconds.      Comments: Normal cap refill in toes   Neurological:      General: No focal deficit present.      Mental Status: She is alert and oriented to person, place, and time.      Sensory: No sensory deficit.      Motor: No weakness.   Psychiatric:         Mood and Affect: Mood normal.         Behavior: Behavior normal.       Administrative Statements

## 2024-07-26 DIAGNOSIS — R05.2 SUBACUTE COUGH: ICD-10-CM

## 2024-07-26 RX ORDER — BENZONATATE 100 MG/1
CAPSULE ORAL
Qty: 60 CAPSULE | Refills: 0 | Status: SHIPPED | OUTPATIENT
Start: 2024-07-26

## 2024-08-09 DIAGNOSIS — I10 HYPERTENSION: ICD-10-CM

## 2024-08-09 RX ORDER — HYDROCHLOROTHIAZIDE 25 MG/1
25 TABLET ORAL DAILY
Qty: 90 TABLET | Refills: 1 | Status: SHIPPED | OUTPATIENT
Start: 2024-08-09

## 2024-08-15 DIAGNOSIS — J43.9 PULMONARY EMPHYSEMA, UNSPECIFIED EMPHYSEMA TYPE (HCC): ICD-10-CM

## 2024-08-15 DIAGNOSIS — F41.9 ANXIETY: ICD-10-CM

## 2024-08-15 RX ORDER — BUDESONIDE AND FORMOTEROL FUMARATE DIHYDRATE 80; 4.5 UG/1; UG/1
2 AEROSOL RESPIRATORY (INHALATION) 2 TIMES DAILY
Qty: 10.2 G | Refills: 3 | Status: CANCELLED | OUTPATIENT
Start: 2024-08-15

## 2024-08-15 RX ORDER — ALPRAZOLAM 0.25 MG
0.25 TABLET ORAL 3 TIMES DAILY PRN
Qty: 90 TABLET | Refills: 0 | Status: CANCELLED | OUTPATIENT
Start: 2024-08-15

## 2024-08-16 DIAGNOSIS — J43.9 PULMONARY EMPHYSEMA, UNSPECIFIED EMPHYSEMA TYPE (HCC): ICD-10-CM

## 2024-08-16 DIAGNOSIS — F41.9 ANXIETY: ICD-10-CM

## 2024-08-16 RX ORDER — ALPRAZOLAM 0.25 MG
0.25 TABLET ORAL 3 TIMES DAILY PRN
Qty: 90 TABLET | Refills: 0 | Status: SHIPPED | OUTPATIENT
Start: 2024-08-16

## 2024-08-16 RX ORDER — ALPRAZOLAM 0.25 MG
0.25 TABLET ORAL 3 TIMES DAILY PRN
Qty: 90 TABLET | Refills: 0 | OUTPATIENT
Start: 2024-08-16

## 2024-08-16 RX ORDER — BUDESONIDE AND FORMOTEROL FUMARATE DIHYDRATE 80; 4.5 UG/1; UG/1
2 AEROSOL RESPIRATORY (INHALATION) 2 TIMES DAILY
Qty: 10.2 G | Refills: 3 | Status: SHIPPED | OUTPATIENT
Start: 2024-08-16 | End: 2024-08-21 | Stop reason: SDUPTHER

## 2024-08-16 NOTE — PROGRESS NOTES
Spoke with Roxy. She had a BURNETT which is now gone. Not sleeping well. A little anxious which elevates BP but BP responds to normal when relaxing. Stopped Symbicort as she felt generic was contributing to BURNETT. Will order trade name. Recommend appt in next 2 weeks.

## 2024-08-19 ENCOUNTER — RA CDI HCC (OUTPATIENT)
Dept: OTHER | Facility: HOSPITAL | Age: 84
End: 2024-08-19

## 2024-08-21 DIAGNOSIS — J43.9 PULMONARY EMPHYSEMA, UNSPECIFIED EMPHYSEMA TYPE (HCC): ICD-10-CM

## 2024-08-21 RX ORDER — BUDESONIDE AND FORMOTEROL FUMARATE DIHYDRATE 80; 4.5 UG/1; UG/1
2 AEROSOL RESPIRATORY (INHALATION) 2 TIMES DAILY
Qty: 10.2 G | Refills: 3 | Status: ON HOLD | OUTPATIENT
Start: 2024-08-21

## 2024-08-21 RX ORDER — DILTIAZEM HYDROCHLORIDE 60 MG/1
TABLET, FILM COATED ORAL
Refills: 3 | OUTPATIENT
Start: 2024-08-21

## 2024-08-26 ENCOUNTER — OFFICE VISIT (OUTPATIENT)
Dept: FAMILY MEDICINE CLINIC | Facility: HOME HEALTHCARE | Age: 84
End: 2024-08-26
Payer: MEDICARE

## 2024-08-26 VITALS
HEIGHT: 68 IN | TEMPERATURE: 99.1 F | RESPIRATION RATE: 18 BRPM | HEART RATE: 83 BPM | DIASTOLIC BLOOD PRESSURE: 60 MMHG | WEIGHT: 174.8 LBS | BODY MASS INDEX: 26.49 KG/M2 | SYSTOLIC BLOOD PRESSURE: 122 MMHG | OXYGEN SATURATION: 95 %

## 2024-08-26 DIAGNOSIS — F33.9 DEPRESSION, RECURRENT (HCC): ICD-10-CM

## 2024-08-26 DIAGNOSIS — G89.29 CHRONIC RIGHT-SIDED LOW BACK PAIN WITHOUT SCIATICA: Primary | ICD-10-CM

## 2024-08-26 DIAGNOSIS — F41.9 ANXIETY: ICD-10-CM

## 2024-08-26 DIAGNOSIS — M54.50 CHRONIC RIGHT-SIDED LOW BACK PAIN WITHOUT SCIATICA: Primary | ICD-10-CM

## 2024-08-26 PROCEDURE — 99213 OFFICE O/P EST LOW 20 MIN: CPT | Performed by: FAMILY MEDICINE

## 2024-08-26 NOTE — PROGRESS NOTES
"Ambulatory Visit  Name: Roxy Farmer      : 1940      MRN: [id number]  Encounter Provider: Casey Barrera MD  Encounter Date: 2024   Encounter department: [facility]    Assessment & Plan   1. Chronic right-sided low back pain without sciatica  Comments:  will refer to PT  Orders:  -     Ambulatory Referral to Physical Therapy; Future  2. Anxiety  Comments:  has used prn alprazolam for years  3. Depression, recurrent (HCC)  Comments:  not interested in counseling or medication       History of Present Illness     Patient presented for Low back pain on R side which has been chronic in nature. Has undergone PT in the past, but feels slightly worse lately. No loss of sensation or strength. No incontinence or saddle anesthesia. Stills has depression over lack of contact with children and loss of son. Trying to fight for some of estate of ,  . Relies on her delia and denies SI, HI. Sleep has been a chronic issue.        Review of Systems   Constitutional:  Negative for activity change and fatigue.   HENT:  Negative for trouble swallowing.    Eyes:  Negative for visual disturbance.   Respiratory:  Negative for shortness of breath.    Cardiovascular:  Negative for chest pain, palpitations and leg swelling.   Gastrointestinal:  Negative for abdominal pain, nausea and vomiting.   Genitourinary:  Negative for difficulty urinating.   Musculoskeletal:  Positive for back pain. Negative for neck pain.   Skin:  Negative for rash.   Neurological:  Negative for tremors, weakness, numbness and headaches.   Psychiatric/Behavioral:  Positive for sleep disturbance. Negative for confusion, decreased concentration and dysphoric mood.        Objective     /60 (BP Location: Left arm, Patient Position: Sitting, Cuff Size: Large)   Pulse 83   Temp 99.1 °F (37.3 °C)   Resp 18   Ht 5' 7.5\" (1.715 m)   Wt 79.3 kg (174 lb 12.8 oz)   SpO2 95%   BMI 26.97 kg/m² "     Physical Exam  Vitals reviewed.   Constitutional:       Appearance: Normal appearance.   HENT:      Head: Normocephalic and atraumatic.   Eyes:      General: No scleral icterus.     Conjunctiva/sclera: Conjunctivae normal.   Cardiovascular:      Rate and Rhythm: Normal rate and regular rhythm.      Pulses: Normal pulses.      Heart sounds: Normal heart sounds. No murmur heard.     No friction rub. No gallop.   Pulmonary:      Effort: Pulmonary effort is normal.      Breath sounds: Normal breath sounds.   Abdominal:      General: Bowel sounds are normal.      Palpations: Abdomen is soft.   Musculoskeletal:      Right lower leg: No edema.      Left lower leg: No edema.      Comments: Pain with mild palpation R low back; No pain rotation of hip; Negative SLR's; normal sensation and strength; No CVAT   Skin:     General: Skin is warm and dry.      Capillary Refill: Capillary refill takes less than 2 seconds.   Neurological:      General: No focal deficit present.      Mental Status: She is alert and oriented to person, place, and time.      Sensory: No sensory deficit.      Motor: No weakness.      Deep Tendon Reflexes: Reflexes normal.   Psychiatric:         Mood and Affect: Mood normal.         Behavior: Behavior normal.       Administrative Statements

## 2024-09-12 ENCOUNTER — EVALUATION (OUTPATIENT)
Dept: PHYSICAL THERAPY | Facility: HOME HEALTHCARE | Age: 84
End: 2024-09-12
Payer: MEDICARE

## 2024-09-12 DIAGNOSIS — R53.81 PHYSICAL DECONDITIONING: Primary | ICD-10-CM

## 2024-09-12 DIAGNOSIS — M54.50 CHRONIC RIGHT-SIDED LOW BACK PAIN WITHOUT SCIATICA: ICD-10-CM

## 2024-09-12 DIAGNOSIS — G89.29 CHRONIC RIGHT-SIDED LOW BACK PAIN WITHOUT SCIATICA: ICD-10-CM

## 2024-09-12 PROCEDURE — 97112 NEUROMUSCULAR REEDUCATION: CPT | Performed by: PHYSICAL THERAPIST

## 2024-09-12 PROCEDURE — 97116 GAIT TRAINING THERAPY: CPT | Performed by: PHYSICAL THERAPIST

## 2024-09-12 PROCEDURE — 97161 PT EVAL LOW COMPLEX 20 MIN: CPT | Performed by: PHYSICAL THERAPIST

## 2024-09-12 NOTE — PROGRESS NOTES
"PT Evaluation  and PT Discharge    Today's date: 2024  Patient name: Roxy Farmer  : 1940  MRN: [id number]  Referring provider: Casey Brannon MD  Dx:   Encounter Diagnosis     ICD-10-CM    1. Physical deconditioning  R53.81       2. Chronic right-sided low back pain without sciatica  M54.50 Ambulatory Referral to Physical Therapy    G89.29           Start Time: 1300  Stop Time: 1345  Total time in clinic (min): 45 minutes    Assessment  Impairments: abnormal gait, activity intolerance, impaired balance, impaired physical strength, safety issue, weight-bearing intolerance, participation limitations, activity limitations and endurance  Symptom irritability: high    Assessment details: 84 y.o. female pt presents to OP PT at [facility] w/ referral for general debility.  Pt presents w/ impaired balance w/ SPC for ambulation outside of household, decreased ambulatory ability due to BLE weakness limiting pt to <15mins before needing a rest break, decreased ambulatory safety due to score of 5/30 on the FGA w/ use of SPC, and decreased TUG time of 25.14s w/ SPC demonstrating need for increased AD for safety.  Pt was self-aware of impaired balance and decreased ambulatory capacity, denied performing certain steps in FGA w/ SPC due to \"feeling unsteady and scared\" when trying to perform ambulatory task.   Pt states that she would benefit from increased ambulatory device, such as a rollator, to increase WB responsibilities through AD and allowing pt to take a seated rest break when fatigue sets in, PT agrees w/ pt.  PT advises that rollator would be a suitable AD for pt to continue ambulation to perform ADLs/ recreational activities.    Understanding of Dx/Px/POC: good     Prognosis: good    Goals  AD consultation for increased restrictive device to increase safe ambulatory capacity and reduce risk of falls, no goals needed.     Plan    Plan of Care beginning date: 2024  Plan of Care expiration date: " 2024  Treatment plan discussed with: patient  Plan details: AD consultation for increased restrictive device to increase safe ambulatory capacity and reduce risk of falls.    Subjective Evaluation    History of Present Illness  Date of onset: 8/10/2017  Mechanism of injury: Pt stated that when she was around 79 y/o, her balance, gait, and physical strength had started to decline.  Has declined even more over the past year or so.  Was able to take care of her  prior to his death, now she is unable to take care of herself.  She struggles ambulating around the house, and even more so in the community.  Currently uses a SPC, struggles w/ AD due to stress on RUE in which she uses the AD with.   Pt can only ambulate w/ SPC for <15mins before needing a rest break w/ sitting.  Pt furniture walks at home due to unsteadiness, and always uses SPC when outdoors.           Recurrent probem    Quality of life: fair    Patient Goals  Patient goals for therapy: independence with ADLs/IADLs, return to sport/leisure activities and increased motion  Patient goal: Wants to be able to be mobile around grocery store and department stores.  Pain  Current pain ratin  At best pain ratin  At worst pain ratin  Aggravating factors: standing, stair climbing and walking  Progression: worsening    Social Support  Stairs in house: yes (b/l handrails)   16  Lives in: apartment  Lives with: alone    Employment status: not working  Treatments  Previous treatment: physical therapy  Current treatment: physical therapy    Objective     Strength/Myotome Testing     Left Hip   Planes of Motion   Flexion: 4  Abduction: 4  Adduction: 4    Right Hip   Planes of Motion   Flexion: 4  Abduction: 4  Adduction: 4    Left Knee   Flexion: 4  Extension: 4+    Right Knee   Flexion: 4  Extension: 4+    Ambulation     Observational Gait   Gait: antalgic   Decreased walking speed, stride length, left stance time, right stance time, left swing  time, right swing time, left step length and right step length.   Left arm swing: decreased  Base of support: decreased    Additional Observational Gait Details  Uses SPC for ambulation    Comments   TUG with use of SPC = 25.45 s  Romberg FT EO = 30s   Romberg FT EC = 21s   FGA = 5/30 = 16.7%             Precautions: general deconditioning      Manuals                                                                 Neuro Re-Ed                          TUG & Romberg Assessment 3'                                                                             Ther Ex                                                                                                                     Ther Activity                                       Gait Training             FGA 10'                         Modalities

## 2024-09-15 ENCOUNTER — HOSPITAL ENCOUNTER (INPATIENT)
Facility: HOSPITAL | Age: 84
LOS: 1 days | Discharge: HOME | DRG: 065 | End: 2024-09-17
Attending: EMERGENCY MEDICINE | Admitting: INTERNAL MEDICINE
Payer: MEDICARE

## 2024-09-15 ENCOUNTER — APPOINTMENT (EMERGENCY)
Dept: CT IMAGING | Facility: HOSPITAL | Age: 84
DRG: 065 | End: 2024-09-15
Payer: MEDICARE

## 2024-09-15 DIAGNOSIS — R29.90 STROKE-LIKE SYMPTOMS: ICD-10-CM

## 2024-09-15 DIAGNOSIS — R20.0 LEFT FACIAL NUMBNESS: Primary | ICD-10-CM

## 2024-09-15 DIAGNOSIS — I63.9 CEREBROVASCULAR ACCIDENT (CVA), UNSPECIFIED MECHANISM (HCC): ICD-10-CM

## 2024-09-15 DIAGNOSIS — E78.2 MIXED HYPERLIPIDEMIA: ICD-10-CM

## 2024-09-15 LAB
ALBUMIN SERPL BCG-MCNC: 4.2 G/DL (ref 3.5–5)
ALP SERPL-CCNC: 78 U/L (ref 34–104)
ALT SERPL W P-5'-P-CCNC: 18 U/L (ref 7–52)
ANION GAP SERPL CALCULATED.3IONS-SCNC: 10 MMOL/L (ref 4–13)
APTT PPP: 26 SECONDS (ref 23–34)
AST SERPL W P-5'-P-CCNC: 21 U/L (ref 13–39)
BASOPHILS # BLD AUTO: 0.05 THOUSANDS/ΜL (ref 0–0.1)
BASOPHILS NFR BLD AUTO: 1 % (ref 0–1)
BILIRUB SERPL-MCNC: 0.42 MG/DL (ref 0.2–1)
BUN SERPL-MCNC: 23 MG/DL (ref 5–25)
CALCIUM SERPL-MCNC: 9.8 MG/DL (ref 8.4–10.2)
CHLORIDE SERPL-SCNC: 100 MMOL/L (ref 96–108)
CO2 SERPL-SCNC: 26 MMOL/L (ref 21–32)
CREAT SERPL-MCNC: 0.93 MG/DL (ref 0.6–1.3)
EOSINOPHIL # BLD AUTO: 0.26 THOUSAND/ΜL (ref 0–0.61)
EOSINOPHIL NFR BLD AUTO: 5 % (ref 0–6)
ERYTHROCYTE [DISTWIDTH] IN BLOOD BY AUTOMATED COUNT: 13.9 % (ref 11.6–15.1)
FOLATE SERPL-MCNC: >22.3 NG/ML
GFR SERPL CREATININE-BSD FRML MDRD: 55 ML/MIN/1.73SQ M
GLUCOSE SERPL-MCNC: 107 MG/DL (ref 65–140)
HCT VFR BLD AUTO: 40.3 % (ref 34.8–46.1)
HGB BLD-MCNC: 13.1 G/DL (ref 11.5–15.4)
IMM GRANULOCYTES # BLD AUTO: 0.01 THOUSAND/UL (ref 0–0.2)
IMM GRANULOCYTES NFR BLD AUTO: 0 % (ref 0–2)
INR PPP: 0.91 (ref 0.85–1.19)
LYMPHOCYTES # BLD AUTO: 2.12 THOUSANDS/ΜL (ref 0.6–4.47)
LYMPHOCYTES NFR BLD AUTO: 43 % (ref 14–44)
MCH RBC QN AUTO: 32.1 PG (ref 26.8–34.3)
MCHC RBC AUTO-ENTMCNC: 32.5 G/DL (ref 31.4–37.4)
MCV RBC AUTO: 99 FL (ref 82–98)
MONOCYTES # BLD AUTO: 0.48 THOUSAND/ΜL (ref 0.17–1.22)
MONOCYTES NFR BLD AUTO: 10 % (ref 4–12)
NEUTROPHILS # BLD AUTO: 2.04 THOUSANDS/ΜL (ref 1.85–7.62)
NEUTS SEG NFR BLD AUTO: 41 % (ref 43–75)
NRBC BLD AUTO-RTO: 0 /100 WBCS
PLATELET # BLD AUTO: 184 THOUSANDS/UL (ref 149–390)
PMV BLD AUTO: 10.6 FL (ref 8.9–12.7)
POTASSIUM SERPL-SCNC: 4 MMOL/L (ref 3.5–5.3)
PROT SERPL-MCNC: 7.8 G/DL (ref 6.4–8.4)
PROTHROMBIN TIME: 12.8 SECONDS (ref 12.3–15)
RBC # BLD AUTO: 4.08 MILLION/UL (ref 3.81–5.12)
SODIUM SERPL-SCNC: 136 MMOL/L (ref 135–147)
T4 FREE SERPL-MCNC: 1.2 NG/DL (ref 0.61–1.12)
TSH SERPL DL<=0.05 MIU/L-ACNC: 0.34 UIU/ML (ref 0.45–4.5)
VIT B12 SERPL-MCNC: 423 PG/ML (ref 180–914)
WBC # BLD AUTO: 4.96 THOUSAND/UL (ref 4.31–10.16)

## 2024-09-15 PROCEDURE — 84443 ASSAY THYROID STIM HORMONE: CPT | Performed by: EMERGENCY MEDICINE

## 2024-09-15 PROCEDURE — 99285 EMERGENCY DEPT VISIT HI MDM: CPT | Performed by: EMERGENCY MEDICINE

## 2024-09-15 PROCEDURE — 99223 1ST HOSP IP/OBS HIGH 75: CPT | Performed by: HOSPITALIST

## 2024-09-15 PROCEDURE — 84439 ASSAY OF FREE THYROXINE: CPT | Performed by: EMERGENCY MEDICINE

## 2024-09-15 PROCEDURE — 99285 EMERGENCY DEPT VISIT HI MDM: CPT

## 2024-09-15 PROCEDURE — 85025 COMPLETE CBC W/AUTO DIFF WBC: CPT | Performed by: EMERGENCY MEDICINE

## 2024-09-15 PROCEDURE — 82746 ASSAY OF FOLIC ACID SERUM: CPT

## 2024-09-15 PROCEDURE — 85730 THROMBOPLASTIN TIME PARTIAL: CPT | Performed by: EMERGENCY MEDICINE

## 2024-09-15 PROCEDURE — 93005 ELECTROCARDIOGRAM TRACING: CPT

## 2024-09-15 PROCEDURE — 97166 OT EVAL MOD COMPLEX 45 MIN: CPT

## 2024-09-15 PROCEDURE — 80053 COMPREHEN METABOLIC PANEL: CPT | Performed by: EMERGENCY MEDICINE

## 2024-09-15 PROCEDURE — 36415 COLL VENOUS BLD VENIPUNCTURE: CPT | Performed by: EMERGENCY MEDICINE

## 2024-09-15 PROCEDURE — 70450 CT HEAD/BRAIN W/O DYE: CPT

## 2024-09-15 PROCEDURE — 85610 PROTHROMBIN TIME: CPT | Performed by: EMERGENCY MEDICINE

## 2024-09-15 PROCEDURE — 92610 EVALUATE SWALLOWING FUNCTION: CPT | Performed by: NURSE PRACTITIONER

## 2024-09-15 PROCEDURE — 99204 OFFICE O/P NEW MOD 45 MIN: CPT | Performed by: PSYCHIATRY & NEUROLOGY

## 2024-09-15 PROCEDURE — 82607 VITAMIN B-12: CPT

## 2024-09-15 RX ORDER — BENZONATATE 100 MG/1
100 CAPSULE ORAL 3 TIMES DAILY PRN
Status: DISCONTINUED | OUTPATIENT
Start: 2024-09-15 | End: 2024-09-17 | Stop reason: HOSPADM

## 2024-09-15 RX ORDER — HYOSCYAMINE SULFATE 0.38 MG/1
0.38 TABLET, EXTENDED RELEASE ORAL EVERY 12 HOURS PRN
Status: DISCONTINUED | OUTPATIENT
Start: 2024-09-15 | End: 2024-09-15 | Stop reason: CLARIF

## 2024-09-15 RX ORDER — ENOXAPARIN SODIUM 100 MG/ML
40 INJECTION SUBCUTANEOUS 2 TIMES DAILY
Status: DISCONTINUED | OUTPATIENT
Start: 2024-09-16 | End: 2024-09-15

## 2024-09-15 RX ORDER — ATORVASTATIN CALCIUM 40 MG/1
40 TABLET, FILM COATED ORAL EVERY EVENING
Status: DISCONTINUED | OUTPATIENT
Start: 2024-09-15 | End: 2024-09-17 | Stop reason: HOSPADM

## 2024-09-15 RX ORDER — METOPROLOL TARTRATE 50 MG
50 TABLET ORAL DAILY
Status: DISCONTINUED | OUTPATIENT
Start: 2024-09-15 | End: 2024-09-15

## 2024-09-15 RX ORDER — LANOLIN ALCOHOL/MO/W.PET/CERES
6 CREAM (GRAM) TOPICAL
Status: DISCONTINUED | OUTPATIENT
Start: 2024-09-15 | End: 2024-09-15

## 2024-09-15 RX ORDER — ALPRAZOLAM 0.25 MG
0.25 TABLET ORAL
Status: DISCONTINUED | OUTPATIENT
Start: 2024-09-15 | End: 2024-09-17 | Stop reason: HOSPADM

## 2024-09-15 RX ORDER — BUDESONIDE AND FORMOTEROL FUMARATE DIHYDRATE 80; 4.5 UG/1; UG/1
2 AEROSOL RESPIRATORY (INHALATION) 2 TIMES DAILY
Status: DISCONTINUED | OUTPATIENT
Start: 2024-09-15 | End: 2024-09-17 | Stop reason: HOSPADM

## 2024-09-15 RX ORDER — ACETAMINOPHEN 325 MG/1
650 TABLET ORAL EVERY 4 HOURS PRN
Status: DISCONTINUED | OUTPATIENT
Start: 2024-09-15 | End: 2024-09-17 | Stop reason: HOSPADM

## 2024-09-15 RX ORDER — HYDROCHLOROTHIAZIDE 25 MG/1
25 TABLET ORAL DAILY
Status: DISCONTINUED | OUTPATIENT
Start: 2024-09-15 | End: 2024-09-15

## 2024-09-15 RX ORDER — PANTOPRAZOLE SODIUM 40 MG/1
40 TABLET, DELAYED RELEASE ORAL
Status: DISCONTINUED | OUTPATIENT
Start: 2024-09-15 | End: 2024-09-17 | Stop reason: HOSPADM

## 2024-09-15 RX ORDER — LEVOTHYROXINE SODIUM 100 UG/1
100 TABLET ORAL
Status: DISCONTINUED | OUTPATIENT
Start: 2024-09-15 | End: 2024-09-17 | Stop reason: HOSPADM

## 2024-09-15 RX ORDER — ALPRAZOLAM 0.25 MG
0.25 TABLET ORAL 3 TIMES DAILY PRN
Status: DISCONTINUED | OUTPATIENT
Start: 2024-09-15 | End: 2024-09-17 | Stop reason: HOSPADM

## 2024-09-15 RX ORDER — METOPROLOL TARTRATE 25 MG/1
25 TABLET, FILM COATED ORAL
Status: DISCONTINUED | OUTPATIENT
Start: 2024-09-15 | End: 2024-09-15

## 2024-09-15 RX ORDER — LISINOPRIL 10 MG/1
10 TABLET ORAL 2 TIMES DAILY
Status: DISCONTINUED | OUTPATIENT
Start: 2024-09-15 | End: 2024-09-15

## 2024-09-15 RX ADMIN — HYDROCHLOROTHIAZIDE 25 MG: 25 TABLET ORAL at 09:59

## 2024-09-15 RX ADMIN — ALPRAZOLAM 0.25 MG: 0.25 TABLET ORAL at 09:58

## 2024-09-15 RX ADMIN — BUDESONIDE AND FORMOTEROL FUMARATE DIHYDRATE 2 PUFF: 80; 4.5 AEROSOL RESPIRATORY (INHALATION) at 11:32

## 2024-09-15 RX ADMIN — PAROXETINE 30 MG: 20 TABLET, FILM COATED ORAL at 09:59

## 2024-09-15 RX ADMIN — ALPRAZOLAM 0.25 MG: 0.25 TABLET ORAL at 22:16

## 2024-09-15 RX ADMIN — LEVOTHYROXINE SODIUM 100 MCG: 100 TABLET ORAL at 09:58

## 2024-09-15 RX ADMIN — LISINOPRIL 10 MG: 10 TABLET ORAL at 09:59

## 2024-09-15 RX ADMIN — PANTOPRAZOLE SODIUM 40 MG: 40 TABLET, DELAYED RELEASE ORAL at 09:59

## 2024-09-15 RX ADMIN — ATORVASTATIN CALCIUM 40 MG: 40 TABLET, FILM COATED ORAL at 17:03

## 2024-09-15 RX ADMIN — BUDESONIDE AND FORMOTEROL FUMARATE DIHYDRATE 2 PUFF: 80; 4.5 AEROSOL RESPIRATORY (INHALATION) at 17:04

## 2024-09-15 RX ADMIN — ALPRAZOLAM 0.25 MG: 0.25 TABLET ORAL at 13:26

## 2024-09-15 RX ADMIN — METOPROLOL TARTRATE 50 MG: 50 TABLET, FILM COATED ORAL at 09:58

## 2024-09-15 NOTE — OCCUPATIONAL THERAPY NOTE
"    Occupational Therapy Evaluation     Patient Name: Roxy Farmer  Today's Date: 9/15/2024  Problem List  Principal Problem:    Stroke-like symptoms  Active Problems:    Anxiety    Primary hypertension    Hypothyroidism    Von Willebrand disease (HCC)    Pulmonary emphysema (HCC)    Past Medical History  Past Medical History:   Diagnosis Date    Contact dermatitis     Last assessed - 8/8/12    COPD (chronic obstructive pulmonary disease) (HCC)     Disease of thyroid gland     GERD (gastroesophageal reflux disease)     Hyperlipidemia     Hypertension     Irritable bowel syndrome     Von Willebrand disease (HCC)      Past Surgical History  Past Surgical History:   Procedure Laterality Date    APPENDECTOMY      HYSTERECTOMY      Date unk          09/15/24 0954   OT Last Visit   OT Visit Date 09/15/24   Note Type   Note type Evaluation   Pain Assessment   Pain Assessment Tool 0-10   Pain Score No Pain   Restrictions/Precautions   Weight Bearing Precautions Per Order No   Other Precautions Fall Risk   Home Living   Type of Home Apartment   Home Layout One level;Stairs to enter with rails  (13-16 NAZANIN with BL HR)   Bathroom Shower/Tub Tub/shower unit   Bathroom Toilet Standard   Bathroom Equipment Grab bars in shower;Shower chair   Bathroom Accessibility Accessible   Home Equipment Walker;Cane;Grab bars   Prior Function   Level of [place] Independent with ADLs;Independent with functional mobility;Needs assistance with IADLS   Lives With (S)  Alone   Receives Help From Family   IADLs Independent with driving;Independent with meal prep;Independent with medication management   Falls in the last 6 months 0   Vocational Retired   Comments Assist with laundry from niece   Lifestyle   Autonomy PTA pt reports independence with self care tasks, IADLs (with the exception of laundry) and mobiltiy with use of SPC and \"furniture walking\" - +.   Reciprocal Relationships Supportive family. Pt stating that someone calls or " "comes by at least daily.   Service to Others Retired nurse.   Intrinsic Gratification Yarsani. Enjoys being with family.   Subjective   Subjective \"I just got assessed for a electric scooter\"   ADL   Toileting Assistance  5  Supervision/Setup   Toileting Deficit Clothing management up;Clothing management down;Perineal hygiene   Additional Comments use of standard toilet with BL grab bars - washed hands in stance at SBA level.   Bed Mobility   Supine to Sit 6  Modified independent   Sit to Supine 6  Modified independent   Transfers   Sit to Stand 5  Supervision   Additional items Assist x 1;Increased time required;Verbal cues   Stand to Sit 5  Supervision   Additional items Assist x 1;Increased time required;Verbal cues   Stand pivot 4  Minimal assistance   Additional items Assist x 1;Increased time required;Verbal cues   Functional Mobility   Functional Mobility 4  Minimal assistance   Additional Comments assist of 1 with SPC; slightly unsteady and use of furniture and grab bars to steady self along with CGA - pt stating that her L foot felt numb during mobility. to/from the bathroom with CGA. offered to trial RW but pt declining stating that she cannot fit a RW through her apartment.   Additional items SPC   Balance   Static Sitting Normal   Dynamic Sitting Good   Static Standing Fair   Dynamic Standing Fair -   Ambulatory Fair -   Activity Tolerance   Activity Tolerance Patient tolerated treatment well   RUE Assessment   RUE Assessment WFL   LUE Assessment   LUE Assessment WFL   Hand Function   Gross Motor Coordination Impaired   Fine Motor Coordination Impaired   Hand Function Comments pt reporting numbness and tingling in LUE, L side of face and LLE. no change in strength with R versus L hand and overall UE. Slight coordination deficits noted in LUE during testing. Continue to assess throughout hospitalization.   Vision-Basic Assessment   Patient Visual Report Other (Comment)  (no visual changes since symptoms " began)   Vision - Complex Assessment   Ocular Range of Motion Intact   Tracking Intact   Psychosocial   Psychosocial (WDL) X   Patient Behaviors/Mood Anxious  (slightly agitated with this OT's presence in the beginning of the session but eventually warming up and cooperative once OOB)   Cognition   Overall Cognitive Status WFL   Arousal/Participation Alert;Responsive;Arousable;Cooperative   Attention Within functional limits   Orientation Level Oriented X4   Memory Within functional limits   Following Commands Follows all commands and directions without difficulty   Assessment   Limitation Decreased ADL status;Decreased UE strength;Decreased endurance;Decreased sensation;Decreased self-care trans;Decreased high-level ADLs   Prognosis Good   Assessment Pt is a 84 y.o. female seen for OT evaluation s/p admit to [facility] on [facility]/15[facility]2024 w/ Stroke-like symptoms.  Comorbidities affecting pt's functional performance at time of assessment include:  anxiety, PE, HTN and hypothyroidism . Personal factors affecting pt at time of IE include:steps to enter environment, limited home support, difficulty performing ADLS, and difficulty performing IADLS . Prior to admission, pt was independent. Upon evaluation: Pt requires min A 2* the following deficits impacting occupational performance: weakness, decreased strength, decreased balance, impaired GMC, impaired FMC, and impaired sensation. Pt to benefit from continued skilled OT tx while in the hospital to address deficits as defined above and maximize level of functional independence w ADL's and functional mobility. Occupational Performance areas to address include: bathing/shower, toilet hygiene, dressing, medication management, functional mobility, community mobility, cleaning, meal prep, and household maintenance. The patient's raw score on the AM-PAC Daily Activity Inpatient Short Form is 20. A raw score of greater than or equal to 19 suggests the patient may benefit from discharge to  home. Discharge recommendation at this time is level III.   Goals   Patient Goals Pt would like something to eat, her morning medications and to stay in bed.   STG Time Frame 1-3   Short Term Goal #1 Pt to participate in UE exercises in order to increase activity tolerance and fucntional use of BL UES during tasks of choice.   Short Term Goal #2 Functional mobility at mod I level with use of least restrictive AD.   LTG Time Frame 10-14   Long Term Goal #1 Toileting tasks at mod I level.   Long Term Goal #2 All UB and LB bathing and dressing at mod I level.   Plan   Treatment Interventions ADL retraining;Functional transfer training;UE strengthening/ROM;Endurance training;Equipment evaluation/education;Fine motor coordination activities;Energy conservation;Activityengagement   Goal Expiration Date 09/29/24   OT Frequency 3-5x/wk   Discharge Recommendation   Rehab Resource Intensity Level, OT III (Minimum Resource Intensity)   AM-PAC Daily Activity Inpatient   Lower Body Dressing 3   Bathing 3   Toileting 3   Upper Body Dressing 3   Grooming 4   Eating 4   Daily Activity Raw Score 20   Daily Activity Standardized Score (Calc for Raw Score >=11) 42.03   AM-PAC Applied Cognition Inpatient   Following a Speech/Presentation 4   Understanding Ordinary Conversation 4   Taking Medications 4   Remembering Where Things Are Placed or Put Away 4   Remembering List of 4-5 Errands 4   Taking Care of Complicated Tasks 4   Applied Cognition Raw Score 24   Applied Cognition Standardized Score 62.21       Teri Schmid, OTR/L

## 2024-09-15 NOTE — CONSULTS
TeleConsultation - Neurology   Roxy Farmer 84 y.o. female MRN: [id number]  Unit/Bed#: 411-01 Encounter: [id number]      VIRTUAL CARE DOCUMENTATION:     1. This service was provided via Telemedicine using [employer]     2. Parties in the room with patient during teleconsult Patient only    3. Confidentiality My office door was closed     4. Participants No one else was in the room    5. Patient acknowledged consent and understanding of privacy and security of the  Telemedicine consult. I informed the patient that I have reviewed their record in Epic and presented the opportunity for them to ask any questions regarding the visit today.  The patient agreed to participate.    6. Time spent 45 min       Assessment & Plan     Left sided numbness:  Involving face, arm and leg. Started last night at 10:30 pm.  May be ischemic in nature.  NIHSS 1    Plan:  Lipitor 40  Can't do aspirin due to VWB disease with excessive bleeding  Stroke work-up including MRI brain, MRA head and neck, LDL, HgbA1c and TTE  Permissive HTN for 24 hours until MRA is done      Recommendations for outpatient neurological follow up have yet to be determined.    History of Present Illness     Reason for Consult / Principal Problem: numbness  Hx and PE limited by: none  HPI: Roxy Farmer is a 84 y.o. female who presents with left sided numbness started yesterday at 10:30 pm, including face, arm and leg. Sensation is 30% less that right as per her. No weakness, speech difficulty or other neurological symptoms.    Inpatient consult to Neurology  Consult performed by: Josie Felipe MD  Consult ordered by: Elisabeth Quigley PA-C           Review of Systems    Historical Information   Past Medical History:   Diagnosis Date    Contact dermatitis     Last assessed - 8/8/12    COPD (chronic obstructive pulmonary disease) (HCC)     Disease of thyroid gland     GERD (gastroesophageal reflux disease)     Hyperlipidemia     Hypertension      "Irritable bowel syndrome     Von Willebrand disease (HCC)      Past Surgical History:   Procedure Laterality Date    APPENDECTOMY      HYSTERECTOMY      Date unk      Social History   Social History     Substance and Sexual Activity   Alcohol Use Never     Social History     Substance and Sexual Activity   Drug Use No     E-Cigarette/Vaping    E-Cigarette Use Never User      E-Cigarette/Vaping Substances    Nicotine No     THC No     CBD No     Flavoring No     Other No     Unknown No      Social History     Tobacco Use   Smoking Status Never   Smokeless Tobacco Never     Family History: Family history non-contributory    Review of previous medical records was completed.     Meds/Allergies   current meds:   Current Facility-Administered Medications:     acetaminophen (TYLENOL) tablet 650 mg, Q4H PRN    ALPRAZolam (XANAX) tablet 0.25 mg, TID PRN    Artificial Tears Op Soln 1 drop, Q6H PRN    atorvastatin (LIPITOR) tablet 40 mg, QPM    benzonatate (TESSALON PERLES) capsule 100 mg, TID PRN    budesonide-formoterol (SYMBICORT) 80-4.5 MCG/ACT inhaler 2 puff, BID    hydroCHLOROthiazide tablet 25 mg, Daily    hyoscyamine (LEVSIN/SL) SL tablet 0.125 mg, Q6H PRN    levothyroxine tablet 100 mcg, Early Morning    lisinopril (ZESTRIL) tablet 10 mg, BID    metoprolol tartrate (LOPRESSOR) tablet 25 mg, HS    metoprolol tartrate (LOPRESSOR) tablet 50 mg, Daily    pantoprazole (PROTONIX) EC tablet 40 mg, Early Morning    PARoxetine (PAXIL) tablet 30 mg, Daily    Allergies   Allergen Reactions    Contrast [Iodinated Contrast Media] Itching, Tongue Swelling and Lip Swelling    Niacin And Related Anaphylaxis    Other      Iv contrast dye- lips and tongue thick body itchy    Codeine Nausea Only    Nsaids     Penicillins Rash       Objective   Vitals:Blood pressure 138/67, pulse 58, temperature 97.7 °F (36.5 °C), resp. rate 18, height 5' 7\" (1.702 m), weight 78.8 kg (173 lb 11.6 oz), SpO2 95%.,Body mass index is 27.21 kg/m².  No intake " or output data in the 24 hours ending 09/15/24 1202    Invasive Devices:   Invasive Devices       Peripheral Intravenous Line  Duration             Peripheral IV 09/15/24 Left Antecubital <1 day    Peripheral IV 09/15/24 Right Antecubital <1 day                    Physical Exam NAD  Skin: no seen lesions  HEENT: ATNC  Chest: breathing comfortably on room air  GI: no apparent distension.    Neurologic Exam  Alert and oriented for self, place and time. Memory is intact to recent and remote events. Language is intact to comprehension and expression. No neglect. Speech is normal. EOMI. Pupils are symmetric. Visual field appears intact. Face is symmetric. Tongue is midline. Able to move all extremities against gravity. No dysmetria noted.  Sensation reported less on left compared to right on light touch.    Lab Results: CBC:   Results from last 7 days   Lab Units 09/15/24  0659   WBC Thousand/uL 4.96   RBC Million/uL 4.08   HEMOGLOBIN g/dL 13.1   HEMATOCRIT % 40.3   MCV fL 99*   PLATELETS Thousands/uL 184   , BMP/CMP:   Results from last 7 days   Lab Units 09/15/24  0659   SODIUM mmol/L 136   POTASSIUM mmol/L 4.0   CHLORIDE mmol/L 100   CO2 mmol/L 26   BUN mg/dL 23   CREATININE mg/dL 0.93   CALCIUM mg/dL 9.8   AST U/L 21   ALT U/L 18   ALK PHOS U/L 78   EGFR ml/min/1.73sq m 55     Imaging Studies: Reviewed radiology reports from this admission including: CT head.  EKG, Pathology, and Other Studies: No pertinent imaging studies reviewed.  VTE Prophylaxis: VTE covered by:    None       Code Status: Level 3 - DNAR and DNI  Advance Directive and Living Will:      Power of :    POLST:      Counseling / Coordination of Care  N/A

## 2024-09-15 NOTE — ED PROVIDER NOTES
1. Left facial numbness    2. Stroke-like symptoms      ED Disposition       ED Disposition   Admit    Condition   Stable    Date/Time   Sun Sep 15, 2024  8:21 AM    Comment   Case was discussed with ELIDA and the patient's admission status was agreed to be Admission Status: observation status to the service of Dr. Iyer .               Assessment & Plan       Medical Decision Making  I reviewed the patient's medical chart, PMHx, prior encounters, medications.    My independent interpretation of ECG demonstrated: Normal sinus rhythm, no acute ischemic changes    My DDx includes: Strokelike symptoms, stroke, electrolyte abnormality, arrhythmia, anxiety    Patient does not demonstrate any objective findings on exam, despite description of left-sided numbness/tingling, when I pressed, she reports that her sensation is normal.  She denies any weakness, she states that her speech is baseline and she is talking complete sentences clearly, she has no evidence of facial droop, no cerebellar dysfunction.  Predominantly left-sided symptoms are concerning for stroke however, I did reach out to neurology given patient's severe anaphylaxis history despite NIH score of 0 on my exam, and they recommended admission for MRI/MRI.  Will perform Noncon in the emergency department to ensure no other pathology could be causing her symptoms that could at least be seen on a noncontrast study, labs ordered.  Patient signed out to Dr. Poole pending CT and lab results.  Plan for admission stroke pathway.    Amount and/or Complexity of Data Reviewed  Labs: ordered.  Radiology: ordered.    Risk  Decision regarding hospitalization.                 ED Course as of 09/15/24 2148   Sun Sep 15, 2024   0713 Discussed with neurology, given anaphylaxis allergy to contrast, they agree that MRI/MRA can be performed tomorrow given no objective findings on exam.       Medications       History of Present Illness       84-year-old female who presents for  strokelike symptoms.  Patient reports that her son  approximately 1 year ago, and she has been under a lot of stress lately regarding this, states that she is having any family issues regarding his suicide 1 year ago.  She describes that around 10:30 PM last night, she began feeling a tremor on the left side of her face.  She states that since then she noticed a slight numbness/tingling sensation.  Denies any weakness.  She states that she noticed this on the left side of her body.  She denies any slurred speech, denies any facial droop.  She states that she checked for this at home and states that she feels normal.  She then went to sleep, woke up in the morning but still felt the sensations on the left side of her body, so she decided to come in. ROS otherwise negative.            Review of Systems   Constitutional:  Negative for chills and fever.   HENT:  Negative for congestion, rhinorrhea and sore throat.    Respiratory:  Negative for cough and shortness of breath.    Cardiovascular:  Negative for chest pain and palpitations.   Gastrointestinal:  Negative for abdominal pain, constipation, diarrhea, nausea and vomiting.   Genitourinary:  Negative for difficulty urinating and flank pain.   Musculoskeletal:  Negative for arthralgias.   Neurological:  Positive for numbness. Negative for dizziness, weakness, light-headedness and headaches.   Psychiatric/Behavioral:  Negative for agitation, behavioral problems and confusion.    All other systems reviewed and are negative.          Objective     ED Triage Vitals   Temperature Pulse Blood Pressure Respirations SpO2 Patient Position - Orthostatic VS   09/15/24 0653 09/15/24 0653 09/15/24 0700 09/15/24 0653 09/15/24 0653 --   99.1 °F (37.3 °C) 77 143/67 18 93 %       Temp src Heart Rate Source BP Location FiO2 (%) Pain Score    -- 09/15/24 0653 -- -- 09/15/24 0653     Monitor   No Pain        Physical Exam  Constitutional:       Appearance: She is well-developed.  "  HENT:      Head: Normocephalic and atraumatic.   Cardiovascular:      Rate and Rhythm: Normal rate and regular rhythm.      Heart sounds: Normal heart sounds. No murmur heard.     No friction rub.   Pulmonary:      Effort: Pulmonary effort is normal. No respiratory distress.      Breath sounds: Normal breath sounds. No wheezing or rales.   Abdominal:      General: Bowel sounds are normal. There is no distension.      Palpations: Abdomen is soft.      Tenderness: There is no abdominal tenderness.   Musculoskeletal:         General: Normal range of motion.      Cervical back: Normal range of motion and neck supple.   Skin:     General: Skin is warm.   Neurological:      Mental Status: She is alert and oriented to person, place, and time.      Coordination: Coordination normal.      Comments: Patient AAOx3. CN II-XII intact. There is no facial droop. No slurred speech or aphasia (patient personally reports to me that her speech is baseline). Tongue is midline. Normal CFTs. 5/5 strength in all extremities. Normal sensation in all extremities.     Psychiatric:         Behavior: Behavior normal.         Thought Content: Thought content normal.         Judgment: Judgment normal.         Labs Reviewed   CBC AND DIFFERENTIAL - Abnormal       Result Value    WBC 4.96      RBC 4.08      Hemoglobin 13.1      Hematocrit 40.3      MCV 99 (*)     MCH 32.1      MCHC 32.5      RDW 13.9      MPV 10.6      Platelets 184      nRBC 0      Segmented % 41 (*)     Immature Grans % 0      Lymphocytes % 43      Monocytes % 10      Eosinophils Relative 5      Basophils Relative 1      Absolute Neutrophils 2.04      Absolute Immature Grans 0.01      Absolute Lymphocytes 2.12      Absolute Monocytes 0.48      Eosinophils Absolute 0.26      Basophils Absolute 0.05     TSH, 3RD GENERATION WITH FREE T4 REFLEX - Abnormal    TSH 3RD GENERATON 0.343 (*)    T4, FREE - Abnormal    Free T4 1.20 (*)     Narrative:     \"Therapeutic range for patients " "medicated with thyroid disorders: 0.61-1.24 ng/dL.\"   PROTIME-INR - Normal    Protime 12.8      INR 0.91      Narrative:     INR Therapeutic Range    Indication                                             INR Range      Atrial Fibrillation                                               2.0-3.0  Hypercoagulable State                                    2.0.2.3  Left Ventricular Asist Device                            2.0-3.0  Mechanical Heart Valve                                  -    Aortic(with afib, MI, embolism, HF, LA enlargement,    and/or coagulopathy)                                     2.0-3.0 (2.5-3.5)     Mitral                                                             2.5-3.5  Prosthetic/Bioprosthetic Heart Valve               2.0-3.0  Venous thromboembolism (VTE: VT, PE        2.0-3.0   APTT - Normal    PTT 26     VITAMIN B12 - Normal    Vitamin B-12 423     FOLATE - Normal    Folate >22.3     COMPREHENSIVE METABOLIC PANEL    Sodium 136      Potassium 4.0      Chloride 100      CO2 26      ANION GAP 10      BUN 23      Creatinine 0.93      Glucose 107      Calcium 9.8      AST 21      ALT 18      Alkaline Phosphatase 78      Total Protein 7.8      Albumin 4.2      Total Bilirubin 0.42      eGFR 55      Narrative:     National Kid[employer] Foundation guidelines for Chronic Kidney Disease (CKD):     Stage 1 with normal or high GFR (GFR > 90 mL/min/1.73 square meters)    Stage 2 Mild CKD (GFR = 60-89 mL/min/1.73 square meters)    Stage 3A Moderate CKD (GFR = 45-59 mL/min/1.73 square meters)    Stage 3B Moderate CKD (GFR = 30-44 mL/min/1.73 square meters)    Stage 4 Severe CKD (GFR = 15-29 mL/min/1.73 square meters)    Stage 5 End Stage CKD (GFR <15 mL/min/1.73 square meters)  Note: GFR calculation is accurate only with a steady state creatinine     CT head without contrast   Final Interpretation by Jerry Concepcion MD (09/15 0729)      No acute intracranial abnormality.                  Workstation " performed: [id number]         MRI Inpatient Order    (Results Pending)   MRI inpatient order    (Results Pending)       ECG 12 Lead Documentation Only    Date/Time: 9/15/2024 9:47 PM    Performed by: Tejas Alcantara MD  Authorized by: Tejas Alcantara MD    Indications / Diagnosis:  Stroke-like sx  ECG reviewed by me, the ED Provider: yes    Patient location:  ED  Previous ECG:     Previous ECG:  Compared to current    Similarity:  No change  Interpretation:     Interpretation: normal    Rate:     ECG rate:  74    ECG rate assessment: normal    Rhythm:     Rhythm: sinus rhythm    Ectopy:     Ectopy: none    QRS:     QRS axis:  Normal    QRS intervals:  Normal  Conduction:     Conduction: normal    ST segments:     ST segments:  Normal  T waves:     T waves: normal           Tejas Alcantara MD  09/15/24 1632

## 2024-09-15 NOTE — PLAN OF CARE
Problem: OCCUPATIONAL THERAPY ADULT  Goal: Performs self-care activities at highest level of function for planned discharge setting.  See evaluation for individualized goals.  Description: Treatment Interventions: ADL retraining, Functional transfer training, UE strengthening/ROM, Endurance training, Equipment evaluation/education, Fine motor coordination activities, Energy conservation, Activityengagement          See flowsheet documentation for full assessment, interventions and recommendations.   Note: Limitation: Decreased ADL status, Decreased UE strength, Decreased endurance, Decreased sensation, Decreased self-care trans, Decreased high-level ADLs  Prognosis: Good  Assessment: Pt is a 84 y.o. female seen for OT evaluation s/p admit to [facility] on 9/15/2024 w/ Stroke-like symptoms.  Comorbidities affecting pt's functional performance at time of assessment include:  anxiety, PE, HTN and hypothyroidism . Personal factors affecting pt at time of IE include:steps to enter environment, limited home support, difficulty performing ADLS, and difficulty performing IADLS . Prior to admission, pt was independent. Upon evaluation: Pt requires min A 2* the following deficits impacting occupational performance: weakness, decreased strength, decreased balance, impaired GMC, impaired FMC, and impaired sensation. Pt to benefit from continued skilled OT tx while in the hospital to address deficits as defined above and maximize level of functional independence w ADL's and functional mobility. Occupational Performance areas to address include: bathing/shower, toilet hygiene, dressing, medication management, functional mobility, community mobility, cleaning, meal prep, and household maintenance. The patient's raw score on the -PAC Daily Activity Inpatient Short Form is 20. A raw score of greater than or equal to 19 suggests the patient may benefit from discharge to home. Discharge recommendation at this time is level III.     Rehab  Resource Intensity Level, OT: III (Minimum Resource Intensity)

## 2024-09-15 NOTE — SPEECH THERAPY NOTE
"Speech Language/Pathology  Speech/Language Pathology  Assessment    Patient Name: Roxy Farmer  Today's Date: 9/15/2024     Problem List  Principal Problem:    Stroke-like symptoms  Active Problems:    Anxiety    Primary hypertension    Hypothyroidism    Von Willebrand disease (HCC)    Pulmonary emphysema (HCC)    Past Medical History  Past Medical History:   Diagnosis Date    Contact dermatitis     Last assessed - 8/8/12    COPD (chronic obstructive pulmonary disease) (HCC)     Disease of thyroid gland     GERD (gastroesophageal reflux disease)     Hyperlipidemia     Hypertension     Irritable bowel syndrome     Von Willebrand disease (HCC)      Past Surgical History  Past Surgical History:   Procedure Laterality Date    APPENDECTOMY      HYSTERECTOMY      Date unk      [facility] Chart Review: \"Roxy Farmer is a 84 y.o. female who presents with left sided numbness started yesterday at 10:30 pm, including face, arm and leg. Sensation is 30% less that right as per her. No weakness, speech difficulty or other neurological symptoms.\"     Bedside Swallow Evaluation:    Summary:  Pt presented w/ an oropharyngeal swallow function WFL to continue regular solids and thin liquids. Pt reports L sided facial weakness, also presenting with mild L labial asymmetry. Case d/w RN, no dysphagia symptoms observed. Pt completed dinner tray of regular solids and thin liquids. No difficulty observed with regular solids or thin liquids via cup/straw single or consecutive sips. Oral phase WFL without any anterior or poster loss. Adequate pharyngeal phase of the swallow. No clinical overt s.s of aspiration and no reported difficulty. Pt is tolerating the least restrictive diet at this time, no further swallow tx indicated at this time. Please reconsult as needed.    Recommendations:  Diet: regular   Liquid:thin  Meds: as tolerated/desired  Supervision: set up clean up as needed  Positioning:Upright  Pt to take PO/Meds only when fully alert " "and upright.   Eval only, No f/u tx indicated.       Goal(s):  Pt will tolerate least restrictive diet w/out s/s aspiration or oral/pharyngeal difficulties.     H&P/Admit info/ pertinent provider notes: (PMH noted above)        Special Studies:    CT Head 9/15/24 \"IMPRESSION:     No acute intracranial abnormality.\"    MRI of the brain pending      Previous MBS: no      Patient's goal: to get better and go back home    Did the pt report pain?no      Reason for consult:  R/o aspiration  Determine safest and least restrictive diet  Stroke protocol      Food Allergies: None listed in chart or reported by patient   Current Diet: Regular solids/thin liquids   Premorbid diet: Regular solids/thin liquids   O2 requirement: RA   Social/Prior living Lives at home independently   Voice/Speech:  WFL across conversation - no signs or symptoms of dysarthria, apraxia or aphasia.    Follows commands: yes   Cognitive status: Oriented x4/4     Oral [facility] exam:  Dentition:adequate  Lips (VII): left asymmetry  Tongue (XII):WFL  Mandible (V):WFL  Face/oral sensation (V): left sided facial \"numbness\"  Secretion management:WFL  Volitional cough:WFL  Volitional swallow:WFL    Esophageal stage:  No s/s reported    Results d/w:  Pt, nursing                    "

## 2024-09-15 NOTE — H&P
H&P - Hospitalist   Name: Roxy Farmer 84 y.o. female I MRN: [id number]  Unit/Bed#: 411-01 I Date of Admission: 9/15/2024   Date of Service: 9/15/2024 I Hospital Day: 0     Assessment & Plan  Stroke-like symptoms  83 yo female PMHx HTN, von Willebrand disease, anxiety who presents with left facial paresthesias x 1 day. Now with associated left arm & leg paresthesias. No weakness, slurred speech, facial droop, HA, facial pain, blurred vision. No focal deficits.   R/o CVA. Possible anxiety rxn   CTH: no acute intracranial abnormality   Unable to get CTA H/N given severe contrast allergy   Admit to stroke pathway - frequent VS, neuro checks, telemetry  MRI brain & MRA brain ordered   Echocardiogram  Check A1c, lipid panel. T4 pending   Check B12, folate  Discussed low dose aspirin - however in setting of von Willebrand disease and prior significant bleeding it is declined. Started on statin    Von Willebrand disease (HCC)  Hx of, reports severe bleeding in past   Utilize SCDs  Primary hypertension  Addendum: patient now agreeable to permissive HTN   Hold medications x 24 hrs  Pulmonary emphysema (HCC)  Not in acute exacerbation  Continue home inhalers   Hypothyroidism  TSH 0.343, await free T4  Continue home dose of levothyroxine for now   Anxiety  Continue home Xanax prn    VTE Pharmacologic Prophylaxis:   SCDs  Code Status: Level 3 - DNAR and DNI   Discussion with family: Patient declined call to .     Anticipated Length of Stay: Patient will be admitted on an observation basis with an anticipated length of stay of less than 2 midnights secondary to stroke like symptoms.    History of Present Illness   Chief Complaint: left facial numbness     Roxy Farmer is a 84 y.o. female with a PMH of HTN, HLD, vonWillebrand disease who presents with left sided facial numbness x 1 day. Started around 10:30 pm last evening. No associated weakness, facial drooping, slurred speech, facial pain, headache, vision  "abnormalities, tinnitus, dysarthria, dysphagia, ataxia. Notes subjective numbness in her left arm & leg today. Denies chest pain, SOB, abdominal pain, N/V/D. Reports she has been struggling with anxiety lately regarding her son's suicide approximately 1 year prior.     In ED, had CT head which was negative for acute abnormality. Unable to have CTA H/N given contrast allergy. Labs unremarkable with exception of low TSH at 0.343. case discussed with neurology and recommended for admission for MRI brain and MRA H/N.     Review of Systems   Constitutional:  Negative for chills and fever.   Respiratory:  Negative for shortness of breath.    Cardiovascular:  Negative for chest pain.   Gastrointestinal:  Negative for abdominal pain, diarrhea, nausea and vomiting.   Neurological:  Positive for numbness. Negative for dizziness, weakness, light-headedness and headaches.   Psychiatric/Behavioral:  The patient is nervous/anxious.        I have reviewed the patient's PMH, PSH, Social History, Family History, Meds, and Allergies  Social History:  Marital Status:    Occupation:   Patient Pre-hospital Living Situation:   Patient Pre-hospital Level of Mobility:   Patient Pre-hospital Diet Restrictions:     Objective     Vitals:   Blood Pressure: 138/67 (09/15/24 0848)  Pulse: 58 (09/15/24 0848)  Temperature: 97.7 °F (36.5 °C) (09/15/24 0848)  Respirations: 18 (09/15/24 0848)  Height: 5' 7\" (170.2 cm) (09/15/24 0848)  Weight - Scale: 78.8 kg (173 lb 11.6 oz) (09/15/24 0848)  SpO2: 95 % (09/15/24 0848)    Physical Exam  Constitutional:       General: She is not in acute distress.  HENT:      Head: Normocephalic and atraumatic.   Cardiovascular:      Rate and Rhythm: Normal rate and regular rhythm.   Pulmonary:      Effort: Pulmonary effort is normal. No respiratory distress.      Breath sounds: Normal breath sounds.   Abdominal:      General: Abdomen is flat. Bowel sounds are normal. There is no distension.      Palpations: " Abdomen is soft.   Musculoskeletal:      Right lower leg: No edema.      Left lower leg: No edema.   Skin:     General: Skin is warm and dry.   Neurological:      Mental Status: She is alert and oriented to person, place, and time.      GCS: GCS eye subscore is 4. GCS verbal subscore is 5. GCS motor subscore is 6.      Cranial Nerves: Cranial nerves 2-12 are intact. No cranial nerve deficit, dysarthria or facial asymmetry.      Sensory: Sensation is intact.      Motor: Motor function is intact. No weakness or pronator drift.      Coordination: Finger-Nose-Finger Test normal.         Lines/Drains:  Lines/Drains/Airways       Active Status       None                        Additional Data:   Lab Results: I have reviewed the following results:   Results from last 7 days   Lab Units 09/15/24  0659   WBC Thousand/uL 4.96   HEMOGLOBIN g/dL 13.1   HEMATOCRIT % 40.3   PLATELETS Thousands/uL 184   SEGS PCT % 41*   LYMPHO PCT % 43   MONO PCT % 10   EOS PCT % 5     Results from last 7 days   Lab Units 09/15/24  0659   SODIUM mmol/L 136   POTASSIUM mmol/L 4.0   CHLORIDE mmol/L 100   CO2 mmol/L 26   BUN mg/dL 23   CREATININE mg/dL 0.93   ANION GAP mmol/L 10   CALCIUM mg/dL 9.8   ALBUMIN g/dL 4.2   TOTAL BILIRUBIN mg/dL 0.42   ALK PHOS U/L 78   ALT U/L 18   AST U/L 21   GLUCOSE RANDOM mg/dL 107     Results from last 7 days   Lab Units 09/15/24  0659   INR  0.91         Lab Results   Component Value Date    HGBA1C 6.0 02/26/2018           Imaging Review: Reviewed radiology reports from this admission including: CT head.  Other Studies: No additional pertinent studies reviewed.    Administrative Statements       ** Please Note: This note has been constructed using a voice recognition system. **

## 2024-09-15 NOTE — ASSESSMENT & PLAN NOTE
83 yo female PMHx HTN, von Willebrand disease, anxiety who presents with left facial paresthesias x 1 day. Now with associated left arm & leg paresthesias. No weakness, slurred speech, facial droop, HA, facial pain, blurred vision. No focal deficits.   R/o CVA. Possible anxiety rxn   CTH: no acute intracranial abnormality   Unable to get CTA H/N given severe contrast allergy   Admit to stroke pathway - frequent VS, neuro checks, telemetry  MRI brain & MRA brain ordered   Echocardiogram  Check A1c, lipid panel. T4 pending   Check B12, folate  Discussed low dose aspirin - however in setting of von Willebrand disease and prior significant bleeding it is declined. Started on statin

## 2024-09-16 ENCOUNTER — APPOINTMENT (OUTPATIENT)
Dept: MRI IMAGING | Facility: HOSPITAL | Age: 84
DRG: 065 | End: 2024-09-16
Payer: MEDICARE

## 2024-09-16 ENCOUNTER — APPOINTMENT (OUTPATIENT)
Dept: NON INVASIVE DIAGNOSTICS | Facility: HOSPITAL | Age: 84
DRG: 065 | End: 2024-09-16
Payer: MEDICARE

## 2024-09-16 LAB
AORTIC ROOT: 3.3 CM
ATRIAL RATE: 74 BPM
AV LVOT MEAN GRADIENT: 3 MMHG
AV LVOT PEAK GRADIENT: 6 MMHG
BSA FOR ECHO PROCEDURE: 1.9 M2
CHOLEST SERPL-MCNC: 348 MG/DL
DOP CALC LVOT AREA: 3.14 CM2
DOP CALC LVOT CARDIAC INDEX: 2.68 L/MIN/M2
DOP CALC LVOT CARDIAC OUTPUT: 5.1 L/MIN
DOP CALC LVOT DIAMETER: 2 CM
DOP CALC LVOT PEAK VEL VTI: 22.47 CM
DOP CALC LVOT PEAK VEL: 1.18 M/S
DOP CALC LVOT STROKE INDEX: 36.8 ML/M2
DOP CALC LVOT STROKE VOLUME: 70.56
E WAVE DECELERATION TIME: 400 MS
E/A RATIO: 0.43
EST. AVERAGE GLUCOSE BLD GHB EST-MCNC: 117 MG/DL
FRACTIONAL SHORTENING: 33 (ref 28–44)
HBA1C MFR BLD: 5.7 %
HDLC SERPL-MCNC: 31 MG/DL
INTERVENTRICULAR SEPTUM IN DIASTOLE (PARASTERNAL SHORT AXIS VIEW): 1 CM
INTERVENTRICULAR SEPTUM: 1 CM (ref 0.6–1.1)
LAAS-AP2: 20.2 CM2
LAAS-AP4: 21.5 CM2
LDLC SERPL DIRECT ASSAY-MCNC: 81 MG/DL (ref 0–100)
LEFT ATRIUM SIZE: 3.8 CM
LEFT ATRIUM VOLUME (MOD BIPLANE): 70 ML
LEFT ATRIUM VOLUME INDEX (MOD BIPLANE): 36.8 ML/M2
LEFT INTERNAL DIMENSION IN SYSTOLE: 2.6 CM (ref 2.1–4)
LEFT VENTRICULAR INTERNAL DIMENSION IN DIASTOLE: 3.9 CM (ref 3.5–6)
LEFT VENTRICULAR POSTERIOR WALL IN END DIASTOLE: 0.9 CM
LEFT VENTRICULAR STROKE VOLUME: 41 ML
LVSV (TEICH): 41 ML
MV E'TISSUE VEL-LAT: 7 CM/S
MV E'TISSUE VEL-SEP: 5 CM/S
MV PEAK A VEL: 0.92 M/S
MV PEAK E VEL: 40 CM/S
MV STENOSIS PRESSURE HALF TIME: 116 MS
MV VALVE AREA P 1/2 METHOD: 1.9
P AXIS: 56 DEGREES
PR INTERVAL: 194 MS
PULM VEIN S/D RATIO: 1.73
PV PEAK D VEL: 0.15 M/S
PV PEAK S VEL: 0.26 M/S
QRS AXIS: 17 DEGREES
QRSD INTERVAL: 70 MS
QT INTERVAL: 386 MS
QTC INTERVAL: 428 MS
RA PRESSURE ESTIMATED: 5 MMHG
RIGHT ATRIAL 2D VOLUME: 45 ML
RIGHT ATRIUM AREA SYSTOLE A4C: 16.6 CM2
RIGHT VENTRICLE ID DIMENSION: 2.3 CM
RV PSP: 31 MMHG
SL CV LEFT ATRIUM LENGTH A2C: 5 CM
SL CV LV EF: 65
SL CV PED ECHO LEFT VENTRICLE DIASTOLIC VOLUME (MOD BIPLANE) 2D: 66 ML
SL CV PED ECHO LEFT VENTRICLE SYSTOLIC VOLUME (MOD BIPLANE) 2D: 25 ML
T WAVE AXIS: 62 DEGREES
TR MAX PG: 26 MMHG
TR PEAK VELOCITY: 2.5 M/S
TRICUSPID ANNULAR PLANE SYSTOLIC EXCURSION: 2.6 CM
TRICUSPID VALVE PEAK E WAVE VELOCITY: 0.08 M/S
TRICUSPID VALVE PEAK REGURGITATION VELOCITY: 2.54 M/S
TRIGL SERPL-MCNC: 993 MG/DL
VENTRICULAR RATE: 74 BPM

## 2024-09-16 PROCEDURE — 97163 PT EVAL HIGH COMPLEX 45 MIN: CPT

## 2024-09-16 PROCEDURE — 83721 ASSAY OF BLOOD LIPOPROTEIN: CPT

## 2024-09-16 PROCEDURE — 70547 MR ANGIOGRAPHY NECK W/O DYE: CPT

## 2024-09-16 PROCEDURE — 70551 MRI BRAIN STEM W/O DYE: CPT

## 2024-09-16 PROCEDURE — 99232 SBSQ HOSP IP/OBS MODERATE 35: CPT

## 2024-09-16 PROCEDURE — 93306 TTE W/DOPPLER COMPLETE: CPT | Performed by: INTERNAL MEDICINE

## 2024-09-16 PROCEDURE — 93010 ELECTROCARDIOGRAM REPORT: CPT | Performed by: INTERNAL MEDICINE

## 2024-09-16 PROCEDURE — 80061 LIPID PANEL: CPT

## 2024-09-16 PROCEDURE — 93306 TTE W/DOPPLER COMPLETE: CPT

## 2024-09-16 PROCEDURE — 70544 MR ANGIOGRAPHY HEAD W/O DYE: CPT

## 2024-09-16 PROCEDURE — 83036 HEMOGLOBIN GLYCOSYLATED A1C: CPT

## 2024-09-16 PROCEDURE — 92526 ORAL FUNCTION THERAPY: CPT

## 2024-09-16 RX ORDER — METOPROLOL TARTRATE 25 MG/1
25 TABLET, FILM COATED ORAL
Status: DISCONTINUED | OUTPATIENT
Start: 2024-09-16 | End: 2024-09-17 | Stop reason: HOSPADM

## 2024-09-16 RX ORDER — FENOFIBRATE 48 MG/1
48 TABLET, COATED ORAL DAILY
Status: DISCONTINUED | OUTPATIENT
Start: 2024-09-16 | End: 2024-09-17 | Stop reason: HOSPADM

## 2024-09-16 RX ADMIN — ALPRAZOLAM 0.25 MG: 0.25 TABLET ORAL at 20:03

## 2024-09-16 RX ADMIN — ALPRAZOLAM 0.25 MG: 0.25 TABLET ORAL at 14:40

## 2024-09-16 RX ADMIN — METOPROLOL TARTRATE 25 MG: 25 TABLET, FILM COATED ORAL at 21:17

## 2024-09-16 RX ADMIN — BUDESONIDE AND FORMOTEROL FUMARATE DIHYDRATE 2 PUFF: 80; 4.5 AEROSOL RESPIRATORY (INHALATION) at 17:04

## 2024-09-16 RX ADMIN — PANTOPRAZOLE SODIUM 40 MG: 40 TABLET, DELAYED RELEASE ORAL at 06:05

## 2024-09-16 RX ADMIN — ALPRAZOLAM 0.25 MG: 0.25 TABLET ORAL at 09:59

## 2024-09-16 RX ADMIN — FENOFIBRATE 48 MG: 48 TABLET ORAL at 10:40

## 2024-09-16 RX ADMIN — ALPRAZOLAM 0.25 MG: 0.25 TABLET ORAL at 21:21

## 2024-09-16 RX ADMIN — BUDESONIDE AND FORMOTEROL FUMARATE DIHYDRATE 2 PUFF: 80; 4.5 AEROSOL RESPIRATORY (INHALATION) at 09:55

## 2024-09-16 RX ADMIN — ATORVASTATIN CALCIUM 40 MG: 40 TABLET, FILM COATED ORAL at 17:04

## 2024-09-16 RX ADMIN — LEVOTHYROXINE SODIUM 100 MCG: 100 TABLET ORAL at 06:05

## 2024-09-16 RX ADMIN — ACETAMINOPHEN 650 MG: 325 TABLET ORAL at 20:03

## 2024-09-16 RX ADMIN — ALPRAZOLAM 0.25 MG: 0.25 TABLET ORAL at 00:32

## 2024-09-16 RX ADMIN — PAROXETINE 30 MG: 20 TABLET, FILM COATED ORAL at 09:54

## 2024-09-16 NOTE — PLAN OF CARE
Problem: PAIN - ADULT  Goal: Verbalizes/displays adequate comfort level or baseline comfort level  Description: Interventions:  - Encourage patient to monitor pain and request assistance  - Assess pain using appropriate pain scale  - Administer analgesics based on type and severity of pain and evaluate response  - Implement non-pharmacological measures as appropriate and evaluate response  - Consider cultural and social influences on pain and pain management  - Notify physician/advanced practitioner if interventions unsuccessful or patient reports new pain  Outcome: Progressing     Problem: INFECTION - ADULT  Goal: Absence or prevention of progression during hospitalization  Description: INTERVENTIONS:  - Assess and monitor for signs and symptoms of infection  - Monitor lab/diagnostic results  - Monitor all insertion sites, i.e. indwelling lines, tubes, and drains  - Monitor endotracheal if appropriate and nasal secretions for changes in amount and color  - [place] appropriate cooling/warming therapies per order  - Administer medications as ordered  - Instruct and encourage patient and family to use good hand hygiene technique  - Identify and instruct in appropriate isolation precautions for identified infection/condition  Outcome: Progressing  Goal: Absence of fever/infection during neutropenic period  Description: INTERVENTIONS:  - Monitor WBC    Outcome: Progressing     Problem: SAFETY ADULT  Goal: Patient will remain free of falls  Description: INTERVENTIONS:  - Educate patient/family on patient safety including physical limitations  - Instruct patient to call for assistance with activity   - Consult OT/PT to assist with strengthening/mobility   - Keep Call bell within reach  - Keep bed low and locked with side rails adjusted as appropriate  - Keep care items and personal belongings within reach  - Initiate and maintain comfort rounds  - Make Fall Risk Sign visible to staff  - Offer Toileting every 4 Hours,  in advance of need  - Obtain necessary fall risk management equipment  - Apply yellow socks and bracelet for high fall risk patients  - Consider moving patient to room near nurses station  Outcome: Progressing  Goal: Maintain or return to baseline ADL function  Description: INTERVENTIONS:  -  Assess patient's ability to carry out ADLs; assess patient's baseline for ADL function and identify physical deficits which impact ability to perform ADLs (bathing, care of mouth/teeth, toileting, grooming, dressing, etc.)  - Assess/evaluate cause of self-care deficits   - Assess range of motion  - Assess patient's mobility; develop plan if impaired  - Assess patient's need for assistive devices and provide as appropriate  - Encourage maximum independence but intervene and supervise when necessary  - Involve family in performance of ADLs  - Assess for home care needs following discharge   - Consider OT consult to assist with ADL evaluation and planning for discharge  - Provide patient education as appropriate  Outcome: Progressing  Goal: Maintains/Returns to pre admission functional level  Description: INTERVENTIONS:  - Perform AM-PAC 6 Click Basic Mobility/ Daily Activity assessment daily.  - Set and communicate daily mobility goal to care team and patient/family/caregiver.   - Collaborate with rehabilitation services on mobility goals if consulted  - Perform Range of Motion 3 times a day.  - Reposition patient every 2 hours.  - Dangle patient 3 times a day  - Stand patient 3 times a day  - Ambulate patient 3 times a day  - Out of bed to chair 3 times a day   - Out of bed for meals 3 times a day  - Out of bed for toileting  - Record patient progress and toleration of activity level   Outcome: Progressing     Problem: DISCHARGE PLANNING  Goal: Discharge to home or other facility with appropriate resources  Description: INTERVENTIONS:  - Identify barriers to discharge w/patient and caregiver  - Arrange for needed discharge  resources and transportation as appropriate  - Identify discharge learning needs (meds, wound care, etc.)  - Arrange for interpretive services to assist at discharge as needed  - Refer to Case Management Department for coordinating discharge planning if the patient needs post-hospital services based on physician/advanced practitioner order or complex needs related to functional status, cognitive ability, or social support system  Outcome: Progressing     Problem: Knowledge Deficit  Goal: Patient/family/caregiver demonstrates understanding of disease process, treatment plan, medications, and discharge instructions  Description: Complete learning assessment and assess knowledge base.  Interventions:  - Provide teaching at level of understanding  - Provide teaching via preferred learning methods  Outcome: Progressing     Problem: NEUROSENSORY - ADULT  Goal: Achieves stable or improved neurological status  Description: INTERVENTIONS  - Monitor and report changes in neurological status  - Monitor vital signs   Outcome: Progressing

## 2024-09-16 NOTE — ASSESSMENT & PLAN NOTE
83 yo female PMHx HTN, von Willebrand disease, anxiety who presents with left facial paresthesias x 1 day prior to admission with associated left arm & leg paresthesias  R/o CVA. Possible anxiety rxn   CTH: no acute intracranial abnormality   Unable to get CTA H/N given severe contrast allergy   Admit to stroke pathway - frequent VS, neuro checks, telemetry  MRI brain & MRA brain ordered   Echocardiogram  A1c pending. , , HDL 31. T4 1.20  B12 423, Folate >22.3  ASA contraindicated with vWB dx. Continue Lipitor 40mg. Started on fenofibrate 48mg daily per renal dosing given significant hypertriglyceridemia

## 2024-09-16 NOTE — CASE MANAGEMENT
Case Management Discharge Planning Note    Patient name Roxy Farmer  Location /411-01 MRN [id number]  : 1940 Date 2024       Current Admission Date: 9/15/2024  Current Admission Diagnosis:Stroke-like symptoms   Patient Active Problem List    Diagnosis Date Noted Date Diagnosed    Stroke-like symptoms 09/15/2024     Hypertensive urgency 2023     Headache 2023     Acute non intractable tension-type headache 2023     Chronic pain syndrome 2023     Lumbar degenerative disc disease 2023     Acute idiopathic gout of right foot 2023     Mild aortic stenosis 2023     Stage 3a chronic kidney disease (HCC) 2023     Pulmonary emphysema (HCC) 2021     Acute bilateral low back pain without sciatica 2021     Depression, recurrent (HCC) 2020     Positive colorectal cancer screening using Cologuard test 2020     Left corneal abrasion 2019     Irritable bowel syndrome with both constipation and diarrhea 07/10/2019     BMI 28.0-28.9,adult 07/10/2019     Generalized abdominal pain 2018     Back pain, chronic 2017     Hearing loss 2017     Diarrhea 2017     Right hip pain 2017     Stool guaiac positive 2015     Vitamin D deficiency 2015     Diverticulosis 2013     Von Willebrand disease (HCC) 2013     Hyperlipidemia 2013     Esophagitis, reflux 12/10/2012     Anxiety 10/09/2012     Hypothyroidism 2012     Generalized osteoarthritis of multiple sites 2012     Primary hypertension 2012       LOS (days): 0  Geometric Mean LOS (GMLOS) (days):   Days to GMLOS:     OBJECTIVE:            Current admission status: Observation   Preferred Pharmacy:   [id number] [place] - [address] [place]  [address]  [place] [address]  Phone: [phone] Fax: [phone]    Primary Care Provider: Casey Barrera MD    Primary Insurance:  MEDICARE  Secondary Insurance: [facility]    DISCHARGE DETAILS:  Chart review done. Pt here as OBV with stroke like symptoms. Plans at this time are home on dc with OP follow up and likely OP Therapy after dc.

## 2024-09-16 NOTE — PROGRESS NOTES
Progress Note - Hospitalist   Name: Roxy Farmer 84 y.o. female I MRN: [id number]  Unit/Bed#: 411-01 I Date of Admission: 9/15/2024   Date of Service: 9/16/2024 I Hospital Day: 0    Assessment & Plan  Stroke-like symptoms  83 yo female PMHx HTN, von Willebrand disease, anxiety who presents with left facial paresthesias x 1 day prior to admission with associated left arm & leg paresthesias  R/o CVA. Possible anxiety rxn   CTH: no acute intracranial abnormality   Unable to get CTA H/N given severe contrast allergy   Admit to stroke pathway - frequent VS, neuro checks, telemetry  MRI brain & MRA brain ordered   Echocardiogram  A1c pending. , , HDL 31. T4 1.20  B12 423, Folate >22.3  ASA contraindicated with vWB dx. Continue Lipitor 40mg. Started on fenofibrate 48mg daily per renal dosing given significant hypertriglyceridemia     Von Willebrand disease (HCC)  Hx of, reports severe bleeding in past   Utilize SCDs  Primary hypertension  Continue to hold antihypertensives until MRA results  Hyperlipidemia  Continue Lipitor 40 started yesterday   Start on fenofibrate 48mg daily for hypertriglyceridemia   Dietitian consult requested. Patient reports eating a lot of fast food  Pulmonary emphysema (HCC)  Not in acute exacerbation  Continue home inhalers   Hypothyroidism  TSH 0.343, await free T4  Continue home dose of levothyroxine for now   Anxiety  Continue home Xanax prn    VTE Pharmacologic Prophylaxis:   SCDs    Mobility:   Basic Mobility Inpatient Raw Score: 23  JH-HLM Goal: 7: Walk 25 feet or more  JH-HLM Achieved: 6: Walk 10 steps or more  JH-HLM Goal NOT achieved. Continue with multidisciplinary rounding and encourage appropriate mobility to improve upon JH-HLM goals.    Patient Centered Rounds: I performed bedside rounds with nursing staff today.   Discussions with Specialists or Other Care Team Provider: case management    Education and Discussions with Family / Patient: Updated  (son)  at bedside.    Current Length of Stay: 0 day(s)  Current Patient Status: Observation   Certification Statement: requires MRI brain and MRA head & neck for stroke workup  Discharge Plan: later today or tomorrow     Code Status: Level 3 - DNAR and DNI    Subjective   Patient seen and examined. No new complaints. Reports ongoing left sided numbness that is a little improved from prior.    Objective     Vitals:   Temp (24hrs), Av.6 °F (36.4 °C), Min:96.8 °F (36 °C), Max:98.7 °F (37.1 °C)    Temp:  [96.8 °F (36 °C)-98.7 °F (37.1 °C)] 97.1 °F (36.2 °C)  HR:  [64-78] 78  Resp:  [16-17] 17  BP: (109-133)/(51-62) 127/62  SpO2:  [90 %-96 %] 93 %  Body mass index is 27.21 kg/m².     Input and Output Summary (last 24 hours):     Intake/Output Summary (Last 24 hours) at 2024 1040  Last data filed at 2024 0910  Gross per 24 hour   Intake 660 ml   Output --   Net 660 ml       Physical Exam  HENT:      Head: Normocephalic and atraumatic.   Cardiovascular:      Rate and Rhythm: Normal rate and regular rhythm.   Pulmonary:      Effort: Pulmonary effort is normal. No respiratory distress.      Breath sounds: Normal breath sounds.   Musculoskeletal:      Right lower leg: No edema.      Left lower leg: No edema.   Skin:     General: Skin is warm and dry.   Neurological:      Mental Status: She is alert and oriented to person, place, and time.      GCS: GCS eye subscore is 4. GCS verbal subscore is 5. GCS motor subscore is 6.      Cranial Nerves: Cranial nerves 2-12 are intact. No cranial nerve deficit, dysarthria or facial asymmetry (smile symmetric).      Motor: Motor function is intact. No weakness or tremor.      Coordination: Finger-Nose-Finger Test normal.      Comments: Reports minimally decreased sensation on the left side compared to the right          Lines/Drains:  Lines/Drains/Airways       Active Status       None                            Lab Results: I have reviewed the following results:    Results from  last 7 days   Lab Units 09/15/24  0659   WBC Thousand/uL 4.96   HEMOGLOBIN g/dL 13.1   HEMATOCRIT % 40.3   PLATELETS Thousands/uL 184   SEGS PCT % 41*   LYMPHO PCT % 43   MONO PCT % 10   EOS PCT % 5     Results from last 7 days   Lab Units 09/15/24  0659   SODIUM mmol/L 136   POTASSIUM mmol/L 4.0   CHLORIDE mmol/L 100   CO2 mmol/L 26   BUN mg/dL 23   CREATININE mg/dL 0.93   ANION GAP mmol/L 10   CALCIUM mg/dL 9.8   ALBUMIN g/dL 4.2   TOTAL BILIRUBIN mg/dL 0.42   ALK PHOS U/L 78   ALT U/L 18   AST U/L 21   GLUCOSE RANDOM mg/dL 107     Results from last 7 days   Lab Units 09/15/24  0659   INR  0.91                   Recent Cultures (last 7 days):         Imaging Review: Reviewed radiology reports from this admission including: CT head.  Other Studies: No additional pertinent studies reviewed.    Last 24 Hours Medication List:     Current Facility-Administered Medications:     acetaminophen (TYLENOL) tablet 650 mg, Q4H PRN    ALPRAZolam (XANAX) tablet 0.25 mg, TID PRN    ALPRAZolam (XANAX) tablet 0.25 mg, HS PRN    Artificial Tears Op Soln 1 drop, Q6H PRN    atorvastatin (LIPITOR) tablet 40 mg, QPM    benzonatate (TESSALON PERLES) capsule 100 mg, TID PRN    budesonide-formoterol (SYMBICORT) 80-4.5 MCG/ACT inhaler 2 puff, BID    fenofibrate (TRICOR) tablet 48 mg, Daily    hyoscyamine (LEVSIN/SL) SL tablet 0.125 mg, Q6H PRN    levothyroxine tablet 100 mcg, Early Morning    pantoprazole (PROTONIX) EC tablet 40 mg, Early Morning    PARoxetine (PAXIL) tablet 30 mg, Daily    Administrative Statements   Today, Patient Was Seen By: Elisabeth Quigley PA-C      **Please Note: This note may have been constructed using a voice recognition system.**

## 2024-09-16 NOTE — PHYSICAL THERAPY NOTE
Physical Therapy Evaluation    Patient Name: Roxy Farmer    Today's Date: 9/16/2024     Problem List  Principal Problem:    Stroke-like symptoms  Active Problems:    Anxiety    Hyperlipidemia    Primary hypertension    Hypothyroidism    Von Willebrand disease (HCC)    Pulmonary emphysema (HCC)       Past Medical History  Past Medical History:   Diagnosis Date    Contact dermatitis     Last assessed - 8/8/12    COPD (chronic obstructive pulmonary disease) (HCC)     Disease of thyroid gland     GERD (gastroesophageal reflux disease)     Hyperlipidemia     Hypertension     Irritable bowel syndrome     Von Willebrand disease (HCC)         Past Surgical History  Past Surgical History:   Procedure Laterality Date    APPENDECTOMY      HYSTERECTOMY      Date unk            09/16/24 1124   PT Last Visit   PT Visit Date 09/16/24   Note Type   Note type Evaluation   Pain Assessment   Pain Assessment Tool 0-10   Pain Score No Pain   Restrictions/Precautions   Weight Bearing Precautions Per Order No   Other Precautions Fall Risk   Home Living   Type of Home Apartment   Home Layout One level;Stairs to enter with rails  (13-16 NAZANIN c HR)   Bathroom Shower/Tub Tub/shower unit   Bathroom Toilet Standard   Bathroom Equipment Grab bars in shower;Shower chair   Bathroom Accessibility Accessible   Home Equipment Walker;Cane   Additional Comments pt reports use of cane at baseline   Prior Function   Level of [place] Independent with ADLs;Independent with functional mobility;Needs assistance with IADLS   Lives With Alone   Receives Help From Family   IADLs Independent with driving   Falls in the last 6 months 0   Vocational Retired   General   Family/Caregiver Present No   Cognition   Overall Cognitive Status WFL   Arousal/Participation Alert   Attention Within functional limits   Orientation Level Oriented X4   Following Commands Follows all commands and directions without  "difficulty   Subjective   Subjective \"It started with my mouth\"   RLE Assessment   RLE Assessment WFL   LLE Assessment   LLE Assessment WFL   Vision-Basic Assessment   Current Vision Wears glasses only for reading   Light Touch   RLE Light Touch Grossly intact   LLE Light Touch Impaired   LLE Light Touch Comments pt reports decreased sensation as compared to R LE   Bed Mobility   Supine to Sit 5  Supervision   Additional items Increased time required;Verbal cues   Sit to Supine 5  Supervision   Additional items Increased time required;Verbal cues   Transfers   Sit to Stand 5  Supervision   Additional items Increased time required;Verbal cues   Stand to Sit 5  Supervision   Additional items Increased time required;Verbal cues   Additional Comments RW used   Ambulation/Elevation   Gait pattern Short stride;Foward flexed;Narrow ASHISH   Gait Assistance 5  Supervision   Additional items Verbal cues   Assistive Device Rolling walker   Distance 100'   Balance   Static Sitting Good   Dynamic Sitting Good   Static Standing Fair   Dynamic Standing Fair   Ambulatory Fair -  (with RW)   Endurance Deficit   Endurance Deficit Yes   Endurance Deficit Description pt appears fatigued with increased ambulation   Activity Tolerance   Activity Tolerance Patient limited by fatigue   Assessment   Prognosis Good   Problem List Decreased endurance;Decreased strength;Impaired balance;Decreased mobility   Assessment Patient is a 84 y.o. female evaluated by Physical Therapy s/p admit to [facility] on 9/15/2024 with admitting diagnosis of: Tingling, Left facial numbness, and principal problem of: Stroke-like symptoms. PT was consulted to assess patient's functional mobility and discharge needs. Ordered are PT Evaluation and treatment with activity level of: up and OOB as tolerated. Comorbidities affecting patient's physical performance at time of assessment include:  HLD, HTN, hypothyroidism, Von Willebrand disease, pulmonary " emphysema, IBS, chronic pain syndrome, CKD . Personal factors affecting the patient at time of IE include: ambulating with assistive device, step(s) to enter home, inability to navigate community distances, and inability/difficulty performing IADLs. Please locate objective findings from PT assessment regarding body systems outlined above. Upon evaluation, pt able to perform all functional mobility with SUP, RW, and increased time. Occasional verbal cuing provided for safety awareness and sequencing. Seated rest break taken following ~100' of ambulation; mild to moderate postural sway demonstrated but no true LOB experienced. HR and SpO2 remained WFL on RA throughout. Pt reports feeling weak while ambulating and in agreement with continued RW use for increased support while symptoms persist. The patient's AM-PAC Basic Mobility Inpatient Short Form Raw Score is 18. A Raw score of greater than 16 suggests the patient may benefit from discharge to home. Please also refer to the recommendation of the Physical Therapist for safe discharge planning. Pt will benefit from continued PT intervention during LOS to address current deficits, increase LOF, and facilitate safe d/c to next level of care when medically appropriate. D/c recommendation at this time is rehabilitation resource intensity level III.   Goals   Patient Goals to get stronger   LTG Expiration Date 09/30/24   Long Term Goal #1 Pt will participate in B LE strengthening exercises to facilitate improved functional activity tolerance. Pt will perform all functional transfers and bed mobility mod(I) with good safety awareness. Pt will ambulate 250' mod(I) with LRAD while maintaining good functional dynamic balance. Pt will ascend/descend a FFOS with HR and SUP to reflect the ability to safely navigate the home.   Plan   Treatment/Interventions Functional transfer training;LE strengthening/ROM;Elevations;Endurance training;Therapeutic exercise;Bed mobility;Gait  training   PT Frequency 3-5x/wk   Discharge Recommendation   Rehab Resource Intensity Level, PT III (Minimum Resource Intensity)   AM-PAC Basic Mobility Inpatient   Turning in Flat Bed Without Bedrails 3   Lying on Back to Sitting on Edge of Flat Bed Without Bedrails 3   Moving Bed to Chair 3   Standing Up From Chair Using Arms 3   Walk in Room 3   Climb 3-5 Stairs With Railing 3   Basic Mobility Inpatient Raw Score 18   Basic Mobility Standardized Score 41.05   [facility] Highest Level Of Mobility   -HLM Goal 6: Walk 10 steps or more   -HLM Achieved 7: Walk 25 feet or more   End of Consult   Patient Position at End of Consult Seated edge of bed;All needs within reach

## 2024-09-16 NOTE — SPEECH THERAPY NOTE
"Speech Language/Pathology  Speech/Language Pathology Progress Note    Patient Name: Roxy Farmer  Today's Date: 9/16/2024     Problem List  Principal Problem:    Stroke-like symptoms  Active Problems:    Anxiety    Primary hypertension    Hypothyroidism    Von Willebrand disease (HCC)    Pulmonary emphysema (HCC)    Past Medical History  Past Medical History:   Diagnosis Date    Contact dermatitis     Last assessed - 8/8/12    COPD (chronic obstructive pulmonary disease) (HCC)     Disease of thyroid gland     GERD (gastroesophageal reflux disease)     Hyperlipidemia     Hypertension     Irritable bowel syndrome     Von Willebrand disease (HCC)      Past Surgical History  Past Surgical History:   Procedure Laterality Date    APPENDECTOMY      HYSTERECTOMY      Date unk        Subjective:  Patient received laying reclined in bed.  Patient and clinician discussing tolerance of baseline diet of regular solids and thin liquids.  She reports that she has \"no trouble\" swallowing and \"never have.\"  Reviewing hard/crunchy items, mixed consistencies and thin liquids, pt continues to report tolerance.    Objective:  Thin via straw:  Patient consuming consecutive sips of thin liquids via straw with no overt s/s of penetration or aspiration.  Patient demonstrating appropriate oral reception, AP transport, swallow initiation is also appropriate from first trial onward.  She continues with clear vocal quality, no coughing, no throat clear demonstrated.    Assessment:  Patient demonstrating/reporting tolerance of regular solids and thin liquids.  Patient with no dysphagia history or overt s/s on evaluation or observed in session.  Verbalized understanding of no ongoing needs and ST to sign off on her care.    Plan/Recommendations:  No further ST warranted at this time, patient discharged on baseline reg/thin, please reconsult with changes  "

## 2024-09-16 NOTE — PLAN OF CARE
Problem: PHYSICAL THERAPY ADULT  Goal: Performs mobility at highest level of function for planned discharge setting.  See evaluation for individualized goals.  Description: Treatment/Interventions: Functional transfer training, LE strengthening/ROM, Elevations, Endurance training, Therapeutic exercise, Bed mobility, Gait training          See flowsheet documentation for full assessment, interventions and recommendations.  Note: Prognosis: Good  Problem List: Decreased endurance, Decreased strength, Impaired balance, Decreased mobility  Assessment: Patient is a 84 y.o. female evaluated by Physical Therapy s/p admit to [facility] on 9/15/2024 with admitting diagnosis of: Tingling, Left facial numbness, and principal problem of: Stroke-like symptoms. PT was consulted to assess patient's functional mobility and discharge needs. Ordered are PT Evaluation and treatment with activity level of: up and OOB as tolerated. Comorbidities affecting patient's physical performance at time of assessment include:  HLD, HTN, hypothyroidism, Von Willebrand disease, pulmonary emphysema, IBS, chronic pain syndrome, CKD . Personal factors affecting the patient at time of IE include: ambulating with assistive device, step(s) to enter home, inability to navigate community distances, and inability/difficulty performing IADLs. Please locate objective findings from PT assessment regarding body systems outlined above. Upon evaluation, pt able to perform all functional mobility with SUP, RW, and increased time. Occasional verbal cuing provided for safety awareness and sequencing. Seated rest break taken following ~100' of ambulation; mild to moderate postural sway demonstrated but no true LOB experienced. HR and SpO2 remained WFL on RA throughout. Pt reports feeling weak while ambulating and in agreement with continued RW use for increased support while symptoms persist. The patient's AM-PAC Basic Mobility Inpatient Short Form  Raw Score is 18. A Raw score of greater than 16 suggests the patient may benefit from discharge to home. Please also refer to the recommendation of the Physical Therapist for safe discharge planning. Pt will benefit from continued PT intervention during LOS to address current deficits, increase LOF, and facilitate safe d/c to next level of care when medically appropriate. D/c recommendation at this time is rehabilitation resource intensity level III.        Rehab Resource Intensity Level, PT: III (Minimum Resource Intensity)    See flowsheet documentation for full assessment.

## 2024-09-16 NOTE — PLAN OF CARE
Problem: PAIN - ADULT  Goal: Verbalizes/displays adequate comfort level or baseline comfort level  Description: Interventions:  - Encourage patient to monitor pain and request assistance  - Assess pain using appropriate pain scale  - Administer analgesics based on type and severity of pain and evaluate response  - Implement non-pharmacological measures as appropriate and evaluate response  - Consider cultural and social influences on pain and pain management  - Notify physician/advanced practitioner if interventions unsuccessful or patient reports new pain  Outcome: Progressing     Problem: INFECTION - ADULT  Goal: Absence or prevention of progression during hospitalization  Description: INTERVENTIONS:  - Assess and monitor for signs and symptoms of infection  - Monitor lab/diagnostic results  - Monitor all insertion sites, i.e. indwelling lines, tubes, and drains  - Monitor endotracheal if appropriate and nasal secretions for changes in amount and color  - [place] appropriate cooling/warming therapies per order  - Administer medications as ordered  - Instruct and encourage patient and family to use good hand hygiene technique  - Identify and instruct in appropriate isolation precautions for identified infection/condition  Outcome: Progressing  Goal: Absence of fever/infection during neutropenic period  Description: INTERVENTIONS:  - Monitor WBC    Outcome: Progressing     Problem: SAFETY ADULT  Goal: Patient will remain free of falls  Description: INTERVENTIONS:  - Educate patient/family on patient safety including physical limitations  - Instruct patient to call for assistance with activity   - Consult OT/PT to assist with strengthening/mobility   - Keep Call bell within reach  - Keep bed low and locked with side rails adjusted as appropriate  - Keep care items and personal belongings within reach  - Initiate and maintain comfort rounds  - Make Fall Risk Sign visible to staff  - Apply yellow socks and bracelet  for high fall risk patients  - Consider moving patient to room near nurses station  Outcome: Progressing  Goal: Maintain or return to baseline ADL function  Description: INTERVENTIONS:  -  Assess patient's ability to carry out ADLs; assess patient's baseline for ADL function and identify physical deficits which impact ability to perform ADLs (bathing, care of mouth/teeth, toileting, grooming, dressing, etc.)  - Assess/evaluate cause of self-care deficits   - Assess range of motion  - Assess patient's mobility; develop plan if impaired  - Assess patient's need for assistive devices and provide as appropriate  - Encourage maximum independence but intervene and supervise when necessary  - Involve family in performance of ADLs  - Assess for home care needs following discharge   - Consider OT consult to assist with ADL evaluation and planning for discharge  - Provide patient education as appropriate  Outcome: Progressing  Goal: Maintains/Returns to pre admission functional level  Description: INTERVENTIONS:  - Perform AM-PAC 6 Click Basic Mobility/ Daily Activity assessment daily.  - Set and communicate daily mobility goal to care team and patient/family/caregiver.   - Collaborate with rehabilitation services on mobility goals if consulted  - Perform Range of Motion 3-4 times a day.  - Ambulate patient 3 times a day  - Out of bed to chair 3 times a day   - Out of bed for meals 3 times a day  - Out of bed for toileting  - Record patient progress and toleration of activity level   Outcome: Progressing     Problem: DISCHARGE PLANNING  Goal: Discharge to home or other facility with appropriate resources  Description: INTERVENTIONS:  - Identify barriers to discharge w/patient and caregiver  - Arrange for needed discharge resources and transportation as appropriate  - Identify discharge learning needs (meds, wound care, etc.)  - Arrange for interpretive services to assist at discharge as needed  - Refer to Case Management  Department for coordinating discharge planning if the patient needs post-hospital services based on physician/advanced practitioner order or complex needs related to functional status, cognitive ability, or social support system  Outcome: Progressing     Problem: Knowledge Deficit  Goal: Patient/family/caregiver demonstrates understanding of disease process, treatment plan, medications, and discharge instructions  Description: Complete learning assessment and assess knowledge base.  Interventions:  - Provide teaching at level of understanding  - Provide teaching via preferred learning methods  Outcome: Progressing     Problem: NEUROSENSORY - ADULT  Goal: Achieves stable or improved neurological status  Description: INTERVENTIONS  - Monitor and report changes in neurological status  - Monitor vital signs   Outcome: Progressing

## 2024-09-16 NOTE — ASSESSMENT & PLAN NOTE
Continue Lipitor 40 started yesterday   Start on fenofibrate 48mg daily for hypertriglyceridemia   Dietitian consult requested. Patient reports eating a lot of fast food

## 2024-09-17 VITALS
SYSTOLIC BLOOD PRESSURE: 119 MMHG | BODY MASS INDEX: 27.27 KG/M2 | RESPIRATION RATE: 17 BRPM | TEMPERATURE: 97.6 F | DIASTOLIC BLOOD PRESSURE: 70 MMHG | WEIGHT: 173.72 LBS | HEART RATE: 71 BPM | OXYGEN SATURATION: 92 % | HEIGHT: 67 IN

## 2024-09-17 DIAGNOSIS — F41.9 ANXIETY: ICD-10-CM

## 2024-09-17 PROBLEM — I63.9 CVA (CEREBROVASCULAR ACCIDENT) (HCC): Status: ACTIVE | Noted: 2024-09-17

## 2024-09-17 PROCEDURE — 99239 HOSP IP/OBS DSCHRG MGMT >30: CPT

## 2024-09-17 PROCEDURE — 97110 THERAPEUTIC EXERCISES: CPT

## 2024-09-17 PROCEDURE — 97116 GAIT TRAINING THERAPY: CPT

## 2024-09-17 PROCEDURE — 97530 THERAPEUTIC ACTIVITIES: CPT

## 2024-09-17 RX ORDER — HYDROCHLOROTHIAZIDE 25 MG/1
25 TABLET ORAL DAILY
Status: DISCONTINUED | OUTPATIENT
Start: 2024-09-17 | End: 2024-09-17 | Stop reason: HOSPADM

## 2024-09-17 RX ORDER — LISINOPRIL 10 MG/1
10 TABLET ORAL 2 TIMES DAILY
Status: DISCONTINUED | OUTPATIENT
Start: 2024-09-17 | End: 2024-09-17 | Stop reason: HOSPADM

## 2024-09-17 RX ORDER — METOPROLOL TARTRATE 50 MG
50 TABLET ORAL DAILY
Status: DISCONTINUED | OUTPATIENT
Start: 2024-09-17 | End: 2024-09-17 | Stop reason: HOSPADM

## 2024-09-17 RX ORDER — FENOFIBRATE 48 MG/1
48 TABLET, COATED ORAL DAILY
Qty: 30 TABLET | Refills: 0 | Status: SHIPPED | OUTPATIENT
Start: 2024-09-18 | End: 2024-10-18

## 2024-09-17 RX ORDER — ATORVASTATIN CALCIUM 40 MG/1
40 TABLET, FILM COATED ORAL EVERY EVENING
Qty: 30 TABLET | Refills: 0 | Status: SHIPPED | OUTPATIENT
Start: 2024-09-17 | End: 2024-10-17

## 2024-09-17 RX ADMIN — BUDESONIDE AND FORMOTEROL FUMARATE DIHYDRATE 2 PUFF: 80; 4.5 AEROSOL RESPIRATORY (INHALATION) at 08:39

## 2024-09-17 RX ADMIN — FENOFIBRATE 48 MG: 48 TABLET ORAL at 08:37

## 2024-09-17 RX ADMIN — ALPRAZOLAM 0.25 MG: 0.25 TABLET ORAL at 08:41

## 2024-09-17 RX ADMIN — LEVOTHYROXINE SODIUM 100 MCG: 100 TABLET ORAL at 05:10

## 2024-09-17 RX ADMIN — HYDROCHLOROTHIAZIDE 25 MG: 25 TABLET ORAL at 08:37

## 2024-09-17 RX ADMIN — METOPROLOL TARTRATE 50 MG: 50 TABLET, FILM COATED ORAL at 08:37

## 2024-09-17 RX ADMIN — LISINOPRIL 10 MG: 10 TABLET ORAL at 08:37

## 2024-09-17 RX ADMIN — PAROXETINE 30 MG: 20 TABLET, FILM COATED ORAL at 08:37

## 2024-09-17 RX ADMIN — PANTOPRAZOLE SODIUM 40 MG: 40 TABLET, DELAYED RELEASE ORAL at 05:10

## 2024-09-17 NOTE — ASSESSMENT & PLAN NOTE
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 Moderate chronic microangiopathic ischemic changes   CTH: no acute intracranial abnormality   Unable to get CTA H/N given severe contrast allergy   MRA H/N: no LVO, focal stenosis along right vertebral artery, no signficant carotid artery stenosis  Telemetry unremarkable  Echo: LVEF 65%, noted with patent PFO  A1c 5.7. , , HDL 31. T4 1.20  B12 423, Folate >22.3  ASA contraindicated with vWB dz. Continue Lipitor 40mg & fenofibrate 48mg daily   Discussed with neurology, inpatient workup complete. Recommended outpatient follow-up in 6 weeks.

## 2024-09-17 NOTE — PLAN OF CARE
Problem: PAIN - ADULT  Goal: Verbalizes/displays adequate comfort level or baseline comfort level  Description: Interventions:  - Encourage patient to monitor pain and request assistance  - Assess pain using appropriate pain scale  - Administer analgesics based on type and severity of pain and evaluate response  - Implement non-pharmacological measures as appropriate and evaluate response  - Consider cultural and social influences on pain and pain management  - Notify physician/advanced practitioner if interventions unsuccessful or patient reports new pain  Outcome: Progressing     Problem: INFECTION - ADULT  Goal: Absence or prevention of progression during hospitalization  Description: INTERVENTIONS:  - Assess and monitor for signs and symptoms of infection  - Monitor lab/diagnostic results  - Monitor all insertion sites, i.e. indwelling lines, tubes, and drains  - Monitor endotracheal if appropriate and nasal secretions for changes in amount and color  - [place] appropriate cooling/warming therapies per order  - Administer medications as ordered  - Instruct and encourage patient and family to use good hand hygiene technique  - Identify and instruct in appropriate isolation precautions for identified infection/condition  Outcome: Progressing  Goal: Absence of fever/infection during neutropenic period  Description: INTERVENTIONS:  - Monitor WBC    Outcome: Progressing     Problem: SAFETY ADULT  Goal: Patient will remain free of falls  Description: INTERVENTIONS:  - Educate patient/family on patient safety including physical limitations  - Instruct patient to call for assistance with activity   - Consult OT/PT to assist with strengthening/mobility   - Keep Call bell within reach  - Keep bed low and locked with side rails adjusted as appropriate  - Keep care items and personal belongings within reach  - Initiate and maintain comfort rounds  - Make Fall Risk Sign visible to staff  - Offer Toileting every 2 Hours,  in advance of need  - Apply yellow socks and bracelet for high fall risk patients  - Consider moving patient to room near nurses station  Outcome: Progressing  Goal: Maintain or return to baseline ADL function  Description: INTERVENTIONS:  -  Assess patient's ability to carry out ADLs; assess patient's baseline for ADL function and identify physical deficits which impact ability to perform ADLs (bathing, care of mouth/teeth, toileting, grooming, dressing, etc.)  - Assess/evaluate cause of self-care deficits   - Assess range of motion  - Assess patient's mobility; develop plan if impaired  - Assess patient's need for assistive devices and provide as appropriate  - Encourage maximum independence but intervene and supervise when necessary  - Involve family in performance of ADLs  - Assess for home care needs following discharge   - Consider OT consult to assist with ADL evaluation and planning for discharge  - Provide patient education as appropriate  Outcome: Progressing  Goal: Maintains/Returns to pre admission functional level  Description: INTERVENTIONS:  - Perform AM-PAC 6 Click Basic Mobility/ Daily Activity assessment daily.  - Set and communicate daily mobility goal to care team and patient/family/caregiver.   - Collaborate with rehabilitation services on mobility goals if consulted  - Perform Range of Motion 3-4 times a day.  - Out of bed to chair 3 times a day   - Out of bed for meals 3 times a day  - Out of bed for toileting  - Record patient progress and toleration of activity level   Outcome: Progressing     Problem: DISCHARGE PLANNING  Goal: Discharge to home or other facility with appropriate resources  Description: INTERVENTIONS:  - Identify barriers to discharge w/patient and caregiver  - Arrange for needed discharge resources and transportation as appropriate  - Identify discharge learning needs (meds, wound care, etc.)  - Arrange for interpretive services to assist at discharge as needed  - Refer to  Case Management Department for coordinating discharge planning if the patient needs post-hospital services based on physician/advanced practitioner order or complex needs related to functional status, cognitive ability, or social support system  Outcome: Progressing     Problem: Knowledge Deficit  Goal: Patient/family/caregiver demonstrates understanding of disease process, treatment plan, medications, and discharge instructions  Description: Complete learning assessment and assess knowledge base.  Interventions:  - Provide teaching at level of understanding  - Provide teaching via preferred learning methods  Outcome: Progressing     Problem: NEUROSENSORY - ADULT  Goal: Achieves stable or improved neurological status  Description: INTERVENTIONS  - Monitor and report changes in neurological status  - Monitor vital signs   Outcome: Progressing

## 2024-09-17 NOTE — DISCHARGE SUMMARY
Discharge Summary - Hospitalist   Name: Roxy Farmer 84 y.o. 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 Moderate chronic microangiopathic ischemic changes   CTH: no acute intracranial abnormality   Unable to get CTA H/N given severe contrast allergy   MRA H/N: no LVO, focal stenosis along right vertebral artery, no signficant carotid artery stenosis  Telemetry unremarkable  Echo: LVEF 65%, noted with patent PFO  A1c 5.7. , , HDL 31. T4 1.20  B12 423, Folate >22.3  ASA contraindicated with vWB dz. Continue Lipitor 40mg & fenofibrate 48mg daily   Discussed with neurology, inpatient workup complete. Recommended outpatient follow-up in 6 weeks.   Von Willebrand disease (HCC)  Hx of, reports severe bleeding in past   Utilize SCDs  Primary hypertension  Continue home antihypertensives   Hyperlipidemia  Continue Lipitor 40mg & fenofibrate 48mg daily   Pulmonary emphysema (HCC)  Not in acute exacerbation  Continue home inhalers   Hypothyroidism  TSH 0.343, await free T4  Continue home dose of levothyroxine for now   Repeat TSH in 4-6 weeks   Anxiety  Continue home Xanax prn     Medical Problems       Resolved Problems  Date Reviewed: 9/12/2024   None       Discharging Physician / Practitioner: Elisabeth Quigley PA-C  PCP: Casey Barrera MD  Admission Date:   Admission Orders (From admission, onward)       Ordered        09/16/24 1635  INPATIENT ADMISSION  Once            09/15/24 0821  Place in Observation  Once                          Discharge Date: 09/17/24    Consultations During Hospital  Stay:  Neurology  PT/OT  SLP    Procedures Performed:   Echo: Left Ventricle: Left ventricular cavity size is normal. Wall thickness is normal. The left ventricular ejection fraction is 65%. Systolic function is normal. Wall motion is normal. Diastolic function is mildly abnormal, consistent with grade I (abnormal) relaxation. Right Ventricle: Right ventricular cavity size is normal. Systolic function is normal. Atrial Septum: There is a patent foramen ovale confirmed with provocation (Valsalva) with predominant right to left shunting using saline contrast. Aortic Valve: There is aortic valve sclerosis.    Significant Findings / Test Results:   CT head: No acute intracranial abnormality   MRI brain: Subcentimeter acute to subacute infarcts involving the right thalamus and gangliocapsular region Moderate chronic microangiopathic ischemic changes   MRA head wo: Cerebral MRA demonstrates no large vessel intracranial occlusion Suspected focal stenosis along the right vertebral artery V4 segment   MRA carotids wo: The extracranial carotid arteries are patent with no suspicious hemodynamically significant stenosis   The cervical vertebral arteries are patent   Hgb 5.7   TSH 0.343, Free T4 1.20   Folate >22.3  Vitamin B12 423  Lipid panel: TC-348, TG-993, HDL-31, LDL-81    Incidental Findings:   None       Test Results Pending at Discharge (will require follow up):   None     Outpatient Tests Requested:  Outpatient PT  Outpatient follow-up with neurology in 6 weeks   Outpatient follow-up with PCP within 1 week   Repeat TSH in 4-6 weeks  Repeat lipid panel in 3-4 months     Complications:  none     Reason for Admission: stroke like symptoms     Hospital Course:   Roxy Farmer is a 84 y.o. female patient who originally presented to the hospital on 9/15/2024 due to stroke like symptoms with left face, arm & leg paresthesias. She underwent CT head in the ED which was negative for acute abnormality. She was unable to have  "CTA H/N given severe contrast allergy. She was admitted on stroke pathway. MRI brain obtained and significant for acute to subacute infarcts in right thalamus and gangliocapsular region. MRA H/N negative for LVO or significant carotid artery stenosis, reveals focal stenosis along right vertebral artery V4 segment. She underwent echocardiogram significant for PFO. Results discussed with neurology who recommended outpatient follow-up in 6 weeks. She is to continue Lipitor 40mg and fenofibrate 48mg for hyperlipidemia and profound hypertriglyceridemia found this hospitalization. Aspirin was contraindicated given history of vonWillebrand disease with severe bleeding in past. She was seen by PT/OT who recommended level III and was referred to outpatient PT. SLP evaluated patient and recommended no dietary consistency modification. Results discussed with patient extensively at bedside. She was discharged to home in stable condition with instructions to follow-up with PCP within 1 week & neurology in 6 weeks.           Please see above list of diagnoses and related plan for additional information.     Condition at Discharge: stable    Discharge Day Visit / Exam:   Subjective:  patient seen and examined. No new neurologic complaints. No SOB or chest pain. Persistent left sided numbness.   Vitals: Blood Pressure: 114/70 (09/17/24 1100)  Pulse: 76 (09/17/24 0710)  Temperature: 98.1 °F (36.7 °C) (09/17/24 1100)  Temp Source: Oral (09/17/24 1100)  Respirations: 17 (09/17/24 1100)  Height: 5' 7\" (170.2 cm) (09/16/24 0737)  Weight - Scale: 78.8 kg (173 lb 11.6 oz) (09/16/24 0737)  SpO2: (!) 88 % (09/17/24 0710)  Exam:   Physical Exam  Constitutional:       General: She is not in acute distress.  HENT:      Head: Normocephalic and atraumatic.   Cardiovascular:      Rate and Rhythm: Normal rate and regular rhythm.      Pulses: Normal pulses.   Pulmonary:      Effort: Pulmonary effort is normal. No respiratory distress.      Breath " sounds: Normal breath sounds.   Abdominal:      General: Abdomen is flat. Bowel sounds are normal. There is no distension.      Palpations: Abdomen is soft.      Tenderness: There is no abdominal tenderness.   Musculoskeletal:      Right lower leg: No edema.      Left lower leg: No edema.   Skin:     General: Skin is warm and dry.   Neurological:      Mental Status: She is alert and oriented to person, place, and time.      GCS: GCS eye subscore is 4. GCS verbal subscore is 5. GCS motor subscore is 6.      Cranial Nerves: Cranial nerves 2-12 are intact. No cranial nerve deficit, dysarthria or facial asymmetry.      Sensory: Sensory deficit (sensation intact, slightly decreased to light touch in leg and arm) present.      Motor: Motor function is intact. No weakness.          Discussion with Family: Patient declined call to .     Discharge instructions/Information to patient and family:   See after visit summary for information provided to patient and family.      Provisions for Follow-Up Care:  See after visit summary for information related to follow-up care and any pertinent home health orders.      Mobility at time of Discharge:   Basic Mobility Inpatient Raw Score: 18  JH-HLM Goal: 6: Walk 10 steps or more  JH-HLM Achieved: 6: Walk 10 steps or more  HLM Goal achieved. Continue to encourage appropriate mobility.     Disposition:   Home    Planned Readmission: none    Discharge Medications:  See after visit summary for reconciled discharge medications provided to patient and/or family.      Administrative Statements   Discharge Statement:  I have spent a total time of 35 minutes in caring for this patient on the day of the visit/encounter. .    **Please Note: This note may have been constructed using a voice recognition system**

## 2024-09-17 NOTE — CASE MANAGEMENT
Case Management Discharge Planning Note    Patient name Roxy Farmer  Location /411-01 MRN [id number]  : 1940 Date 2024       Current Admission Date: 9/15/2024  Current Admission Diagnosis:Stroke-like symptoms   Patient Active Problem List    Diagnosis Date Noted Date Diagnosed    CVA (cerebrovascular accident) ([facility]) 2024     Stroke-like symptoms 09/15/2024     Hypertensive urgency 2023     Headache 2023     Acute non intractable tension-type headache 2023     Chronic pain syndrome 2023     Lumbar degenerative disc disease 2023     Acute idiopathic gout of right foot 2023     Mild aortic stenosis 2023     Stage 3a chronic kidney disease ([facility]) 2023     Pulmonary emphysema ([facility]) 2021     Acute bilateral low back pain without sciatica 2021     Depression, recurrent ([facility]) 2020     Positive colorectal cancer screening using Cologuard test 2020     Left corneal abrasion 2019     Irritable bowel syndrome with both constipation and diarrhea 07/10/2019     BMI 28.0-28.9,adult 07/10/2019     Generalized abdominal pain 2018     Back pain, chronic 2017     Hearing loss 2017     Diarrhea 2017     Right hip pain 2017     Stool guaiac positive 2015     Vitamin D deficiency 2015     Diverticulosis 2013     Von Willebrand disease ([facility]) 2013     Hyperlipidemia 2013     Esophagitis, reflux 12/10/2012     Anxiety 10/09/2012     Hypothyroidism 2012     Generalized osteoarthritis of multiple sites 2012     Primary hypertension 2012       LOS (days): 1  Geometric Mean LOS (GMLOS) (days): 2.9  Days to GMLOS:2.1     OBJECTIVE:  Risk of Unplanned Readmission Score: 9.45         Current admission status: Inpatient   Preferred Pharmacy:   [id number] [place] - [address]  [address]  [place] [address]  Phone:  [phone] Fax: [phone]    Primary Care Provider: Casey Barrera MD    Primary Insurance: MEDICARE  Secondary Insurance: [facility]    DISCHARGE DETAILS:  Pt is being dc'd home on this date with OP follow up and ambulatory referral for OP Physical Therapy. Son will be over around 5pm to transport pt home.

## 2024-09-17 NOTE — PLAN OF CARE
Problem: PHYSICAL THERAPY ADULT  Goal: Performs mobility at highest level of function for planned discharge setting.  See evaluation for individualized goals.  Description: Treatment/Interventions: Functional transfer training, LE strengthening/ROM, Elevations, Endurance training, Therapeutic exercise, Bed mobility, Gait training          See flowsheet documentation for full assessment, interventions and recommendations.  Outcome: Progressing  Note: Prognosis: Good  Problem List:  (Decreased endurance; Decreased strength; Impaired balance; Decreased mobility)  Assessment: Pt. seen for PT treatment session this date with interventions consisting of  therapeutic exercises, toilet transfers, bed mobility, transfers and  gait training w/ emphasis on improving pt's ability to ambulate. Pt. Requiring occasional cues for sequence and safety. In comparison to previous session, Pt. With improvements in activity tolerance.   Pt is in need of continued activity in PT to improve strength balance endurance mobility transfers and ambulation with return to maximize LOF. From PT/mobility standpoint, recommendation at time of d/c would be level III: min resource intensity in order to promote return to PLOF and independence.   The patient's AM-PAC Basic Mobility Inpatient Short Form Raw Score is 21. A Raw score of greater than 16 suggests the patient may benefit from discharge to home.  Please also refer to physical therapy recommendation for safe DC planning.        Rehab Resource Intensity Level, PT: III (Minimum Resource Intensity)    See flowsheet documentation for full assessment.

## 2024-09-17 NOTE — PHYSICAL THERAPY NOTE
PHYSICAL THERAPY NOTE          Patient Name: Roxy Farmer  Today's Date: 9/17/2024 09/17/24 0959   PT Last Visit   PT Visit Date 09/17/24   Note Type   Note Type Treatment   Pain Assessment   Pain Assessment Tool 0-10   Pain Score No Pain   Restrictions/Precautions   Weight Bearing Precautions Per Order No   Other Precautions Chair Alarm;Fall Risk   General   Chart Reviewed Yes   Family/Caregiver Present No   Cognition   Overall Cognitive Status WFL   Arousal/Participation Alert;Cooperative   Attention Within functional limits   Orientation Level Oriented X4   Memory Within functional limits   Following Commands Follows all commands and directions without difficulty   Subjective   Subjective Reports she still has numbness on her L side. c/o fatigue. Agreeable to therapy.   Bed Mobility   Supine to Sit 5  Supervision   Additional items Increased time required;Verbal cues   Additional Comments Increased time to transition to EOB. Sat EOB with fair+ sitting balance. Mild c/o feeling lightheaded initially with transitional movements.   Transfers   Sit to Stand 5  Supervision   Additional items Increased time required;Verbal cues   Stand to Sit 5  Supervision   Additional items Armrests;Increased time required;Verbal cues   Stand pivot 5  Supervision   Additional items Increased time required;Verbal cues   Toilet transfer 5  Supervision   Additional items Increased time required;Verbal cues;Standard toilet   Additional Comments RW used. Able to manage clothing and hygiene.   Ambulation/Elevation   Gait pattern Excessively slow  (Short stride; Foward flexed; Narrow ASHISH)   Gait Assistance 5  Supervision   Additional items Verbal cues   Assistive Device Rolling walker   Distance 120'   Balance   Static Sitting Good   Dynamic Sitting Good   Static Standing Fair   Dynamic Standing Fair   Ambulatory Fair -  (RW)   Endurance Deficit    Endurance Deficit Yes   Activity Tolerance   Activity Tolerance Patient limited by fatigue   Exercises   Hip Flexion Sitting;AROM;Bilateral   Hip Abduction Sitting;15 reps;AROM;Bilateral   Hip Adduction Sitting;15 reps;AROM;Bilateral   Knee AROM Long Arc Quad Sitting;15 reps;AROM;Bilateral   Ankle Pumps Sitting;15 reps;AROM;Bilateral   Assessment   Prognosis Good   Problem List   (Decreased endurance; Decreased strength; Impaired balance; Decreased mobility)   Assessment Pt. seen for PT treatment session this date with interventions consisting of  therapeutic exercises, toilet transfers, bed mobility, transfers and  gait training w/ emphasis on improving pt's ability to ambulate. Pt. Requiring occasional cues for sequence and safety. In comparison to previous session, Pt. With improvements in activity tolerance.   Pt is in need of continued activity in PT to improve strength balance endurance mobility transfers and ambulation with return to maximize LOF. From PT/mobility standpoint, recommendation at time of d/c would be level III: min resource intensity in order to promote return to PLOF and independence.   The patient's AM-[facility] Basic Mobility Inpatient Short Form Raw Score is 21. A Raw score of greater than 16 suggests the patient may benefit from discharge to home.  Please also refer to physical therapy recommendation for safe DC planning.   Goals   Patient Goals to get stronger   LTG Expiration Date 09/30/24   PT Treatment Day 1   Plan   Treatment/Interventions Functional transfer training;LE strengthening/ROM;Therapeutic exercise;Endurance training;Bed mobility;Gait training;Spoke to nursing   Progress Progressing toward goals   PT Frequency 3-5x/wk   Discharge Recommendation   Rehab Resource Intensity Level, PT III (Minimum Resource Intensity)   AM-PAC Basic Mobility Inpatient   Turning in Flat Bed Without Bedrails 4   Lying on Back to Sitting on Edge of Flat Bed Without Bedrails 4   Moving Bed to Chair 3    Standing Up From Chair Using Arms 4   Walk in Room 3   Climb 3-5 Stairs With Railing 3   Basic Mobility Inpatient Raw Score 21   Basic Mobility Standardized Score 45.55   [facility] Highest Level Of Mobility   -HLM Goal 6: Walk 10 steps or more   -HLM Achieved 8: Walk 250 feet ot more   Education   Education Provided Mobility training   Patient Demonstrates verbal understanding   End of Consult   Patient Position at End of Consult Bedside chair;Bed/Chair alarm activated;All needs within reach   End of Consult Comments discussed POC with PT

## 2024-09-17 NOTE — DISCHARGE INSTR - AVS FIRST PAGE
You were found to have small stroke in the right thalamus region of your brain explaining your symptoms.   Please see attached education regarding strokes and management to help prevent future strokes.   Continue Lipitor 40 mg daily, continue fenofibrate 48 mg daily  Please call primary care doctor for outpatient follow-up within 1 week for transition of care appointment  Neurology has arranged for stroke follow-up in 6 weeks, see attached contact information.  Recommend repeat thyroid testing in 4-6 weeks with PCP  Recommend repeat lipid panel testing in few months

## 2024-09-18 ENCOUNTER — TELEPHONE (OUTPATIENT)
Age: 84
End: 2024-09-18

## 2024-09-18 ENCOUNTER — TELEPHONE (OUTPATIENT)
Dept: FAMILY MEDICINE CLINIC | Facility: HOME HEALTHCARE | Age: 84
End: 2024-09-18

## 2024-09-18 DIAGNOSIS — Z71.89 COMPLEX CARE COORDINATION: Primary | ICD-10-CM

## 2024-09-18 DIAGNOSIS — F41.9 ANXIETY: ICD-10-CM

## 2024-09-18 RX ORDER — ALPRAZOLAM 0.25 MG
0.25 TABLET ORAL 3 TIMES DAILY PRN
Qty: 90 TABLET | Refills: 0 | OUTPATIENT
Start: 2024-09-18

## 2024-09-18 RX ORDER — ALPRAZOLAM 0.25 MG/1
0.25 TABLET ORAL 3 TIMES DAILY PRN
Qty: 90 TABLET | Refills: 0 | Status: SHIPPED | OUTPATIENT
Start: 2024-09-18 | End: 2024-10-15 | Stop reason: SDUPTHER

## 2024-09-18 NOTE — TELEPHONE ENCOUNTER
Scheduled 9/27/24 12:30 MAGGY Combs    HFU/ SL MINERS/ Numbness         DC- 9/17/2024- HOME     ----- Message from Santos Webster DO sent at 9/17/2024  9:12 AM EDT -----  Regarding: Hospital Follow Up  Roxy Farmer will need follow-up in in 6 weeks with neurovascular team for Small vessel lacunar infarct in 60 minute appointment. They will not require outpatient neurological testing

## 2024-09-18 NOTE — TELEPHONE ENCOUNTER
HFU/ SL MINERS/ Numbness       DC- 9/17/2024- HOME    ----- Message from Santos Webster DO sent at 9/17/2024  9:12 AM EDT -----  Regarding: Hospital Follow Up  Roxy CONCHITA Carvajale will need follow-up in in 6 weeks with neurovascular team for Small vessel lacunar infarct in 60 minute appointment. They will not require outpatient neurological testing

## 2024-09-19 ENCOUNTER — PATIENT OUTREACH (OUTPATIENT)
Dept: CASE MANAGEMENT | Facility: OTHER | Age: 84
End: 2024-09-19

## 2024-09-19 NOTE — PROGRESS NOTES
Outpatient Care Management Note:    New HRR referral received. Patient was hospitalized from 9/15-9/17/24 with stroke like symptoms. She presented with left face, arm and leg paresthesias. MRI of brain showed acute to subacute infarcts. Her echo was significant for PFO. She was found to have hypertriglyceridemia and hyper lipidemia. She is to follow up with neurology and her PCP. She was referred to outpatient physical therapy.     EDIN called Roxy, introduced myself and the care management program. Roxy states that she still has paresthesias of face, arm, and leg. She denies any weakness and can move her left side. She ambulates with a walker or cane. We reviewed warning signs of stroke. Roxy knows to call 911 with any facial droop, speech difficulty, increased weakness, headache etc.     Roxy has her follow up appts scheduled. She will call [facility] physical therapy to schedule. She denies any difficulty with transportation. Her son lives near by and assists as needed.     Roxy declined completing a med review. 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  She will speak with her PCP if she develops any side effects from atorvastatin and fenofibrate as she was concerned when she read the patient information. EDIN did review that her triglycerides/ lipids were very high.  Roxy is working to improve her diet to address the issue.     EDIN gave Roxy my contact information. She knows that my phone goes through my computer and I do not get messages after hours or on weekends. She is aware to call her PCP office directly with any immediate questions.

## 2024-09-21 DIAGNOSIS — R05.2 SUBACUTE COUGH: ICD-10-CM

## 2024-09-23 ENCOUNTER — OFFICE VISIT (OUTPATIENT)
Dept: FAMILY MEDICINE CLINIC | Facility: HOME HEALTHCARE | Age: 84
End: 2024-09-23
Payer: MEDICARE

## 2024-09-23 VITALS
RESPIRATION RATE: 18 BRPM | SYSTOLIC BLOOD PRESSURE: 134 MMHG | WEIGHT: 172 LBS | BODY MASS INDEX: 26.94 KG/M2 | HEART RATE: 62 BPM | OXYGEN SATURATION: 98 % | DIASTOLIC BLOOD PRESSURE: 74 MMHG | TEMPERATURE: 98 F

## 2024-09-23 DIAGNOSIS — Z86.73 HISTORY OF STROKE: Primary | ICD-10-CM

## 2024-09-23 DIAGNOSIS — R05.2 SUBACUTE COUGH: ICD-10-CM

## 2024-09-23 PROCEDURE — 99495 TRANSJ CARE MGMT MOD F2F 14D: CPT | Performed by: FAMILY MEDICINE

## 2024-09-23 RX ORDER — BENZONATATE 100 MG/1
100 CAPSULE ORAL 3 TIMES DAILY PRN
Qty: 60 CAPSULE | Refills: 0 | Status: SHIPPED | OUTPATIENT
Start: 2024-09-23

## 2024-09-23 RX ORDER — BENZONATATE 100 MG/1
CAPSULE ORAL
Qty: 60 CAPSULE | Refills: 0 | OUTPATIENT
Start: 2024-09-23

## 2024-09-26 ENCOUNTER — TELEPHONE (OUTPATIENT)
Age: 84
End: 2024-09-26

## 2024-09-26 ENCOUNTER — EVALUATION (OUTPATIENT)
Dept: PHYSICAL THERAPY | Facility: HOME HEALTHCARE | Age: 84
End: 2024-09-26
Payer: MEDICARE

## 2024-09-26 DIAGNOSIS — I63.9 CEREBROVASCULAR ACCIDENT (CVA), UNSPECIFIED MECHANISM (HCC): Primary | ICD-10-CM

## 2024-09-26 PROCEDURE — 97161 PT EVAL LOW COMPLEX 20 MIN: CPT | Performed by: PHYSICAL THERAPIST

## 2024-09-26 PROCEDURE — 97110 THERAPEUTIC EXERCISES: CPT | Performed by: PHYSICAL THERAPIST

## 2024-09-26 NOTE — PROGRESS NOTES
PT Evaluation     Today's date: 2024  Patient name: Roxy Farmer  : 1940  MRN: [id number]  Referring provider: Elisabeth Quigley*  Dx:   Encounter Diagnosis     ICD-10-CM    1. Cerebrovascular accident (CVA), unspecified mechanism (HCC)  I63.9 Ambulatory referral to Physical Therapy          Start Time: 1300  Stop Time: 1350  Total time in clinic (min): 50 minutes    Assessment  Impairments: abnormal gait, abnormal or restricted ROM, activity intolerance, impaired balance, impaired physical strength, lacks appropriate home exercise program, participation limitations, activity limitations and endurance  Symptom irritability: low    Assessment details: Pt is a 84 y.o. female presenting to OP PT clinic in [place] w/ referral for s/p TIA, stroke like symptoms with left face, arm & leg paresthesias.  Pt presents w/ decreased strength in LUE, LLE, and L hand  strength, decreased FMS w/ L hand, difficulty w/ ambulation, stair climbing, and impaired balance.  Pt has difficulty performing ADLs and recreational activities due to above mentioned deficits.  Pt would benefit from skilled therapy to address impairments, allowing pt to perform ADLs and recreational activities w/o restriction or pain to improve QoL and return to PLOF.  PT gave pt HEP focusing on performing exercises/ activities outside of the clinic to further improve and address impairments.  Thank you for this referral!      Understanding of Dx/Px/POC: good     Prognosis: good    Goals  STG:  -Pt will improve n/t sensation in face and hand to allow reduced difficulty performing ADLs due to pain in 4 weeks.  -Pt will improve gait mechanics from decreased LLE stance to normalized gait to allow pt w/ improved ability to ambulate in store w/ less difficulty in 4 weeks.  -Pt will increase LLE strength from 4-/5 to 4/5 to allow pt w/ improved ability of performing a STS tx w/ less difficulty in 4 weeks.    LTG:  -Pt will be d/c from OP PT w/  "I HEP to allow pt to continue improving upon their impairments for improved ADLs/ recreational activities in 12 weeks.  -Pt will improve TUG time from 16.81s w/ SPC to <13s to allow improved ability to perform ADLs/ recreational activities w/o need for a rest break in 12 weeks.  -Pt will improve 5xSTS time from 18.26s to <16s to show increased BLE strength and endurance to allow improved ascending/ descending from chair in 12 weeks.     Plan  Patient would benefit from: skilled physical therapy  Planned modality interventions: unattended electrical stimulation    Planned therapy interventions: manual therapy, neuromuscular re-education, therapeutic activities, therapeutic exercise, sensory integrative techniques, stretching, strengthening, home exercise program, gait training, functional ROM exercises, flexibility, balance/weight bearing training and balance    Frequency: 2x week  Duration in weeks: 12  Plan of Care beginning date: 9/26/2024  Plan of Care expiration date: 12/19/2024  Treatment plan discussed with: patient  Plan details: Begin POC focusing on addressing & improving impairments listed in eval.        Subjective Evaluation    History of Present Illness  Date of onset: 9/14/2024  Mechanism of injury: trauma  Mechanism of injury: From ER PA D/C note, admitted on 9/15/24, pt had sx beginning at 10:30PM on 9/14/24, \"85 yo female PMHx HTN, von Willebrand disease, anxiety who presents with left facial paresthesias x 1 day prior to admission with associated left arm & leg paresthesias.  ·MRI brain: acute to subacute infarcts involving the right thalamus and gangliocapsular region. Moderate chronic microangiopathic ischemic changes.  Sensory deficit (sensation intact, slightly decreased to light touch in leg and arm) present.  She was discharged to home in stable condition with instructions to follow-up with PCP within 1 week & neurology in 6 weeks. \"  Pt has neurology appt tomorrow in [place]    Pt stated " no pain in L side, has tingles in L side of face (cheek).  Has weakness in voice, no slurring, has sialorrhea (drooling).  Using SPC to ambulate.  Reduced L  strength                   Not a recurrent problem   Quality of life: good    Patient Goals  Patient goals for therapy: increased strength, independence with ADLs/IADLs, improved balance and return to sport/leisure activities  Patient goal: Wants to return function of L side of body to PLOF  Pain  Current pain ratin  At best pain ratin  At worst pain ratin  Location: L cheek  Quality: needle-like  Relieving factors: rest, relaxation and medications  Progression: improved    Social Support  Steps to enter house: yes  Stairs in house: yes (b/l handrails)   16  Lives in: apartment  Lives with: alone    Employment status: not working  Hand dominance: right      Diagnostic Tests  MRI studies: abnormal  Treatments  Current treatment: physical therapy  Discharged from (in last 30 days): inpatient hospitalization        Objective     Active Range of Motion   Left Shoulder   Normal active range of motion    Right Shoulder   Normal active range of motion    Left Elbow   Normal active range of motion    Right Elbow   Normal active range of motion  Left Hip   Normal active range of motion    Right Hip   Normal active range of motion  Left Knee   Normal active range of motion    Right Knee   Normal active range of motion    Strength/Myotome Testing     Left Shoulder     Planes of Motion   Flexion: 4-   Extension: 4   Abduction: 4-   External rotation at 0°: 4-   Internal rotation at 0°: 4-     Right Shoulder     Planes of Motion   Flexion: 4   Extension: 4   Abduction: 4   External rotation at 0°: 4   Internal rotation at 0°: 4     Left Elbow   Flexion: 4+  Extension: 4-    Right Elbow   Flexion: 4+  Extension: 4+    Left Hip   Planes of Motion   Flexion: 4-  Abduction: 4  Adduction: 4-    Right Hip   Planes of Motion   Flexion: 4  Abduction: 4+  Adduction:  4    Left Knee   Flexion: 4  Extension: 4-    Right Knee   Flexion: 4  Extension: 4    Left Ankle/Foot   Dorsiflexion: WFL.   Plantar flexion: WFL.     Right Ankle/Foot   Dorsiflexion: WFL.   Plantar flexion: WFL.     Ambulation     Ambulation: Level Surfaces   Ambulation with assistive device: independent    Observational Gait   Decreased walking speed, stride length, left stance time, left swing time and left step length.   Left arm swing: decreased    Comments   TUG w/ SPC = 16.81s  5xSTS w/ b/l armrests = 18.26s      Functional Assessment        Comments  Romberg FT EO = 30s   Romberg FT EC = 12s, 8s, 16s  Neuro Exam:     Sensation   Light touch LE: left impaired             Precautions: Silas Akhtar      Manuals 9/26                                                    Neuro Re-Ed           *MEASURE HAND  STRENGTH NEXT SESSION*        Balance as appropriate                   Putty / Towel Squeezes HEP       Tband ER/ IR        Tband MTP/LTP        Peg Board                              Ther Ex          NuStep           Ankle pumps           HR/TR        Standing Hip flex/abd/ext        Mini Squats        Step ups/ Step downs                                  QS          SAQ        Clamshells                  LAQ  HEP         Seated March HEP       Seated Elbow Flex/ Ext HEP       Hip add          Hip abd                  Seated UE FF/ ABD        Seated Shoulder Press w/ Cane                        SLR                  S/L SLR                                                                                   Ther Activity                                Gait Training                                Modalities                                  Access Code: [email or web address]  URL: [email or web address]  Date: 09/26/2024  Prepared by: Mony Vu    Exercises  - Putty Squeezes  - 1-2 x daily - 7 x weekly - 2 sets - 10 reps  - Seated Long Arc Quad  - 1-2 x daily - 7 x weekly - 2 sets - 10 reps - 1-2 hold  -  Seated March  - 1-2 x daily - 7 x weekly - 2 sets - 10 reps - 1-2 hold  - Seated Elbow Flexion and Extension AROM  - 1-2 x daily - 7 x weekly - 2 sets - 10 reps - 1-2 hold

## 2024-09-26 NOTE — TELEPHONE ENCOUNTER
Patient called in as she seen OVL next to her Appt and did not know what that meant. I did informed it means Office visit long, that is 60 min appt. Pt appreciated that insight as she has someone else bringing her to the appt and needs to let them know the time frame. If for any reason the appt would go over she would just appreciate the update at appt, so they both are aware. [place] and confirmed what buildings to look for and where our office is located.       Routed to provider

## 2024-09-27 ENCOUNTER — OFFICE VISIT (OUTPATIENT)
Dept: NEUROLOGY | Facility: CLINIC | Age: 84
End: 2024-09-27
Payer: MEDICARE

## 2024-09-27 ENCOUNTER — TRANSITIONAL CARE MANAGEMENT (OUTPATIENT)
Dept: FAMILY MEDICINE CLINIC | Facility: CLINIC | Age: 84
End: 2024-09-27

## 2024-09-27 VITALS
BODY MASS INDEX: 27.15 KG/M2 | HEART RATE: 60 BPM | HEIGHT: 67 IN | DIASTOLIC BLOOD PRESSURE: 66 MMHG | WEIGHT: 173 LBS | OXYGEN SATURATION: 98 % | SYSTOLIC BLOOD PRESSURE: 102 MMHG | TEMPERATURE: 98.1 F

## 2024-09-27 DIAGNOSIS — Z86.73 HISTORY OF STROKE: Primary | ICD-10-CM

## 2024-09-27 DIAGNOSIS — E78.2 MIXED HYPERLIPIDEMIA: ICD-10-CM

## 2024-09-27 PROBLEM — R29.90 STROKE-LIKE SYMPTOMS: Status: RESOLVED | Noted: 2024-09-15 | Resolved: 2024-09-27

## 2024-09-27 PROCEDURE — 99215 OFFICE O/P EST HI 40 MIN: CPT

## 2024-09-27 NOTE — PATIENT INSTRUCTIONS
- Continue with good blood pressure control; I would recommend monitoring at home at least 3 times per week; Goal of <130/80  - Continue with good cholesterol control; Goal LDL <70; would recommend repeat in 3 months from CVA  - Continue with good blood sugar control; Goal HgbA1c <7.0  - Will defer monitoring of cholesterol and blood sugar and management of hypertensive medications to the primary care provider  - Stay well hydrated by drinking enough water   - Eat a healthy diet, high in lean meats fish, turkey, chicken. Low in fats, cholesterol, sugars and sodium. Avoid canned foods,  get lets of fresh/frozen vegetables/fruits.  - Get routine exercise/physical activity as much as able to tolerate  - Continue with physical, and occupational therapists  - Keep follow ups with your other health care providers  - Continue current regimen of Atorvastatin 40 mg and fenofibrate 48 mg daily    -  Fall precautions    I will plan for her to return to the office in 4 months time but would be happy to see her sooner if the need should arise.  If she has any symptoms concerning for TIA or stroke including sudden painless loss of vision or double vision, difficulty speaking or swallowing, vertigo/room spinning that does not quickly resolve, or weakness/numbness affecting 1 side of the face or body she should proceed by ambulance to the nearest emergency room immediately.

## 2024-09-27 NOTE — ASSESSMENT & PLAN NOTE
Roxy Farmer is a 84 year old right handed female who recently 9/15/24 suffered a right thalamic and gangliocapsular region stroke. The etiology of her stroke was small vessel secondary to her significantly elevated cholesterol and triglycerides in the setting of moderate chronic microangiopathic ischemic changes. She cannot take aspirin due to Von Willebrand disease with excessive bleeding in the past. She was noted to also have a PFO on SHANNON but with RoPE score of 2, does not appear to have contributed to this stroke. Since her stroke she has started Atorvastatin 40 mg and fenofibrate 48 mg daily. I did encourage follow up with her PCP for monitoring of Lipids to ensure these are more tightly controlled in the future. She continues with left sided numbness but denies any weakness. Her exam is consistent with recent CVA with sensory deficits, nasolabial fold flattening, and hyperreflexia. We discussed the natural course of recovery from stroke and encouraged her to work with PT/OT especially in these first 6 weeks of recovery. We discussed healthy low fat diet and she was provided a hand out about the same. She was also encouraged to increase physical activity, as much as she is comfortable with doing so. We reviewed her stroke risk factors and management of the same. She was provided stroke education and we reviewed stroke warning signs/symptoms and reasons to return by ambulance to the ER. She will follow up in 4 months; sooner if needed.

## 2024-09-27 NOTE — PROGRESS NOTES
Ambulatory Visit  Name: Roxy Farmer      : 1940      MRN: [id number]  Encounter Provider: MAGGY Combs  Encounter Date: 2024   Encounter department: [facility] [place]    Assessment & Plan  History of stroke  Roxy Farmer is a 84 year old right handed female who recently 9/15/24 suffered a right thalamic and gangliocapsular region stroke. The etiology of her stroke was small vessel secondary to her significantly elevated cholesterol and triglycerides in the setting of moderate chronic microangiopathic ischemic changes. She cannot take aspirin due to Von Willebrand disease with excessive bleeding in the past. She was noted to also have a PFO on SHANNON but with RoPE score of 2, does not appear to have contributed to this stroke. Since her stroke she has started Atorvastatin 40 mg and fenofibrate 48 mg daily. I did encourage follow up with her PCP for monitoring of Lipids to ensure these are more tightly controlled in the future. She continues with left sided numbness but denies any weakness. Her exam is consistent with recent CVA with sensory deficits, nasolabial fold flattening, and hyperreflexia. We discussed the natural course of recovery from stroke and encouraged her to work with PT/OT especially in these first 6 weeks of recovery. We discussed healthy low fat diet and she was provided a hand out about the same. She was also encouraged to increase physical activity, as much as she is comfortable with doing so. We reviewed her stroke risk factors and management of the same. She was provided stroke education and we reviewed stroke warning signs/symptoms and reasons to return by ambulance to the ER. She will follow up in 4 months; sooner if needed.           Mixed hyperlipidemia  24 total 348, triglycerides 993, HDL 31, LDL 81  Recent CVA small vessel in etiology secondary to lipids   Continues on Atorvastatin 40 mg and fenofibrate 48 mg daily   Recommend  PCP follow with repeat lipid panel in 3 months to assess for improvement            Patient Instructions   - Continue with good blood pressure control; I would recommend monitoring at home at least 3 times per week; Goal of <130/80  - Continue with good cholesterol control; Goal LDL <70; would recommend repeat in 3 months from CVA  - Continue with good blood sugar control; Goal HgbA1c <7.0  - Will defer monitoring of cholesterol and blood sugar and management of hypertensive medications to the primary care provider  - Stay well hydrated by drinking enough water   - Eat a healthy diet, high in lean meats fish, turkey, chicken. Low in fats, cholesterol, sugars and sodium. Avoid canned foods,  get lets of fresh/frozen vegetables/fruits.  - Get routine exercise/physical activity as much as able to tolerate  - Continue with physical, and occupational therapists  - Keep follow ups with your other health care providers  - Continue current regimen of Atorvastatin 40 mg and fenofibrate 48 mg daily    -  Fall precautions    I will plan for her to return to the office in 4 months time but would be happy to see her sooner if the need should arise.  If she has any symptoms concerning for TIA or stroke including sudden painless loss of vision or double vision, difficulty speaking or swallowing, vertigo/room spinning that does not quickly resolve, or weakness/numbness affecting 1 side of the face or body she should proceed by ambulance to the nearest emergency room immediately.     History of Present Illness     HPI Roxy Farmer is a  84 y.o. female who presented 9/15/24 to [facility] ER with left sided numbness starting 9/14/24 at 10:30 pm, including face, arm and leg. Sensation is 30% less that right as per her. No weakness, speech difficulty or other neurological symptoms. Of note, she cannot take aspirin due to Von Willebrand disease with excessive bleeding.     Stroke work up completed:    MRI Brain 9/16/24: Subcentimeter  acute to subacute infarcts involving the right thalamus and gangliocapsular region. Moderate chronic microangiopathic ischemic changes.    MRA head wo 9/16/24:Cerebral MRA demonstrates no large vessel intracranial occlusion.  Suspected focal stenosis along the right vertebral artery V4 segment.     MRA carotids wo 9/16/24:The extracranial carotid arteries are patent with no suspicious hemodynamically significant stenosis. The cervical vertebral arteries are patent.    9/16/24 total 348, triglycerides 993, HDL 31, LDL 81    9/16/24 HgbA1c 5.7    SHANNON 9/16/24:     Left Ventricle: Left ventricular cavity size is normal. Wall thickness is normal. The left ventricular ejection fraction is 65%. Systolic function is normal. Wall motion is normal. Diastolic function is mildly abnormal, consistent with grade I (abnormal) relaxation.    Right Ventricle: Right ventricular cavity size is normal. Systolic function is normal.    Atrial Septum: There is a patent foramen ovale confirmed with provocation (Valsalva) with predominant right to left shunting using saline contrast.    Aortic Valve: There is aortic valve sclerosis.    She presents today in follow up accompanied by her niece Tomasa.    She was not on anything for her cholesterol prior to her CVA  She reports in the remote past having Triglycerides of 1100 and was placed on a prescription special strength fish oil which then normalized her level and she eventually discontinued this without significant elevations in her cholesterol since per patient.    Secondary Stroke Prevention  She continues on Atorvastatin 40 mg and fenofibrate 48 mg daily   Cannot take Aspirin due to VWD  Compliant with her medications, no issues affording or obtaining medications.   Denies excessive bruising or bleeding     Deficits  Numbness and tingling on the left side of the face continues  More salivation or feeling of drooling noted  She does feel that numbness in the arm and leg is still present  but improved overall.   Tingling is also present sporadically but not painful.  Denies numbness, tingling, weakness, dizziness, headaches, vision changes or any new or worsening stroke like symptoms    Monitoring  BP- Bps are usually controlled; not monitored at home as she feels this would make her anxious  MRA head wo 9/16/24:Cerebral MRA demonstrates no large vessel intracranial occlusion.  Suspected focal stenosis along the right vertebral artery V4 segment.   MRA carotids wo 9/16/24:The extracranial carotid arteries are patent with no suspicious hemodynamically significant stenosis. The cervical vertebral arteries are patent.  9/16/24 total 348, triglycerides 993, HDL 31, LDL 81  9/16/24 HgbA1c 5.7     Safety  Lives alone   Independent of all ADLs  No falls at home  She is managing her own finances and medications  assistive devices- uses a cane secondary to low back pain   Driving- no concern  Memory- no concerns or changes since CVA    Substance abuse    Smoking- never  Etoh- moderate etoh use x 10 years; quit 40 years ago  Drugs- never  Caffeine- coffee 1 cup      Sleep  She has difficulty sleeping intermittently   This has been since her son passed away 2 years ago 2/2 suicide   Also under a lot of stress and anxiety   Mind racing- can't shut her mind off      Diet  Fish or chicken recently   Red meat once a week   Reports she was eating a lot of cheesecake, cupcakes, cookies, crackers before her stroke  Since then she has cut out these items      Exercise  House chores  Has a stepper but has not used this recently   Lives in a 2nd floor apartment- goes up steps daily     PT/OT/ST  Just started PT/OT     Mood  2 years ago her son committed suicide ever since then she has been struggling  She has good and bad days  Takes paxil and xanax   Has done talk therapy in the past, not interested in this currently    Follow ups  Sees her PCP; no other specialists    Review of Systems   Constitutional:  Negative for  appetite change, fatigue and fever.   HENT: Negative.  Negative for hearing loss, tinnitus, trouble swallowing and voice change.    Eyes: Negative.  Negative for photophobia, pain and visual disturbance.   Respiratory: Negative.  Negative for shortness of breath.    Cardiovascular: Negative.  Negative for palpitations.   Gastrointestinal: Negative.  Negative for nausea and vomiting.   Endocrine: Negative.  Negative for cold intolerance.   Genitourinary: Negative.  Negative for dysuria, frequency and urgency.   Musculoskeletal:  Negative for back pain, gait problem, myalgias, neck pain and neck stiffness.   Skin: Negative.  Negative for rash.   Allergic/Immunologic: Negative.    Neurological:  Positive for numbness (left sided). Negative for dizziness, tremors, seizures, syncope, facial asymmetry, speech difficulty, weakness, light-headedness and headaches.   Hematological: Negative.  Does not bruise/bleed easily.   Psychiatric/Behavioral: Negative.  Negative for confusion, hallucinations and sleep disturbance.    All other systems reviewed and are negative.    I have personally reviewed the MA's review of systems and made changes as necessary.    Current Outpatient Medications on File Prior to Visit   Medication Sig Dispense Refill    acetaminophen (TYLENOL) 325 mg tablet Take 2 tablets (650 mg total) by mouth every 6 (six) hours as needed (pain, fever) 1 Bottle 0    ALPRAZolam (XANAX) 0.25 mg tablet Take 1 tablet (0.25 mg total) by mouth 3 (three) times a day as needed for anxiety 90 tablet 0    atorvastatin (LIPITOR) 40 mg tablet Take 1 tablet (40 mg total) by mouth every evening 30 tablet 0    benzonatate (TESSALON PERLES) 100 mg capsule Take 1 capsule (100 mg total) by mouth 3 (three) times a day as needed for cough for up to 60 doses 60 capsule 0    budesonide-formoterol (Symbicort) 80-4.5 MCG/ACT inhaler Inhale 2 puffs 2 (two) times a day Rinse mouth after use. 10.2 g 3    dicyclomine (BENTYL) 10 mg capsule  "TAKE 1 CAPSULE (10 MG TOTAL) BY MOUTH 3 (THREE) TIMES A DAY AS NEEDED (USE ONLY AS NEEDED) 270 capsule 1    fenofibrate (TRICOR) 48 mg tablet Take 1 tablet (48 mg total) by mouth daily 30 tablet 0    hydroCHLOROthiazide 25 mg tablet TAKE 1 TABLET (25 MG TOTAL) BY MOUTH DAILY. 90 tablet 1    hyoscyamine (ANASPAZ,LEVSIN) 0.125 MG tablet Take 1 tablet (0.125 mg total) by mouth every 6 (six) hours as needed for cramping 60 tablet 0    levothyroxine 100 mcg tablet Take 1 tablet (100 mcg total) by mouth daily 90 tablet 3    lisinopril (ZESTRIL) 10 mg tablet Take 1 tablet (10 mg total) by mouth 2 (two) times a day 180 tablet 3    metoprolol tartrate (LOPRESSOR) 25 mg tablet Take 1 tablet (25 mg total) by mouth daily at bedtime (Patient taking differently: Take 25 mg by mouth daily at bedtime 50mg am, 25mg pm) 90 tablet 3    metoprolol tartrate (LOPRESSOR) 50 mg tablet TAKE 1 TABLET (50 MG TOTAL) BY MOUTH DAILY FOR 90 DOSES 90 tablet 1    pantoprazole (PROTONIX) 40 mg tablet Take 1 tablet (40 mg total) by mouth daily 90 tablet 3    PARoxetine (PAXIL) 30 mg tablet TAKE 1 TABLET BY MOUTH EVERY DAY 90 tablet 3    REFRESH TEARS 0.5 % SOLN INSTILL 1 DROP INTO EACH EYE THREE TIMES DAILY AS NEEDED FOR DRY EYES      meclizine (ANTIVERT) 12.5 MG tablet Take 1 tablet (12.5 mg total) by mouth every 8 (eight) hours as needed for dizziness (Patient not taking: Reported on 9/27/2024) 30 tablet 0     No current facility-administered medications on file prior to visit.      Objective     /66 (BP Location: Right arm, Patient Position: Sitting, Cuff Size: Standard)   Pulse 60   Temp 98.1 °F (36.7 °C) (Temporal)   Ht 5' 7\" (1.702 m)   Wt 78.5 kg (173 lb)   SpO2 98%   BMI 27.10 kg/m²     Neurologic Exam  On neurological examination patient is alert, awake, oriented and in no distress. Speech is fluent without dysarthria or aphasia. Cranial nerves 2-12 were symmetrically intact bilaterally, with the exception of diminished sensation " left V2 and left nasolabial fold flattening. Smile is symmetric. Motor testing reveals 5/5 strength of the bilateral upper and lower extremities.There was no pronator drift.  No fasciculations present. No abnormal involuntary movements. Finger- to-nose reveals no tremor or ataxia and intact proprioceptive function, no dysmetria was noted. Rapid alternating movement normal. Sensation was intact to vibration, light touch, and temperature in bilateral upper and lower extremities, with the exception of no vibratory sensation at the patella bilaterally secondary to known neuropathy. Deep tendon reflexes were 2+ and symmetric in the bilateral upper and lower extremities, with the exception of 2++ LUE/LLE. She is able to rise without assistance from a seated position although slowly due to chronic back pain. She ambulates with a cane. Casual gait is normal including stance, stride, and arm swing. Romberg is positive with a very mild sway.      Administrative Statements     I have spent a total time of 60 minutes in caring for this patient on the day of the visit/encounter including Diagnostic results, Prognosis, Risks and benefits of tx options, Instructions for management, Patient and family education, Importance of tx compliance, Risk factor reductions, Impressions, Documenting in the medical record, Reviewing / ordering tests, medicine, procedures  , and Obtaining or reviewing history  .

## 2024-09-27 NOTE — ASSESSMENT & PLAN NOTE
9/16/24 total 348, triglycerides 993, HDL 31, LDL 81  Recent CVA small vessel in etiology secondary to lipids   Continues on Atorvastatin 40 mg and fenofibrate 48 mg daily   Recommend PCP follow with repeat lipid panel in 3 months to assess for improvement

## 2024-09-30 DIAGNOSIS — F32.9 REACTIVE DEPRESSION: ICD-10-CM

## 2024-09-30 DIAGNOSIS — I10 ESSENTIAL HYPERTENSION: ICD-10-CM

## 2024-10-01 RX ORDER — PAROXETINE 30 MG/1
TABLET, FILM COATED ORAL
Qty: 90 TABLET | Refills: 3 | Status: SHIPPED | OUTPATIENT
Start: 2024-10-01

## 2024-10-01 RX ORDER — METOPROLOL TARTRATE 50 MG
50 TABLET ORAL DAILY
Qty: 90 TABLET | Refills: 1 | Status: SHIPPED | OUTPATIENT
Start: 2024-10-01

## 2024-10-02 ENCOUNTER — OFFICE VISIT (OUTPATIENT)
Dept: PHYSICAL THERAPY | Facility: HOME HEALTHCARE | Age: 84
End: 2024-10-02
Payer: MEDICARE

## 2024-10-02 DIAGNOSIS — I63.9 CEREBROVASCULAR ACCIDENT (CVA), UNSPECIFIED MECHANISM (HCC): Primary | ICD-10-CM

## 2024-10-02 DIAGNOSIS — M54.50 CHRONIC RIGHT-SIDED LOW BACK PAIN WITHOUT SCIATICA: ICD-10-CM

## 2024-10-02 DIAGNOSIS — G89.29 CHRONIC RIGHT-SIDED LOW BACK PAIN WITHOUT SCIATICA: ICD-10-CM

## 2024-10-02 DIAGNOSIS — R53.81 PHYSICAL DECONDITIONING: ICD-10-CM

## 2024-10-02 PROCEDURE — 97110 THERAPEUTIC EXERCISES: CPT

## 2024-10-02 NOTE — PROGRESS NOTES
"Daily Note     Today's date: 10/2/2024  Patient name: Roxy Farmer  : 1940  MRN: [id number]  Referring provider: Elisabeth Quigley*  Dx:   Encounter Diagnosis     ICD-10-CM    1. Cerebrovascular accident (CVA), unspecified mechanism (HCC)  I63.9       2. Chronic right-sided low back pain without sciatica  M54.50     G89.29       3. Physical deconditioning  R53.81                      Subjective: Pt reports she has pain in her low back.       Objective: See treatment diary below      Assessment: Verbal cues and tactile cues needed for correct form with exercises. L  strength deficits noted. Strength deficits evident L LE during exercise. Fatigue noted with exercise. Patient would benefit from continued PT      Plan: Continue per plan of care.      Precautions: Silas Akhtar      Manuals 9/26 10/2                                                   Neuro Re-Ed           *MEASURE HAND  STRENGTH NEXT SESSION*  R  35 lb      35 lb      35 lb    L  25 lb      27 lb      27 lb        Balance as appropriate                   Putty / Towel Squeezes HEP       Tband ER/ IR        Tband MTP/LTP        Peg Board                              Ther Ex          NuStep   L1  10'        Ankle pumps           HR/TR  1x10 ea       Standing Hip flex/abd/ext  1x10 ea Tyrell       Mini Squats        Step ups/ Step downs                                  QS          SAQ        Clamshells                  LAQ  HEP 5\" x 10 Tyrell         Seated March HEP 1x10 Tyrell      Seated Elbow Flex/ Ext HEP       Hip add  5\" x10        Hip abd                  Seated UE FF/ ABD        Seated Shoulder Press w/ Cane                        SLR                  S/L SLR                                                                                   Ther Activity                                Gait Training                                Modalities                                      "

## 2024-10-03 ENCOUNTER — PATIENT OUTREACH (OUTPATIENT)
Dept: CASE MANAGEMENT | Facility: OTHER | Age: 84
End: 2024-10-03

## 2024-10-03 NOTE — PROGRESS NOTES
Outpatient Care Management Note:    Voice mail message left for Roxy, with my contact information, requesting a call back.     CM called Roxy. She states that she continues with numbness on her left side. She is doing physical therapy and hoping her symptoms will improve. She did note that the therapist did note less strength in her left side than right.  Roxy states that overall is doing ok, because the stroke affecting her left side and not her right.     Roxy does not check home BP. She states it caused her anxiety in the past.     Roxy did complain of long term sleep issues.I encouraged her to discuss it with her PCP. She admits to taking a nap each afternoon. I encouraged her to try and skip the nap, so she sleeps at night but she declined.     She agreed to CM following up in 3 weeks. She will call with any questions.

## 2024-10-03 NOTE — PROGRESS NOTES
Ambulatory Visit  Name: Roxy Farmer      : 1940      MRN: [id number]  Encounter Provider: Casey Barrera MD  Encounter Date: 2024   Encounter department: [facility]    Assessment & Plan  History of stroke         Subacute cough    Orders:    benzonatate (TESSALON PERLES) 100 mg capsule; Take 1 capsule (100 mg total) by mouth 3 (three) times a day as needed for cough for up to 60 doses       History of Present Illness     Following up from recent CVA. No residual issues with strength. No loss of vision, hearing, swallowing or speech. Still depressed over situation with son. No SI/HI. Years ago was on medication for elevated TAG's but that was stopped. She states that she had better TAG numbers but with recent hospitalization, she had elevated cholesterol and TAG's and was placed on medication that I recommend she continues.          Review of Systems   Constitutional:  Negative for activity change, appetite change and fatigue.   HENT:  Negative for congestion, sore throat and trouble swallowing.    Eyes:  Negative for visual disturbance.   Respiratory:  Positive for cough. Negative for shortness of breath.    Cardiovascular:  Negative for chest pain, palpitations and leg swelling.   Gastrointestinal:  Negative for abdominal pain, nausea and vomiting.   Genitourinary:  Negative for dysuria.   Musculoskeletal:  Positive for arthralgias. Negative for myalgias.   Neurological:  Negative for tremors, syncope, weakness and numbness.   Psychiatric/Behavioral:  Negative for behavioral problems and confusion.            Objective     /74   Pulse 62   Temp 98 °F (36.7 °C)   Resp 18   Wt 78 kg (172 lb)   SpO2 98%   BMI 26.94 kg/m²     Physical Exam  Vitals reviewed.   Constitutional:       Appearance: Normal appearance.   HENT:      Head: Normocephalic and atraumatic.      Nose: Nose normal.      Mouth/Throat:      Mouth: Mucous membranes are moist.   Eyes:       General: No scleral icterus.     Extraocular Movements: Extraocular movements intact.      Conjunctiva/sclera: Conjunctivae normal.      Pupils: Pupils are equal, round, and reactive to light.   Cardiovascular:      Rate and Rhythm: Normal rate and regular rhythm.      Pulses: Normal pulses.      Heart sounds: Normal heart sounds. No murmur heard.     No friction rub. No gallop.   Pulmonary:      Effort: Pulmonary effort is normal.      Breath sounds: Normal breath sounds.   Abdominal:      General: Bowel sounds are normal. There is no distension.      Palpations: Abdomen is soft.   Musculoskeletal:      Cervical back: Normal range of motion.      Right lower leg: No edema.      Left lower leg: No edema.   Skin:     General: Skin is warm and dry.   Neurological:      General: No focal deficit present.      Mental Status: She is alert and oriented to person, place, and time.      Sensory: No sensory deficit.      Motor: No weakness.      Deep Tendon Reflexes: Reflexes normal.   Psychiatric:         Mood and Affect: Mood normal.         Behavior: Behavior normal.

## 2024-10-04 ENCOUNTER — APPOINTMENT (OUTPATIENT)
Dept: PHYSICAL THERAPY | Facility: HOME HEALTHCARE | Age: 84
End: 2024-10-04
Payer: MEDICARE

## 2024-10-08 ENCOUNTER — APPOINTMENT (OUTPATIENT)
Dept: PHYSICAL THERAPY | Facility: HOME HEALTHCARE | Age: 84
End: 2024-10-08
Payer: MEDICARE

## 2024-10-10 ENCOUNTER — OFFICE VISIT (OUTPATIENT)
Dept: PHYSICAL THERAPY | Facility: HOME HEALTHCARE | Age: 84
End: 2024-10-10
Payer: MEDICARE

## 2024-10-10 DIAGNOSIS — R53.81 PHYSICAL DECONDITIONING: ICD-10-CM

## 2024-10-10 DIAGNOSIS — I63.9 CEREBROVASCULAR ACCIDENT (CVA), UNSPECIFIED MECHANISM (HCC): Primary | ICD-10-CM

## 2024-10-10 PROCEDURE — 97110 THERAPEUTIC EXERCISES: CPT

## 2024-10-10 NOTE — PROGRESS NOTES
"Daily Note     Today's date: 10/10/2024  Patient name: Roxy Farmer  : 1940  MRN: [id number]  Referring provider: Elisabeth Quigley*  Dx:   Encounter Diagnosis     ICD-10-CM    1. Cerebrovascular accident (CVA), unspecified mechanism (HCC)  I63.9       2. Physical deconditioning  R53.81           Start Time: 1113  Stop Time: 1158  Total time in clinic (min): 45 minutes    Subjective: Pt stated that her sensation is returning in the L side of her face, arm, and leg.  Pt stated that she was able to go shopping for a few items at the local grocery store.  Pt states that she has to go to store prior to skilled therapy session due to fatigue after therapy from deconditioning.       Objective: See treatment diary below      Assessment: Pt tolerated treatment session w/ increased reps and exercise.  Pt still having difficulty w/ balance, endurance, and strength in the L side of her body.  PT to continue you challenging pt to increase overall strength and endurance of pt to allow pt to perform ADLs w/ greater function.       Plan: Continue per plan of care.      Precautions: Silas Akhtar      Manuals 9/26 10/2 10/10                                                  Neuro Re-Ed           *MEASURE HAND  STRENGTH NEXT SESSION*  R  35 lb      35 lb      35 lb    L  25 lb      27 lb      27 lb        Balance as appropriate                   Putty / Towel Squeezes HEP       Tband ER/ IR        Tband MTP/LTP        Peg Board                              Ther Ex          NuStep   L1  10' L2 10'       Ankle pumps           HR/TR  1x10 ea  1x20 each     Standing Hip flex/abd/ext  1x10 ea Tyrell  1x15 each     Mini Squats        Step ups/ Step downs                                  QS          SAQ        Clamshells        Seated thera-ball squeeze   L hand Blue ball 5\" x 40  While doing seated exercises        LAQ  HEP 5\" x 10 Tyrell  5\" x10 Tyrell        Seated March HEP 1x10 Tyrell 1x10 b/l 2\" hold     Seated Elbow Flex/ " "Ext HEP       Hip add  5\" x10 5\" x10 w/ ball       Hip abd   Green self hold 1x15               Seated Bicep Curls   2# 1x15 each b/l     Seated Shoulder Flexion w/ Cane   1x15 b/l     Seated Shoulder Press w/ Cane   1x15 b/l w/ cane  2# 1x15 b/l             SLR                  S/L SLR                                                                                   Ther Activity                                Gait Training                                Modalities                                        "

## 2024-10-14 ENCOUNTER — APPOINTMENT (OUTPATIENT)
Dept: PHYSICAL THERAPY | Facility: HOME HEALTHCARE | Age: 84
End: 2024-10-14
Payer: MEDICARE

## 2024-10-14 DIAGNOSIS — E78.2 MIXED HYPERLIPIDEMIA: ICD-10-CM

## 2024-10-14 DIAGNOSIS — I63.9 CEREBROVASCULAR ACCIDENT (CVA), UNSPECIFIED MECHANISM (HCC): ICD-10-CM

## 2024-10-14 DIAGNOSIS — I10 ESSENTIAL HYPERTENSION: ICD-10-CM

## 2024-10-14 RX ORDER — ATORVASTATIN CALCIUM 40 MG/1
40 TABLET, FILM COATED ORAL EVERY EVENING
Qty: 30 TABLET | Refills: 0 | Status: CANCELLED | OUTPATIENT
Start: 2024-10-14 | End: 2024-11-13

## 2024-10-14 RX ORDER — FENOFIBRATE 48 MG/1
48 TABLET, COATED ORAL DAILY
Qty: 30 TABLET | Refills: 0 | Status: CANCELLED | OUTPATIENT
Start: 2024-10-14 | End: 2024-11-13

## 2024-10-14 RX ORDER — METOPROLOL TARTRATE 25 MG/1
25 TABLET, FILM COATED ORAL
Qty: 90 TABLET | Refills: 3 | Status: CANCELLED | OUTPATIENT
Start: 2024-10-14

## 2024-10-15 DIAGNOSIS — F41.9 ANXIETY: ICD-10-CM

## 2024-10-15 DIAGNOSIS — I63.9 CEREBROVASCULAR ACCIDENT (CVA), UNSPECIFIED MECHANISM (HCC): ICD-10-CM

## 2024-10-15 DIAGNOSIS — E78.2 MIXED HYPERLIPIDEMIA: ICD-10-CM

## 2024-10-15 DIAGNOSIS — I10 ESSENTIAL HYPERTENSION: ICD-10-CM

## 2024-10-15 RX ORDER — METOPROLOL TARTRATE 25 MG/1
25 TABLET, FILM COATED ORAL
Qty: 90 TABLET | Refills: 2 | Status: SHIPPED | OUTPATIENT
Start: 2024-10-15

## 2024-10-15 RX ORDER — ALPRAZOLAM 0.25 MG/1
0.25 TABLET ORAL 3 TIMES DAILY PRN
Qty: 90 TABLET | Refills: 0 | Status: SHIPPED | OUTPATIENT
Start: 2024-10-15 | End: 2024-11-14 | Stop reason: SDUPTHER

## 2024-10-15 RX ORDER — METOPROLOL TARTRATE 50 MG
50 TABLET ORAL DAILY
Qty: 90 TABLET | Refills: 1 | Status: SHIPPED | OUTPATIENT
Start: 2024-10-15

## 2024-10-15 RX ORDER — FENOFIBRATE 48 MG/1
48 TABLET, COATED ORAL DAILY
Qty: 30 TABLET | Refills: 0 | Status: SHIPPED | OUTPATIENT
Start: 2024-10-15 | End: 2024-10-15

## 2024-10-15 RX ORDER — ATORVASTATIN CALCIUM 40 MG/1
40 TABLET, FILM COATED ORAL EVERY EVENING
Qty: 30 TABLET | Refills: 0 | Status: SHIPPED | OUTPATIENT
Start: 2024-10-15 | End: 2024-10-15

## 2024-10-15 RX ORDER — FENOFIBRATE 48 MG/1
48 TABLET, COATED ORAL DAILY
Qty: 90 TABLET | Refills: 2 | Status: SHIPPED | OUTPATIENT
Start: 2024-10-15 | End: 2024-11-14

## 2024-10-15 RX ORDER — ATORVASTATIN CALCIUM 40 MG/1
40 TABLET, FILM COATED ORAL EVERY EVENING
Qty: 90 TABLET | Refills: 2 | Status: SHIPPED | OUTPATIENT
Start: 2024-10-15 | End: 2024-11-14

## 2024-10-17 ENCOUNTER — APPOINTMENT (OUTPATIENT)
Dept: PHYSICAL THERAPY | Facility: HOME HEALTHCARE | Age: 84
End: 2024-10-17
Payer: MEDICARE

## 2024-10-21 ENCOUNTER — OFFICE VISIT (OUTPATIENT)
Dept: FAMILY MEDICINE CLINIC | Facility: HOME HEALTHCARE | Age: 84
End: 2024-10-21
Payer: MEDICARE

## 2024-10-21 VITALS
OXYGEN SATURATION: 95 % | HEART RATE: 71 BPM | DIASTOLIC BLOOD PRESSURE: 58 MMHG | SYSTOLIC BLOOD PRESSURE: 104 MMHG | BODY MASS INDEX: 26.63 KG/M2 | TEMPERATURE: 98 F | WEIGHT: 170 LBS

## 2024-10-21 DIAGNOSIS — F41.9 ANXIETY: ICD-10-CM

## 2024-10-21 DIAGNOSIS — F33.9 DEPRESSION, RECURRENT (HCC): ICD-10-CM

## 2024-10-21 DIAGNOSIS — G47.09 OTHER INSOMNIA: ICD-10-CM

## 2024-10-21 DIAGNOSIS — Z86.73 HISTORY OF STROKE: Primary | ICD-10-CM

## 2024-10-21 DIAGNOSIS — E78.2 MIXED HYPERLIPIDEMIA: ICD-10-CM

## 2024-10-21 PROBLEM — I16.0 HYPERTENSIVE URGENCY: Status: RESOLVED | Noted: 2023-09-25 | Resolved: 2024-10-21

## 2024-10-21 PROCEDURE — 99214 OFFICE O/P EST MOD 30 MIN: CPT

## 2024-10-21 NOTE — ASSESSMENT & PLAN NOTE
, total Cholesterol 348 (9/16/24)  Patient has been on Fenofibrate and Atorvastatin for a month  Recheck lipid panel  Continue to follow low-carb, low-sodium diet as tolerated

## 2024-10-21 NOTE — ASSESSMENT & PLAN NOTE
With left-sided weakness  Encouraged patient to advocate for herself, to focus PT on left hand strengthening and decrease frequency.  Order scooter through parachute

## 2024-10-21 NOTE — PROGRESS NOTES
Ambulatory Visit  Name: Roxy Farmer      : 1940      MRN: [id number]  Encounter Provider: Casey Barrera MD  Encounter Date: 10/21/2024   Encounter department: [facility]    Assessment & Plan  History of stroke  With left-sided weakness  Encouraged patient to advocate for herself, to focus PT on left hand strengthening and decrease frequency.  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  With regards to neuropathy, she complains of weakness/paresthesias of her left face and left hand.  She says that she has been going to physical therapy regularly and they have been working on her left limbs and back, but she gets significant back pain the day after.  Therefore, she would like to decrease frequency of PT and re-focus it to her areas of chief concern.   Feeling like she's going downhill physically and emotionally. She's had depression chronically, worse for past year after son committed suicide. She hasn't seen good results w/ psychotherapy, but she has a good support system.  She's been trying to eat low-carb/sodium diet. However, it exacerbates her IBS, w/ hematochezia. She's had IBS for >50 years.   Patient asks about obtaining a scooter, d/t ambulatory difficulty      Review of Systems   Constitutional:  Positive for fatigue.   Gastrointestinal:   Positive for diarrhea.   Musculoskeletal:  Positive for back pain and gait problem.   Neurological:  Positive for weakness and numbness.   Psychiatric/Behavioral:  Positive for dysphoric mood. The patient is nervous/anxious.            Objective     /58   Pulse 71   Temp 98 °F (36.7 °C)   Wt 77.1 kg (170 lb)   SpO2 95%   BMI 26.63 kg/m²     Physical Exam  Vitals reviewed.   Constitutional:       General: She is not in acute distress.     Appearance: Normal appearance. She is normal weight.   Cardiovascular:      Rate and Rhythm: Normal rate and regular rhythm.      Heart sounds: Normal heart sounds. No murmur heard.  Pulmonary:      Effort: Pulmonary effort is normal. No respiratory distress.      Breath sounds: Normal breath sounds. No wheezing or rales.   Neurological:      Mental Status: She is alert.      Comments: Upper and lower limb strength slightly decreased on left. Nasolabial fold slightly decreased on left

## 2024-10-22 ENCOUNTER — CLINICAL SUPPORT (OUTPATIENT)
Dept: FAMILY MEDICINE CLINIC | Facility: HOME HEALTHCARE | Age: 84
End: 2024-10-22
Payer: MEDICARE

## 2024-10-22 ENCOUNTER — APPOINTMENT (OUTPATIENT)
Dept: PHYSICAL THERAPY | Facility: HOME HEALTHCARE | Age: 84
End: 2024-10-22
Payer: MEDICARE

## 2024-10-22 DIAGNOSIS — E78.2 MIXED HYPERLIPIDEMIA: ICD-10-CM

## 2024-10-22 DIAGNOSIS — K57.90 DIVERTICULOSIS: ICD-10-CM

## 2024-10-22 DIAGNOSIS — F32.9 REACTIVE DEPRESSION: ICD-10-CM

## 2024-10-22 LAB
CHOLEST SERPL-MCNC: 155 MG/DL
HDLC SERPL-MCNC: 40 MG/DL
LDLC SERPL CALC-MCNC: 68 MG/DL (ref 0–100)
NONHDLC SERPL-MCNC: 115 MG/DL
TRIGL SERPL-MCNC: 237 MG/DL

## 2024-10-22 PROCEDURE — 80061 LIPID PANEL: CPT

## 2024-10-22 RX ORDER — PAROXETINE 30 MG/1
30 TABLET, FILM COATED ORAL DAILY
Qty: 90 TABLET | Refills: 3 | OUTPATIENT
Start: 2024-10-22

## 2024-10-22 RX ORDER — PANTOPRAZOLE SODIUM 40 MG/1
40 TABLET, DELAYED RELEASE ORAL DAILY
Qty: 90 TABLET | Refills: 3 | Status: SHIPPED | OUTPATIENT
Start: 2024-10-22

## 2024-10-24 ENCOUNTER — PATIENT OUTREACH (OUTPATIENT)
Dept: CASE MANAGEMENT | Facility: OTHER | Age: 84
End: 2024-10-24

## 2024-10-24 NOTE — PROGRESS NOTES
Outpatient Care Management Note:    Voice mail message left for Roxy, with my contact information, requesting a call back.     My chart message also sent.     Call received from Roxy. She states that she is still having numbness of her left side. She continues to work with therapy and feels she is making small improvements. She states that she is waiting to hear about a rollator.  EDIN reviewed that per [facility] notes, Dr Holley may have ordered a scooter.  I instructed Roxy to call the PCP office and have them verify.      Roxy would like EDIN to follow up in 3 weeks. She received another call and had to hang up.

## 2024-10-25 LAB
DME PARACHUTE DELIVERY DATE REQUESTED: NORMAL
DME PARACHUTE ITEM DESCRIPTION: NORMAL
DME PARACHUTE ORDER STATUS: NORMAL
DME PARACHUTE SUPPLIER NAME: NORMAL
DME PARACHUTE SUPPLIER PHONE: NORMAL

## 2024-10-29 ENCOUNTER — APPOINTMENT (OUTPATIENT)
Dept: PHYSICAL THERAPY | Facility: HOME HEALTHCARE | Age: 84
End: 2024-10-29
Payer: MEDICARE

## 2024-10-31 ENCOUNTER — OFFICE VISIT (OUTPATIENT)
Dept: PHYSICAL THERAPY | Facility: HOME HEALTHCARE | Age: 84
End: 2024-10-31
Payer: MEDICARE

## 2024-10-31 DIAGNOSIS — I63.9 CEREBROVASCULAR ACCIDENT (CVA), UNSPECIFIED MECHANISM (HCC): Primary | ICD-10-CM

## 2024-10-31 PROCEDURE — 97110 THERAPEUTIC EXERCISES: CPT

## 2024-10-31 NOTE — PROGRESS NOTES
"Daily Note     Today's date: 10/31/2024  Patient name: Roxy Farmer  : 1940  MRN: [id number]  Referring provider: Elisabeth Quigley*  Dx: No diagnosis found.    Start Time: 1055        Subjective: Pt with c/o L side weakness, mainly in L hand and facial areas. I was very weak and tired after last visit.    Objective: See treatment diary below    Assessment:  Modified session as per flow sheet to avoid exacerbation of weakness and over all fatigue. Pt dee TE as per flow sheet well and reports feeling well at end of session. VC's provided for correct form  Patient would benefit from continued PT    Plan: Continue per plan of care.      Precautions: Silas Akhtar      Manuals 9/26 10/2 10/10  10-31                                                Neuro Re-Ed           *MEASURE HAND  STRENGTH NEXT SESSION*  R  35 lb      35 lb      35 lb    L  25 lb      27 lb      27 lb        Balance as appropriate                   Putty / Towel Squeezes HEP       Tband ER/ IR        Tband MTP/LTP    NV seated    Peg Board                              Ther Ex     10-31     NuStep   L1  10' L2 10'       UBE    5'    Ankle pumps           HR/TR  1x10 ea  1x20 each     Standing Hip flex/abd/ext  1x10 ea Darlin  1x15 each     Mini Squats        Step ups/ Step downs                                  QS          SAQ        Clamshells        Seated thera-ball squeeze   L hand Blue ball 5\" x 40  While doing seated exercises   L hand  [place] ball  5'     LAQ  HEP 5\" x 10 Darlin  5\" x10 Darlin   5\" x10 darlin     Seated March HEP 1x10 Darlin 1x10 b/l 2\" hold 1x10 darlin 2\"     Seated Elbow Flex/ Ext HEP       Hip add  5\" x10 5\" x10 w/ ball  5\" x10 w/ball     Hip abd   Green self hold 1x15  Green self hold  1x15             Seated Bicep Curls   2# 1x15 each b/l W/cane  No wt  1x15 darlin    Seated Shoulder Flexion w/ Cane   1x15 b/l 1x10 darlin    Seated Shoulder Press w/ Cane   1x15 b/l w/ cane  2# 1x15 b/l NP            SLR                  S/L " SLR                                                                                   Ther Activity                                Gait Training                                Modalities

## 2024-11-06 ENCOUNTER — OFFICE VISIT (OUTPATIENT)
Dept: PHYSICAL THERAPY | Facility: HOME HEALTHCARE | Age: 84
End: 2024-11-06
Payer: MEDICARE

## 2024-11-06 DIAGNOSIS — I63.9 CEREBROVASCULAR ACCIDENT (CVA), UNSPECIFIED MECHANISM (HCC): Primary | ICD-10-CM

## 2024-11-06 PROCEDURE — 97110 THERAPEUTIC EXERCISES: CPT

## 2024-11-06 NOTE — PROGRESS NOTES
"Daily Note     Today's date: 2024  Patient name: Roxy Farmer  : 1940  MRN: [id number]  Referring provider: Elisabeth Quigley*  Dx: No diagnosis found.    Start Time: 1128          Subjective: I eed to work on my hand and my face.     Objective: See treatment diary below    Assessment: Advanced pt as per flow sheet with good ede . Pt with short rests needed.  Patient would benefit from continued PT    Plan: Continue per plan of care. Add facial E-stim NV.      Precautions: Silas Akhtar      Manuals 9/26 10/2 10/10  10-31  11-6                                              Neuro Re-Ed           *MEASURE HAND  STRENGTH NEXT SESSION*  R  35 lb      35 lb      35 lb    L  25 lb      27 lb      27 lb        Balance as appropriate                   Putty / Towel Squeezes HEP       Tband ER/ IR        Tband MTP/LTP    NV seated Seated  Red 1x10   ea  darlin   T-bar     Yellow  Down/up  2x5 ea                         Ther Ex     10-31  11-6   NuStep   L1  10' L2 10'       UBE    5' 10'   Ankle pumps           HR/TR  1x10 ea  1x20 each 1x20 ea 1x20   Standing Hip flex/abd/ext  1x10 ea Darlin  1x15 each 1x15 ea 1x15 ea darlin   Mini Squats        Step ups/ Step downs                                  QS          SAQ        Clamshells        Seated thera-ball squeeze   L hand Blue ball 5\" x 40  While doing seated exercises   L hand  Orange ball  5'  L hand  [place] ball     LAQ  HEP 5\" x 10 Darlin  5\" x10 Darlin   5\" x10 darlin  5\" x15   Seated March HEP 1x10 Darlin 1x10 b/l 2\" hold 1x10 darlin 2\"  1x10 darlin    Seated Elbow Flex/ Ext HEP       Hip add  5\" x10 5\" x10 w/ ball  5\" x10 w/ball  5\" x15   Hip abd   Green self hold 1x15  Green self hold  1x15  Green   Self hold  1x15           Seated Bicep Curls   2# 1x15 each b/l W/cane  No wt  1x15 darlin W/cane  No wt  1x15 darlin   Seated Shoulder Flexion w/ Cane   1x15 b/l 1x10 darlin 1x10 darlin   Seated Shoulder Press w/ Cane   1x15 b/l w/ cane  2# 1x15 b/l NP 1x15  w/cane  darlin         "   SLR                  S/L SLR                                                                                   Ther Activity                                Gait Training                                Modalities

## 2024-11-11 DIAGNOSIS — R05.2 SUBACUTE COUGH: ICD-10-CM

## 2024-11-12 RX ORDER — BENZONATATE 100 MG/1
CAPSULE ORAL
Qty: 60 CAPSULE | Refills: 0 | Status: SHIPPED | OUTPATIENT
Start: 2024-11-12

## 2024-11-13 ENCOUNTER — APPOINTMENT (OUTPATIENT)
Dept: PHYSICAL THERAPY | Facility: HOME HEALTHCARE | Age: 84
End: 2024-11-13
Payer: MEDICARE

## 2024-11-13 DIAGNOSIS — F41.9 ANXIETY: ICD-10-CM

## 2024-11-14 ENCOUNTER — PATIENT OUTREACH (OUTPATIENT)
Dept: CASE MANAGEMENT | Facility: OTHER | Age: 84
End: 2024-11-14

## 2024-11-14 ENCOUNTER — OFFICE VISIT (OUTPATIENT)
Dept: PHYSICAL THERAPY | Facility: HOME HEALTHCARE | Age: 84
End: 2024-11-14
Payer: MEDICARE

## 2024-11-14 DIAGNOSIS — F41.9 ANXIETY: ICD-10-CM

## 2024-11-14 DIAGNOSIS — I63.9 CEREBROVASCULAR ACCIDENT (CVA), UNSPECIFIED MECHANISM (HCC): Primary | ICD-10-CM

## 2024-11-14 PROCEDURE — 97110 THERAPEUTIC EXERCISES: CPT

## 2024-11-14 RX ORDER — ALPRAZOLAM 0.25 MG/1
0.25 TABLET ORAL 3 TIMES DAILY PRN
Qty: 90 TABLET | Refills: 0 | OUTPATIENT
Start: 2024-11-14

## 2024-11-14 RX ORDER — ALPRAZOLAM 0.25 MG/1
0.25 TABLET ORAL 3 TIMES DAILY PRN
Qty: 90 TABLET | Refills: 0 | Status: SHIPPED | OUTPATIENT
Start: 2024-11-14 | End: 2024-12-09 | Stop reason: SDUPTHER

## 2024-11-14 NOTE — PROGRESS NOTES
Outpatient Care Management Note:    Care manager called Roxy. She states that she still has ongoing parastesias of her left side. She is going to physical therapy and hoping some of her symptoms resolve.  We reviewed warning signs of stroke and discussed that she should seek emergency care for any stroke symptoms.     Roxy requested CM follow up one more time in a month to see how she is managing.  Roxy has my contact information and will call with any questions.

## 2024-11-14 NOTE — PROGRESS NOTES
"Daily Note     Today's date: 2024  Patient name: Roxy Farmer  : 1940  MRN: [id number]  Referring provider: Elisabeth Quigley*  Dx: No diagnosis found.    Start Time: 1157          Subjective: I feel ok. I did grocery shopping this morning.     Objective: See treatment diary below    Assessment:  Pt provided with point stimulation to L surrounding mouth and cheek by PT Mony Vu which pt ede well. Provided pt with print out and instruction of facial ex for HEP.  Pt ede entire session well with vc's as needed.  Patient would benefit from continued PT    Plan: Continue as per plan of care.      Precautions: Silas Akhtar      Manuals 11-14 10/2 10/10  10-31  116   Point stimulation L surrounding mouth & cheek 8'  HZ                                          Neuro Re-Ed           *MEASURE HAND  STRENGTH NEXT SESSION*  R  35 lb      35 lb      35 lb    L  25 lb      27 lb      27 lb        Balance as appropriate                   Putty / Towel Squeezes HEP       Tband ER/ IR        Tband MTP/LTP Seated Red 1x10 ea darlin   NV seated Seated  Red 1x10   ea  darlin   T-bar Yellow  Down/up  2x5 ea    Yellow  Down/up  2x5 ea                         Ther Ex 11-14    10-31  11-6   NuStep   L1  10' L2 10'       UBE 8'   5' 10'   Ankle pumps           HR/TR Declined 1x10 ea  1x20 each 1x20 ea 1x20   Standing Hip flex/abd/ext Declined 1x10 ea Darlin  1x15 each 1x15 ea 1x15 ea darlin   Mini Squats        Step ups/ Step downs                                  QS          SAQ        Clamshells        Seated thera-ball squeeze L hand   Orange ball  5'      L hand Blue ball 5\" x 40  While doing seated exercises   L hand  Orange ball  5'  L hand  Orange ball     Power web   L hand Squeeze w/ twist in/out  5\" x10 ea       LAQ  HEP 5\" x 10 Darlin  5\" x10 Darlin   5\" x10 darlin  5\" x15   Seated March 1x10 1x10 Darlin 1x10 b/l 2\" hold 1x10 darlin 2\"  1x10 darlin    Seated Elbow Flex/ Ext        Hip add 5\" x15 5\" x10 5\" x10 w/ ball " " 5\" x10 w/ball  5\" x15   Hip abd Green  Self hold  1x15  Green self hold 1x15  Green self hold  1x15  Green   Self hold  1x15           Seated Bicep Curls W/cane  No wt  1x15 darlin  2# 1x15 each b/l W/cane  No wt  1x15 darlin W/cane  No wt  1x15 darlin   Seated Shoulder Flexion w/ Cane 1x10 darlin  1x15 b/l 1x10 darlin 1x10 darlin   Seated Shoulder Press w/ Cane 1x15 w/cane  darlin  1x15 b/l w/ cane  2# 1x15 b/l NP 1x15  w/cane  darlin           SLR                  S/L SLR                                                                                   Ther Activity                                Gait Training                                Modalities                                              " 23-Dec-2017

## 2024-11-18 ENCOUNTER — OFFICE VISIT (OUTPATIENT)
Dept: FAMILY MEDICINE CLINIC | Facility: HOME HEALTHCARE | Age: 84
End: 2024-11-18
Payer: MEDICARE

## 2024-11-18 VITALS
SYSTOLIC BLOOD PRESSURE: 112 MMHG | HEART RATE: 71 BPM | RESPIRATION RATE: 18 BRPM | BODY MASS INDEX: 26.53 KG/M2 | DIASTOLIC BLOOD PRESSURE: 68 MMHG | WEIGHT: 169.4 LBS | OXYGEN SATURATION: 98 % | TEMPERATURE: 98 F

## 2024-11-18 DIAGNOSIS — E78.1 HYPERTRIGLYCERIDEMIA: ICD-10-CM

## 2024-11-18 DIAGNOSIS — I63.9 CEREBROVASCULAR ACCIDENT (CVA), UNSPECIFIED MECHANISM (HCC): ICD-10-CM

## 2024-11-18 DIAGNOSIS — I10 PRIMARY HYPERTENSION: Primary | ICD-10-CM

## 2024-11-18 DIAGNOSIS — D68.00 VON WILLEBRAND DISEASE (HCC): ICD-10-CM

## 2024-11-18 DIAGNOSIS — M19.079 ARTHRITIS OF BIG TOE: ICD-10-CM

## 2024-11-18 PROCEDURE — 99214 OFFICE O/P EST MOD 30 MIN: CPT | Performed by: FAMILY MEDICINE

## 2024-11-18 NOTE — PROGRESS NOTES
Name: Roxy Farmer      : 1940      MRN: [id number]  Encounter Provider: Casey Barrera MD  Encounter Date: 2024   Encounter department: [facility]  :  Assessment & Plan  Cerebrovascular accident (CVA), unspecified mechanism (HCC)         Hypertriglyceridemia         Primary hypertension         Von Willebrand disease (HCC)         Arthritis of big toe                History of Present Illness     Here for f/up CVA, hypertriglyceridemia and for R great toe pain. R toe pain is intermittent and she uses icy hot for it as well as tylenol. Has lump on toe which has been present for quite some time. Under stress with son being difficult and estranged as Roxy is fighting her late ex-'s estate. She relies on friends and delia. She had some counseling through [employer].      Review of Systems   Constitutional:  Negative for fever.   HENT:  Negative for hearing loss and trouble swallowing.    Eyes:  Negative for visual disturbance.   Respiratory:  Negative for shortness of breath.    Cardiovascular:  Negative for chest pain.   Neurological:  Negative for speech difficulty and headaches.        Strength and sensation L face and LUE improving   Psychiatric/Behavioral:  Negative for dysphoric mood.           Objective   /68 (BP Location: Left arm, Patient Position: Sitting, Cuff Size: Adult)   Pulse 71   Temp 98 °F (36.7 °C) (Tympanic)   Resp 18   Wt 76.8 kg (169 lb 6.4 oz)   SpO2 98%   BMI 26.53 kg/m²      Physical Exam  Vitals reviewed.   Constitutional:       General: She is not in acute distress.     Appearance: Normal appearance.   HENT:      Head: Normocephalic and atraumatic.   Eyes:      General: No scleral icterus.     Extraocular Movements: Extraocular movements intact.      Conjunctiva/sclera: Conjunctivae normal.   Cardiovascular:      Rate and Rhythm: Normal rate and regular rhythm.      Pulses: Normal pulses.      Heart sounds: Normal heart  sounds. No murmur heard.     No friction rub. No gallop.   Musculoskeletal:      Comments: Medial aspect R great toe with like heberden node; normal ROM , cap refill and strength of toes and R foot   Skin:     General: Skin is warm and dry.      Capillary Refill: Capillary refill takes less than 2 seconds.   Neurological:      Mental Status: She is alert.      Comments: Improved L nasolabial fold - otherwise CN 2-12 normal    LUE 5/5 strength with slight decreased hand  compared with R   Psychiatric:         Mood and Affect: Mood normal.         Behavior: Behavior normal.

## 2024-11-19 ENCOUNTER — CLINICAL SUPPORT (OUTPATIENT)
Dept: FAMILY MEDICINE CLINIC | Facility: HOME HEALTHCARE | Age: 84
End: 2024-11-19
Payer: MEDICARE

## 2024-11-19 DIAGNOSIS — E78.1 HYPERTRIGLYCERIDEMIA: ICD-10-CM

## 2024-11-19 LAB
CHOLEST SERPL-MCNC: 153 MG/DL (ref ?–200)
HDLC SERPL-MCNC: 42 MG/DL
LDLC SERPL CALC-MCNC: 57 MG/DL (ref 0–100)
NONHDLC SERPL-MCNC: 111 MG/DL
TRIGL SERPL-MCNC: 272 MG/DL (ref ?–150)

## 2024-11-19 PROCEDURE — 80061 LIPID PANEL: CPT

## 2024-11-20 ENCOUNTER — APPOINTMENT (OUTPATIENT)
Dept: PHYSICAL THERAPY | Facility: HOME HEALTHCARE | Age: 84
End: 2024-11-20
Payer: MEDICARE

## 2024-11-20 DIAGNOSIS — M79.676 PAIN OF TOE, UNSPECIFIED LATERALITY: Primary | ICD-10-CM

## 2024-11-21 ENCOUNTER — OFFICE VISIT (OUTPATIENT)
Dept: PHYSICAL THERAPY | Facility: HOME HEALTHCARE | Age: 84
End: 2024-11-21
Payer: MEDICARE

## 2024-11-21 DIAGNOSIS — I63.9 CEREBROVASCULAR ACCIDENT (CVA), UNSPECIFIED MECHANISM (HCC): Primary | ICD-10-CM

## 2024-11-21 PROCEDURE — 97110 THERAPEUTIC EXERCISES: CPT

## 2024-11-21 PROCEDURE — 97032 APPL MODALITY 1+ESTIM EA 15: CPT

## 2024-11-21 NOTE — PROGRESS NOTES
"Daily Note     Today's date: 2024  Patient name: Roxy Farmer  : 1940  MRN: [id number]  Referring provider: Elisabeth Quigley*  Dx:   Encounter Diagnosis     ICD-10-CM    1. Cerebrovascular accident (CVA), unspecified mechanism (HCC)  I63.9                      Subjective: Pt reports she has been having a hard time sleeping because of her R foot. Pt reports she feels the the point stim helped last session.       Objective: See treatment diary below      Assessment: Pt with good tolerance to point stim L surrounding mouth and cheek area. Good skin integrity noted after point stim. Verbal cues needed for correct form with exercises. Program modified today per Pt request.  Patient would benefit from continued PT      Plan: Continue per plan of care.      Precautions: Silas Akhtar      Manuals 11-14 11/21 10/10  10-31  11-6   Point stimulation L surrounding mouth & cheek 8'  HZ L surrounding mouth and cheek 8'  JK                                         Neuro Re-Ed           *MEASURE HAND  STRENGTH NEXT SESSION*          Balance as appropriate                   Putty / Towel Squeezes HEP       Tband ER/ IR        Tband MTP/LTP Seated Red 1x10 ea darlin Seated red 1x10 ea Darlin   NV seated Seated  Red 1x10   ea  darlin   T-bar Yellow  Down/up  2x5 ea NP   Yellow  Down/up  2x5 ea                         Ther Ex 11-14    10-31  11-6   NuStep    L2 10'       UBE 8' 8'  5' 10'   Ankle pumps           HR/TR Declined Pt declined  1x20 each 1x20 ea 1x20   Standing Hip flex/abd/ext Declined Pt declined  1x15 each 1x15 ea 1x15 ea darlin   Mini Squats        Step ups/ Step downs                                  QS          SAQ        Clamshells        Seated thera-ball squeeze L hand   Orange ball  5'     NP L hand Blue ball 5\" x 40  While doing seated exercises   L hand  [place] ball  5'  L hand  [place]ange ball     Power web   L hand Squeeze w/ twist in/out  5\" x10 ea NP      LAQ  HEP  5\" x10 Darlin   5\" x10 darlin  " "5\" x15   Seated March 1x10 NP 1x10 b/l 2\" hold 1x10 tyrell 2\"  1x10 tyrell    Seated Elbow Flex/ Ext        Hip add 5\" x15 NP 5\" x10 w/ ball  5\" x10 w/ball  5\" x15   Hip abd Green  Self hold  1x15 NP Green self hold 1x15  Green self hold  1x15  Green   Self hold  1x15           Seated Bicep Curls W/cane  No wt  1x15 tyrell W/cane   No wt   1x15 Tyrell  2# 1x15 each b/l W/cane  No wt  1x15 tyrell W/cane  No wt  1x15 tyrell   Seated Shoulder Flexion w/ Cane 1x10 tyrell 1x10 Tyrell  1x15 b/l 1x10 tyrell 1x10 tyrell   Seated Shoulder Press w/ Cane 1x15 w/cane  tyrell 1x15 w/cane Tyrell  1x15 b/l w/ cane  2# 1x15 b/l NP 1x15  w/cane  tyrell           SLR                  S/L SLR                                                                                   Ther Activity                                Gait Training                                Modalities                                                "

## 2024-11-26 ENCOUNTER — APPOINTMENT (OUTPATIENT)
Dept: PHYSICAL THERAPY | Facility: HOME HEALTHCARE | Age: 84
End: 2024-11-26
Payer: MEDICARE

## 2024-12-02 ENCOUNTER — OFFICE VISIT (OUTPATIENT)
Dept: PHYSICAL THERAPY | Facility: HOME HEALTHCARE | Age: 84
End: 2024-12-02
Payer: MEDICARE

## 2024-12-02 DIAGNOSIS — I63.9 CEREBROVASCULAR ACCIDENT (CVA), UNSPECIFIED MECHANISM (HCC): Primary | ICD-10-CM

## 2024-12-02 PROCEDURE — 97032 APPL MODALITY 1+ESTIM EA 15: CPT | Performed by: PHYSICAL THERAPIST

## 2024-12-02 NOTE — PROGRESS NOTES
"Daily Note     Today's date: 2024  Patient name: Roxy Farmer  : 1940  MRN: [id number]  Referring provider: Elisabeth Quigley*  Dx:   Encounter Diagnosis     ICD-10-CM    1. Cerebrovascular accident (CVA), unspecified mechanism (HCC)  I63.9                      Subjective: Pt states that she thinks the stim is really helping.       Objective: See treatment diary below      Assessment: Facial point stim only completed this date per pt request. Good contractions observed around the mouth but limited contractions at nose and cheek.  Continue with POC as pt is tolerable to work on facial N/T.     Plan: Continue per plan of care.      Precautions: Silas Akhtar      Manuals 11-14 11/21 12/2  10-31  11-6   Point stimulation L surrounding mouth & cheek 8'  HZ L surrounding mouth and cheek 8'  JK L surrounding mouth and cheek 8'   HZ                                         Neuro Re-Ed           *MEASURE HAND  STRENGTH NEXT SESSION*          Balance as appropriate                   Putty / Towel Squeezes HEP       Tband ER/ IR        Tband MTP/LTP Seated Red 1x10 ea darlin Seated red 1x10 ea Darlin   NV seated Seated  Red 1x10   ea  darlin   T-bar Yellow  Down/up  2x5 ea NP   Yellow  Down/up  2x5 ea                         Ther Ex 11-14    10-31  11-6   NuStep           UBE 8' 8'  5' 10'   Ankle pumps           HR/TR Declined Pt declined   1x20 ea 1x20   Standing Hip flex/abd/ext Declined Pt declined   1x15 ea 1x15 ea darlin   Mini Squats        Step ups/ Step downs                                  QS          SAQ        Clamshells        Seated thera-ball squeeze L hand   [place]ange ball  5'     NP   L hand  [place] ball  5'  L hand  [place]ange ball     Power web   L hand Squeeze w/ twist in/out  5\" x10 ea NP      LAQ  HEP    5\" x10 darlin  5\" x15   Seated March 1x10 NP  1x10 darlin 2\"  1x10 darlin    Seated Elbow Flex/ Ext        Hip add 5\" x15 NP   5\" x10 w/ball  5\" x15   Hip abd Green  Self hold  1x15 NP   Green self " hold  1x15  Green   Self hold  1x15           Seated Bicep Curls W/cane  No wt  1x15 tyrell W/cane   No wt   1x15 Tyrell   W/cane  No wt  1x15 tyrell W/cane  No wt  1x15 tyrell   Seated Shoulder Flexion w/ Cane 1x10 tyrell 1x10 Tyrell   1x10 tyrell 1x10 tyrell   Seated Shoulder Press w/ Cane 1x15 w/cane  tyrell 1x15 w/cane Tyrell   NP 1x15  w/cane  tyrell           SLR                  S/L SLR                                                                                   Ther Activity                                Gait Training                                Modalities

## 2024-12-04 ENCOUNTER — OFFICE VISIT (OUTPATIENT)
Dept: PHYSICAL THERAPY | Facility: HOME HEALTHCARE | Age: 84
End: 2024-12-04
Payer: MEDICARE

## 2024-12-04 DIAGNOSIS — I63.9 CEREBROVASCULAR ACCIDENT (CVA), UNSPECIFIED MECHANISM (HCC): Primary | ICD-10-CM

## 2024-12-04 PROCEDURE — 97032 APPL MODALITY 1+ESTIM EA 15: CPT | Performed by: PHYSICAL THERAPIST

## 2024-12-04 NOTE — PROGRESS NOTES
"Daily Note     Today's date: 2024  Patient name: Roxy Farmer  : 1940  MRN: [id number]  Referring provider: Elisabeth Quigley*  Dx:   Encounter Diagnosis     ICD-10-CM    1. Cerebrovascular accident (CVA), unspecified mechanism (HCC)  I63.9                      Subjective: Pt reporting that she is doing all the exercises at home and seeing good improvement with her strength on the L side.  However, she continues to state N/T in the face but feels the stim is really helping.       Objective: See treatment diary below      Assessment: Facial stim completed to L nasal and maxillary regions; increased intensity of stim which provided good facial contractions throughout session and pt without c/o pain or discomfort.  Continue with focus on point stimulation to address post CVA facial weakness.     Plan: Continue per plan of care.      Precautions: Silas Akhtar      Manuals   11-6   Point stimulation L surrounding mouth & cheek 8'  HZ L surrounding mouth and cheek 8'  JK L surrounding mouth and cheek   HZ  L surrounding mouth and cheek   15'   HZ                                    Neuro Re-Ed          *MEASURE HAND  STRENGTH NEXT SESSION*          Balance as appropriate                  Putty / Towel Squeezes HEP       Tband ER/ IR        Tband MTP/LTP Seated Red 1x10 ea darlin Seated red 1x10 ea Darlin    Seated  Red 1x10   ea  darlin   T-bar Yellow  Down/up  2x5 ea NP   Yellow  Down/up  2x5 ea                       Ther Ex      11-6   NuStep          UBE 8' 8'   10'   Ankle pumps          HR/TR Declined Pt declined    1x20   Standing Hip flex/abd/ext Declined Pt declined    1x15 ea darlin   Mini Squats        Step ups/ Step downs                                 QS         SAQ        Clamshells        Seated thera-ball squeeze L hand   [place]ange ball  5'     NP    L hand  [place] ball     Power web   L hand Squeeze w/ twist in/out  5\" x10 ea NP      LAQ  HEP     5\" x15   Seated " "March 1x10 NP   1x10 tyrell    Seated Elbow Flex/ Ext        Hip add 5\" x15 NP    5\" x15   Hip abd Green  Self hold  1x15 NP    Green   Self hold  1x15           Seated Bicep Curls W/cane  No wt  1x15 tyrell W/cane   No wt   1x15 Tyrell    W/cane  No wt  1x15 tyrell   Seated Shoulder Flexion w/ Cane 1x10 tyrell 1x10 Tyrell    1x10 tyrell   Seated Shoulder Press w/ Cane 1x15 w/cane  tyrell 1x15 w/cane Tyrell    1x15  w/cane  tyrell           SLR                 S/L SLR                                                                           Ther Activity                             Gait Training                             Modalities                                                 "

## 2024-12-06 ENCOUNTER — PATIENT OUTREACH (OUTPATIENT)
Dept: CASE MANAGEMENT | Facility: OTHER | Age: 84
End: 2024-12-06

## 2024-12-06 NOTE — PROGRESS NOTES
Outpatient Care Management Note:    Voice mail message left for Roxy, with my contact information, requesting a call back.

## 2024-12-09 ENCOUNTER — PATIENT OUTREACH (OUTPATIENT)
Dept: CASE MANAGEMENT | Facility: OTHER | Age: 84
End: 2024-12-09

## 2024-12-09 DIAGNOSIS — F41.9 ANXIETY: ICD-10-CM

## 2024-12-09 RX ORDER — ALPRAZOLAM 0.25 MG/1
0.25 TABLET ORAL 3 TIMES DAILY PRN
Qty: 90 TABLET | Refills: 0 | Status: SHIPPED | OUTPATIENT
Start: 2024-12-09 | End: 2025-01-13 | Stop reason: SDUPTHER

## 2024-12-09 RX ORDER — ALPRAZOLAM 0.25 MG/1
0.25 TABLET ORAL 3 TIMES DAILY PRN
Qty: 90 TABLET | Refills: 0 | OUTPATIENT
Start: 2024-12-09

## 2024-12-09 NOTE — PROGRESS NOTES
Outpatient Care Management Note:    Voice mail message received from Roxy returning my call.     Voice mail message left for Roxy, with my contact information, requesting a call back.     Call received from Roxy.  She states that she continues with left sided facial and left paresthesias but feels it is improving. We reviewed all warning signs of stroke and that she should call 911.      Roxy states that she has dramatically changed her diet due to her high triglycerides. She is eating mostly fruits and vegetables.This is causing her IBS symptoms to flare up.  Roxy's triglycerides returned to her baseline,. She will try to balance out her diet. CM instructed her to call Dr rBannon if she continues to have ongoing symptoms.     Roxy denies any other issues or needs. CM will close the referral.

## 2024-12-11 ENCOUNTER — APPOINTMENT (OUTPATIENT)
Dept: PHYSICAL THERAPY | Facility: HOME HEALTHCARE | Age: 84
End: 2024-12-11
Payer: MEDICARE

## 2024-12-18 ENCOUNTER — OFFICE VISIT (OUTPATIENT)
Dept: PHYSICAL THERAPY | Facility: HOME HEALTHCARE | Age: 84
End: 2024-12-18
Payer: MEDICARE

## 2024-12-18 DIAGNOSIS — I63.9 CEREBROVASCULAR ACCIDENT (CVA), UNSPECIFIED MECHANISM (HCC): Primary | ICD-10-CM

## 2024-12-18 PROCEDURE — 97032 APPL MODALITY 1+ESTIM EA 15: CPT | Performed by: PHYSICAL THERAPIST

## 2024-12-18 NOTE — PROGRESS NOTES
"Daily Note     Today's date: 2024  Patient name: Roxy Farmer  : 1940  MRN: [id number]  Referring provider: Elisabeth Quigley*  Dx:   Encounter Diagnosis     ICD-10-CM    1. Cerebrovascular accident (CVA), unspecified mechanism (HCC)  I63.9                      Subjective: Pt notes that she is seeing improvement with therapy.       Objective: See treatment diary below      Assessment: Good contractions observed t/o point stimulation; pt with no discomfort during session. Continue to utilize stim to address facial weakness and restore pts maximal LOF.     Plan: Continue per plan of care.      Precautions: Silas Akhtar      Manuals -   Point stimulation L surrounding mouth & cheek 8'  HZ L surrounding mouth and cheek 8'  JK L surrounding mouth and cheek   HZ  L surrounding mouth and cheek   15'   HZ  L surrounding mouth and cheek   23'   HZ                               Neuro Re-Ed         *MEASURE HAND  STRENGTH NEXT SESSION*          Balance as appropriate                 Putty / Towel Squeezes HEP       Tband ER/ IR        Tband MTP/LTP Seated Red 1x10 ea darlin Seated red 1x10 ea Darlin       T-bar Yellow  Down/up  2x5 ea NP                        Ther Ex        NuStep         UBE 8' 8'      Ankle pumps         HR/TR Declined Pt declined       Standing Hip flex/abd/ext Declined Pt declined       Mini Squats        Step ups/ Step downs                                QS        SAQ        Clamshells        Seated thera-ball squeeze L hand   Orange ball  5'     NP      Power web   L hand Squeeze w/ twist in/out  5\" x10 ea NP      LAQ  HEP       Seated March 1x10 NP      Seated Elbow Flex/ Ext        Hip add 5\" x15 NP      Hip abd Green  Self hold  1x15 NP              Seated Bicep Curls W/cane  No wt  1x15 darlin W/cane   No wt   1x15 Darlin       Seated Shoulder Flexion w/ Cane 1x10 darlin 1x10 Darlin       Seated Shoulder Press w/ Cane 1x15 w/cane  darlin 1x15 w/cane Darlin  "              SLR                S/L SLR                                                                   Ther Activity                          Gait Training                          Modalities

## 2024-12-20 ENCOUNTER — APPOINTMENT (OUTPATIENT)
Dept: PHYSICAL THERAPY | Facility: HOME HEALTHCARE | Age: 84
End: 2024-12-20
Payer: MEDICARE

## 2024-12-24 ENCOUNTER — OFFICE VISIT (OUTPATIENT)
Dept: PHYSICAL THERAPY | Facility: HOME HEALTHCARE | Age: 84
End: 2024-12-24
Payer: MEDICARE

## 2024-12-24 DIAGNOSIS — I63.9 CEREBROVASCULAR ACCIDENT (CVA), UNSPECIFIED MECHANISM (HCC): Primary | ICD-10-CM

## 2024-12-24 PROCEDURE — 97032 APPL MODALITY 1+ESTIM EA 15: CPT | Performed by: PHYSICAL THERAPIST

## 2024-12-24 NOTE — PROGRESS NOTES
Daily Note     Today's date: 2024  Patient name: Roxy Farmer  : 1940  MRN: [id number]  Referring provider: Elisabeth Quigley*  Dx:   Encounter Diagnosis     ICD-10-CM    1. Cerebrovascular accident (CVA), unspecified mechanism (HCC)  I63.9                      Subjective: Pt reporting that she feels like the lips are a little swollen.       Objective: See treatment diary below      Assessment: Pt demonstrating good facial contractions t/o point stim; PT to complete 2-3 more sessions with plan for discharge at that time as pt is approaching full facial symmetry.     Plan: Continue per plan of care.      Precautions: Silas Akhtar      Manuals    Point stimulation L surrounding mouth and cheek   23'   HZ  L surrounding mouth and cheek 8'  JK L surrounding mouth and cheek   HZ  L surrounding mouth and cheek   15'   HZ  L surrounding mouth and cheek   23'   HZ                               Neuro Re-Ed         *MEASURE HAND  STRENGTH NEXT SESSION*          Balance as appropriate                 Putty / Towel Squeezes        Tband ER/ IR        Tband MTP/LTP  Seated red 1x10 ea Tyrell       T-bar  NP                        Ther Ex        NuStep         UBE  8'      Ankle pumps         HR/TR  Pt declined       Standing Hip flex/abd/ext  Pt declined       Mini Squats        Step ups/ Step downs                                QS        SAQ        Clamshells        Seated thera-ball squeeze  NP      Power web   L hand  NP      LAQ         Seated March  NP      Seated Elbow Flex/ Ext        Hip add  NP      Hip abd  NP              Seated Bicep Curls  W/cane   No wt   1x15 Tyrell       Seated Shoulder Flexion w/ Cane  1x10 Tyrell       Seated Shoulder Press w/ Cane  1x15 w/cane Tyrell               SLR                S/L SLR                                                                   Ther Activity                          Gait Training                          Modalities

## 2024-12-31 ENCOUNTER — OFFICE VISIT (OUTPATIENT)
Dept: PHYSICAL THERAPY | Facility: HOME HEALTHCARE | Age: 84
End: 2024-12-31
Payer: MEDICARE

## 2024-12-31 DIAGNOSIS — I63.9 CEREBROVASCULAR ACCIDENT (CVA), UNSPECIFIED MECHANISM (HCC): Primary | ICD-10-CM

## 2024-12-31 PROCEDURE — 97032 APPL MODALITY 1+ESTIM EA 15: CPT

## 2024-12-31 NOTE — PROGRESS NOTES
Daily Note     Today's date: 2024  Patient name: Roxy Farmer  : 1940  MRN: [id number]  Referring provider: Elisabeth Quigley*  Dx:   Encounter Diagnosis     ICD-10-CM    1. Cerebrovascular accident (CVA), unspecified mechanism (HCC)  I63.9                    Subjective: Pt reports her lips feel tingly.       Objective: See treatment diary below      Assessment: Tolerated treatment well. Pt demonstrates good facial contractions during point stim. Good skin integrity noted after point stim. Patient would benefit from continued PT      Plan: Continue per plan of care.      Precautions: Silas Akhtar      Manuals    Point stimulation L surrounding mouth and cheek   23'   HZ  L surrounding mouth and cheek 23'  JK L surrounding mouth and cheek   HZ  L surrounding mouth and cheek   15'   HZ  L surrounding mouth and cheek   23'   HZ                               Neuro Re-Ed         *MEASURE HAND  STRENGTH NEXT SESSION*          Balance as appropriate                 Putty / Towel Squeezes        Tband ER/ IR        Tband MTP/LTP        T-bar                          Ther Ex        NuStep         UBE        Ankle pumps         HR/TR        Standing Hip flex/abd/ext        Mini Squats        Step ups/ Step downs                                QS        SAQ        Clamshells        Seated thera-ball squeeze        Power web   L hand        LAQ         Seated March        Seated Elbow Flex/ Ext        Hip add        Hip abd                Seated Bicep Curls        Seated Shoulder Flexion w/ Cane        Seated Shoulder Press w/ Cane                 SLR                S/L SLR                                                                   Ther Activity                          Gait Training                          Modalities

## 2025-01-02 ENCOUNTER — APPOINTMENT (OUTPATIENT)
Dept: PHYSICAL THERAPY | Facility: HOME HEALTHCARE | Age: 85
End: 2025-01-02
Payer: MEDICARE

## 2025-01-06 ENCOUNTER — APPOINTMENT (OUTPATIENT)
Dept: PHYSICAL THERAPY | Facility: HOME HEALTHCARE | Age: 85
End: 2025-01-06
Payer: MEDICARE

## 2025-01-10 ENCOUNTER — OFFICE VISIT (OUTPATIENT)
Dept: PHYSICAL THERAPY | Facility: HOME HEALTHCARE | Age: 85
End: 2025-01-10
Payer: MEDICARE

## 2025-01-10 DIAGNOSIS — R05.2 SUBACUTE COUGH: ICD-10-CM

## 2025-01-10 DIAGNOSIS — I10 ESSENTIAL HYPERTENSION: ICD-10-CM

## 2025-01-10 DIAGNOSIS — E03.9 HYPOTHYROIDISM, UNSPECIFIED TYPE: ICD-10-CM

## 2025-01-10 DIAGNOSIS — F41.9 ANXIETY: ICD-10-CM

## 2025-01-10 DIAGNOSIS — I63.9 CEREBROVASCULAR ACCIDENT (CVA), UNSPECIFIED MECHANISM (HCC): Primary | ICD-10-CM

## 2025-01-10 PROCEDURE — 97032 APPL MODALITY 1+ESTIM EA 15: CPT

## 2025-01-10 NOTE — PROGRESS NOTES
Daily Note     Today's date: 1/10/2025  Patient name: Roxy Farmer  : 1940  MRN: [id number]  Referring provider: Elisabeth Quigley*  Dx:   Encounter Diagnosis     ICD-10-CM    1. Cerebrovascular accident (CVA), unspecified mechanism (HCC)  I63.9                  Subjective: Pt reports she is doing ok today.       Objective: See treatment diary below      Assessment: Tolerated treatment well. Pt demonstrates good facial contractions during point stim. Good skin integrity noted after point stim. Patient would benefit from continued PT      Plan: Continue per plan of care.      Precautions: Silas Akhtar      Manuals 12/24 12/31 1/10 12/4 12/18   Point stimulation L surrounding mouth and cheek   23'   HZ  L surrounding mouth and cheek 23'  JK L surrounding mouth and cheek   23' JK L surrounding mouth and cheek   15'   HZ  L surrounding mouth and cheek   23'   HZ                               Neuro Re-Ed         *MEASURE HAND  STRENGTH NEXT SESSION*          Balance as appropriate                 Putty / Towel Squeezes        Tband ER/ IR        Tband MTP/LTP        T-bar                          Ther Ex        NuStep         UBE        Ankle pumps         HR/TR        Standing Hip flex/abd/ext        Mini Squats        Step ups/ Step downs                                QS        SAQ        Clamshells        Seated thera-ball squeeze        Power web   L hand        LAQ         Seated March        Seated Elbow Flex/ Ext        Hip add        Hip abd                Seated Bicep Curls        Seated Shoulder Flexion w/ Cane        Seated Shoulder Press w/ Cane                 SLR                S/L SLR                                                                   Ther Activity                          Gait Training                          Modalities

## 2025-01-13 DIAGNOSIS — F41.9 ANXIETY: ICD-10-CM

## 2025-01-13 RX ORDER — LEVOTHYROXINE SODIUM 100 UG/1
100 TABLET ORAL DAILY
Qty: 90 TABLET | Refills: 3 | Status: SHIPPED | OUTPATIENT
Start: 2025-01-13

## 2025-01-13 RX ORDER — ALPRAZOLAM 0.25 MG/1
0.25 TABLET ORAL 3 TIMES DAILY PRN
Qty: 90 TABLET | Refills: 0 | OUTPATIENT
Start: 2025-01-13

## 2025-01-13 RX ORDER — LISINOPRIL 10 MG/1
10 TABLET ORAL 2 TIMES DAILY
Qty: 180 TABLET | Refills: 3 | Status: SHIPPED | OUTPATIENT
Start: 2025-01-13

## 2025-01-13 RX ORDER — ALPRAZOLAM 0.25 MG/1
0.25 TABLET ORAL 3 TIMES DAILY PRN
Qty: 90 TABLET | Refills: 0 | Status: SHIPPED | OUTPATIENT
Start: 2025-01-13

## 2025-01-13 RX ORDER — BENZONATATE 100 MG/1
CAPSULE ORAL
Qty: 60 CAPSULE | Refills: 0 | Status: SHIPPED | OUTPATIENT
Start: 2025-01-13

## 2025-01-14 ENCOUNTER — HOSPITAL ENCOUNTER (OUTPATIENT)
Dept: RADIOLOGY | Facility: HOSPITAL | Age: 85
Discharge: HOME | End: 2025-01-14
Payer: MEDICARE
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
## 2025-01-14 DIAGNOSIS — M79.605 LEFT LEG PAIN: ICD-10-CM

## 2025-01-14 DIAGNOSIS — M79.605 LEFT LEG PAIN: Primary | ICD-10-CM

## 2025-01-14 PROCEDURE — 73552 X-RAY EXAM OF FEMUR 2/>: CPT

## 2025-01-15 ENCOUNTER — RESULTS FOLLOW-UP (OUTPATIENT)
Dept: FAMILY MEDICINE CLINIC | Facility: HOME HEALTHCARE | Age: 85
End: 2025-01-15

## 2025-01-15 ENCOUNTER — APPOINTMENT (OUTPATIENT)
Dept: PHYSICAL THERAPY | Facility: HOME HEALTHCARE | Age: 85
End: 2025-01-15
Payer: MEDICARE

## 2025-01-26 ENCOUNTER — TELEPHONE (OUTPATIENT)
Dept: OTHER | Facility: OTHER | Age: 85
End: 2025-01-26

## 2025-02-11 DIAGNOSIS — F41.9 ANXIETY: ICD-10-CM

## 2025-02-12 DIAGNOSIS — F41.9 ANXIETY: ICD-10-CM

## 2025-02-12 RX ORDER — ALPRAZOLAM 0.25 MG/1
0.25 TABLET ORAL 3 TIMES DAILY PRN
Qty: 90 TABLET | Refills: 2 | Status: SHIPPED | OUTPATIENT
Start: 2025-02-12

## 2025-02-13 RX ORDER — ALPRAZOLAM 0.25 MG/1
0.25 TABLET ORAL 3 TIMES DAILY PRN
Qty: 90 TABLET | Refills: 0 | OUTPATIENT
Start: 2025-02-13

## 2025-02-17 ENCOUNTER — RA CDI HCC (OUTPATIENT)
Dept: OTHER | Facility: HOSPITAL | Age: 85
End: 2025-02-17

## 2025-02-19 ENCOUNTER — APPOINTMENT (OUTPATIENT)
Dept: LAB | Facility: HOSPITAL | Age: 85
End: 2025-02-19
Payer: MEDICARE

## 2025-02-19 DIAGNOSIS — E78.2 MIXED HYPERLIPIDEMIA: Primary | ICD-10-CM

## 2025-02-19 DIAGNOSIS — E78.2 MIXED HYPERLIPIDEMIA: ICD-10-CM

## 2025-02-19 LAB
CHOLEST SERPL-MCNC: 145 MG/DL (ref ?–200)
HDLC SERPL-MCNC: 47 MG/DL
LDLC SERPL CALC-MCNC: 62 MG/DL (ref 0–100)
NONHDLC SERPL-MCNC: 98 MG/DL
TRIGL SERPL-MCNC: 179 MG/DL (ref ?–150)

## 2025-02-19 PROCEDURE — 36415 COLL VENOUS BLD VENIPUNCTURE: CPT

## 2025-02-19 PROCEDURE — 80061 LIPID PANEL: CPT

## 2025-02-24 ENCOUNTER — OFFICE VISIT (OUTPATIENT)
Dept: FAMILY MEDICINE CLINIC | Facility: HOME HEALTHCARE | Age: 85
End: 2025-02-24
Payer: MEDICARE

## 2025-02-24 VITALS
BODY MASS INDEX: 26.16 KG/M2 | DIASTOLIC BLOOD PRESSURE: 76 MMHG | WEIGHT: 167 LBS | RESPIRATION RATE: 18 BRPM | HEART RATE: 67 BPM | SYSTOLIC BLOOD PRESSURE: 120 MMHG | OXYGEN SATURATION: 98 % | TEMPERATURE: 98 F

## 2025-02-24 DIAGNOSIS — M79.671 RIGHT FOOT PAIN: Primary | ICD-10-CM

## 2025-02-24 DIAGNOSIS — E78.2 MIXED HYPERLIPIDEMIA: ICD-10-CM

## 2025-02-24 DIAGNOSIS — R20.0 NUMBNESS AND TINGLING OF RIGHT LEG: ICD-10-CM

## 2025-02-24 DIAGNOSIS — D68.00 VON WILLEBRAND DISEASE (HCC): ICD-10-CM

## 2025-02-24 DIAGNOSIS — R20.2 NUMBNESS AND TINGLING OF RIGHT LEG: ICD-10-CM

## 2025-02-24 DIAGNOSIS — F33.9 DEPRESSION, RECURRENT (HCC): ICD-10-CM

## 2025-02-24 DIAGNOSIS — F41.9 ANXIETY: ICD-10-CM

## 2025-02-24 PROCEDURE — 99213 OFFICE O/P EST LOW 20 MIN: CPT | Performed by: FAMILY MEDICINE

## 2025-02-24 RX ORDER — ALPRAZOLAM 0.25 MG
0.25 TABLET ORAL 3 TIMES DAILY PRN
Qty: 270 TABLET | Refills: 0 | Status: SHIPPED | OUTPATIENT
Start: 2025-02-24 | End: 2025-05-25

## 2025-02-24 NOTE — ASSESSMENT & PLAN NOTE
Lipid panel from 2/19 with reduced triglycerides and increased HDL   -Continue atorvastatin and fenofibrate

## 2025-02-24 NOTE — PROGRESS NOTES
Name: Roxy Farmer      : 1940      MRN: [id number]  Encounter Provider: Casey Barrera MD  Encounter Date: 2025   Encounter department: [facility]  :  Assessment & Plan  Mixed hyperlipidemia  Lipid panel from  with reduced triglycerides and increased HDL   -Continue atorvastatin and fenofibrate     Depression, recurrent (HCC)  Condition stable on current regimen.  -Continue paroxetine       Anxiety  Patient endorses taking Xanax 2-3x nightly for sleep. Does not take during day time. Patient requesting 90 day refill. No concern for SI.  -Counseled patient on appropriate use of medication  -Order Xanax 90 day refill       Right foot pain  Right foot pain that is worse at night, extending from great to palmar aspect of foot possibly plantar fascitis vs. Arthritis vs gout. Presentation not classic picture of plantar fascitis as pain is greater at night and not associated with insertion point, but does location to plantar fascia. Less likely gout as patient does not have point tenderness nor involvement of other joints.   Orders:    Ambulatory Referral to Podiatry; Future    XR foot 3+ vw right; Future    Numbness and tingling of right leg  Patient endorses intermittent numbness/tingling of RLE. Numbness in this area noted on exam without motor deficit. Possible radiculopathy as patient has history of chronic back pain with imaging evidence of disc bulge and spinal stenosis in lumbar region. Neuropathy less likely given sparing of foot and lack of risk factors such as diabetes.   -Obtain EMG of RLE    Orders:    EMG; Future    Von Willebrand disease (HCC)  -Patient does not want to pursue surgical intervention for any health concerns due to diagnosis            History of Present Illness   HPI  85 yo female with PMHx CVA, hyperlipidemia, hypothyroidism, Von Willebrand disease, anxiety and depression presenting with complaint of R foot pain. Patient endorses bony  prominence on R great toe that causes pain at night preventing sleep. The toe is occasionally erythematous and pain radiates up palmar aspect of foot. She uses Icy Hot and epsom salt foot baths for the pain with mild relief. She does not have associated weakness. She also endorses numbness on RLE without motor deficit.     Otherwise, patient still feels some depressed mood and anxiety associated with life events and aging. She is unsure if paroxetine is helping mood because she has been on it for so long, but does not want to stop taking it. Denies SI/HI. She feels that the Xanax helps her sleep at night, although she does not sleep well due to foot pain. Currently taking 2-3x at night, once after dinner, once before bedtime, and occasionally in middle of the night. She endorses anxiety associated with not being able to obtain xanax prescription when it runs out.     Review of Systems   Respiratory:  Negative for shortness of breath.    Cardiovascular:  Negative for chest pain and leg swelling.   Genitourinary:  Negative for difficulty urinating.   Musculoskeletal:  Positive for back pain and gait problem (uses cane at baseline). Negative for joint swelling and neck pain.   Hematological:  Bruises/bleeds easily.   All other systems reviewed and are negative.        Objective   There were no vitals taken for this visit.     Physical Exam  Constitutional:       Appearance: Normal appearance.   Cardiovascular:      Rate and Rhythm: Normal rate and regular rhythm.      Heart sounds: Normal heart sounds.   Pulmonary:      Effort: Pulmonary effort is normal.      Breath sounds: Normal breath sounds.   Abdominal:      General: Abdomen is flat. Bowel sounds are normal.      Palpations: Abdomen is soft.   Musculoskeletal:         General: Tenderness (tenderness to palpation of palmar aspect foot with palpation) and deformity (bony prominence R great toe) present. No swelling or signs of injury.      Cervical back: Normal  range of motion.   Neurological:      Mental Status: She is alert.      Sensory: Sensory deficit (numbness RLE between ankle and knee) present.      Motor: No weakness.

## 2025-02-24 NOTE — PROGRESS NOTES
Name: Roxy Farmer      : 1940      MRN: [id number]  Encounter Provider: Casey Barrera MD  Encounter Date: 2025   Encounter department: [facility]  :  Assessment & Plan  Mixed hyperlipidemia         Depression, recurrent (HCC)         Anxiety         Right foot pain    Orders:    Ambulatory Referral to Podiatry; Future    XR foot 3+ vw right; Future    Numbness and tingling of right leg    Orders:    EMG; Future    Von Willebrand disease (HCC)                History of Present Illness   Please see medical student note. Patient with R foot pain, states near great toe but on exam it seems to be along middle of plantar fascia. Longstanding paresthesia R LE below knee unchanged - has declined therapies for radiculopathy in past due to von Willebrand's.      Review of Systems   Constitutional:  Negative for activity change and appetite change.   Respiratory:  Negative for shortness of breath.    Cardiovascular:  Negative for chest pain.   Gastrointestinal:         No incontinence   Genitourinary:  Negative for enuresis.   Musculoskeletal:  Positive for back pain. Negative for neck pain.   Neurological:  Negative for syncope.   Hematological:  Bruises/bleeds easily.   Psychiatric/Behavioral:  Positive for sleep disturbance. Negative for behavioral problems, confusion, decreased concentration, self-injury and suicidal ideas.        Objective   /76 (BP Location: Left arm, Patient Position: Sitting, Cuff Size: Adult)   Pulse 67   Temp 98 °F (36.7 °C) (Tympanic)   Resp 18   Wt 75.8 kg (167 lb)   SpO2 98%   BMI 26.16 kg/m²      Physical Exam  Vitals reviewed.   Constitutional:       General: She is not in acute distress.     Appearance: Normal appearance.   HENT:      Head: Normocephalic and atraumatic.   Cardiovascular:      Rate and Rhythm: Normal rate and regular rhythm.      Pulses: Normal pulses.      Heart sounds: Normal heart sounds. No murmur heard.     No  friction rub. No gallop.   Pulmonary:      Effort: Pulmonary effort is normal.      Breath sounds: Normal breath sounds.   Musculoskeletal:      Right lower leg: No edema.      Left lower leg: No edema.      Comments: R great toe slight erythema but no edema or pain; pain along middle plantar fascia; no metatarsalgia   Skin:     General: Skin is warm and dry.      Capillary Refill: Capillary refill takes less than 2 seconds.   Neurological:      General: No focal deficit present.      Mental Status: She is alert and oriented to person, place, and time.      Motor: No weakness.      Deep Tendon Reflexes: Reflexes normal.      Comments: Uses cane to walk; sensation slightly less RLE below knee; DTR and strength normal B/L   Psychiatric:         Mood and Affect: Mood normal.         Behavior: Behavior normal.

## 2025-02-24 NOTE — ASSESSMENT & PLAN NOTE
Patient endorses taking Xanax 2-3x nightly for sleep. Does not take during day time. Patient requesting 90 day refill. No concern for SI.  -Counseled patient on appropriate use of medication  -Order Xanax 90 day refill

## 2025-02-24 NOTE — ASSESSMENT & PLAN NOTE
-Patient does not want to pursue surgical intervention for any health concerns due to diagnosis

## 2025-02-24 NOTE — ASSESSMENT & PLAN NOTE
Right foot pain that is worse at night, extending from great to palmar aspect of foot possibly plantar fascitis vs. Arthritis vs gout. Presentation not classic picture of plantar fascitis as pain is greater at night and not associated with insertion point, but does location to plantar fascia. Less likely gout as patient does not have point tenderness nor involvement of other joints.   Orders:    Ambulatory Referral to Podiatry; Future    XR foot 3+ vw right; Future

## 2025-03-05 ENCOUNTER — APPOINTMENT (OUTPATIENT)
Dept: RADIOLOGY | Facility: MEDICAL CENTER | Age: 85
End: 2025-03-05
Payer: MEDICARE

## 2025-03-05 ENCOUNTER — OFFICE VISIT (OUTPATIENT)
Dept: PODIATRY | Facility: CLINIC | Age: 85
End: 2025-03-05
Payer: MEDICARE

## 2025-03-05 VITALS
RESPIRATION RATE: 16 BRPM | OXYGEN SATURATION: 95 % | HEART RATE: 63 BPM | WEIGHT: 167 LBS | HEIGHT: 67 IN | BODY MASS INDEX: 26.21 KG/M2 | TEMPERATURE: 98.7 F

## 2025-03-05 DIAGNOSIS — M19.079 ARTHRITIS OF BIG TOE: Primary | ICD-10-CM

## 2025-03-05 DIAGNOSIS — M79.671 RIGHT FOOT PAIN: ICD-10-CM

## 2025-03-05 PROCEDURE — 73630 X-RAY EXAM OF FOOT: CPT

## 2025-03-05 PROCEDURE — 99204 OFFICE O/P NEW MOD 45 MIN: CPT | Performed by: PODIATRIST

## 2025-03-05 PROCEDURE — 20600 DRAIN/INJ JOINT/BURSA W/O US: CPT | Performed by: PODIATRIST

## 2025-03-05 RX ORDER — DEXAMETHASONE SODIUM PHOSPHATE 4 MG/ML
0.25 INJECTION, SOLUTION INTRAMUSCULAR; INTRAVENOUS
Status: SHIPPED | OUTPATIENT
Start: 2025-03-05

## 2025-03-05 RX ORDER — LIDOCAINE HYDROCHLORIDE 10 MG/ML
0.5 INJECTION, SOLUTION EPIDURAL; INFILTRATION; INTRACAUDAL; PERINEURAL
Status: SHIPPED | OUTPATIENT
Start: 2025-03-05

## 2025-03-05 RX ADMIN — DEXAMETHASONE SODIUM PHOSPHATE 0.25 ML: 4 INJECTION, SOLUTION INTRAMUSCULAR; INTRAVENOUS at 09:00

## 2025-03-05 RX ADMIN — LIDOCAINE HYDROCHLORIDE 0.5 ML: 10 INJECTION, SOLUTION EPIDURAL; INFILTRATION; INTRACAUDAL; PERINEURAL at 09:00

## 2025-03-05 NOTE — PROGRESS NOTES
Name: Roxy Farmer      : 1940      MRN: [id number]  Encounter Provider: Vicky Mcintyre DPM  Encounter Date: 3/5/2025   Encounter department: [facility] [place]  :  Assessment & Plan  Right foot pain    Orders:    Ambulatory Referral to Podiatry    X-ray foot right 3+ views; Future    Arthritis of big toe         Imaging Reviewed at this visit (I personally reviewed/independently interpreted images and reports in PACS)  XR right foot WB 3v today's date: No acute osseous abnormalities noted.  No acute fracture or dislocation noted.   First interphalangeal joint with joint space narrowing and early arthritic changes noted.  Sclerotic changes noted at level of the joint especially medially with early osseous lipping developing.  Midfoot arthritis and joint space narrowing noted.     IMPRESSION:  RLE foot pain  RLE osteoarthritis first MPJ  RLE radiculopathy    PLAN:  I reviewed clinical exam and radiographic imaging (XR) with patient in detail today. I have discussed with the patient the pathophysiology of this diagnosis and reviewed how the examination correlates with this diagnosis.  Right first interphalangeal joint of great toe shows osteoarthritic changes and early sclerosis with early osseous lipping  PCP note from 25 reviewed.  Referred to podiatry for right foot pain.  Patient has history of numbness and tingling of right lower extremity likely related to radiculopathy.  Has declined treatment in the past.  PCP ordered EMG of RLE  Due to von Willebrand's disease patient does not want surgical intervention.  Patient cannot take NSAIDs  Patient counseled on arthritic changes at the level of the IPJ and due 2 palpable osseous lipping increased pressure at the level of the neurovascular bundle causing her nerve irritation and resulting in her current symptoms  Patient counseled at length on use of offloading pads and appropriate shoe gear to prevent rubbing and irritation on the  area  Offloading pads provided  Patient counseled on further treatment options to decrease inflammation.  Patient cannot take NSAIDs and states that her current use of Tylenol and other topicals are not providing her enough relief and she is unable to sleep at night causing problems in her day-to-day activity  Reviewed with patient steroid injection at level of arthritic changes.  Risks and benefits reviewed at length.  Patient would like to proceed with steroid injection  Right first interphalangeal joint medial cortisone injection performed to patient's tolerance without incident  Continue with conservative treatment modalities such as shoe gear, Tylenol, Biofreeze, Aspercreme, rest, ice, elevation  Patient was counseled on clinical signs of infection  Patient will follow-up in 4 weeks for further evaluation and management, may consider repeat injection    Foot/lower extremity injection    Performed by: Vicky Mcintyre DPM  Authorized by: Vicky Mcintyre DPM    Procedure:     Other Assisting Provider: No      Verbal consent obtained?: Yes      Risks and benefits: Risks, benefits and alternatives were discussed      Consent given by:  Patient    Time out: Immediately prior to the procedure a time out was called      Patient states understanding of procedure being performed: Yes      Patient identity confirmed:  Verbally with patient    Supporting Documentation:     Indications:  Pain and therapeutic    Procedure Details:      Needle size: 25 G G    Location comment:  1s tIPJ    Injection Information:       Medications:  0.5 mL lidocaine (PF) 1 %; 0.25 mL dexamethasone 120 mg/30 mL    Patient tolerance:  Patient tolerated the procedure well with no immediate complications    Dressing: sterile dressing applied            History of Present Illness   HPI  Roxy Farmer is a 85 y.o. female with PMH of CVA, hyperlipidemia, hypothyroidism, von Willebrand's disease, anxiety and depression who presents with  "complaint of right foot pain.  Patient states pain developed several weeks ago and denies any history of trauma.  She reports right great toe pain this causes significant discomfort at night making it difficult to sleep.  Patient describes pain as radiating from the level of the first interphalangeal joint and then she feels her toes curl.  Patient states she has a history of back pain and right-sided numbness.  Patient also reports intermittent swelling and redness in the area of her great toe joint due to rubbing on her shoes.  Patient has used OTC topicals such as IcyHot and has also utilized Epsom salts soaks with some relief.  Patient also takes Tylenol with some relief.  She was referred to podiatry by her PCP for further evaluation and management.  Of note patient has right lower extremity weakness, back pain, and history of von Willebrand's disease      Review of Systems   Constitutional:  Negative for chills and fever.   Respiratory:  Negative for shortness of breath.    Cardiovascular:  Negative for leg swelling.   Musculoskeletal:  Positive for arthralgias, back pain, gait problem and joint swelling.   Skin:  Negative for wound.          Objective   Resp 16   Ht 5' 7\" (1.702 m)   Wt 75.8 kg (167 lb)   SpO2 98%   BMI 26.16 kg/m²      Physical Exam  Constitutional:       Appearance: She is not ill-appearing.   Cardiovascular:      Comments: DP 1/4, PT 0/4  Skin temperature gradient mildly decreased from knees to digits bilaterally  Musculoskeletal:      Comments: RLE evaluation.  MMT 5/5 at level of ankle.  No pain with range of motion or palpation of first MPJ.  Mild decrease in range of motion.  No pain with palpation of plantar medial tubercle or along medial, central, or lateral band of plantar fascia.  No pain with range of motion of midfoot joints or subtalar joint.  No pain with range of motion of first interphalangeal joint.  Mild pain with palpation directly over bony prominence on dorsal " medial aspect of interphalangeal joint.  No paresthesias elicited with pressure or palpation over area of pain complaint.  Palpable osseous lipping noted at dorsal medial aspect of first interphalangeal joint.  Increase in soft tissue at this level.  No edema, no erythema   Skin:     Capillary Refill: Capillary refill takes less than 2 seconds.      Comments: No open wounds   Neurological:      Sensory: Sensory deficit present.

## 2025-03-05 NOTE — PATIENT INSTRUCTIONS
I recommend use of supportive sneakers with stiff bottom and mesh upper portion.  Brands such as [employer] feet, new balance, [employer]

## 2025-03-24 DIAGNOSIS — R05.2 SUBACUTE COUGH: ICD-10-CM

## 2025-03-24 DIAGNOSIS — I10 ESSENTIAL HYPERTENSION: ICD-10-CM

## 2025-03-24 DIAGNOSIS — I10 HYPERTENSION: ICD-10-CM

## 2025-03-27 DIAGNOSIS — R05.2 SUBACUTE COUGH: ICD-10-CM

## 2025-03-27 DIAGNOSIS — I10 ESSENTIAL HYPERTENSION: ICD-10-CM

## 2025-03-27 DIAGNOSIS — I10 HYPERTENSION: ICD-10-CM

## 2025-03-27 RX ORDER — METOPROLOL TARTRATE 25 MG/1
25 TABLET, FILM COATED ORAL
Qty: 90 TABLET | Refills: 1 | Status: SHIPPED | OUTPATIENT
Start: 2025-03-27

## 2025-03-27 RX ORDER — METOPROLOL TARTRATE 50 MG
50 TABLET ORAL DAILY
Qty: 90 TABLET | Refills: 1 | Status: SHIPPED | OUTPATIENT
Start: 2025-03-27

## 2025-03-27 RX ORDER — HYDROCHLOROTHIAZIDE 25 MG/1
25 TABLET ORAL DAILY
Qty: 90 TABLET | Refills: 1 | Status: SHIPPED | OUTPATIENT
Start: 2025-03-27

## 2025-03-27 RX ORDER — BENZONATATE 100 MG/1
100 CAPSULE ORAL 3 TIMES DAILY PRN
Qty: 60 CAPSULE | Refills: 0 | Status: SHIPPED | OUTPATIENT
Start: 2025-03-27

## 2025-03-28 ENCOUNTER — APPOINTMENT (OUTPATIENT)
Dept: LAB | Facility: HOSPITAL | Age: 85
End: 2025-03-28
Payer: MEDICARE

## 2025-03-28 DIAGNOSIS — E78.2 MIXED HYPERLIPIDEMIA: ICD-10-CM

## 2025-03-28 LAB
ALBUMIN SERPL BCG-MCNC: 4.5 G/DL (ref 3.5–5)
ALP SERPL-CCNC: 54 U/L (ref 34–104)
ALT SERPL W P-5'-P-CCNC: 21 U/L (ref 7–52)
ANION GAP SERPL CALCULATED.3IONS-SCNC: 8 MMOL/L (ref 4–13)
AST SERPL W P-5'-P-CCNC: 26 U/L (ref 13–39)
BILIRUB SERPL-MCNC: 0.49 MG/DL (ref 0.2–1)
BUN SERPL-MCNC: 31 MG/DL (ref 5–25)
CALCIUM SERPL-MCNC: 9.9 MG/DL (ref 8.4–10.2)
CHLORIDE SERPL-SCNC: 105 MMOL/L (ref 96–108)
CHOLEST SERPL-MCNC: 161 MG/DL (ref ?–200)
CO2 SERPL-SCNC: 26 MMOL/L (ref 21–32)
CREAT SERPL-MCNC: 1.09 MG/DL (ref 0.6–1.3)
GFR SERPL CREATININE-BSD FRML MDRD: 45 ML/MIN/1.73SQ M
GLUCOSE P FAST SERPL-MCNC: 114 MG/DL (ref 65–99)
HDLC SERPL-MCNC: 50 MG/DL
LDLC SERPL CALC-MCNC: 63 MG/DL (ref 0–100)
POTASSIUM SERPL-SCNC: 4.2 MMOL/L (ref 3.5–5.3)
PROT SERPL-MCNC: 7.5 G/DL (ref 6.4–8.4)
SODIUM SERPL-SCNC: 139 MMOL/L (ref 135–147)
TRIGL SERPL-MCNC: 239 MG/DL (ref ?–150)

## 2025-03-28 PROCEDURE — 80053 COMPREHEN METABOLIC PANEL: CPT

## 2025-03-28 PROCEDURE — 80061 LIPID PANEL: CPT

## 2025-03-28 PROCEDURE — 36415 COLL VENOUS BLD VENIPUNCTURE: CPT

## 2025-03-30 ENCOUNTER — TELEPHONE (OUTPATIENT)
Dept: OTHER | Facility: OTHER | Age: 85
End: 2025-03-30

## 2025-03-30 NOTE — TELEPHONE ENCOUNTER
Patient is calling regarding cancelling an appointment.    Date/Time:3/31/2025 10:20 AM     Patient was rescheduled: YES [] NO [x]    Patient requesting call back to reschedule: YES [x] NO []

## 2025-04-04 RX ORDER — HYDROCHLOROTHIAZIDE 25 MG/1
25 TABLET ORAL DAILY
Qty: 90 TABLET | Refills: 1 | OUTPATIENT
Start: 2025-04-04

## 2025-04-04 RX ORDER — BENZONATATE 100 MG/1
CAPSULE ORAL
Qty: 60 CAPSULE | Refills: 0 | OUTPATIENT
Start: 2025-04-04

## 2025-04-04 RX ORDER — METOPROLOL TARTRATE 50 MG
50 TABLET ORAL DAILY
Qty: 90 TABLET | Refills: 1 | OUTPATIENT
Start: 2025-04-04

## 2025-04-09 DIAGNOSIS — R05.2 SUBACUTE COUGH: ICD-10-CM

## 2025-04-09 DIAGNOSIS — I10 HYPERTENSION: ICD-10-CM

## 2025-04-09 DIAGNOSIS — E78.2 MIXED HYPERLIPIDEMIA: ICD-10-CM

## 2025-04-09 DIAGNOSIS — E03.9 HYPOTHYROIDISM, UNSPECIFIED TYPE: ICD-10-CM

## 2025-04-10 RX ORDER — LEVOTHYROXINE SODIUM 100 UG/1
100 TABLET ORAL DAILY
Qty: 90 TABLET | Refills: 3 | Status: SHIPPED | OUTPATIENT
Start: 2025-04-10

## 2025-04-10 RX ORDER — BENZONATATE 100 MG/1
100 CAPSULE ORAL 3 TIMES DAILY PRN
Qty: 60 CAPSULE | Refills: 0 | OUTPATIENT
Start: 2025-04-10

## 2025-04-10 RX ORDER — BENZONATATE 100 MG/1
100 CAPSULE ORAL 3 TIMES DAILY PRN
Qty: 60 CAPSULE | Refills: 5 | Status: SHIPPED | OUTPATIENT
Start: 2025-04-10

## 2025-04-10 RX ORDER — FENOFIBRATE 48 MG/1
48 TABLET, COATED ORAL DAILY
Qty: 90 TABLET | Refills: 3 | Status: SHIPPED | OUTPATIENT
Start: 2025-04-10

## 2025-04-10 RX ORDER — HYDROCHLOROTHIAZIDE 25 MG/1
25 TABLET ORAL DAILY
Qty: 90 TABLET | Refills: 3 | Status: SHIPPED | OUTPATIENT
Start: 2025-04-10

## 2025-04-23 DIAGNOSIS — J43.9 PULMONARY EMPHYSEMA, UNSPECIFIED EMPHYSEMA TYPE (HCC): ICD-10-CM

## 2025-04-23 RX ORDER — BUDESONIDE AND FORMOTEROL FUMARATE DIHYDRATE 80; 4.5 UG/1; UG/1
2 AEROSOL RESPIRATORY (INHALATION) 2 TIMES DAILY
Qty: 10.2 G | Refills: 5 | Status: SHIPPED | OUTPATIENT
Start: 2025-04-23 | End: 2025-04-24

## 2025-04-24 DIAGNOSIS — I63.9 CEREBROVASCULAR ACCIDENT (CVA), UNSPECIFIED MECHANISM (HCC): ICD-10-CM

## 2025-04-24 RX ORDER — ATORVASTATIN CALCIUM 40 MG/1
40 TABLET, FILM COATED ORAL EVERY EVENING
Qty: 90 TABLET | Refills: 2 | Status: SHIPPED | OUTPATIENT
Start: 2025-04-24

## 2025-04-24 RX ORDER — FLUTICASONE PROPIONATE AND SALMETEROL XINAFOATE 45; 21 UG/1; UG/1
2 AEROSOL, METERED RESPIRATORY (INHALATION) DAILY
Qty: 12 G | Refills: 3 | Status: SHIPPED | OUTPATIENT
Start: 2025-04-24

## 2025-05-17 ENCOUNTER — TELEPHONE (OUTPATIENT)
Dept: OTHER | Facility: OTHER | Age: 85
End: 2025-05-17

## 2025-05-17 NOTE — TELEPHONE ENCOUNTER
Patient called requesting an appointment for ASAP. Patient was offered an appointment in June and patient stated she would like something sooner. Office visit will be follow up. Patient is not experiencing any issues. Please follow up and schedule. Thank you in advance.

## 2025-05-20 ENCOUNTER — APPOINTMENT (OUTPATIENT)
Dept: LAB | Facility: HOSPITAL | Age: 85
End: 2025-05-20
Payer: MEDICARE

## 2025-05-20 DIAGNOSIS — E78.2 MIXED HYPERLIPIDEMIA: ICD-10-CM

## 2025-05-20 DIAGNOSIS — I10 PRIMARY HYPERTENSION: ICD-10-CM

## 2025-05-20 LAB
ALBUMIN SERPL BCG-MCNC: 4.5 G/DL (ref 3.5–5)
ALP SERPL-CCNC: 49 U/L (ref 34–104)
ALT SERPL W P-5'-P-CCNC: 22 U/L (ref 7–52)
ANION GAP SERPL CALCULATED.3IONS-SCNC: 11 MMOL/L (ref 4–13)
AST SERPL W P-5'-P-CCNC: 28 U/L (ref 13–39)
BASOPHILS # BLD AUTO: 0.07 THOUSANDS/ÂΜL (ref 0–0.1)
BASOPHILS NFR BLD AUTO: 2 % (ref 0–1)
BILIRUB SERPL-MCNC: 0.53 MG/DL (ref 0.2–1)
BUN SERPL-MCNC: 39 MG/DL (ref 5–25)
CALCIUM SERPL-MCNC: 10 MG/DL (ref 8.4–10.2)
CHLORIDE SERPL-SCNC: 105 MMOL/L (ref 96–108)
CHOLEST SERPL-MCNC: 160 MG/DL (ref ?–200)
CO2 SERPL-SCNC: 24 MMOL/L (ref 21–32)
CREAT SERPL-MCNC: 1.46 MG/DL (ref 0.6–1.3)
EOSINOPHIL # BLD AUTO: 0.33 THOUSAND/ÂΜL (ref 0–0.61)
EOSINOPHIL NFR BLD AUTO: 7 % (ref 0–6)
ERYTHROCYTE [DISTWIDTH] IN BLOOD BY AUTOMATED COUNT: 14.1 % (ref 11.6–15.1)
GFR SERPL CREATININE-BSD FRML MDRD: 32 ML/MIN/1.73SQ M
GLUCOSE P FAST SERPL-MCNC: 111 MG/DL (ref 65–99)
HCT VFR BLD AUTO: 38.2 % (ref 34.8–46.1)
HDLC SERPL-MCNC: 46 MG/DL
HGB BLD-MCNC: 12.7 G/DL (ref 11.5–15.4)
IMM GRANULOCYTES # BLD AUTO: 0.01 THOUSAND/UL (ref 0–0.2)
IMM GRANULOCYTES NFR BLD AUTO: 0 % (ref 0–2)
LDLC SERPL CALC-MCNC: 67 MG/DL (ref 0–100)
LYMPHOCYTES # BLD AUTO: 1.89 THOUSANDS/ÂΜL (ref 0.6–4.47)
LYMPHOCYTES NFR BLD AUTO: 40 % (ref 14–44)
MCH RBC QN AUTO: 32.6 PG (ref 26.8–34.3)
MCHC RBC AUTO-ENTMCNC: 33.2 G/DL (ref 31.4–37.4)
MCV RBC AUTO: 98 FL (ref 82–98)
MONOCYTES # BLD AUTO: 0.51 THOUSAND/ÂΜL (ref 0.17–1.22)
MONOCYTES NFR BLD AUTO: 11 % (ref 4–12)
NEUTROPHILS # BLD AUTO: 1.89 THOUSANDS/ÂΜL (ref 1.85–7.62)
NEUTS SEG NFR BLD AUTO: 40 % (ref 43–75)
NONHDLC SERPL-MCNC: 114 MG/DL
NRBC BLD AUTO-RTO: 0 /100 WBCS
PLATELET # BLD AUTO: 199 THOUSANDS/UL (ref 149–390)
PMV BLD AUTO: 11.1 FL (ref 8.9–12.7)
POTASSIUM SERPL-SCNC: 4 MMOL/L (ref 3.5–5.3)
PROT SERPL-MCNC: 7.3 G/DL (ref 6.4–8.4)
RBC # BLD AUTO: 3.9 MILLION/UL (ref 3.81–5.12)
SODIUM SERPL-SCNC: 140 MMOL/L (ref 135–147)
TRIGL SERPL-MCNC: 234 MG/DL (ref ?–150)
WBC # BLD AUTO: 4.7 THOUSAND/UL (ref 4.31–10.16)

## 2025-05-20 PROCEDURE — 85025 COMPLETE CBC W/AUTO DIFF WBC: CPT

## 2025-05-20 PROCEDURE — 80061 LIPID PANEL: CPT

## 2025-05-20 PROCEDURE — 36415 COLL VENOUS BLD VENIPUNCTURE: CPT

## 2025-05-20 PROCEDURE — 80053 COMPREHEN METABOLIC PANEL: CPT

## 2025-05-22 ENCOUNTER — OFFICE VISIT (OUTPATIENT)
Dept: FAMILY MEDICINE CLINIC | Facility: HOME HEALTHCARE | Age: 85
End: 2025-05-22
Payer: MEDICARE

## 2025-05-22 VITALS
WEIGHT: 166 LBS | HEART RATE: 73 BPM | RESPIRATION RATE: 20 BRPM | BODY MASS INDEX: 26.06 KG/M2 | TEMPERATURE: 98.6 F | SYSTOLIC BLOOD PRESSURE: 146 MMHG | DIASTOLIC BLOOD PRESSURE: 64 MMHG | OXYGEN SATURATION: 95 % | HEIGHT: 67 IN

## 2025-05-22 DIAGNOSIS — N17.9 AKI (ACUTE KIDNEY INJURY) (HCC): ICD-10-CM

## 2025-05-22 DIAGNOSIS — E78.2 MIXED HYPERLIPIDEMIA: ICD-10-CM

## 2025-05-22 DIAGNOSIS — Z86.73 HISTORY OF STROKE: ICD-10-CM

## 2025-05-22 DIAGNOSIS — F41.9 ANXIETY: Primary | ICD-10-CM

## 2025-05-22 DIAGNOSIS — F33.9 DEPRESSION, RECURRENT (HCC): ICD-10-CM

## 2025-05-22 DIAGNOSIS — N18.31 CHRONIC KIDNEY DISEASE, STAGE 3A (HCC): ICD-10-CM

## 2025-05-22 PROCEDURE — 99213 OFFICE O/P EST LOW 20 MIN: CPT | Performed by: FAMILY MEDICINE

## 2025-05-22 RX ORDER — DILTIAZEM HYDROCHLORIDE 60 MG/1
TABLET, FILM COATED ORAL
COMMUNITY
Start: 2025-04-24

## 2025-05-22 NOTE — PROGRESS NOTES
"Name: Roxy Farmer      : 1940      MRN: [id number]  Encounter Provider: Csaey Barrera MD  Encounter Date: 2025   Encounter department: [facility]  :  Assessment & Plan  LUCIEN (acute kidney injury) (HCC)         Anxiety         Depression, recurrent (HCC)         History of stroke         Mixed hyperlipidemia                History of Present Illness   Here for concerns about LUCIEN on CKD. She does not want to stop HCTZ due to previous elevated BP off of it. She admits to decreased eating and drinking due to depression.Upset with son and landlord. Still feels LUE parestheisas but getting better s/p CVA      Review of Systems   Constitutional:  Negative for activity change and fever.   HENT:  Negative for trouble swallowing.    Respiratory:  Negative for shortness of breath.    Cardiovascular:  Negative for chest pain.   Gastrointestinal:  Negative for abdominal pain.   Neurological:  Negative for syncope and light-headedness.   Psychiatric/Behavioral:  Positive for sleep disturbance. Negative for confusion and suicidal ideas.        Objective   /64 (BP Location: Left arm, Patient Position: Sitting, Cuff Size: Large)   Pulse 73   Temp 98.6 °F (37 °C) (Tympanic)   Resp 20   Ht 5' 7\" (1.702 m)   Wt 75.3 kg (166 lb)   SpO2 95%   BMI 26.00 kg/m²      Physical Exam  Vitals reviewed.   Constitutional:       Appearance: Normal appearance.   HENT:      Head: Normocephalic and atraumatic.     Eyes:      General: No scleral icterus.      Cardiovascular:      Pulses: Normal pulses.      Heart sounds: Normal heart sounds.   Pulmonary:      Effort: Pulmonary effort is normal.      Breath sounds: Normal breath sounds.     Musculoskeletal:      Cervical back: Normal range of motion.      Right lower leg: No edema.      Left lower leg: No edema.     Skin:     General: Skin is warm and dry.      Capillary Refill: Capillary refill takes less than 2 seconds.     Neurological: "      Mental Status: She is alert and oriented to person, place, and time.      Cranial Nerves: No cranial nerve deficit.      Motor: No weakness.     Psychiatric:         Mood and Affect: Mood normal.         Behavior: Behavior normal.

## 2025-06-10 DIAGNOSIS — F41.9 ANXIETY: ICD-10-CM

## 2025-06-10 RX ORDER — ALPRAZOLAM 0.25 MG
0.25 TABLET ORAL 3 TIMES DAILY PRN
Qty: 270 TABLET | Refills: 0 | Status: CANCELLED | OUTPATIENT
Start: 2025-06-10 | End: 2025-09-08

## 2025-06-11 ENCOUNTER — TELEPHONE (OUTPATIENT)
Dept: FAMILY MEDICINE CLINIC | Facility: HOME HEALTHCARE | Age: 85
End: 2025-06-11

## 2025-06-11 DIAGNOSIS — E03.9 HYPOTHYROIDISM, UNSPECIFIED TYPE: ICD-10-CM

## 2025-06-11 DIAGNOSIS — I10 PRIMARY HYPERTENSION: ICD-10-CM

## 2025-06-11 DIAGNOSIS — F41.9 ANXIETY: ICD-10-CM

## 2025-06-11 DIAGNOSIS — N17.9 AKI (ACUTE KIDNEY INJURY) (HCC): Primary | ICD-10-CM

## 2025-06-11 RX ORDER — ALPRAZOLAM 0.25 MG
0.25 TABLET ORAL 3 TIMES DAILY PRN
Qty: 270 TABLET | Refills: 0 | Status: SHIPPED | OUTPATIENT
Start: 2025-06-11 | End: 2025-09-09

## 2025-06-11 RX ORDER — ALPRAZOLAM 0.25 MG
0.25 TABLET ORAL 3 TIMES DAILY PRN
Qty: 270 TABLET | Refills: 0 | OUTPATIENT
Start: 2025-06-11

## 2025-06-12 NOTE — TELEPHONE ENCOUNTER
Hi, this is Roxy Farmer. It's 11:00 in the morning on Wednesday. I would just like to follow up on my request for an alprazolam refill by the 12th. Could could you please call me and let me know if doctor has refilled it? I just talked to the and they don't have a request in yet. Thank you so much. Bye    LVM to let pt know that Alprazolam has been filled.

## 2025-07-02 DIAGNOSIS — E78.2 MIXED HYPERLIPIDEMIA: ICD-10-CM

## 2025-07-02 RX ORDER — FENOFIBRATE 48 MG/1
48 TABLET, FILM COATED ORAL DAILY
Qty: 90 TABLET | Refills: 3 | Status: SHIPPED | OUTPATIENT
Start: 2025-07-02

## 2025-07-14 ENCOUNTER — OFFICE VISIT (OUTPATIENT)
Dept: FAMILY MEDICINE CLINIC | Facility: HOME HEALTHCARE | Age: 85
End: 2025-07-14
Payer: MEDICARE

## 2025-07-14 VITALS
RESPIRATION RATE: 18 BRPM | HEIGHT: 67 IN | DIASTOLIC BLOOD PRESSURE: 80 MMHG | WEIGHT: 165 LBS | BODY MASS INDEX: 25.9 KG/M2 | OXYGEN SATURATION: 94 % | TEMPERATURE: 97.8 F | SYSTOLIC BLOOD PRESSURE: 132 MMHG | HEART RATE: 64 BPM

## 2025-07-14 DIAGNOSIS — K64.9 HEMORRHOIDS, UNSPECIFIED HEMORRHOID TYPE: ICD-10-CM

## 2025-07-14 DIAGNOSIS — E03.9 HYPOTHYROIDISM, UNSPECIFIED TYPE: ICD-10-CM

## 2025-07-14 DIAGNOSIS — N17.9 AKI (ACUTE KIDNEY INJURY) (HCC): ICD-10-CM

## 2025-07-14 DIAGNOSIS — K58.2 IRRITABLE BOWEL SYNDROME WITH BOTH CONSTIPATION AND DIARRHEA: Primary | ICD-10-CM

## 2025-07-14 DIAGNOSIS — E78.2 MIXED HYPERLIPIDEMIA: ICD-10-CM

## 2025-07-14 DIAGNOSIS — R05.2 SUBACUTE COUGH: ICD-10-CM

## 2025-07-14 DIAGNOSIS — F33.9 DEPRESSION, RECURRENT (HCC): ICD-10-CM

## 2025-07-14 PROCEDURE — 99213 OFFICE O/P EST LOW 20 MIN: CPT | Performed by: FAMILY MEDICINE

## 2025-07-14 NOTE — PROGRESS NOTES
"Name: Roxy Farmer      : 1940      MRN: [id number]  Encounter Provider: Casey Barrera MD  Encounter Date: 2025   Encounter department: [facility]  :  Assessment & Plan  Irritable bowel syndrome with both constipation and diarrhea    Orders:    Ambulatory Referral to Gastroenterology; Future    LUCIEN (acute kidney injury) (HCC)         Mixed hyperlipidemia         Depression, recurrent (HCC)         Hypothyroidism, unspecified type         Hemorrhoids, unspecified hemorrhoid type    Orders:    Ambulatory Referral to Gastroenterology; Future    hydrocortisone-pramoxine (PROCTOFOAM-HC) 1-1 % FOAM rectal foam; Insert 1 applicator into the rectum 2 (two) times a day for 7 days           History of Present Illness   Here fro f/up medical conditions. Does not want aggressive diagnosis and treatment. Has not yet gotten labs done ( I explained concerns with Cr and TSH). Having pain in lower lags and feet, mainly at night. Takes magnesium and not certain she will take anything else. Not sleeping well at night -often awake until 4 am. Recent IBS pain with cramping and gas, responsive to bentyl but not simethicone. Wishes to see Dr. Gimenez in GI. Had constipation but better now, but with hemorrhoids.       Review of Systems   Constitutional:  Negative for activity change, appetite change and fever.   Respiratory:  Negative for shortness of breath.    Cardiovascular:  Negative for chest pain.   Gastrointestinal:  Negative for abdominal pain, constipation, nausea and vomiting.        Constipation resloved   Genitourinary:  Negative for dysuria.   Neurological:  Negative for syncope, light-headedness and headaches.   Psychiatric/Behavioral:  Positive for sleep disturbance. Negative for confusion.        Objective   /80 (BP Location: Left arm, Patient Position: Sitting, Cuff Size: Standard)   Pulse 64   Temp 97.8 °F (36.6 °C) (Tympanic)   Resp 18   Ht 5' 7\" (1.702 m)   " Wt 74.8 kg (165 lb)   SpO2 94%   BMI 25.84 kg/m²      Physical Exam  Vitals reviewed.   Constitutional:       Appearance: Normal appearance.     Eyes:      General: No scleral icterus.     Conjunctiva/sclera: Conjunctivae normal.       Cardiovascular:      Rate and Rhythm: Normal rate and regular rhythm.      Pulses: Normal pulses.      Heart sounds: Normal heart sounds. No murmur heard.     No friction rub. No gallop.   Pulmonary:      Effort: Pulmonary effort is normal.      Breath sounds: Normal breath sounds.   Abdominal:      General: Abdomen is flat.      Palpations: Abdomen is soft.      Tenderness: There is no abdominal tenderness. There is no guarding or rebound.     Musculoskeletal:      Right lower leg: No edema.      Left lower leg: No edema.     Skin:     General: Skin is warm and dry.      Capillary Refill: Capillary refill takes less than 2 seconds.     Neurological:      General: No focal deficit present.      Mental Status: She is alert.     Psychiatric:         Mood and Affect: Mood normal.         Behavior: Behavior normal.

## 2025-07-15 DIAGNOSIS — K64.9 HEMORRHOIDS, UNSPECIFIED HEMORRHOID TYPE: Primary | ICD-10-CM

## 2025-07-15 RX ORDER — HYDROCORTISONE 25 MG/G
CREAM TOPICAL 2 TIMES DAILY
Qty: 28 G | Refills: 2 | Status: SHIPPED | OUTPATIENT
Start: 2025-07-15

## 2025-07-15 RX ORDER — HYDROCORTISONE 25 MG/G
CREAM TOPICAL
Refills: 0 | OUTPATIENT
Start: 2025-07-15

## 2025-07-15 RX ORDER — BENZONATATE 100 MG/1
100 CAPSULE ORAL 3 TIMES DAILY PRN
Qty: 60 CAPSULE | Refills: 5 | Status: SHIPPED | OUTPATIENT
Start: 2025-07-15

## 2025-07-17 ENCOUNTER — TELEPHONE (OUTPATIENT)
Age: 85
End: 2025-07-17

## 2025-07-17 NOTE — TELEPHONE ENCOUNTER
Patients GI provider:  Dr. Gimenez     Number to return call: (: [phone]     Reason for call: Received a referral, spoke with pt she will only see Dr Gimenez at [facility] only. No appts open.  Please verify and contact the pt back to discuss.     Scheduled procedure/appointment date if applicable:

## 2025-07-24 ENCOUNTER — CLINICAL SUPPORT (OUTPATIENT)
Dept: FAMILY MEDICINE CLINIC | Facility: HOME HEALTHCARE | Age: 85
End: 2025-07-24
Payer: MEDICARE

## 2025-07-24 DIAGNOSIS — I10 PRIMARY HYPERTENSION: ICD-10-CM

## 2025-07-24 DIAGNOSIS — E03.9 HYPOTHYROIDISM, UNSPECIFIED TYPE: ICD-10-CM

## 2025-07-24 DIAGNOSIS — E78.2 MIXED HYPERLIPIDEMIA: ICD-10-CM

## 2025-07-24 DIAGNOSIS — N17.9 AKI (ACUTE KIDNEY INJURY) (HCC): ICD-10-CM

## 2025-07-24 DIAGNOSIS — F33.9 DEPRESSION, RECURRENT (HCC): ICD-10-CM

## 2025-07-24 LAB
ALBUMIN SERPL BCG-MCNC: 4 G/DL (ref 3.5–5)
ALP SERPL-CCNC: 46 U/L (ref 34–104)
ALT SERPL W P-5'-P-CCNC: 20 U/L (ref 7–52)
ANION GAP SERPL CALCULATED.3IONS-SCNC: 9 MMOL/L (ref 4–13)
AST SERPL W P-5'-P-CCNC: 25 U/L (ref 13–39)
BILIRUB SERPL-MCNC: 0.46 MG/DL (ref 0.2–1)
BUN SERPL-MCNC: 34 MG/DL (ref 5–25)
CALCIUM SERPL-MCNC: 9.8 MG/DL (ref 8.4–10.2)
CHLORIDE SERPL-SCNC: 105 MMOL/L (ref 96–108)
CO2 SERPL-SCNC: 26 MMOL/L (ref 21–32)
CREAT SERPL-MCNC: 1.22 MG/DL (ref 0.6–1.3)
GFR SERPL CREATININE-BSD FRML MDRD: 39 ML/MIN/1.73SQ M
GLUCOSE SERPL-MCNC: 89 MG/DL (ref 65–140)
POTASSIUM SERPL-SCNC: 4.3 MMOL/L (ref 3.5–5.3)
PROT SERPL-MCNC: 7.5 G/DL (ref 6.4–8.4)
SODIUM SERPL-SCNC: 140 MMOL/L (ref 135–147)
T4 FREE SERPL-MCNC: 0.82 NG/DL (ref 0.61–1.12)
TSH SERPL DL<=0.05 MIU/L-ACNC: 5 UIU/ML (ref 0.45–4.5)

## 2025-07-24 PROCEDURE — 80053 COMPREHEN METABOLIC PANEL: CPT

## 2025-07-24 PROCEDURE — 84443 ASSAY THYROID STIM HORMONE: CPT

## 2025-07-24 PROCEDURE — 84439 ASSAY OF FREE THYROXINE: CPT

## 2025-07-31 ENCOUNTER — DOCUMENTATION (OUTPATIENT)
Dept: ADMINISTRATIVE | Facility: OTHER | Age: 85
End: 2025-07-31

## 2025-08-13 ENCOUNTER — OFFICE VISIT (OUTPATIENT)
Dept: GASTROENTEROLOGY | Facility: CLINIC | Age: 85
End: 2025-08-13
Payer: MEDICARE

## 2025-08-13 VITALS
HEART RATE: 66 BPM | DIASTOLIC BLOOD PRESSURE: 72 MMHG | TEMPERATURE: 98.2 F | BODY MASS INDEX: 25.9 KG/M2 | OXYGEN SATURATION: 95 % | SYSTOLIC BLOOD PRESSURE: 135 MMHG | WEIGHT: 165 LBS | HEIGHT: 67 IN

## 2025-08-13 DIAGNOSIS — K58.2 IRRITABLE BOWEL SYNDROME WITH BOTH CONSTIPATION AND DIARRHEA: Primary | ICD-10-CM

## 2025-08-13 DIAGNOSIS — K64.9 HEMORRHOIDS, UNSPECIFIED HEMORRHOID TYPE: ICD-10-CM

## 2025-08-13 DIAGNOSIS — R14.0 BLOATING: ICD-10-CM

## 2025-08-13 DIAGNOSIS — K21.9 GASTROESOPHAGEAL REFLUX DISEASE WITHOUT ESOPHAGITIS: ICD-10-CM

## 2025-08-13 PROCEDURE — 99214 OFFICE O/P EST MOD 30 MIN: CPT | Performed by: INTERNAL MEDICINE

## 2025-08-13 PROCEDURE — G2211 COMPLEX E/M VISIT ADD ON: HCPCS | Performed by: INTERNAL MEDICINE

## 2025-08-25 PROBLEM — K64.4 EXTERNAL HEMORRHOIDS: Status: ACTIVE | Noted: 2025-08-25

## 2025-08-25 PROBLEM — K64.9 HEMORRHOIDS: Status: ACTIVE | Noted: 2025-08-25
